# Patient Record
Sex: MALE | Race: WHITE | NOT HISPANIC OR LATINO | Employment: FULL TIME | URBAN - METROPOLITAN AREA
[De-identification: names, ages, dates, MRNs, and addresses within clinical notes are randomized per-mention and may not be internally consistent; named-entity substitution may affect disease eponyms.]

---

## 2017-01-15 ENCOUNTER — E-VISIT (OUTPATIENT)
Dept: FAMILY MEDICINE | Facility: CLINIC | Age: 42
End: 2017-01-15
Payer: COMMERCIAL

## 2017-01-15 DIAGNOSIS — J45.31 MILD PERSISTENT ASTHMA WITH EXACERBATION: Primary | ICD-10-CM

## 2017-01-15 DIAGNOSIS — J01.00 ACUTE NON-RECURRENT MAXILLARY SINUSITIS: ICD-10-CM

## 2017-01-15 PROCEDURE — 99444 ZZC PHYSICIAN ONLINE EVALUATION & MANAGEMENT SERVICE: CPT | Performed by: FAMILY MEDICINE

## 2017-01-15 RX ORDER — PREDNISONE 20 MG/1
40 TABLET ORAL DAILY
Qty: 10 TABLET | Refills: 0 | Status: SHIPPED | OUTPATIENT
Start: 2017-01-15 | End: 2017-02-16

## 2017-02-07 DIAGNOSIS — J45.31 MILD PERSISTENT ASTHMA WITH EXACERBATION: Primary | ICD-10-CM

## 2017-02-07 NOTE — Clinical Note
[facility]  [address] [facility] [id number] [phone]    February 8, 2017      Dano Kennedy  [address] [facility] [address]      Dear Dano,     Your clinic record indicates that you are due for an asthma update. We have a survey tool called an ACT (or Asthma Control Test) we use to measure the level of control of your asthma. Please complete the enclosed questionnaire and mail it back to us in the self-addressed stamped envelope.     If you have questions about this letter please contact your provider.     Sincerely,       Your [facility] Team

## 2017-02-07 NOTE — Clinical Note
[facility]                                             [address] [place]  [address]    February 8, 2017    Dano Kennedy  [address] [facility] [address]    Dear Dano,       We recently received a refill request for albuterol (PROAIR HFA/PROVENTIL.  We have refilled this for a one time 30 day supply only because you are due for a:    Asthma office visit      Please call at your earliest convenience so that there will not be a delay with your future refills.          Thank you,   Your [facility] Care Team/ms  [phone]

## 2017-02-08 RX ORDER — ALBUTEROL SULFATE 90 UG/1
2 AEROSOL, METERED RESPIRATORY (INHALATION) EVERY 6 HOURS PRN
Qty: 3 INHALER | Refills: 0 | Status: SHIPPED | OUTPATIENT
Start: 2017-02-08 | End: 2017-04-26

## 2017-02-15 ENCOUNTER — E-VISIT (OUTPATIENT)
Dept: FAMILY MEDICINE | Facility: CLINIC | Age: 42
End: 2017-02-15
Payer: COMMERCIAL

## 2017-02-15 DIAGNOSIS — J45.31 MILD PERSISTENT ASTHMA WITH EXACERBATION: ICD-10-CM

## 2017-02-15 PROCEDURE — 99444 ZZC PHYSICIAN ONLINE EVALUATION & MANAGEMENT SERVICE: CPT | Performed by: FAMILY MEDICINE

## 2017-02-16 ENCOUNTER — TELEPHONE (OUTPATIENT)
Dept: FAMILY MEDICINE | Facility: CLINIC | Age: 42
End: 2017-02-16

## 2017-02-16 RX ORDER — PREDNISONE 20 MG/1
40 TABLET ORAL DAILY
Qty: 10 TABLET | Refills: 0 | Status: SHIPPED | OUTPATIENT
Start: 2017-02-16 | End: 2017-07-11

## 2017-02-16 NOTE — TELEPHONE ENCOUNTER
PA started for Dulera through cover my meds  Key [id number]  Will await for a response.  Gem Sultana, cma

## 2017-02-17 NOTE — TELEPHONE ENCOUNTER
PA for Dulera has been approved 2/17/17 - 2/17/18.  Will notify pharmacy when they open.  Gem Sultana cma

## 2017-03-14 ENCOUNTER — TELEPHONE (OUTPATIENT)
Dept: FAMILY MEDICINE | Facility: CLINIC | Age: 42
End: 2017-03-14

## 2017-03-14 NOTE — TELEPHONE ENCOUNTER
Panel Management Review      Patient has the following on his problem list:     Asthma review     ACT Total Scores 12/8/2016   ACT TOTAL SCORE (Goal Greater than or Equal to 20) 15   In the past 12 months, how many times did you visit the emergency room for your asthma without being admitted to the hospital? 0   In the past 12 months, how many times were you hospitalized overnight because of your asthma? 0      1. Is Asthma diagnosis on the Problem List? Yes    2. Is Asthma listed on Health Maintenance? Yes    3. Patient is due for:  ACT and AAP      Composite cancer screening  Chart review shows that this patient is due/due soon for the following None  Summary:    Patient is due/failing the following:   AAP, ACT and LDL    Action needed:   Patient needs office visit for asthma and fasting lipids.    Type of outreach:    Sent [employer] message.    Questions for provider review:    None                                                                                                                                    Joy Omalley MA       Chart routed to Care Team .

## 2017-03-28 DIAGNOSIS — J45.30 MILD PERSISTENT ASTHMA: ICD-10-CM

## 2017-03-28 RX ORDER — ALBUTEROL SULFATE 0.83 MG/ML
1 SOLUTION RESPIRATORY (INHALATION) EVERY 6 HOURS PRN
Qty: 1 BOX | Refills: 0 | Status: SHIPPED | OUTPATIENT
Start: 2017-03-28 | End: 2017-11-06

## 2017-04-03 ENCOUNTER — TELEPHONE (OUTPATIENT)
Dept: FAMILY MEDICINE | Facility: CLINIC | Age: 42
End: 2017-04-03

## 2017-04-03 ENCOUNTER — OFFICE VISIT (OUTPATIENT)
Dept: FAMILY MEDICINE | Facility: CLINIC | Age: 42
End: 2017-04-03
Payer: COMMERCIAL

## 2017-04-03 VITALS
SYSTOLIC BLOOD PRESSURE: 124 MMHG | BODY MASS INDEX: 36.96 KG/M2 | OXYGEN SATURATION: 97 % | DIASTOLIC BLOOD PRESSURE: 79 MMHG | TEMPERATURE: 99 F | WEIGHT: 236 LBS | HEART RATE: 78 BPM | RESPIRATION RATE: 15 BRPM

## 2017-04-03 DIAGNOSIS — K12.2 UVULITIS: Primary | ICD-10-CM

## 2017-04-03 PROCEDURE — 99213 OFFICE O/P EST LOW 20 MIN: CPT | Performed by: NURSE PRACTITIONER

## 2017-04-03 RX ORDER — EPINEPHRINE 0.3 MG/.3ML
0.3 INJECTION SUBCUTANEOUS
Qty: 0.6 ML | Refills: 0 | Status: SHIPPED | OUTPATIENT
Start: 2017-04-03 | End: 2020-07-22

## 2017-04-03 RX ORDER — PREDNISONE 20 MG/1
TABLET ORAL
Qty: 20 TABLET | Refills: 0 | Status: SHIPPED | OUTPATIENT
Start: 2017-04-03 | End: 2017-07-11

## 2017-04-03 RX ORDER — PENICILLIN V POTASSIUM 500 MG/1
500 TABLET, FILM COATED ORAL 2 TIMES DAILY
Qty: 20 TABLET | Refills: 0 | Status: SHIPPED | OUTPATIENT
Start: 2017-04-03 | End: 2017-07-11

## 2017-04-03 ASSESSMENT — PAIN SCALES - GENERAL: PAINLEVEL: NO PAIN (0)

## 2017-04-03 NOTE — MR AVS SNAPSHOT
After Visit Summary   4/3/2017    Dano Kennedy    MRN: [id number]           Patient Information     Date Of Birth          1975        Visit Information        Provider Department      4/3/2017 11:00 AM Yumiko Mckeon APRN [facility]        Today's Diagnoses     Uvulitis    -  1      Care Instructions      Uvulitis    The uvula is the tissue that hangs in the back of the throat. Uvulitis is inflammation of the uvula. Inflammation happens when the body responds to an injury, allergic reaction, infection or illness. Symptoms of inflammation may include redness, irritation, itching, swelling, or burning. Uvulitis is more common in children than adults.  Symptoms of uvulitis include:    Sore throat    Trouble swallowing    Painful swallowing    Trouble breathing  Possible causes of uvulitis include:    Throat infection    Inhaling or swallowing chemicals     Inhaling hot air or steam    Allergic reaction to something eaten, touched or breathed in  In rare cases, uvulitis can be caused by a condition called angioedema. Angioendema can be:    Hereditary (runs in the family).     A side effect of a class of drugs used for high blood pressure called  ACE inhibitors.     Life-threatening. It can lead to swelling of the air passage in the mouth or throat. Severe swelling can block your breathing and cause death. Watch for the earliest signs of this illness. Call 911 if the swelling involves the face, mouth, or throat areas.  To help find the cause of the uvulitis, imaging tests may be done. If infection is suspected, swabs from the throat or samples of blood may be tested. Questions may be asked about an individual s vaccination history to be sure it is up to date. A cause for uvulitis is not always found.  Treatment depends on the cause and the severity of the symptoms.  Home care  Medicines: The doctor may prescribe antibiotics for an infection. For an allergic reaction or  angioedema, medicines called steroids or antihistamines may be given. Follow instructions when using any medicine.  To care for the condition at home:    Rest until the symptoms go away.    If medicines were prescribed, be sure they are taken as directed. They should be taken until they are gone or the healthcare provider says to stop them.    If you were told that your angioedema was from a medicine that you are taking, you must stop taking this medicine. Contact your doctor for a prescription for a different medicine. Advise future medical providers that you are allergic to this medicine.    Contact your healthcare provider before taking any over-the-counter medicines.    Drink fluids. Pain when swallowing may make it harder to drink and lead to dehydration. To prevent this, sip fluids throughout the day. Children can be given frozen juice bars, milk, or other cold liquids. Watch for the signs of dehydration listed below.    Ask your healthcare provider when you can return to work or school. Ask your child s healthcare provider when your child can return to school or .  Follow-up care  Follow up with the healthcare provider as directed. You may be referred to a specialist (an allergy doctor and/or an ear, nose, or throat doctor) for further evaluation and treatment. Make these visits as soon as possible.  When to seek medical advice  Call the healthcare provider right away for any of the following:    Symptoms not going away or getting worse    Symptoms of dehydration, including dark urine, dry mouth, cracked lips, dizziness, or sunken eyes    A child acting very sick or not improving  Fever in adults:     Fever over 100.4 F (38 C), or as directed by the healthcare provider.  Fever in children:    Child of any age has repeated fevers above 104 F (40 C).    Child younger than 2 years of age has a fever of 100.4 F (38 C) that continues for more than 1 day.    Child 2 years old or older has a fever of 100.4 F  (38 C) that continues for more than 3 days.  Call 911  Call 911 if any of these occur:    Drooling or trouble swallowing    Trouble breathing    Trouble talking    Swollen tongue, lips, face, or throat (may be indicated by voice changes)    Jerking and loss of consciousness; seizure    Lack of response, extreme drowsiness, or trouble waking up    Fainting    Confusion    Child being unresponsive    Skin or lips look blue, purple, or gray    4194-6261 [employer]. [address] [place] [address]. All rights reserved. This information is not intended as a substitute for professional medical care. Always follow your healthcare professional's instructions.              Follow-ups after your visit        Who to contact     If you have questions or need follow up information about today's clinic visit or your schedule please contact [facility] directly at [phone].  Normal or non-critical lab and imaging results will be communicated to you by MyChart, letter or phone within 4 business days after the clinic has received the results. If you do not hear from us within 7 days, please contact the clinic through [employer] or phone. If you have a critical or abnormal lab result, we will notify you by phone as soon as possible.  Submit refill requests through [employer] or call your pharmacy and they will forward the refill request to us. Please allow 3 business days for your refill to be completed.          Additional Information About Your Visit        MyChart Information     [employer] gives you secure access to your electronic health record. If you see a primary care provider, you can also send messages to your care team and make appointments. If you have questions, please call your primary care clinic.  If you do not have a primary care provider, please call [phone] and they will assist you.        Care EveryWhere ID     This is your Care EveryWhere ID. This could be used by other  organizations to access your [facility] medical records  [id number]        Your Vitals Were     Pulse Temperature Respirations Pulse Oximetry BMI (Body Mass Index)       78 99  F (37.2  C) (Oral) 15 97% 36.96 kg/m2        Blood Pressure from Last 3 Encounters:   04/03/17 124/79   12/14/16 120/80   12/08/16 117/73    Weight from Last 3 Encounters:   04/03/17 236 lb (107 kg)   12/14/16 232 lb (105.2 kg)   12/08/16 230 lb (104.3 kg)              Today, you had the following     No orders found for display         Today's Medication Changes          These changes are accurate as of: 4/3/17 11:26 AM.  If you have any questions, ask your nurse or doctor.               Start taking these medicines.        Dose/Directions    EPINEPHrine 0.3 MG/0.3ML injection   Commonly known as:  EPIPEN 2-DONNY   Used for:  Uvulitis   Started by:  Yumiko Mckeon APRN CNP        Dose:  0.3 mg   Inject 0.3 mLs (0.3 mg) into the muscle once as needed for anaphylaxis   Quantity:  0.6 mL   Refills:  0       penicillin V potassium 500 MG tablet   Commonly known as:  VEETID   Used for:  Uvulitis   Started by:  Yumiko Mckeon APRN CNP        Dose:  500 mg   Take 1 tablet (500 mg) by mouth 2 times daily   Quantity:  20 tablet   Refills:  0         These medicines have changed or have updated prescriptions.        Dose/Directions    * predniSONE 20 MG tablet   Commonly known as:  DELTASONE   This may have changed:  Another medication with the same name was added. Make sure you understand how and when to take each.   Used for:  Mild persistent asthma with exacerbation   Changed by:  Santa Hunter MD        Dose:  40 mg   Take 2 tablets (40 mg) by mouth daily   Quantity:  10 tablet   Refills:  0       * predniSONE 20 MG tablet   Commonly known as:  DELTASONE   This may have changed:  You were already taking a medication with the same name, and this prescription was added. Make sure you understand how and when to take each.   Used for:   Uvulitis   Changed by:  Yumiko Mckeon APRN CNP        Take 3 tabs (60 mg) by mouth daily x 3 days, 2 tabs (40 mg) daily x 3 days, 1 tab (20 mg) daily x 3 days, then 1/2 tab (10 mg) x 3 days.   Quantity:  20 tablet   Refills:  0       * Notice:  This list has 2 medication(s) that are the same as other medications prescribed for you. Read the directions carefully, and ask your doctor or other care provider to review them with you.         Where to get your medicines      These medications were sent to [facility] [address] [facility] [address] [facility] [address]  [address] [facility] [address]     Phone:  [phone]     EPINEPHrine 0.3 MG/0.3ML injection    penicillin V potassium 500 MG tablet    predniSONE 20 MG tablet                Primary Care Provider Office Phone # Fax #    Santa Hunter [phone]       [facility] [address] [facility] [address]        Thank you!     Thank you for choosing [facility]  for your care. Our goal is always to provide you with excellent care. Hearing back from our patients is one way we can continue to improve our services. Please take a few minutes to complete the written survey that you may receive in the mail after your visit with us. Thank you!             Your Updated Medication List - Protect others around you: Learn how to safely use, store and throw away your medicines at www.disposemymeds.org.          This list is accurate as of: 4/3/17 11:26 AM.  Always use your most recent med list.                   Brand Name Dispense Instructions for use    * albuterol 108 (90 BASE) MCG/ACT Inhaler    PROAIR HFA/PROVENTIL HFA/VENTOLIN HFA    3 Inhaler    Inhale 2 puffs into the lungs every 6 hours as needed for shortness of breath / dyspnea       * albuterol (2.5 MG/3ML) 0.083% neb solution     1 Box    Take 1 vial (2.5 mg) by nebulization every 6 hours as needed for  shortness of breath / dyspnea       amoxicillin-clavulanate 875-125 MG per tablet    AUGMENTIN    20 tablet    Take 1 tablet by mouth 2 times daily       EPINEPHrine 0.3 MG/0.3ML injection    EPIPEN 2-DONNY    0.6 mL    Inject 0.3 mLs (0.3 mg) into the muscle once as needed for anaphylaxis       fluticasone 50 MCG/ACT spray    FLONASE    1 Package    Spray 1-2 sprays into both nostrils daily       loratadine-pseudoePHEDrine  MG per 24 hr tablet    CLARITIN-D 24 HOUR    90 tablet    Take 1 tablet by mouth daily       mometasone-formoterol 200-5 MCG/ACT oral inhaler    DULERA    3 Inhaler    Inhale 2 puffs into the lungs 2 times daily       * order for DME     1 Device    Nebulizer tubing and mouthpiece       * order for DME      [employer] REMSTAR 60 Series Auto CPAP 5-18cm H2O, MIrage Fx standard nasal mask       order for DME     1 Device    Equipment being ordered: nebulizer       penicillin V potassium 500 MG tablet    VEETID    20 tablet    Take 1 tablet (500 mg) by mouth 2 times daily       * predniSONE 20 MG tablet    DELTASONE    10 tablet    Take 2 tablets (40 mg) by mouth daily       * predniSONE 20 MG tablet    DELTASONE    20 tablet    Take 3 tabs (60 mg) by mouth daily x 3 days, 2 tabs (40 mg) daily x 3 days, 1 tab (20 mg) daily x 3 days, then 1/2 tab (10 mg) x 3 days.       scopolamine 72 hr patch    TRANSDERM    4 patch    Place 1 patch onto the skin every 72 hours.  Apply to hairless area behind one ear at least 4 hours before travel.  Remove old patch and change every 3 days.       sildenafil 20 MG tablet    REVATIO/VIAGRA    10 tablet    Take 1 tablet (20 mg) by mouth daily as needed Take 1-5 tablets on empty stomach 30 minutes before intercourse.  Never use with nitroglycerin, terazosin or doxazosin.       tadalafil 5 MG tablet    CIALIS    30 tablet    Take 1 tablet (5 mg) by mouth as needed for erectile dysfunction       * Notice:  This list has 6 medication(s) that are the same as other  medications prescribed for you. Read the directions carefully, and ask your doctor or other care provider to review them with you.

## 2017-04-03 NOTE — PATIENT INSTRUCTIONS
Uvulitis    The uvula is the tissue that hangs in the back of the throat. Uvulitis is inflammation of the uvula. Inflammation happens when the body responds to an injury, allergic reaction, infection or illness. Symptoms of inflammation may include redness, irritation, itching, swelling, or burning. Uvulitis is more common in children than adults.  Symptoms of uvulitis include:    Sore throat    Trouble swallowing    Painful swallowing    Trouble breathing  Possible causes of uvulitis include:    Throat infection    Inhaling or swallowing chemicals     Inhaling hot air or steam    Allergic reaction to something eaten, touched or breathed in  In rare cases, uvulitis can be caused by a condition called angioedema. Angioendema can be:    Hereditary (runs in the family).     A side effect of a class of drugs used for high blood pressure called  ACE inhibitors.     Life-threatening. It can lead to swelling of the air passage in the mouth or throat. Severe swelling can block your breathing and cause death. Watch for the earliest signs of this illness. Call 911 if the swelling involves the face, mouth, or throat areas.  To help find the cause of the uvulitis, imaging tests may be done. If infection is suspected, swabs from the throat or samples of blood may be tested. Questions may be asked about an individual s vaccination history to be sure it is up to date. A cause for uvulitis is not always found.  Treatment depends on the cause and the severity of the symptoms.  Home care  Medicines: The doctor may prescribe antibiotics for an infection. For an allergic reaction or angioedema, medicines called steroids or antihistamines may be given. Follow instructions when using any medicine.  To care for the condition at home:    Rest until the symptoms go away.    If medicines were prescribed, be sure they are taken as directed. They should be taken until they are gone or the healthcare provider says to stop them.    If you were  told that your angioedema was from a medicine that you are taking, you must stop taking this medicine. Contact your doctor for a prescription for a different medicine. Advise future medical providers that you are allergic to this medicine.    Contact your healthcare provider before taking any over-the-counter medicines.    Drink fluids. Pain when swallowing may make it harder to drink and lead to dehydration. To prevent this, sip fluids throughout the day. Children can be given frozen juice bars, milk, or other cold liquids. Watch for the signs of dehydration listed below.    Ask your healthcare provider when you can return to work or school. Ask your child s healthcare provider when your child can return to school or .  Follow-up care  Follow up with the healthcare provider as directed. You may be referred to a specialist (an allergy doctor and/or an ear, nose, or throat doctor) for further evaluation and treatment. Make these visits as soon as possible.  When to seek medical advice  Call the healthcare provider right away for any of the following:    Symptoms not going away or getting worse    Symptoms of dehydration, including dark urine, dry mouth, cracked lips, dizziness, or sunken eyes    A child acting very sick or not improving  Fever in adults:     Fever over 100.4 F (38 C), or as directed by the healthcare provider.  Fever in children:    Child of any age has repeated fevers above 104 F (40 C).    Child younger than 2 years of age has a fever of 100.4 F (38 C) that continues for more than 1 day.    Child 2 years old or older has a fever of 100.4 F (38 C) that continues for more than 3 days.  Call 911  Call 911 if any of these occur:    Drooling or trouble swallowing    Trouble breathing    Trouble talking    Swollen tongue, lips, face, or throat (may be indicated by voice changes)    Jerking and loss of consciousness; seizure    Lack of response, extreme drowsiness, or trouble waking  up    Fainting    Confusion    Child being unresponsive    Skin or lips look blue, purple, or gray    5405-2126 [employer]. [address] [place] [address], [place] [address]. All rights reserved. This information is not intended as a substitute for professional medical care. Always follow your healthcare professional's instructions.

## 2017-04-03 NOTE — PROGRESS NOTES
"  SUBJECTIVE:                                                    Dano Kennedy is a 42 year old male who presents to clinic today for the following health issues:    He at shrimp last night at 10 pm, was up 2 hours past that with no symptoms  When he awoke this morning he felt like his throat was dry and was having trouble swallowing. His uvula is red and swollen. He took 2 bendaryl which relieved the symptoms. He never has had facial or lip swelling, no hives  No known anaphylactic reactions, has had shrimp \"many times\" before with no reaction.    Problem list and histories reviewed & adjusted, as indicated.  Additional history: as documented    Patient Active Problem List   Diagnosis     Anxiety     Mild persistent asthma with exacerbation     Seasonal allergic rhinitis     Environmental allergies     GSE (gluten-sensitive enteropathy)     BMI 33.0-33.9,adult     Hyperlipidemia LDL goal <130     ADHD (attention deficit hyperactivity disorder)     Heartburn     WENDY (obstructive sleep apnea)     Past Surgical History:   Procedure Laterality Date     ENDOSCOPIC POLYPECTOMY NASAL       LASER HOLMIUM LITHOTRIPSY URETER(S), INSERT STENT, COMBINED Left 1/14/2015    Procedure: COMBINED CYSTOSCOPY, URETEROSCOPY, LASER HOLMIUM LITHOTRIPSY URETER(S), INSERT STENT;  Surgeon: Derrek Cobb MD;  Location:  OR       Social History   Substance Use Topics     Smoking status: Never Smoker     Smokeless tobacco: Never Used     Alcohol use Yes     No family history on file.        Reviewed and updated as needed this visit by clinical staff  Tobacco       Reviewed and updated as needed this visit by Provider         ROS:  Constitutional, HEENT, cardiovascular, pulmonary, GI, , musculoskeletal, neuro, skin, endocrine and psych systems are negative, except as otherwise noted.    OBJECTIVE:                                                    /79  Pulse 78  Temp 99  F (37.2  C) (Oral)  Resp 15  Wt 236 lb (107 kg)  " SpO2 97%  BMI 36.96 kg/m2  Body mass index is 36.96 kg/(m^2).  GENERAL: alert and no distress  EYES: Eyes grossly normal to inspection, PERRL and conjunctivae and sclerae normal  HENT: normal cephalic/atraumatic, ear canals and TM's normal, nose and mouth without ulcers or lesions and uvula erythematous and inflammed  NECK: no adenopathy, no asymmetry, masses, or scars and thyroid normal to palpation  RESP: lungs clear to auscultation - no rales, rhonchi or wheezes  CV: regular rate and rhythm, normal S1 S2, no S3 or S4, no murmur, click or rub, no peripheral edema and peripheral pulses strong  SKIN: no suspicious lesions or rashes    Diagnostic Test Results:  none      ASSESSMENT/PLAN:                                                        1. Uvulitis    Being unknown cause treated for possible infectious cause and allergic cause    1)- penicillin V potassium (VEETID) 500 MG tablet; Take 1 tablet (500 mg) by mouth 2 times daily  Dispense: 20 tablet; Refill: 0    2)- predniSONE (DELTASONE) 20 MG tablet; Take 3 tabs (60 mg) by mouth daily x 3 days, 2 tabs (40 mg) daily x 3 days, 1 tab (20 mg) daily x 3 days, then 1/2 tab (10 mg) x 3 days.  Dispense: 20 tablet; Refill: 0    Advised on when and how to use epipen if needed  - EPINEPHrine (EPIPEN 2-DONNY) 0.3 MG/0.3ML injection; Inject 0.3 mLs (0.3 mg) into the muscle once as needed for anaphylaxis  Dispense: 0.6 mL; Refill: 0    Home treat and monitor symptoms, if worsening or not improving call or rtc  See Patient Instructions  Patient Instructions     Uvulitis    The uvula is the tissue that hangs in the back of the throat. Uvulitis is inflammation of the uvula. Inflammation happens when the body responds to an injury, allergic reaction, infection or illness. Symptoms of inflammation may include redness, irritation, itching, swelling, or burning. Uvulitis is more common in children than adults.  Symptoms of uvulitis include:    Sore throat    Trouble  swallowing    Painful swallowing    Trouble breathing  Possible causes of uvulitis include:    Throat infection    Inhaling or swallowing chemicals     Inhaling hot air or steam    Allergic reaction to something eaten, touched or breathed in  In rare cases, uvulitis can be caused by a condition called angioedema. Angioendema can be:    Hereditary (runs in the family).     A side effect of a class of drugs used for high blood pressure called  ACE inhibitors.     Life-threatening. It can lead to swelling of the air passage in the mouth or throat. Severe swelling can block your breathing and cause death. Watch for the earliest signs of this illness. Call 911 if the swelling involves the face, mouth, or throat areas.  To help find the cause of the uvulitis, imaging tests may be done. If infection is suspected, swabs from the throat or samples of blood may be tested. Questions may be asked about an individual s vaccination history to be sure it is up to date. A cause for uvulitis is not always found.  Treatment depends on the cause and the severity of the symptoms.  Home care  Medicines: The doctor may prescribe antibiotics for an infection. For an allergic reaction or angioedema, medicines called steroids or antihistamines may be given. Follow instructions when using any medicine.  To care for the condition at home:    Rest until the symptoms go away.    If medicines were prescribed, be sure they are taken as directed. They should be taken until they are gone or the healthcare provider says to stop them.    If you were told that your angioedema was from a medicine that you are taking, you must stop taking this medicine. Contact your doctor for a prescription for a different medicine. Advise future medical providers that you are allergic to this medicine.    Contact your healthcare provider before taking any over-the-counter medicines.    Drink fluids. Pain when swallowing may make it harder to drink and lead to  dehydration. To prevent this, sip fluids throughout the day. Children can be given frozen juice bars, milk, or other cold liquids. Watch for the signs of dehydration listed below.    Ask your healthcare provider when you can return to work or school. Ask your child s healthcare provider when your child can return to school or .  Follow-up care  Follow up with the healthcare provider as directed. You may be referred to a specialist (an allergy doctor and/or an ear, nose, or throat doctor) for further evaluation and treatment. Make these visits as soon as possible.  When to seek medical advice  Call the healthcare provider right away for any of the following:    Symptoms not going away or getting worse    Symptoms of dehydration, including dark urine, dry mouth, cracked lips, dizziness, or sunken eyes    A child acting very sick or not improving  Fever in adults:     Fever over 100.4 F (38 C), or as directed by the healthcare provider.  Fever in children:    Child of any age has repeated fevers above 104 F (40 C).    Child younger than 2 years of age has a fever of 100.4 F (38 C) that continues for more than 1 day.    Child 2 years old or older has a fever of 100.4 F (38 C) that continues for more than 3 days.  Call 911  Call 911 if any of these occur:    Drooling or trouble swallowing    Trouble breathing    Trouble talking    Swollen tongue, lips, face, or throat (may be indicated by voice changes)    Jerking and loss of consciousness; seizure    Lack of response, extreme drowsiness, or trouble waking up    Fainting    Confusion    Child being unresponsive    Skin or lips look blue, purple, or gray    6658-0076 [employer]. [address] [place] [address], [place] [address]. All rights reserved. This information is not intended as a substitute for professional medical care. Always follow your healthcare professional's instructions.            RAMON [facility]

## 2017-04-03 NOTE — TELEPHONE ENCOUNTER
Patient states that he woke up this morning and his uvula appears swollen in the mirror.  States that it doesn't hurt.  He did take some Benadryl now.  No breathing issues.  Is able to swallow fluids/food.  He states did get a little better with he Benadryl.  He had had cold shrimp at 9 or 10pm last night.  States was awake for 3 hrs after that and no issues.  Has had shrimp multiple times in past without any issues.   made him an 11am appointment today with Yumiko Mckeon.  He is aware that if he has any worsening of symptoms; ie throat swelling, lips swelling, respiratory concerns of any kind, difficulty swallowing fluids he needs to call 911 immediately.  He states understanding.  Praveena Dougherty RN

## 2017-04-04 ASSESSMENT — ASTHMA QUESTIONNAIRES: ACT_TOTALSCORE: 15

## 2017-04-10 DIAGNOSIS — N52.9 ERECTILE DYSFUNCTION, UNSPECIFIED ERECTILE DYSFUNCTION TYPE: ICD-10-CM

## 2017-04-10 NOTE — TELEPHONE ENCOUNTER
tadalafil      Last Written Prescription Date: 12/14/16  Last Fill Quantity: 30,  # refills: 0   Last Office Visit with [facility], [facility] or [facility] prescribing provider: 12/14/16 Cobb

## 2017-04-11 DIAGNOSIS — N52.9 ERECTILE DYSFUNCTION, UNSPECIFIED ERECTILE DYSFUNCTION TYPE: ICD-10-CM

## 2017-04-11 RX ORDER — TADALAFIL 5 MG/1
5 TABLET ORAL PRN
Qty: 30 TABLET | Refills: 0 | Status: SHIPPED | OUTPATIENT
Start: 2017-04-11 | End: 2017-04-15

## 2017-04-12 RX ORDER — SILDENAFIL CITRATE 20 MG/1
20 TABLET ORAL DAILY PRN
Qty: 10 TABLET | Refills: 11 | OUTPATIENT
Start: 2017-04-12

## 2017-04-14 DIAGNOSIS — N52.9 ERECTILE DYSFUNCTION, UNSPECIFIED ERECTILE DYSFUNCTION TYPE: ICD-10-CM

## 2017-04-14 NOTE — TELEPHONE ENCOUNTER
Both cialis and viagra are on patient's med list.   Routing to PCP to clarify if patient is to be on both.    Thu Bishop RN

## 2017-04-14 NOTE — TELEPHONE ENCOUNTER
tadalafil (CIALIS) 5 MG tablet      Last Written Prescription Date: 4/11/17  Last Fill Quantity: 30,  # refills: 0   Last Office Visit with [facility], [facility] or [facility] prescribing provider: 12/14/16

## 2017-04-17 RX ORDER — SILDENAFIL CITRATE 20 MG/1
20 TABLET ORAL DAILY PRN
Qty: 10 TABLET | Refills: 11 | Status: SHIPPED | OUTPATIENT
Start: 2017-04-17 | End: 2017-04-21

## 2017-04-17 RX ORDER — TADALAFIL 5 MG/1
5 TABLET ORAL PRN
Qty: 30 TABLET | Refills: 0 | Status: SHIPPED | OUTPATIENT
Start: 2017-04-17 | End: 2018-01-08

## 2017-04-17 NOTE — TELEPHONE ENCOUNTER
cialis  Prescription approved per [facility].  Delaney Guy RN

## 2017-04-21 ENCOUNTER — TELEPHONE (OUTPATIENT)
Dept: FAMILY MEDICINE | Facility: CLINIC | Age: 42
End: 2017-04-21

## 2017-04-21 DIAGNOSIS — N52.9 ERECTILE DYSFUNCTION, UNSPECIFIED ERECTILE DYSFUNCTION TYPE: ICD-10-CM

## 2017-04-21 RX ORDER — SILDENAFIL CITRATE 20 MG/1
20 TABLET ORAL DAILY PRN
Qty: 10 TABLET | Refills: 11 | Status: SHIPPED | OUTPATIENT
Start: 2017-04-21 | End: 2017-04-21

## 2017-04-21 RX ORDER — SILDENAFIL CITRATE 20 MG/1
20 TABLET ORAL DAILY PRN
Qty: 10 TABLET | Refills: 11 | Status: SHIPPED | OUTPATIENT
Start: 2017-04-21 | End: 2018-01-08

## 2017-04-21 NOTE — TELEPHONE ENCOUNTER
Is patient getting both Cialis and Viagra?    Patient last filled Cialis 12/22/16  Last filled Viagra 12/10/16.    If patient is to be on both, then pharmacy will need new Viagra prescription as they never received the 4/17/17 prescription.     Ronda Rosenbaum, RAJESHN RN

## 2017-04-21 NOTE — TELEPHONE ENCOUNTER
Called in clarification to Thao at pharmacy.       Please fax over prescription.    Thank you, RAJESH BinghamN RN

## 2017-04-25 ENCOUNTER — TELEPHONE (OUTPATIENT)
Dept: FAMILY MEDICINE | Facility: CLINIC | Age: 42
End: 2017-04-25

## 2017-04-25 NOTE — TELEPHONE ENCOUNTER
4/21/17 prescription was printed as local print and pharmacy never received the prescription. Gave VO to pharmacist Sarah.   Ronda Rosenbaum, RAJESHN RN

## 2017-04-25 NOTE — TELEPHONE ENCOUNTER
Sarah from [facility] is calling for status on RX: sildenafil (REVATIO/VIAGRA) 20 MG tablet. Please call to advise. Thank  You.

## 2017-04-26 DIAGNOSIS — J45.31 MILD PERSISTENT ASTHMA WITH EXACERBATION: ICD-10-CM

## 2017-04-26 RX ORDER — ALBUTEROL SULFATE 90 UG/1
AEROSOL, METERED RESPIRATORY (INHALATION)
Qty: 25.5 G | Refills: 0 | Status: SHIPPED | OUTPATIENT
Start: 2017-04-26 | End: 2017-08-13

## 2017-06-08 DIAGNOSIS — J30.2 SEASONAL ALLERGIC RHINITIS, UNSPECIFIED ALLERGIC RHINITIS TRIGGER: Primary | ICD-10-CM

## 2017-06-08 RX ORDER — LORATADINE PSEUDOEPHEDRINE SULFATE 10; 240 MG/1; MG/1
TABLET, EXTENDED RELEASE ORAL
Qty: 90 TABLET | Refills: 0 | OUTPATIENT
Start: 2017-06-08

## 2017-06-12 RX ORDER — LORATADINE PSEUDOEPHEDRINE SULFATE 10; 240 MG/1; MG/1
TABLET, EXTENDED RELEASE ORAL
Qty: 90 TABLET | Refills: 0 | Status: SHIPPED | OUTPATIENT
Start: 2017-06-12 | End: 2017-09-22

## 2017-07-11 ENCOUNTER — TELEPHONE (OUTPATIENT)
Dept: FAMILY MEDICINE | Facility: CLINIC | Age: 42
End: 2017-07-11

## 2017-07-11 DIAGNOSIS — J45.31 MILD PERSISTENT ASTHMA WITH EXACERBATION: Primary | ICD-10-CM

## 2017-07-11 NOTE — TELEPHONE ENCOUNTER
Due to copyright laws, staff cannot call and do ACT over phone. Must be sent via postal mail.   , can you please mail the ACT and let her know we will need him to be fasting the next time he is seen.   Thanks, Ronda Rosenbaum, RAJESHN RN

## 2017-07-11 NOTE — LETTER
[facility]  [address] [facility] [id number] [phone]    July 11, 2017          Dano Kennedy  [address] [facility] [address]      Dear Dano,     Your clinic record indicates that you are due for an asthma update. We have a survey tool called an ACT (or Asthma Control Test) we use to measure the level of control of your asthma. Please complete the enclosed questionnaire and mail it back to us in the self-addressed stamped envelope.    You are due for fasting labs (cholesterol) the next time you are in the clinic.       If you have questions about this letter please contact your provider.     Sincerely,       Your [facility] Team

## 2017-07-11 NOTE — TELEPHONE ENCOUNTER
Panel Management Review      Patient has the following on his problem list:     Asthma review     ACT Total Scores 4/3/2017   ACT TOTAL SCORE (Goal Greater than or Equal to 20) 15   In the past 12 months, how many times did you visit the emergency room for your asthma without being admitted to the hospital? 0   In the past 12 months, how many times were you hospitalized overnight because of your asthma? 0      1. Is Asthma diagnosis on the Problem List? Yes    2. Is Asthma listed on Health Maintenance? Yes    3. Patient is due for:  ACT      Composite cancer screening  Chart review shows that this patient is due/due soon for the following None  Summary:    Patient is due/failing the following:   AAP, ACT and LDL    Action needed:   Routed to provider for review.    Type of outreach:    None, routed to provider for review.    Questions for provider review:    Failing act and needs aap, also need lipid panel                                                                                                                                    Jyo Omalley MA      Chart routed to Provider .

## 2017-07-11 NOTE — TELEPHONE ENCOUNTER
Letter & ACT mailed to patient.  Will await for the patient to return it back to us.  Becca Dietz,

## 2017-08-13 DIAGNOSIS — J45.31 MILD PERSISTENT ASTHMA WITH EXACERBATION: ICD-10-CM

## 2017-08-13 NOTE — LETTER
August 15, 2017    Dano Kennedy  [address] [facility] [address]        Dear Dano,       We recently received a refill request for ALBUTEROL 108 (90 BASE) MCG/ACT inhaler.  We have refilled this for a one time refill only because you are due for a:    Asthma office visit      Please call at your earliest convenience so that there will not be a delay with your future refills.          Thank you,   Your [facility] Team/[facility]  [phone]

## 2017-08-14 RX ORDER — ALBUTEROL SULFATE 90 UG/1
AEROSOL, METERED RESPIRATORY (INHALATION)
Qty: 25.5 G | Refills: 0 | Status: SHIPPED | OUTPATIENT
Start: 2017-08-14 | End: 2018-09-26

## 2017-09-22 ENCOUNTER — OFFICE VISIT (OUTPATIENT)
Dept: FAMILY MEDICINE | Facility: CLINIC | Age: 42
End: 2017-09-22
Payer: COMMERCIAL

## 2017-09-22 VITALS
TEMPERATURE: 97.6 F | BODY MASS INDEX: 36.96 KG/M2 | HEART RATE: 83 BPM | OXYGEN SATURATION: 97 % | DIASTOLIC BLOOD PRESSURE: 80 MMHG | WEIGHT: 236 LBS | SYSTOLIC BLOOD PRESSURE: 126 MMHG

## 2017-09-22 DIAGNOSIS — J45.30 MILD PERSISTENT ASTHMA, UNCOMPLICATED: Primary | ICD-10-CM

## 2017-09-22 DIAGNOSIS — Z91.09 ENVIRONMENTAL ALLERGIES: ICD-10-CM

## 2017-09-22 PROCEDURE — 99213 OFFICE O/P EST LOW 20 MIN: CPT | Performed by: PHYSICIAN ASSISTANT

## 2017-09-22 RX ORDER — ALBUTEROL SULFATE 90 UG/1
2 AEROSOL, METERED RESPIRATORY (INHALATION) EVERY 6 HOURS PRN
Qty: 1 INHALER | Refills: 0 | Status: SHIPPED | OUTPATIENT
Start: 2017-09-22 | End: 2017-10-27

## 2017-09-22 ASSESSMENT — ENCOUNTER SYMPTOMS
HEMOPTYSIS: 0
FEVER: 0
WEIGHT LOSS: 0
CONSTITUTIONAL NEGATIVE: 1
EYE PAIN: 0
GASTROINTESTINAL NEGATIVE: 1
CARDIOVASCULAR NEGATIVE: 1
COUGH: 0
DIAPHORESIS: 0
RESPIRATORY NEGATIVE: 1
PALPITATIONS: 0

## 2017-09-22 NOTE — MR AVS SNAPSHOT
After Visit Summary   9/22/2017    Dano Kennedy    MRN: [id number]           Patient Information     Date Of Birth          1975        Visit Information        Provider Department      9/22/2017 2:40 PM Lee Ann Cruz PA-C [facility]        Today's Diagnoses     Mild persistent asthma, uncomplicated    -  1    Environmental allergies           Follow-ups after your visit        Who to contact     If you have questions or need follow up information about today's clinic visit or your schedule please contact [facility] directly at [phone].  Normal or non-critical lab and imaging results will be communicated to you by [employer], letter or phone within 4 business days after the clinic has received the results. If you do not hear from us within 7 days, please contact the clinic through [employer] or phone. If you have a critical or abnormal lab result, we will notify you by phone as soon as possible.  Submit refill requests through [employer] or call your pharmacy and they will forward the refill request to us. Please allow 3 business days for your refill to be completed.          Additional Information About Your Visit        MyChart Information     [employer] gives you secure access to your electronic health record. If you see a primary care provider, you can also send messages to your care team and make appointments. If you have questions, please call your primary care clinic.  If you do not have a primary care provider, please call [phone] and they will assist you.        Care EveryWhere ID     This is your Care EveryWhere ID. This could be used by other organizations to access your [place] medical records  [id number]        Your Vitals Were     Pulse Temperature Pulse Oximetry BMI (Body Mass Index)          83 97.6  F (36.4  C) (Oral) 97% 36.96 kg/m2         Blood Pressure from Last 3 Encounters:   09/22/17 126/80   04/03/17 124/79   12/14/16 120/80    Weight from  Last 3 Encounters:   09/22/17 236 lb (107 kg)   04/03/17 236 lb (107 kg)   12/14/16 232 lb (105.2 kg)              Today, you had the following     No orders found for display         Today's Medication Changes          These changes are accurate as of: 9/22/17  2:59 PM.  If you have any questions, ask your nurse or doctor.               These medicines have changed or have updated prescriptions.        Dose/Directions    * albuterol (2.5 MG/3ML) 0.083% neb solution   This may have changed:  Another medication with the same name was added. Make sure you understand how and when to take each.   Used for:  Mild persistent asthma   Changed by:  Santa Hunter MD        Dose:  1 vial   Take 1 vial (2.5 mg) by nebulization every 6 hours as needed for shortness of breath / dyspnea   Quantity:  1 Box   Refills:  0       * PROAIR  (90 BASE) MCG/ACT Inhaler   This may have changed:  Another medication with the same name was added. Make sure you understand how and when to take each.   Used for:  Mild persistent asthma with exacerbation   Generic drug:  albuterol   Changed by:  Santa Hunter MD        INHALE 2 PUFFS INTO THE LUNGS EVERY 6 HOURS AS NEEDED FOR SHORTNESS OF BREATH.   Quantity:  25.5 g   Refills:  0       * albuterol 108 (90 BASE) MCG/ACT Inhaler   Commonly known as:  PROAIR HFA/PROVENTIL HFA/VENTOLIN HFA   This may have changed:  You were already taking a medication with the same name, and this prescription was added. Make sure you understand how and when to take each.   Used for:  Mild persistent asthma, uncomplicated   Changed by:  Lee Ann Cruz PA-C        Dose:  2 puff   Inhale 2 puffs into the lungs every 6 hours as needed for shortness of breath / dyspnea or wheezing   Quantity:  1 Inhaler   Refills:  0       loratadine-pseudoePHEDrine  MG per 24 hr tablet   Commonly known as:  CLARITIN-D 24 HOUR   This may have changed:  See the new instructions.   Used for:  Environmental allergies    Changed by:  Lee Ann Cruz PA-C        Dose:  1 tablet   Take 1 tablet by mouth daily   Quantity:  30 tablet   Refills:  0       * Notice:  This list has 3 medication(s) that are the same as other medications prescribed for you. Read the directions carefully, and ask your doctor or other care provider to review them with you.         Where to get your medicines      These medications were sent to [facility] [id number] [place] [address] [address] [place] [address] AT SEC [address]  [address] [facility] [address]     Phone:  [phone]     albuterol 108 (90 BASE) MCG/ACT Inhaler    mometasone-formoterol 200-5 MCG/ACT oral inhaler         Some of these will need a paper prescription and others can be bought over the counter.  Ask your nurse if you have questions.     Bring a paper prescription for each of these medications     loratadine-pseudoePHEDrine  MG per 24 hr tablet                Primary Care Provider Office Phone # Fax #    Santa Hunter [phone] [address] [facility] [address]        Equal Access to Services     [facility]: Hadii lori mitchell hadasho Soroulaali, waaxda luqadaha, qaybta kaalmada adeegyada, daiana lim . So [facility] [phone].    ATENCIÓN: Si habla español, tiene a skinner disposición servicios gratuitos de asistencia lingüística. [facility] [phone].    We comply with applicable federal civil rights laws and [place] laws. We do not discriminate on the basis of race, color, national origin, age, disability sex, sexual orientation or gender identity.            Thank you!     Thank you for choosing [facility]  for your care. Our goal is always to provide you with excellent care. Hearing back from our patients is one way we can continue to improve our services. Please take a few minutes to complete the written survey that you may receive in the mail after your visit with us. Thank  you!             Your Updated Medication List - Protect others around you: Learn how to safely use, store and throw away your medicines at www.disposemymeds.org.          This list is accurate as of: 9/22/17  2:59 PM.  Always use your most recent med list.                   Brand Name Dispense Instructions for use Diagnosis    * albuterol (2.5 MG/3ML) 0.083% neb solution     1 Box    Take 1 vial (2.5 mg) by nebulization every 6 hours as needed for shortness of breath / dyspnea    Mild persistent asthma       * PROAIR  (90 BASE) MCG/ACT Inhaler   Generic drug:  albuterol     25.5 g    INHALE 2 PUFFS INTO THE LUNGS EVERY 6 HOURS AS NEEDED FOR SHORTNESS OF BREATH.    Mild persistent asthma with exacerbation       * albuterol 108 (90 BASE) MCG/ACT Inhaler    PROAIR HFA/PROVENTIL HFA/VENTOLIN HFA    1 Inhaler    Inhale 2 puffs into the lungs every 6 hours as needed for shortness of breath / dyspnea or wheezing    Mild persistent asthma, uncomplicated       EPINEPHrine 0.3 MG/0.3ML injection 2-pack    EPIPEN 2-DONNY    0.6 mL    Inject 0.3 mLs (0.3 mg) into the muscle once as needed for anaphylaxis    Uvulitis       fluticasone 50 MCG/ACT spray    FLONASE    1 Package    Spray 1-2 sprays into both nostrils daily    Seasonal allergic rhinitis       loratadine-pseudoePHEDrine  MG per 24 hr tablet    CLARITIN-D 24 HOUR    30 tablet    Take 1 tablet by mouth daily    Environmental allergies       mometasone-formoterol 200-5 MCG/ACT oral inhaler    DULERA    3 Inhaler    Inhale 2 puffs into the lungs 2 times daily    Mild persistent asthma, uncomplicated       * order for DME     1 Device    Nebulizer tubing and mouthpiece    Mild persistent asthma with exacerbation       * order for DME      [employer] REMSTAR 60 Series Auto CPAP 5-18cm H2O, MIrage Fx standard nasal mask        order for DME     1 Device    Equipment being ordered: nebulizer    Mild persistent asthma with exacerbation       sildenafil 20 MG tablet     REVATIO    10 tablet    Take 1 tablet (20 mg) by mouth daily as needed Take 1-5 tablets on empty stomach 30 minutes before intercourse.  Never use with nitroglycerin, terazosin or doxazosin.    Erectile dysfunction, unspecified erectile dysfunction type       tadalafil 5 MG tablet    CIALIS    30 tablet    Take 1 tablet (5 mg) by mouth as needed for erectile dysfunction    Erectile dysfunction, unspecified erectile dysfunction type       * Notice:  This list has 5 medication(s) that are the same as other medications prescribed for you. Read the directions carefully, and ask your doctor or other care provider to review them with you.

## 2017-09-22 NOTE — PROGRESS NOTES
SUBJECTIVE:     SO  Dano Kennedy is a 42 year old male who presents to clinic today for the following health issues:  Has known history of mild persistent asthma and is currently on Dulera and albuterol inhalers. But he does not take the dulera inhaler on a consistent basis.  Was told that he would need to be seen in order to have his albuterol inhaler refilled.  States that he does have symptoms at night disrupting his sleep. No recent asthma exacerbation requiring ER visit or hospitalization.  States that his asthma is primarily triggered by allergies and takes a Claritin-D consistently primarily during the summer, fall and winter    Asthma Follow-Up  Was ACT completed today?  Yes    ACT Total Scores 9/22/2017   ACT TOTAL SCORE -   ASTHMA ER VISITS -   ASTHMA HOSPITALIZATIONS -   ACT TOTAL SCORE (Goal Greater than or Equal to 20) 14   In the past 12 months, how many times did you visit the emergency room for your asthma without being admitted to the hospital? -   In the past 12 months, how many times were you hospitalized overnight because of your asthma? -       Recent asthma triggers that patient is dealing with: Seasonal allergies, exercise     Amount of exercise or physical activity: 3 days per week    Problems taking medications regularly: No    Medication side effects: none  Diet: regular (no restrictions)      Reviewed PMH.  Patient Active Problem List   Diagnosis     Anxiety     Mild persistent asthma with exacerbation     Seasonal allergic rhinitis     Environmental allergies     GSE (gluten-sensitive enteropathy)     BMI 33.0-33.9,adult     Hyperlipidemia LDL goal <130     ADHD (attention deficit hyperactivity disorder)     Heartburn     WENDY (obstructive sleep apnea)     Current Outpatient Prescriptions   Medication Sig Dispense Refill     loratadine-pseudoePHEDrine (CLARITIN-D 24 HOUR)  MG per 24 hr tablet Take 1 tablet by mouth daily 30 tablet 0     mometasone-formoterol (DULERA) 200-5  MCG/ACT oral inhaler Inhale 2 puffs into the lungs 2 times daily 3 Inhaler 3     albuterol (PROAIR HFA/PROVENTIL HFA/VENTOLIN HFA) 108 (90 BASE) MCG/ACT Inhaler Inhale 2 puffs into the lungs every 6 hours as needed for shortness of breath / dyspnea or wheezing 1 Inhaler 0     ALBUTEROL 108 (90 BASE) MCG/ACT inhaler INHALE 2 PUFFS INTO THE LUNGS EVERY 6 HOURS AS NEEDED FOR SHORTNESS OF BREATH. 25.5 g 0     sildenafil (REVATIO/VIAGRA) 20 MG tablet Take 1 tablet (20 mg) by mouth daily as needed Take 1-5 tablets on empty stomach 30 minutes before intercourse.  Never use with nitroglycerin, terazosin or doxazosin. (Patient not taking: Reported on 9/22/2017) 10 tablet 11     tadalafil (CIALIS) 5 MG tablet Take 1 tablet (5 mg) by mouth as needed for erectile dysfunction (Patient not taking: Reported on 9/22/2017) 30 tablet 0     EPINEPHrine (EPIPEN 2-DONNY) 0.3 MG/0.3ML injection Inject 0.3 mLs (0.3 mg) into the muscle once as needed for anaphylaxis (Patient not taking: Reported on 9/22/2017) 0.6 mL 0     albuterol (2.5 MG/3ML) 0.083% neb solution Take 1 vial (2.5 mg) by nebulization every 6 hours as needed for shortness of breath / dyspnea (Patient not taking: Reported on 9/22/2017) 1 Box 0     [DISCONTINUED] mometasone-formoterol (DULERA) 200-5 MCG/ACT oral inhaler Inhale 2 puffs into the lungs 2 times daily (Patient not taking: Reported on 9/22/2017) 3 Inhaler 3     fluticasone (FLONASE) 50 MCG/ACT nasal spray Spray 1-2 sprays into both nostrils daily (Patient not taking: Reported on 9/22/2017) 1 Package 12     ORDER FOR DME Equipment being ordered: nebulizer (Patient not taking: Reported on 9/22/2017) 1 Device 0     ORDER FOR DME Respironics REMSTAR 60 Series Auto CPAP 5-18cm H2O, MIrage Fx standard nasal mask       ORDER FOR DME Nebulizer tubing and mouthpiece (Patient not taking: Reported on 9/22/2017) 1 Device 0     Allergies   Allergen Reactions     Nkda [No Known Drug Allergies]        Review of Systems    Constitutional: Negative.  Negative for diaphoresis, fever and weight loss.   HENT: Negative.    Eyes: Negative for pain.   Respiratory: Negative.  Negative for cough and hemoptysis.    Cardiovascular: Negative.  Negative for chest pain and palpitations.   Gastrointestinal: Negative.    Skin: Negative.    Endo/Heme/Allergies: Negative for environmental allergies.   All other systems reviewed and are negative.      Physical Exam   Constitutional: He is oriented to person, place, and time and well-developed, well-nourished, and in no distress. No distress.   HENT:   Head: Normocephalic and atraumatic.   Nose: Nose normal.   Mouth/Throat: Uvula is midline, oropharynx is clear and moist and mucous membranes are normal. No oropharyngeal exudate or posterior oropharyngeal erythema.   TMs are intact without any erythema or bulging bilaterally.  Airway is patent.   Eyes: Conjunctivae and EOM are normal. Pupils are equal, round, and reactive to light. No scleral icterus.   Neck: Normal range of motion. Neck supple. No thyromegaly present.   Cardiovascular: Normal rate, regular rhythm, normal heart sounds and intact distal pulses.  Exam reveals no gallop and no friction rub.    No murmur heard.  Pulmonary/Chest: Effort normal and breath sounds normal. No respiratory distress. He has no wheezes. He has no rales.   Abdominal: Soft. Normal appearance, normal aorta and bowel sounds are normal. He exhibits no mass. There is no hepatosplenomegaly. There is no tenderness. There is no rebound and no guarding.   Lymphadenopathy:     He has no cervical adenopathy.   Neurological: He is alert and oriented to person, place, and time.   Skin: Skin is warm and dry. No rash noted.   Psychiatric: Mood, memory, affect and judgment normal.   Nursing note and vitals reviewed.      Assessment/Plan:  Mild persistent asthma, uncomplicated:  Fair control as he does not consistently take the Dulera inhaler.  No recent ER visit or hospitalizations  for his asthma within the past year.  Asthma action plan was completed and discussed today in clinic. Recommended that he restart the Dulera and to take this consistently. Also recommended asthma education which patient has declined.  Avoid triggers and will refill his albuterol inhalers. Recheck in clinic if symptoms worsen or if symptoms do not improve.  -     mometasone-formoterol (DULERA) 200-5 MCG/ACT oral inhaler; Inhale 2 puffs into the lungs 2 times daily  -     albuterol (PROAIR HFA/PROVENTIL HFA/VENTOLIN HFA) 108 (90 BASE) MCG/ACT Inhaler; Inhale 2 puffs into the lungs every 6 hours as needed for shortness of breath / dyspnea or wheezing    Environmental allergies:  This seems to be a primary trigger of his asthma. We'll refill the Claritin-D for one month and follow with PCP for further refills.  -     loratadine-pseudoePHEDrine (CLARITIN-D 24 HOUR)  MG per 24 hr tablet; Take 1 tablet by mouth daily          Lee Ann Cruz PA-C

## 2017-09-22 NOTE — LETTER
My Asthma Action Plan  Name: Dano Kennedy   YOB: 1975  Date: 9/22/2017   My doctor: Lee Ann Cruz PA-C   My clinic: [facility]        My Control Medicine: Mometasone + formoterol (Dulera) -  200/5 mcg 2 puffs bid  My Rescue Medicine: Albuterol (Proair/Ventolin/Proventil) inhaler every 4-6hours prn    My Asthma Severity: mild persistent  Avoid your asthma triggers:   seasonal/ exercise            GREEN ZONE   Good Control    I feel good    No cough or wheeze    Can work, sleep and play without asthma symptoms       Take your asthma control medicine every day.     1. If exercise triggers your asthma, take your rescue medication    15 minutes before exercise or sports, and    During exercise if you have asthma symptoms  2. Spacer to use with inhaler: If you have a spacer, make sure to use it with your inhaler             YELLOW ZONE Getting Worse  I have ANY of these:    I do not feel good    Cough or wheeze    Chest feels tight    Wake up at night   1. Keep taking your Green Zone medications  2. Start taking your rescue medicine:    every 20 minutes for up to 1 hour. Then every 4 hours for 24-48 hours.  3. If you stay in the Yellow Zone for more than 12-24 hours, contact your doctor.  4. If you do not return to the Green Zone in 12-24 hours or you get worse, start taking your oral steroid medicine if prescribed by your provider.           RED ZONE Medical Alert - Get Help  I have ANY of these:    I feel awful    Medicine is not helping    Breathing getting harder    Trouble walking or talking    Nose opens wide to breathe       1. Take your rescue medicine NOW  2. If your provider has prescribed an oral steroid medicine, start taking it NOW  3. Call your doctor NOW  4. If you are still in the Red Zone after 20 minutes and you have not reached your doctor:    Take your rescue medicine again and    Call 911 or go to the emergency room right away    See your regular doctor within 2 weeks  of an Emergency Room or Urgent Care visit for follow-up treatment.        Electronically signed by: Lee Ann Cruz, September 22, 2017    Annual Reminders:  Meet with Asthma Educator,  Flu Shot in the Fall, consider Pneumonia Vaccination for patients with asthma (aged 19 and older).    Pharmacy:    [id number] - [place], [place] - [address] [facility] [address] AT [facility] [address]  WRITTEN PRESCRIPTION REQUESTED  [id number] - [place], [place] - [address] AT [facility] [address]                    Asthma Triggers  How To Control Things That Make Your Asthma Worse    Triggers are things that make your asthma worse.  Look at the list below to help you find your triggers and what you can do about them.  You can help prevent asthma flare-ups by staying away from your triggers.      Trigger                                                          What you can do   Cigarette Smoke  Tobacco smoke can make asthma worse. Do not allow smoking in your home, car or around you.  Be sure no one smokes at a child s day care or school.  If you smoke, ask your health care provider for ways to help you quit.  Ask family members to quit too.  Ask your health care provider for a referral to Quit Plan to help you quit smoking, or call 1-586-568-PLAN.     Colds, Flu, Bronchitis  These are common triggers of asthma. Wash your hands often.  Don t touch your eyes, nose or mouth.  Get a flu shot every year.     Dust Mites  These are tiny bugs that live in cloth or carpet. They are too small to see. Wash sheets and blankets in hot water every week.   Encase pillows and mattress in dust mite proof covers.  Avoid having carpet if you can. If you have carpet, vacuum weekly.   Use a dust mask and HEPA vacuum.   Pollen and Outdoor Mold  Some people are allergic to trees, grass, or weed pollen, or molds. Try to keep your windows closed.  Limit time out doors when pollen count is high.   Ask you  health care provider about taking medicine during allergy season.     Animal Dander  Some people are allergic to skin flakes, urine or saliva from pets with fur or feathers. Keep pets with fur or feathers out of your home.    If you can t keep the pet outdoors, then keep the pet out of your bedroom.  Keep the bedroom door closed.  Keep pets off cloth furniture and away from stuffed toys.     Mice, Rats, and Cockroaches  Some people are allergic to the waste from these pests.   Cover food and garbage.  Clean up spills and food crumbs.  Store grease in the refrigerator.   Keep food out of the bedroom.   Indoor Mold  This can be a trigger if your home has high moisture. Fix leaking faucets, pipes, or other sources of water.   Clean moldy surfaces.  Dehumidify basement if it is damp and smelly.   Smoke, Strong Odors, and Sprays  These can reduce air quality. Stay away from strong odors and sprays, such as perfume, powder, hair spray, paints, smoke incense, paint, cleaning products, candles and new carpet.   Exercise or Sports  Some people with asthma have this trigger. Be active!  Ask your doctor about taking medicine before sports or exercise to prevent symptoms.    Warm up for 5-10 minutes before and after sports or exercise.     Other Triggers of Asthma  Cold air:  Cover your nose and mouth with a scarf.  Sometimes laughing or crying can be a trigger.  Some medicines and food can trigger asthma.

## 2017-09-22 NOTE — NURSING NOTE
"Chief Complaint   Patient presents with     Asthma     recheck       Initial /80  Pulse 83  Temp 97.6  F (36.4  C) (Oral)  Wt 236 lb (107 kg)  SpO2 97%  BMI 36.96 kg/m2 Estimated body mass index is 36.96 kg/(m^2) as calculated from the following:    Height as of 5/4/16: 5' 7\" (1.702 m).    Weight as of this encounter: 236 lb (107 kg).  Medication Reconciliation: complete   Mary Hunter CMA      "

## 2017-10-24 ENCOUNTER — TELEPHONE (OUTPATIENT)
Dept: FAMILY MEDICINE | Facility: CLINIC | Age: 42
End: 2017-10-24

## 2017-10-24 NOTE — TELEPHONE ENCOUNTER
Panel Management Review      Patient has the following on his problem list:     Asthma review     ACT Total Scores 9/22/2017   ACT TOTAL SCORE -   ASTHMA ER VISITS -   ASTHMA HOSPITALIZATIONS -   ACT TOTAL SCORE (Goal Greater than or Equal to 20) 14   In the past 12 months, how many times did you visit the emergency room for your asthma without being admitted to the hospital? -   In the past 12 months, how many times were you hospitalized overnight because of your asthma? -      1. Is Asthma diagnosis on the Problem List? Yes    2. Is Asthma listed on Health Maintenance? Yes    3. Patient is due for:  ACT        Composite cancer screening  Chart review shows that this patient is due/due soon for the following None  Summary:    Patient is due/failing the following:   ACT and LDL    Action needed:   Patient needs to do ACT.    Type of outreach:    Sent Object[employer] message. and Sent letter. postpone 4 weeks to follow up    Questions for provider review:    None                                                                                                                                    Joy Omalley MA       Chart routed to Care Team .

## 2017-10-24 NOTE — LETTER
[facility]  [address] [facility] [address]  Phone: [phone]    10/24/17    Dano Kennedy  [address] [facility] [address]      To whom it may concern:     At your last appointment you asthma control test score was lower than we would like to see. Please fill out and mail back to the clinic.     Sincerely,      Santa eWn MD/ct

## 2017-10-24 NOTE — LETTER
November 24, 2017      Dano Kennedy  [address] [facility] [address]          Dear Dano,     Your clinic record indicates that you are due for an asthma update. We have a survey tool called an ACT (or Asthma Control Test) we use to measure the level of control of your asthma. Please complete the enclosed questionnaire and mail it back to us in the self-addressed stamped envelope.     If you have questions about this letter please contact your provider.     Sincerely,       Your [facility] Team

## 2017-10-27 ENCOUNTER — MYC MEDICAL ADVICE (OUTPATIENT)
Dept: FAMILY MEDICINE | Facility: CLINIC | Age: 42
End: 2017-10-27

## 2017-10-27 DIAGNOSIS — J45.30 MILD PERSISTENT ASTHMA, UNCOMPLICATED: ICD-10-CM

## 2017-10-27 RX ORDER — ALBUTEROL SULFATE 90 UG/1
2 AEROSOL, METERED RESPIRATORY (INHALATION) EVERY 6 HOURS PRN
Qty: 3 INHALER | Refills: 0 | Status: SHIPPED | OUTPATIENT
Start: 2017-10-27 | End: 2018-01-08

## 2017-10-27 NOTE — TELEPHONE ENCOUNTER
Based on patients reply, will route to PCP for advice.     Of note, patient was seen by 9/22/17 Lee Ann Cruz PA-C, and was asked to follow up with PCP in 1 month.     If patient is having financial difficulty, can write a referral for care coordination to involve SW.     To provider to advise.   CAMILA Bingham RN

## 2017-10-27 NOTE — TELEPHONE ENCOUNTER
Since his asthma is not well controlled, although some of it is, I believe, due to non compliance and since allergies seem to be a major trigger for his asthma, I think a referral to our asthma/allergist specialist, Dr Maycol Kim would be a good idea.   If he can get him on a good regimen for treatment of his allergies and asthma, we could then just see him once a year for his asthma.     Santa Wen

## 2017-10-30 NOTE — TELEPHONE ENCOUNTER
Will close conversation since it seems to be nonproductive.   Patient's albuterol was refilled.     Santa Wen

## 2017-11-06 DIAGNOSIS — J45.31 MILD PERSISTENT ASTHMA WITH EXACERBATION: Primary | ICD-10-CM

## 2017-11-07 RX ORDER — ALBUTEROL SULFATE 0.83 MG/ML
SOLUTION RESPIRATORY (INHALATION)
Qty: 75 ML | Refills: 3 | Status: SHIPPED | OUTPATIENT
Start: 2017-11-07 | End: 2018-01-08

## 2017-11-07 NOTE — TELEPHONE ENCOUNTER
Patient has not followed up with the allergist as discussed in October MyChart message.     Routing to provider to advise.  Thu Bishop RN

## 2017-12-08 ENCOUNTER — MYC MEDICAL ADVICE (OUTPATIENT)
Dept: NURSING | Facility: CLINIC | Age: 42
End: 2017-12-08

## 2017-12-20 NOTE — TELEPHONE ENCOUNTER
I called and left the patient a VM.  Third and final attempt made but unable to reach patient.  Patient advised to fill out ACT and has not returned it yet.  Please advise.  Thank you.  Becca Dietz,

## 2017-12-20 NOTE — TELEPHONE ENCOUNTER
Patient calling to ask if the Asthma questionnaire can be filled out online somewhere? Or can it be uploaded to his [employer] account to fill out and send back? Please call patient to advise. Thank you. He has made his appointment with  for 1/8/18. Thanks

## 2017-12-27 NOTE — TELEPHONE ENCOUNTER
Can the patient fill out his ACT at his 1/8/18 appointment with Dr. Kim?  Please advise and route back to [facility]. Thank you.  Becca Dietz,

## 2017-12-28 NOTE — TELEPHONE ENCOUNTER
[employer] message sent to the patient.  Postpone and look for the completed ACT on 1/8/18.  Becca Dietz,

## 2018-01-08 ENCOUNTER — OFFICE VISIT (OUTPATIENT)
Dept: ALLERGY | Facility: CLINIC | Age: 43
End: 2018-01-08
Payer: COMMERCIAL

## 2018-01-08 VITALS
TEMPERATURE: 98.4 F | DIASTOLIC BLOOD PRESSURE: 96 MMHG | HEART RATE: 99 BPM | WEIGHT: 237.8 LBS | BODY MASS INDEX: 37.32 KG/M2 | SYSTOLIC BLOOD PRESSURE: 142 MMHG | OXYGEN SATURATION: 95 % | HEIGHT: 67 IN

## 2018-01-08 DIAGNOSIS — J45.50 SEVERE PERSISTENT ASTHMA WITHOUT COMPLICATION (H): Primary | ICD-10-CM

## 2018-01-08 DIAGNOSIS — J30.89 ALLERGIC RHINITIS DUE TO MOLD: ICD-10-CM

## 2018-01-08 DIAGNOSIS — J30.81 CHRONIC ALLERGIC RHINITIS DUE TO ANIMAL HAIR AND DANDER: ICD-10-CM

## 2018-01-08 DIAGNOSIS — J30.1 CHRONIC SEASONAL ALLERGIC RHINITIS DUE TO POLLEN: ICD-10-CM

## 2018-01-08 LAB
FEF 25/75: NORMAL
FEV-1: NORMAL
FEV1/FVC: NORMAL
FVC: NORMAL

## 2018-01-08 PROCEDURE — 95004 PERQ TESTS W/ALRGNC XTRCS: CPT | Performed by: ALLERGY & IMMUNOLOGY

## 2018-01-08 PROCEDURE — 94010 BREATHING CAPACITY TEST: CPT | Performed by: ALLERGY & IMMUNOLOGY

## 2018-01-08 PROCEDURE — 99244 OFF/OP CNSLTJ NEW/EST MOD 40: CPT | Mod: 25 | Performed by: ALLERGY & IMMUNOLOGY

## 2018-01-08 RX ORDER — HYDROCODONE BITARTRATE AND ACETAMINOPHEN 5; 325 MG/1; MG/1
1-2 TABLET ORAL
COMMUNITY
Start: 2014-03-09 | End: 2019-01-30

## 2018-01-08 RX ORDER — ALBUTEROL SULFATE 90 UG/1
2 AEROSOL, METERED RESPIRATORY (INHALATION) EVERY 4 HOURS PRN
Qty: 1 INHALER | Refills: 3 | Status: SHIPPED | OUTPATIENT
Start: 2018-01-08 | End: 2018-09-10

## 2018-01-08 RX ORDER — FEXOFENADINE HCL AND PSEUDOEPHEDRINE HCL 180; 240 MG/1; MG/1
1 TABLET, EXTENDED RELEASE ORAL DAILY
Qty: 30 TABLET | Refills: 11 | Status: SHIPPED | OUTPATIENT
Start: 2018-01-08 | End: 2019-02-04

## 2018-01-08 RX ORDER — ALBUTEROL SULFATE 0.83 MG/ML
1 SOLUTION RESPIRATORY (INHALATION) EVERY 4 HOURS PRN
Qty: 75 ML | Refills: 3 | Status: SHIPPED | OUTPATIENT
Start: 2018-01-08 | End: 2021-11-06

## 2018-01-08 NOTE — NURSING NOTE
Per provider verbal order, placed Adult Environmental Panel scratch test.  Consent was obtained prior to procedure.  Once panels were placed, patient was monitored for 15 minutes in clinic.  RN read test after 15 minutes and provider was notified of results.  Pt tolerated procedure well.  All questions and concerns were addressed at office visit.     RN reviewed Asthma Action Plan with patient. Verbalized understanding.No further questions.    Writer demonstrated how to use an HFA inhaler with a spacer for patient.  Patient instructed to shake the inhaler, empty lungs, put the inhaler spacer in mouth, push down on the inhaler and breathe in at the same time and then hold breath for 10 seconds.  RN informed patient that if an audible whistle/hum is heard from spacer, they are to slow their breathing.  Patient advised to wait 30 seconds-1 minute between puffs.  Patient instructed on how to clean the MDI inhaler, take the medication canister out, wash it in warm soapy water, rinse it and then let it dry over night.  RN advised pt to refer to handout with spacer for cleaning instructions.  Patient informed the inhaler needs to be washed once a week or when he notices a powder buildup.  Patient verbalized understanding.     Emi Lambert RN

## 2018-01-08 NOTE — MR AVS SNAPSHOT
After Visit Summary   1/8/2018    Dano Kennedy    MRN: [id number]           Patient Information     Date Of Birth          1975        Visit Information        Provider Department      1/8/2018 11:20 AM Maycol Kim DO [facility]        Today's Diagnoses     Severe persistent asthma without complication    -  1    Mild persistent asthma, uncomplicated        Chronic seasonal allergic rhinitis due to pollen        Chronic allergic rhinitis due to animal hair and dander        Allergic rhinitis due to mold          Care Instructions    Allergy Staff Appt Hours Shot Hours Locations    Physician     Maycol Kim DO       Support Staff     FERNY Pavon MA  Monday:                      [place] 8-7     Tuesday:         [place] 8-5     Wednesday:        [place]: 7-5     Friday:        [place] 7-5   [place]        Monday: 9-5:50        Wednesday: 2-5:50        Friday: 7-12:50     [place]        Tuesday: 7-10:50        Thursday: 1:30-6:30     [place] Monday: 7:10-4:50        Tuesday: 12:30-6:30        Thursday: 7-11:50 [facility]  [id number] [place] [address]  Appt Line: [phone]  Allergy RN (Monday):  [phone]    [facility]  [address] [place] [address]  Appt Line: [phone]  Allergy RN (Tues & Wed):  [phone]    [facility]  [address] [place] [address]  Appt Line: [phone]  Allergy RN (Friday):  [phone]       Important Scheduling Information  Aspirin Desensitization: Appt will last 2 clinic days. Please call the Allergy RN line for your clinic to schedule. Discontinue antihistamines 7 days prior to the appointment.     Food Challenges: Appt will last 3-4 hours. Please call the Allergy RN line for your clinic to schedule. Discontinue antihistamines 7 days prior to the appointment.     Penicillin Testing: Appt will last 2-3 hours. Please call the Allergy RN line for your  clinic to schedule. Discontinue antihistamines 7 days prior to the appointment.     Skin Testing: Appt will about 40 minutes. Call the appointment line for your clinic to schedule. Discontinue antihistamines 7 days prior to the appointment.     Venom Testing: Appt will last 2-3 hours. Please call the Allergy RN line for your clinic to schedule. Discontinue antihistamines 7 days prior to the appointment.     Thank you for trusting us with your Allergy, Asthma, and Immunology care. Please feel free to contact us with any questions or concerns you may have.      - See asthma action plan.   - Start Breo 200/25mcg 1 puff daily.   - Albuterol 2-4 puffs inhaled (use a spacer unless using a Proair Respiclick device) every 4 hours as needed for chest tightness, wheezing, shortness of breath and/or coughing.   - Albuterol 2-4 puffs inhaled (use spacer if not using Proair Respiclick device) 15-20 minutes prior to physical activity. Please ensure warm up period prior to exercise.   - Allegra-D daily.   - Return to clinic in 6 weeks.   - Start using CPAP.   - Start Prilosec 40mg by mouth daily.     AEROALLERGEN AVOIDANCE INSTRUCTIONS  MOLD  Indoors, mold season is year round. Outdoors, most mold prefer seasons with high humidity. Mold prefers damp, dark, warm places. Here are some tips on how to avoid mold exposure.  1.  Keep humidity inside between 35-50% with air conditioning or dehumidifier. The humidity level can be checked with a meter from a hardware store.   2.  Clean surfaces where mold grows and dry wet areas.  3.  Avoid steam cleaning carpets and discard moldy belongings.  4.  Wear a mask when doing yard work and refrain from walking through uncut fields or playing in leaves.  5.  Minimize use of potted plants and do not keep them indoors.  6.  Consider an allergy cover for the pillow and mattress.  POLLEN  Pollens are the tiny airborne particles given off by trees, weeds, and grasses. They can be the cause of seasonal  allergic rhinitis or hay fever symptoms, which include stuffy, itchy, runny nose, redness, swelling and itching of the eyes, and itching of the ears and throat. Here are some tips on how to avoid pollen exposure.  1. .Keep windows closed and use the air conditioner when possible.  2.  Avoid outside exposure in the early morning as pollen counts are highest at that time.  3.  Take a shower and wash hair each night.  4.  Consider wearing a mask when working in the yard and/or garden.  5.  Clean furnace filter monthly with HEPA filters. Consider a HEPA filter vacuum  which will prevent pollen from being reintroduced into the air.   PETS  Pets present many problems for people with allergies. Dander from pets is very difficult to remove and also is a food source for dust mites.  1.  If possible, find the pet a new home.  2.  If not possible, keep the pet outdoors. Never allow the pet into the bedroom.  3.  Wash pet weekly in warm water.  4.  Encase mattresses, pillows, and box springs in allergen-proof covers.  5.  Use HEPA air filters and a HEPA filter vacuum . Change filters monthly.              Follow-ups after your visit        Who to contact     If you have questions or need follow up information about today's clinic visit or your schedule please contact [facility] directly at [phone].  Normal or non-critical lab and imaging results will be communicated to you by [employer], letter or phone within 4 business days after the clinic has received the results. If you do not hear from us within 7 days, please contact the clinic through Fanerg[employer] or phone. If you have a critical or abnormal lab result, we will notify you by phone as soon as possible.  Submit refill requests through [employer] or call your pharmacy and they will forward the refill request to us. Please allow 3 business days for your refill to be completed.          Additional Information About Your Visit        [employer] Information   "   [employer] gives you secure access to your electronic health record. If you see a primary care provider, you can also send messages to your care team and make appointments. If you have questions, please call your primary care clinic.  If you do not have a primary care provider, please call [phone] and they will assist you.        Care EveryWhere ID     This is your Care EveryWhere ID. This could be used by other organizations to access your [facility] medical records  [id number]        Your Vitals Were     Pulse Temperature Height Pulse Oximetry BMI (Body Mass Index)       99 98.4  F (36.9  C) (Oral) 1.702 m (5' 7.01\") 95% 37.24 kg/m2        Blood Pressure from Last 3 Encounters:   01/08/18 (!) 142/96   09/22/17 126/80   04/03/17 124/79    Weight from Last 3 Encounters:   01/08/18 107.9 kg (237 lb 12.8 oz)   09/22/17 107 kg (236 lb)   04/03/17 107 kg (236 lb)              We Performed the Following     Spirometry, Breathing Capacity          Today's Medication Changes          These changes are accurate as of: 1/8/18 12:53 PM.  If you have any questions, ask your nurse or doctor.               Start taking these medicines.        Dose/Directions    fexofenadine-pseudoePHEDrine 180-240 MG per 24 hr tablet   Commonly known as:  ALLEGRA-D 24   Used for:  Severe persistent asthma without complication, Chronic seasonal allergic rhinitis due to pollen, Chronic allergic rhinitis due to animal hair and dander, Allergic rhinitis due to mold   Started by:  Maycol Kim,         Dose:  1 tablet   Take 1 tablet by mouth daily   Quantity:  30 tablet   Refills:  11       fluticasone-vilanterol 200-25 MCG/INH oral inhaler   Commonly known as:  BREO ELLIPTA   Used for:  Severe persistent asthma without complication   Started by:  Maycol Kim DO        Dose:  1 puff   Inhale 1 puff into the lungs daily   Quantity:  1 Inhaler   Refills:  3         These medicines have changed or have updated prescriptions.        " Dose/Directions    * PROAIR  (90 BASE) MCG/ACT Inhaler   This may have changed:  Another medication with the same name was changed. Make sure you understand how and when to take each.   Used for:  Mild persistent asthma with exacerbation   Generic drug:  albuterol   Changed by:  Santa Hunter MD        INHALE 2 PUFFS INTO THE LUNGS EVERY 6 HOURS AS NEEDED FOR SHORTNESS OF BREATH.   Quantity:  25.5 g   Refills:  0       * albuterol 108 (90 BASE) MCG/ACT Inhaler   Commonly known as:  PROAIR HFA/PROVENTIL HFA/VENTOLIN HFA   This may have changed:  when to take this   Used for:  Severe persistent asthma without complication   Changed by:  Maycol Kim DO        Dose:  2 puff   Inhale 2 puffs into the lungs every 4 hours as needed for shortness of breath / dyspnea or wheezing   Quantity:  1 Inhaler   Refills:  3       * albuterol (2.5 MG/3ML) 0.083% neb solution   This may have changed:  See the new instructions.   Used for:  Severe persistent asthma without complication   Changed by:  Maycol Kim DO        Dose:  1 vial   Take 1 vial (2.5 mg) by nebulization every 4 hours as needed for shortness of breath / dyspnea or wheezing   Quantity:  75 mL   Refills:  3       * Notice:  This list has 3 medication(s) that are the same as other medications prescribed for you. Read the directions carefully, and ask your doctor or other care provider to review them with you.      Stop taking these medicines if you haven't already. Please contact your care team if you have questions.     fluticasone 50 MCG/ACT spray   Commonly known as:  FLONASE   Stopped by:  Maycol Kim DO           loratadine-pseudoePHEDrine  MG per 24 hr tablet   Commonly known as:  CLARITIN-D 24 HOUR   Stopped by:  Maycol Kim DO           mometasone-formoterol 200-5 MCG/ACT oral inhaler   Commonly known as:  DULERA   Stopped by:  Maycol Kim DO                Where to get your medicines      These medications  [id number] [address] [facility] [facility]  [address] [address]     Phone:  [phone]     albuterol (2.5 MG/3ML) 0.083% neb solution    albuterol 108 (90 BASE) MCG/ACT Inhaler    fluticasone-vilanterol 200-25 MCG/INH oral inhaler         Some of these will need a paper prescription and others can be bought over the counter.  Ask your nurse if you have questions.     Bring a paper prescription for each of these medications     fexofenadine-pseudoePHEDrine 180-240 MG per 24 hr tablet                Primary Care Provider Office Phone # Fax #    Santa Hunter [phone] [address] [facility] [address]        Equal Access to Services     [facility]: Hadii aad ku hadasho Soyennifer, waaxda luqadaha, qaybta kaalmada adeegyada, daiana lim . So [facility] [phone].    ATENCIÓN: Si habla español, tiene a skinner disposición servicios gratuitos de asistencia lingüística. [facility] [phone].    We comply with applicable federal civil rights laws and [place] laws. We do not discriminate on the basis of race, color, national origin, age, disability, sex, sexual orientation, or gender identity.            Thank you!     Thank you for choosing [facility]  for your care. Our goal is always to provide you with excellent care. Hearing back from our patients is one way we can continue to improve our services. Please take a few minutes to complete the written survey that you may receive in the mail after your visit with us. Thank you!             Your Updated Medication List - Protect others around you: Learn how to safely use, store and throw away your medicines at www.disposemymeds.org.          This list is accurate as of: 1/8/18 12:53 PM.  Always use your most recent med list.                   Brand Name Dispense Instructions for use Diagnosis    EPINEPHrine  0.3 MG/0.3ML injection 2-pack    EPIPEN 2-DONNY    0.6 mL    Inject 0.3 mLs (0.3 mg) into the muscle once as needed for anaphylaxis    Uvulitis       fexofenadine-pseudoePHEDrine 180-240 MG per 24 hr tablet    ALLEGRA-D 24    30 tablet    Take 1 tablet by mouth daily    Severe persistent asthma without complication, Chronic seasonal allergic rhinitis due to pollen, Chronic allergic rhinitis due to animal hair and dander, Allergic rhinitis due to mold       fluticasone-vilanterol 200-25 MCG/INH oral inhaler    BREO ELLIPTA    1 Inhaler    Inhale 1 puff into the lungs daily    Severe persistent asthma without complication       HYDROcodone-acetaminophen 5-325 MG per tablet    NORCO     Take 1-2 tablets by mouth        * order for DME     1 Device    Nebulizer tubing and mouthpiece    Mild persistent asthma with exacerbation       * order for DME      [employer] REMSTAR 60 Series Auto CPAP 5-18cm H2O, MIrage Fx standard nasal mask        order for DME     1 Device    Equipment being ordered: nebulizer    Mild persistent asthma with exacerbation       * PROAIR  (90 BASE) MCG/ACT Inhaler   Generic drug:  albuterol     25.5 g    INHALE 2 PUFFS INTO THE LUNGS EVERY 6 HOURS AS NEEDED FOR SHORTNESS OF BREATH.    Mild persistent asthma with exacerbation       * albuterol 108 (90 BASE) MCG/ACT Inhaler    PROAIR HFA/PROVENTIL HFA/VENTOLIN HFA    1 Inhaler    Inhale 2 puffs into the lungs every 4 hours as needed for shortness of breath / dyspnea or wheezing    Severe persistent asthma without complication       * albuterol (2.5 MG/3ML) 0.083% neb solution     75 mL    Take 1 vial (2.5 mg) by nebulization every 4 hours as needed for shortness of breath / dyspnea or wheezing    Severe persistent asthma without complication       * Notice:  This list has 5 medication(s) that are the same as other medications prescribed for you. Read the directions carefully, and ask your doctor or other care provider to review them with you.

## 2018-01-08 NOTE — PATIENT INSTRUCTIONS
Allergy Staff Appt Hours Shot Hours Locations    Physician     Maycol Kim DO       Support Staff     Emi POP RN      Kesha WYNN MA  Monday:                      [place] 8-7     Tuesday:         [place] 8-5     Wednesday:        [place]: 7-5     Friday:        [place] 7-5   [place]        Monday: 9-5:50        Wednesday: 2-5:50        Friday: 7-12:50     [place]        Tuesday: 7-10:50        Thursday: 1:30-6:30     [place] Monday: 7:10-4:50        Tuesday: 12:30-6:30        Thursday: 7-11:50 [facility]  [address] [place] [address]  Appt Line: [phone]  Allergy RN (Monday):  [phone]    [facility]  [address] [place] [address]  Appt Line: [phone]  Allergy RN (Tues & Wed):  [phone]    [facility]  [address] [place] [address]  Appt Line: [phone]  Allergy RN (Friday):  [phone]       Important Scheduling Information  Aspirin Desensitization: Appt will last 2 clinic days. Please call the Allergy RN line for your clinic to schedule. Discontinue antihistamines 7 days prior to the appointment.     Food Challenges: Appt will last 3-4 hours. Please call the Allergy RN line for your clinic to schedule. Discontinue antihistamines 7 days prior to the appointment.     Penicillin Testing: Appt will last 2-3 hours. Please call the Allergy RN line for your clinic to schedule. Discontinue antihistamines 7 days prior to the appointment.     Skin Testing: Appt will about 40 minutes. Call the appointment line for your clinic to schedule. Discontinue antihistamines 7 days prior to the appointment.     Venom Testing: Appt will last 2-3 hours. Please call the Allergy RN line for your clinic to schedule. Discontinue antihistamines 7 days prior to the appointment.     Thank you for trusting us with your Allergy, Asthma, and Immunology care. Please feel free to contact us with any questions or concerns you may have.      - See asthma action  plan.   - Start Breo 200/25mcg 1 puff daily.   - Albuterol 2-4 puffs inhaled (use a spacer unless using a Proair Respiclick device) every 4 hours as needed for chest tightness, wheezing, shortness of breath and/or coughing.   - Albuterol 2-4 puffs inhaled (use spacer if not using Proair Respiclick device) 15-20 minutes prior to physical activity. Please ensure warm up period prior to exercise.   - Allegra-D daily.   - Return to clinic in 6 weeks.   - Start using CPAP.   - Start Prilosec 40mg by mouth daily.     AEROALLERGEN AVOIDANCE INSTRUCTIONS  MOLD  Indoors, mold season is year round. Outdoors, most mold prefer seasons with high humidity. Mold prefers damp, dark, warm places. Here are some tips on how to avoid mold exposure.  1.  Keep humidity inside between 35-50% with air conditioning or dehumidifier. The humidity level can be checked with a meter from a hardware store.   2.  Clean surfaces where mold grows and dry wet areas.  3.  Avoid steam cleaning carpets and discard moldy belongings.  4.  Wear a mask when doing yard work and refrain from walking through uncut fields or playing in leaves.  5.  Minimize use of potted plants and do not keep them indoors.  6.  Consider an allergy cover for the pillow and mattress.  POLLEN  Pollens are the tiny airborne particles given off by trees, weeds, and grasses. They can be the cause of seasonal allergic rhinitis or hay fever symptoms, which include stuffy, itchy, runny nose, redness, swelling and itching of the eyes, and itching of the ears and throat. Here are some tips on how to avoid pollen exposure.  1. .Keep windows closed and use the air conditioner when possible.  2.  Avoid outside exposure in the early morning as pollen counts are highest at that time.  3.  Take a shower and wash hair each night.  4.  Consider wearing a mask when working in the yard and/or garden.  5.  Clean furnace filter monthly with HEPA filters. Consider a HEPA filter vacuum  which  will prevent pollen from being reintroduced into the air.   PETS  Pets present many problems for people with allergies. Dander from pets is very difficult to remove and also is a food source for dust mites.  1.  If possible, find the pet a new home.  2.  If not possible, keep the pet outdoors. Never allow the pet into the bedroom.  3.  Wash pet weekly in warm water.  4.  Encase mattresses, pillows, and box springs in allergen-proof covers.  5.  Use HEPA air filters and a HEPA filter vacuum . Change filters monthly.

## 2018-01-08 NOTE — LETTER
My Asthma Action Plan  Name: Dano Kennedy   YOB: 1975  Date: 1/8/2018   My doctor: Maycol Kim, DO   My clinic: [facility]        My Control Medicine: Fluticasone furoate + vilanterol (Breo Ellipta)-  200/25mcg 1 puff daily  My Rescue Medicine:   Albuterol 2-4 puffs inhaled (use a spacer unless using a Proair Respiclick device) every 4 hours as needed for chest tightness, wheezing, shortness of breath and/or coughing.     Albuterol 2-4 puffs inhaled (use spacer if not using Proair Respiclick device) 15-20 minutes prior to physical activity.     Please ensure warm up period prior to exercise.      My Asthma Severity: severe persistent  Avoid your asthma triggers: upper respiratory infections, pollens, animal dander and exercise or sports  seasonal/ exercise            GREEN ZONE   Good Control    I feel good    No cough or wheeze    Can work, sleep and play without asthma symptoms       Take your asthma control medicine every day.     1. If exercise triggers your asthma, take your rescue medication    15 minutes before exercise or sports, and    During exercise if you have asthma symptoms  2. Spacer to use with inhaler: If you have a spacer, make sure to use it with your inhaler             YELLOW ZONE Getting Worse  I have ANY of these:    I do not feel good    Cough or wheeze    Chest feels tight    Wake up at night   1. Keep taking your Green Zone medications  2. Start taking your rescue medicine:    every 20 minutes for up to 1 hour. Then every 4 hours for 24-48 hours.  3. If you stay in the Yellow Zone for more than 12-24 hours, contact your doctor.  4. If you do not return to the Green Zone in 12-24 hours or you get worse, start taking your oral steroid medicine if prescribed by your provider.           RED ZONE Medical Alert - Get Help  I have ANY of these:    I feel awful    Medicine is not helping    Breathing getting harder    Trouble walking or talking    Nose opens  wide to breathe       1. Take your rescue medicine NOW  2. If your provider has prescribed an oral steroid medicine, start taking it NOW  3. Call your doctor NOW  4. If you are still in the Red Zone after 20 minutes and you have not reached your doctor:    Take your rescue medicine again and    Call 911 or go to the emergency room right away    See your regular doctor within 2 weeks of an Emergency Room or Urgent Care visit for follow-up treatment.        Electronically signed by: Maycol Kim, January 8, 2018    Annual Reminders:  Meet with Asthma Educator,  Flu Shot in the Fall, consider Pneumonia Vaccination for patients with asthma (aged 19 and older).    Pharmacy:    [id number] - [place], [place] - [address] [facility] [address]  WRITTEN PRESCRIPTION REQUESTED  [facility] - [place], [place] - [address] AT [facility] [address]                    Asthma Triggers  How To Control Things That Make Your Asthma Worse    Triggers are things that make your asthma worse.  Look at the list below to help you find your triggers and what you can do about them.  You can help prevent asthma flare-ups by staying away from your triggers.      Trigger                                                          What you can do   Cigarette Smoke  Tobacco smoke can make asthma worse. Do not allow smoking in your home, car or around you.  Be sure no one smokes at a child s day care or school.  If you smoke, ask your health care provider for ways to help you quit.  Ask family members to quit too.  Ask your health care provider for a referral to Quit Plan to help you quit smoking, or call 3-516-453-PLAN.     Colds, Flu, Bronchitis  These are common triggers of asthma. Wash your hands often.  Don t touch your eyes, nose or mouth.  Get a flu shot every year.     Dust Mites  These are tiny bugs that live in cloth or carpet. They are too small to see. Wash sheets  and blankets in hot water every week.   Encase pillows and mattress in dust mite proof covers.  Avoid having carpet if you can. If you have carpet, vacuum weekly.   Use a dust mask and HEPA vacuum.   Pollen and Outdoor Mold  Some people are allergic to trees, grass, or weed pollen, or molds. Try to keep your windows closed.  Limit time out doors when pollen count is high.   Ask you health care provider about taking medicine during allergy season.     Animal Dander  Some people are allergic to skin flakes, urine or saliva from pets with fur or feathers. Keep pets with fur or feathers out of your home.    If you can t keep the pet outdoors, then keep the pet out of your bedroom.  Keep the bedroom door closed.  Keep pets off cloth furniture and away from stuffed toys.     Mice, Rats, and Cockroaches  Some people are allergic to the waste from these pests.   Cover food and garbage.  Clean up spills and food crumbs.  Store grease in the refrigerator.   Keep food out of the bedroom.   Indoor Mold  This can be a trigger if your home has high moisture. Fix leaking faucets, pipes, or other sources of water.   Clean moldy surfaces.  Dehumidify basement if it is damp and smelly.   Smoke, Strong Odors, and Sprays  These can reduce air quality. Stay away from strong odors and sprays, such as perfume, powder, hair spray, paints, smoke incense, paint, cleaning products, candles and new carpet.   Exercise or Sports  Some people with asthma have this trigger. Be active!  Ask your doctor about taking medicine before sports or exercise to prevent symptoms.    Warm up for 5-10 minutes before and after sports or exercise.     Other Triggers of Asthma  Cold air:  Cover your nose and mouth with a scarf.  Sometimes laughing or crying can be a trigger.  Some medicines and food can trigger asthma.

## 2018-01-08 NOTE — ASSESSMENT & PLAN NOTE
Perennial allergy symptoms.  Claritin-D is very beneficial.  Nasal steroids caused nasal bleeding.  Increased symptoms around cats.  Dog in the home.    Skin testing positive for dog, cat, mold and weeds.    -Allergen avoidance measures were reviewed and literature provided.  - Allegra-D daily.  -Consider allergen immunotherapy.

## 2018-01-08 NOTE — PROGRESS NOTES
Dano Kennedy is a 42 year old White male with previous medical history significant for asthma, obstructive sleep apnea and allergic rhinoconjunctivitis. Dano Kennedy is being seen today for evaluation of asthma and seasonal allergies. The patient is accompanied by spouse. The spouse helped provide the history. The patient is being seen in consultation at the request of Dr. Elsa MD.     The patient has perennial rhinorrhea, nasal itching, sneezing, congestion, sinus headaches, ocular itching and ocular watering.  Increased symptoms around cats, dust, mowing grass and raking leaves.  No clear seasonal worsening that he can ascertain.  Claritin-D is nearly 100% effective.  He use nasal corticosteroids and caused increased epistaxis.  He has had sinus surgery once before in 2007 for nasal polyposis and septal deviation.  No recent CT scan of the sinuses.  No recent nasal polyposis.  History of positive allergy testing when he was a child.  He was on allergy shots for 1 year.  These were not clearly beneficial.  No recent allergy evaluation.    Patient was diagnosed with asthma at 8 years of age.  Asthma presents with wheezing, shortness of breath, tightness in chest and coughing.  Recently he was on Dulera, but was inconsistent with use.  This was not clearly beneficial.  He was on 200/5 mcg.  He has additionally also tried Serevent, Flovent, Advair and montelukast.  He is using albuterol anywhere from 2-4 times per day and finds this to be helpful.  Prednisone is helpful.  He is on prednisone typically when he gets ill with an upper respiratory tract infection.  He has been hospitalized for asthma twice in childhood.  None recently.  Asthma flares with exposure to cats, dust, infections, pollen, exercise.  He denies history of flaring of asthma or nasal symptoms with ibuprofen or NSAIDs.      Mother with allergic rhinitis.     The patient has no history of eczema, food allergies, medications allergies or  hives.     ENVIRONMENTAL HISTORY: [place] The home is heated with a forced air. They does have central air conditioning. The patient's bedroom is furnished with carpeting in bedroom and allergen mattress cover.  Pets inside the house include 1 dog(s). There is not history of cockroach or mice infestation. There is/are 0 smokers in the house.  The house does not have a damp basement.     ACT Total Scores 1/8/2018   ACT TOTAL SCORE -   ASTHMA ER VISITS -   ASTHMA HOSPITALIZATIONS -   ACT TOTAL SCORE (Goal Greater than or Equal to 20) 12   In the past 12 months, how many times did you visit the emergency room for your asthma without being admitted to the hospital? 0   In the past 12 months, how many times were you hospitalized overnight because of your asthma? 0     Past Medical History:   Diagnosis Date     Allergic rhinitis, cause unspecified      Asthma      Unspecified asthma(493.90)      History reviewed. No pertinent family history.  Past Surgical History:   Procedure Laterality Date     ENDOSCOPIC POLYPECTOMY NASAL       LASER HOLMIUM LITHOTRIPSY URETER(S), INSERT STENT, COMBINED Left 1/14/2015    Procedure: COMBINED CYSTOSCOPY, URETEROSCOPY, LASER HOLMIUM LITHOTRIPSY URETER(S), INSERT STENT;  Surgeon: Derrek Cobb MD;  Location:  OR       REVIEW OF SYSTEMS:  General: positive  for weight gain. negative for weight loss. negative for changes in sleep.   Ears: negative for fullness. negative for hearing loss. negative for dizziness.   Nose: positive  for snoring.negative for changes in smell. positive  for drainage.   Eyes: positive  for eye watering. positive  for eye itching. negative for vision changes. positive  for eye redness.  Throat: positive  for hoarseness. negative for sore throat. positive  for trouble swallowing.   Lungs: positive  for shortness of breath.positive  for wheezing. negative for sputum production.   Cardiovascular: negative for  chest pain. negative for swelling of ankles. negative for fast or irregular heartbeat.   Gastrointestinal: negative for nausea. negative for heartburn. negative for acid reflux.   Musculoskeletal: positive  for joint pain. negative for joint stiffness. negative for joint swelling.   Neurologic: negative for seizures. negative for fainting. negative for weakness.   Psychiatric: negative for changes in mood. negative for anxiety.   Endocrine: negative for cold intolerance. negative for heat intolerance. negative for tremors.   Lymphatic: negative for lower extremity swelling. negative for lymph node swelling.   Hematologic: negative for easy bruising. negative for easy bleeding.  Integumentary: negative for rash. negative for scaling. negative for nail changes.       Current Outpatient Prescriptions:      fluticasone-vilanterol (BREO ELLIPTA) 200-25 MCG/INH oral inhaler, Inhale 1 puff into the lungs daily, Disp: 1 Inhaler, Rfl: 3     albuterol (PROAIR HFA/PROVENTIL HFA/VENTOLIN HFA) 108 (90 BASE) MCG/ACT Inhaler, Inhale 2 puffs into the lungs every 4 hours as needed for shortness of breath / dyspnea or wheezing, Disp: 1 Inhaler, Rfl: 3     fexofenadine-pseudoePHEDrine (ALLEGRA-D 24) 180-240 MG per 24 hr tablet, Take 1 tablet by mouth daily, Disp: 30 tablet, Rfl: 11     albuterol (2.5 MG/3ML) 0.083% neb solution, Take 1 vial (2.5 mg) by nebulization every 4 hours as needed for shortness of breath / dyspnea or wheezing, Disp: 75 mL, Rfl: 3     ALBUTEROL 108 (90 BASE) MCG/ACT inhaler, INHALE 2 PUFFS INTO THE LUNGS EVERY 6 HOURS AS NEEDED FOR SHORTNESS OF BREATH., Disp: 25.5 g, Rfl: 0     EPINEPHrine (EPIPEN 2-DONNY) 0.3 MG/0.3ML injection, Inject 0.3 mLs (0.3 mg) into the muscle once as needed for anaphylaxis, Disp: 0.6 mL, Rfl: 0     ORDER FOR DME, Equipment being ordered: nebulizer, Disp: 1 Device, Rfl: 0     ORDER FOR DME, Respironics REMSTAR 60 Series Auto CPAP 5-18cm H2O, MIrage Fx standard nasal mask, Disp: , Rfl:       ORDER FOR DME, Nebulizer tubing and mouthpiece, Disp: 1 Device, Rfl: 0     HYDROcodone-acetaminophen (NORCO) 5-325 MG per tablet, Take 1-2 tablets by mouth, Disp: , Rfl:      [DISCONTINUED] albuterol (2.5 MG/3ML) 0.083% neb solution, NEBULIZE ONE VIAL EVERY 6 HOURS AS NEEDED FOR SHORTNESS OF BREATH OR DIFFICULTY BREATHING, Disp: 75 mL, Rfl: 3     [DISCONTINUED] albuterol (PROAIR HFA/PROVENTIL HFA/VENTOLIN HFA) 108 (90 BASE) MCG/ACT Inhaler, Inhale 2 puffs into the lungs every 6 hours as needed for shortness of breath / dyspnea or wheezing, Disp: 3 Inhaler, Rfl: 0  Immunization History   Administered Date(s) Administered     Pneumococcal 23 valent 10/30/2012     TDAP Vaccine (Adacel) 10/06/2011     Allergies   Allergen Reactions     Nkda [No Known Drug Allergies]          EXAM:   Constitutional:  Appears well-developed and well-nourished. No distress.   HEENT:   Head: Normocephalic.   Right Ear: External ear normal. TM normal  Left Ear: External ear normal. TM normal  Mouth/Throat: No oropharyngeal exudate present.   No cobblestoning of posterior oropharynx.   Nasal tissue pink and normal appearing.  No rhinorrhea noted.    Eyes: Conjunctivae are non-erythematous   No maxillary or frontal sinus tenderness to palpation.   Cardiovascular: Normal rate, regular rhythm and normal heart sounds. Exam reveals no gallop and no friction rub.   No murmur heard.  Respiratory: Effort normal and breath sounds normal. No respiratory distress. No wheezes. No rales.   Musculoskeletal: Normal range of motion.   Lymphadenopathy:   No cervical adenopathy.   No lower extremity edema.   Neuro: Oriented to person, place, and time.  Skin: Skin is warm and dry. No rash noted.   Psychiatric: Normal mood and affect.     Nursing note and vitals reviewed.    WORKUP:   Skin testing  Positive for cat, dog, weeds and molds.    Spirometry  FVC % pred:98  FEV1 % pred:103  FEV1/FVC % act:83  FEF 25-75% pred:120    Normal  spirometry    ASSESSMENT/PLAN:  Problem List Items Addressed This Visit        Respiratory    Severe persistent asthma without complication - Primary     Chest symptoms throughout the day.  Albuterol is beneficial.  Previously has been on Dulera, Serevent, Flovent and Advair.  Currently on no controller asthma medications.  Normal spirometry today.  Skin testing positive for cat, dog, weeds and molds.ACT 12    - An asthma action plan was provided and discussed with patient and family.   - Albuterol 2-4 puffs inhaled (use a spacer unless using a Proair Respiclick device) every 4 hours as needed for chest tightness, wheezing, shortness of breath and/or coughing.   - Albuterol 2-4 puffs inhaled (use spacer if not using Proair Respiclick device) 15-20 minutes prior to physical activity.   - Please ensure warm up period prior to exercise.   - Avoid asthma triggers.  - Breo 200/25mcg 1 puff inhaled daily.           Relevant Medications    fluticasone-vilanterol (BREO ELLIPTA) 200-25 MCG/INH oral inhaler    albuterol (PROAIR HFA/PROVENTIL HFA/VENTOLIN HFA) 108 (90 BASE) MCG/ACT Inhaler    fexofenadine-pseudoePHEDrine (ALLEGRA-D 24) 180-240 MG per 24 hr tablet    albuterol (2.5 MG/3ML) 0.083% neb solution    Other Relevant Orders    Spirometry, Breathing Capacity (Completed)    ALLERGY SKIN TESTS,ALLERGENS (Completed)    Seasonal allergic rhinitis    Relevant Medications    fluticasone-vilanterol (BREO ELLIPTA) 200-25 MCG/INH oral inhaler    albuterol (PROAIR HFA/PROVENTIL HFA/VENTOLIN HFA) 108 (90 BASE) MCG/ACT Inhaler    fexofenadine-pseudoePHEDrine (ALLEGRA-D 24) 180-240 MG per 24 hr tablet    albuterol (2.5 MG/3ML) 0.083% neb solution    Other Relevant Orders    ALLERGY SKIN TESTS,ALLERGENS (Completed)    Chronic allergic rhinitis due to animal hair and dander     Perennial allergy symptoms.  Claritin-D is very beneficial.  Nasal steroids caused nasal bleeding.  Increased symptoms around cats.  Dog in the home.    Skin  testing positive for dog, cat, mold and weeds.    -Allergen avoidance measures were reviewed and literature provided.  - Allegra-D daily.  -Consider allergen immunotherapy.         Relevant Medications    fluticasone-vilanterol (BREO ELLIPTA) 200-25 MCG/INH oral inhaler    albuterol (PROAIR HFA/PROVENTIL HFA/VENTOLIN HFA) 108 (90 BASE) MCG/ACT Inhaler    fexofenadine-pseudoePHEDrine (ALLEGRA-D 24) 180-240 MG per 24 hr tablet    albuterol (2.5 MG/3ML) 0.083% neb solution    Other Relevant Orders    ALLERGY SKIN TESTS,ALLERGENS (Completed)    Allergic rhinitis due to mold    Relevant Medications    fluticasone-vilanterol (BREO ELLIPTA) 200-25 MCG/INH oral inhaler    albuterol (PROAIR HFA/PROVENTIL HFA/VENTOLIN HFA) 108 (90 BASE) MCG/ACT Inhaler    fexofenadine-pseudoePHEDrine (ALLEGRA-D 24) 180-240 MG per 24 hr tablet    albuterol (2.5 MG/3ML) 0.083% neb solution    Other Relevant Orders    ALLERGY SKIN TESTS,ALLERGENS (Completed)        Return in 6 weeks.    Chart documentation with Dragon Voice recognition Software. Although reviewed after completion, some words and grammatical errors may remain.    Maycol Kim DO   Allergy/Immunology  [facility], [place]

## 2018-01-08 NOTE — ASSESSMENT & PLAN NOTE
Chest symptoms throughout the day.  Albuterol is beneficial.  Previously has been on Dulera, Serevent, Flovent and Advair.  Currently on no controller asthma medications.  Normal spirometry today.  Skin testing positive for cat, dog, weeds and molds.ACT 12    - An asthma action plan was provided and discussed with patient and family.   - Albuterol 2-4 puffs inhaled (use a spacer unless using a Proair Respiclick device) every 4 hours as needed for chest tightness, wheezing, shortness of breath and/or coughing.   - Albuterol 2-4 puffs inhaled (use spacer if not using Proair Respiclick device) 15-20 minutes prior to physical activity.   - Please ensure warm up period prior to exercise.   - Avoid asthma triggers.  - Breo 200/25mcg 1 puff inhaled daily.

## 2018-01-08 NOTE — LETTER
1/8/2018         RE: Dano Kennedy  [address] [facility] [id number]        Dear Colleague,    Thank you for referring your patient, Dano Kennedy, to the [facility]. Please see a copy of my visit note below.    Dano Kennedy is a 42 year old White male with previous medical history significant for asthma, obstructive sleep apnea and allergic rhinoconjunctivitis. Dano Kennedy is being seen today for evaluation of asthma and seasonal allergies. The patient is accompanied by spouse. The spouse helped provide the history. The patient is being seen in consultation at the request of Dr. Elsa MD.     The patient has perennial rhinorrhea, nasal itching, sneezing, congestion, sinus headaches, ocular itching and ocular watering.  Increased symptoms around cats, dust, mowing grass and raking leaves.  No clear seasonal worsening that he can ascertain.  Claritin-D is nearly 100% effective.  He use nasal corticosteroids and caused increased epistaxis.  He has had sinus surgery once before in 2007 for nasal polyposis and septal deviation.  No recent CT scan of the sinuses.  No recent nasal polyposis.  History of positive allergy testing when he was a child.  He was on allergy shots for 1 year.  These were not clearly beneficial.  No recent allergy evaluation.    Patient was diagnosed with asthma at 8 years of age.  Asthma presents with wheezing, shortness of breath, tightness in chest and coughing.  Recently he was on Dulera, but was inconsistent with use.  This was not clearly beneficial.  He was on 200/5 mcg.  He has additionally also tried Serevent, Flovent, Advair and montelukast.  He is using albuterol anywhere from 2-4 times per day and finds this to be helpful.  Prednisone is helpful.  He is on prednisone typically when he gets ill with an upper respiratory tract infection.  He has been hospitalized for asthma twice in childhood.  None recently.  Asthma flares with exposure to cats,  dust, infections, pollen, exercise.  He denies history of flaring of asthma or nasal symptoms with ibuprofen or NSAIDs.      Mother with allergic rhinitis.     The patient has no history of eczema, food allergies, medications allergies or hives.     ENVIRONMENTAL HISTORY: [place] The home is heated with a forced air. They does have central air conditioning. The patient's bedroom is furnished with carpeting in bedroom and allergen mattress cover.  Pets inside the house include 1 dog(s). There is not history of cockroach or mice infestation. There is/are 0 smokers in the house.  The house does not have a damp basement.     ACT Total Scores 1/8/2018   ACT TOTAL SCORE -   ASTHMA ER VISITS -   ASTHMA HOSPITALIZATIONS -   ACT TOTAL SCORE (Goal Greater than or Equal to 20) 12   In the past 12 months, how many times did you visit the emergency room for your asthma without being admitted to the hospital? 0   In the past 12 months, how many times were you hospitalized overnight because of your asthma? 0     Past Medical History:   Diagnosis Date     Allergic rhinitis, cause unspecified      Asthma      Unspecified asthma(493.90)      History reviewed. No pertinent family history.  Past Surgical History:   Procedure Laterality Date     ENDOSCOPIC POLYPECTOMY NASAL       LASER HOLMIUM LITHOTRIPSY URETER(S), INSERT STENT, COMBINED Left 1/14/2015    Procedure: COMBINED CYSTOSCOPY, URETEROSCOPY, LASER HOLMIUM LITHOTRIPSY URETER(S), INSERT STENT;  Surgeon: Derrek Cobb MD;  Location:  OR       REVIEW OF SYSTEMS:  General: positive  for weight gain. negative for weight loss. negative for changes in sleep.   Ears: negative for fullness. negative for hearing loss. negative for dizziness.   Nose: positive  for snoring.negative for changes in smell. positive  for drainage.   Eyes: positive  for eye watering. positive  for eye itching. negative for vision changes. positive  for eye  redness.  Throat: positive  for hoarseness. negative for sore throat. positive  for trouble swallowing.   Lungs: positive  for shortness of breath.positive  for wheezing. negative for sputum production.   Cardiovascular: negative for chest pain. negative for swelling of ankles. negative for fast or irregular heartbeat.   Gastrointestinal: negative for nausea. negative for heartburn. negative for acid reflux.   Musculoskeletal: positive  for joint pain. negative for joint stiffness. negative for joint swelling.   Neurologic: negative for seizures. negative for fainting. negative for weakness.   Psychiatric: negative for changes in mood. negative for anxiety.   Endocrine: negative for cold intolerance. negative for heat intolerance. negative for tremors.   Lymphatic: negative for lower extremity swelling. negative for lymph node swelling.   Hematologic: negative for easy bruising. negative for easy bleeding.  Integumentary: negative for rash. negative for scaling. negative for nail changes.       Current Outpatient Prescriptions:      fluticasone-vilanterol (BREO ELLIPTA) 200-25 MCG/INH oral inhaler, Inhale 1 puff into the lungs daily, Disp: 1 Inhaler, Rfl: 3     albuterol (PROAIR HFA/PROVENTIL HFA/VENTOLIN HFA) 108 (90 BASE) MCG/ACT Inhaler, Inhale 2 puffs into the lungs every 4 hours as needed for shortness of breath / dyspnea or wheezing, Disp: 1 Inhaler, Rfl: 3     fexofenadine-pseudoePHEDrine (ALLEGRA-D 24) 180-240 MG per 24 hr tablet, Take 1 tablet by mouth daily, Disp: 30 tablet, Rfl: 11     albuterol (2.5 MG/3ML) 0.083% neb solution, Take 1 vial (2.5 mg) by nebulization every 4 hours as needed for shortness of breath / dyspnea or wheezing, Disp: 75 mL, Rfl: 3     ALBUTEROL 108 (90 BASE) MCG/ACT inhaler, INHALE 2 PUFFS INTO THE LUNGS EVERY 6 HOURS AS NEEDED FOR SHORTNESS OF BREATH., Disp: 25.5 g, Rfl: 0     EPINEPHrine (EPIPEN 2-DONNY) 0.3 MG/0.3ML injection, Inject 0.3 mLs (0.3 mg) into the muscle once as needed  for anaphylaxis, Disp: 0.6 mL, Rfl: 0     ORDER FOR DME, Equipment being ordered: nebulizer, Disp: 1 Device, Rfl: 0     ORDER FOR DME, Respironics REMSTAR 60 Series Auto CPAP 5-18cm H2O, MIrage Fx standard nasal mask, Disp: , Rfl:      ORDER FOR DME, Nebulizer tubing and mouthpiece, Disp: 1 Device, Rfl: 0     HYDROcodone-acetaminophen (NORCO) 5-325 MG per tablet, Take 1-2 tablets by mouth, Disp: , Rfl:      [DISCONTINUED] albuterol (2.5 MG/3ML) 0.083% neb solution, NEBULIZE ONE VIAL EVERY 6 HOURS AS NEEDED FOR SHORTNESS OF BREATH OR DIFFICULTY BREATHING, Disp: 75 mL, Rfl: 3     [DISCONTINUED] albuterol (PROAIR HFA/PROVENTIL HFA/VENTOLIN HFA) 108 (90 BASE) MCG/ACT Inhaler, Inhale 2 puffs into the lungs every 6 hours as needed for shortness of breath / dyspnea or wheezing, Disp: 3 Inhaler, Rfl: 0  Immunization History   Administered Date(s) Administered     Pneumococcal 23 valent 10/30/2012     TDAP Vaccine (Adacel) 10/06/2011     Allergies   Allergen Reactions     Nkda [No Known Drug Allergies]          EXAM:   Constitutional:  Appears well-developed and well-nourished. No distress.   HEENT:   Head: Normocephalic.   Right Ear: External ear normal. TM normal  Left Ear: External ear normal. TM normal  Mouth/Throat: No oropharyngeal exudate present.   No cobblestoning of posterior oropharynx.   Nasal tissue pink and normal appearing.  No rhinorrhea noted.    Eyes: Conjunctivae are non-erythematous   No maxillary or frontal sinus tenderness to palpation.   Cardiovascular: Normal rate, regular rhythm and normal heart sounds. Exam reveals no gallop and no friction rub.   No murmur heard.  Respiratory: Effort normal and breath sounds normal. No respiratory distress. No wheezes. No rales.   Musculoskeletal: Normal range of motion.   Lymphadenopathy:   No cervical adenopathy.   No lower extremity edema.   Neuro: Oriented to person, place, and time.  Skin: Skin is warm and dry. No rash noted.   Psychiatric: Normal mood and  affect.     Nursing note and vitals reviewed.    WORKUP:   Skin testing  Positive for cat, dog, weeds and molds.    Spirometry  FVC % pred:98  FEV1 % pred:103  FEV1/FVC % act:83  FEF 25-75% pred:120    Normal spirometry    ASSESSMENT/PLAN:  Problem List Items Addressed This Visit        Respiratory    Severe persistent asthma without complication - Primary     Chest symptoms throughout the day.  Albuterol is beneficial.  Previously has been on Dulera, Serevent, Flovent and Advair.  Currently on no controller asthma medications.  Normal spirometry today.  Skin testing positive for cat, dog, weeds and molds.ACT 12    - An asthma action plan was provided and discussed with patient and family.   - Albuterol 2-4 puffs inhaled (use a spacer unless using a Proair Respiclick device) every 4 hours as needed for chest tightness, wheezing, shortness of breath and/or coughing.   - Albuterol 2-4 puffs inhaled (use spacer if not using Proair Respiclick device) 15-20 minutes prior to physical activity.   - Please ensure warm up period prior to exercise.   - Avoid asthma triggers.  - Breo 200/25mcg 1 puff inhaled daily.           Relevant Medications    fluticasone-vilanterol (BREO ELLIPTA) 200-25 MCG/INH oral inhaler    albuterol (PROAIR HFA/PROVENTIL HFA/VENTOLIN HFA) 108 (90 BASE) MCG/ACT Inhaler    fexofenadine-pseudoePHEDrine (ALLEGRA-D 24) 180-240 MG per 24 hr tablet    albuterol (2.5 MG/3ML) 0.083% neb solution    Other Relevant Orders    Spirometry, Breathing Capacity (Completed)    ALLERGY SKIN TESTS,ALLERGENS (Completed)    Seasonal allergic rhinitis    Relevant Medications    fluticasone-vilanterol (BREO ELLIPTA) 200-25 MCG/INH oral inhaler    albuterol (PROAIR HFA/PROVENTIL HFA/VENTOLIN HFA) 108 (90 BASE) MCG/ACT Inhaler    fexofenadine-pseudoePHEDrine (ALLEGRA-D 24) 180-240 MG per 24 hr tablet    albuterol (2.5 MG/3ML) 0.083% neb solution    Other Relevant Orders    ALLERGY SKIN TESTS,ALLERGENS (Completed)    Chronic  allergic rhinitis due to animal hair and dander     Perennial allergy symptoms.  Claritin-D is very beneficial.  Nasal steroids caused nasal bleeding.  Increased symptoms around cats.  Dog in the home.    Skin testing positive for dog, cat, mold and weeds.    -Allergen avoidance measures were reviewed and literature provided.  - Allegra-D daily.  -Consider allergen immunotherapy.         Relevant Medications    fluticasone-vilanterol (BREO ELLIPTA) 200-25 MCG/INH oral inhaler    albuterol (PROAIR HFA/PROVENTIL HFA/VENTOLIN HFA) 108 (90 BASE) MCG/ACT Inhaler    fexofenadine-pseudoePHEDrine (ALLEGRA-D 24) 180-240 MG per 24 hr tablet    albuterol (2.5 MG/3ML) 0.083% neb solution    Other Relevant Orders    ALLERGY SKIN TESTS,ALLERGENS (Completed)    Allergic rhinitis due to mold    Relevant Medications    fluticasone-vilanterol (BREO ELLIPTA) 200-25 MCG/INH oral inhaler    albuterol (PROAIR HFA/PROVENTIL HFA/VENTOLIN HFA) 108 (90 BASE) MCG/ACT Inhaler    fexofenadine-pseudoePHEDrine (ALLEGRA-D 24) 180-240 MG per 24 hr tablet    albuterol (2.5 MG/3ML) 0.083% neb solution    Other Relevant Orders    ALLERGY SKIN TESTS,ALLERGENS (Completed)        Return in 6 weeks.    Chart documentation with Dragon Voice recognition Software. Although reviewed after completion, some words and grammatical errors may remain.    Maycol Kim DO   Allergy/Immunology  [facility]        Again, thank you for allowing me to participate in the care of your patient.        Sincerely,        Maycol Kim DO

## 2018-01-08 NOTE — Clinical Note
I saw your patient today in consultation. Please see the attached note for full details and recommendations from the consultation. I appreciate you trusting [facility] to provide care to your patients.  Maycol Kim, DO [facility]

## 2018-01-09 ASSESSMENT — ASTHMA QUESTIONNAIRES: ACT_TOTALSCORE: 12

## 2018-01-11 NOTE — TELEPHONE ENCOUNTER
Patient scored a 12 on his ACT while at his visit with Dr. Kim on 1/8/18.  Per protocol will forward to provider for review.  Becca Dietz,

## 2018-01-12 NOTE — TELEPHONE ENCOUNTER
Pt noncompliant with filling this out in the past  Now following with Dr. Kim  Will defer to this to him     Santa Wen

## 2018-02-06 ENCOUNTER — TELEPHONE (OUTPATIENT)
Dept: FAMILY MEDICINE | Facility: CLINIC | Age: 43
End: 2018-02-06

## 2018-02-06 NOTE — TELEPHONE ENCOUNTER
Panel Management Review      Patient has the following on his problem list:     Asthma review     ACT Total Scores 1/8/2018   ACT TOTAL SCORE -   ASTHMA ER VISITS -   ASTHMA HOSPITALIZATIONS -   ACT TOTAL SCORE (Goal Greater than or Equal to 20) 12   In the past 12 months, how many times did you visit the emergency room for your asthma without being admitted to the hospital? 0   In the past 12 months, how many times were you hospitalized overnight because of your asthma? 0      1. Is Asthma diagnosis on the Problem List? Yes    2. Is Asthma listed on Health Maintenance? Yes    3. Patient is due for:  none      Composite cancer screening  Chart review shows that this patient is due/due soon for the following None  Summary:    Patient is due/failing the following:   ACT    Action needed:   Please mail ACT and follow up with phone call in 6 weeks per humaira call     Type of outreach:    please mail act     Questions for provider review:    None                                                                                                                                    Joy Omalley MA       Chart routed to Care Team .

## 2018-02-06 NOTE — LETTER
February 6, 2018      Dano Kennedy  [address] [facility] [address]          Dear Dano,     Your clinic record indicates that you are due for an asthma update. We have a survey tool called an ACT (or Asthma Control Test) we use to measure the level of control of your asthma. Please complete the enclosed questionnaire and mail it back to us in the self-addressed stamped envelope.     If you have questions about this letter please contact your provider.     Sincerely,       Your [facility] Team

## 2018-02-14 ENCOUNTER — E-VISIT (OUTPATIENT)
Dept: FAMILY MEDICINE | Facility: CLINIC | Age: 43
End: 2018-02-14
Payer: COMMERCIAL

## 2018-02-14 DIAGNOSIS — T75.3XXA MOTION SICKNESS, INITIAL ENCOUNTER: Primary | ICD-10-CM

## 2018-02-14 PROCEDURE — 99444 ZZC PHYSICIAN ONLINE EVALUATION & MANAGEMENT SERVICE: CPT | Performed by: FAMILY MEDICINE

## 2018-02-14 RX ORDER — SCOLOPAMINE TRANSDERMAL SYSTEM 1 MG/1
PATCH, EXTENDED RELEASE TRANSDERMAL
Qty: 5 PATCH | Refills: 0 | Status: SHIPPED | OUTPATIENT
Start: 2018-02-14 | End: 2020-01-13

## 2018-02-19 ENCOUNTER — MYC MEDICAL ADVICE (OUTPATIENT)
Dept: UROLOGY | Facility: CLINIC | Age: 43
End: 2018-02-19

## 2018-02-19 DIAGNOSIS — N20.0 KIDNEY STONE: Primary | ICD-10-CM

## 2018-02-19 NOTE — TELEPHONE ENCOUNTER
RN called and left message for patient stating he should hear something from clinic tomorrow.    FERNY Chavez

## 2018-02-22 NOTE — TELEPHONE ENCOUNTER
I left the patient a VM.  Clinic number given if he needs another ACT mailed to him.  Becca Dietz,

## 2018-02-23 NOTE — TELEPHONE ENCOUNTER
I called the patient and there was no answer.  No VM message left.  Second attempt made and no response from patient.  Per protocol I will close the encounter.  Becca Dietz,

## 2018-05-15 DIAGNOSIS — J45.50 SEVERE PERSISTENT ASTHMA WITHOUT COMPLICATION (H): ICD-10-CM

## 2018-05-16 NOTE — TELEPHONE ENCOUNTER
"Requested Prescriptions   Pending Prescriptions Disp Refills     fluticasone-vilanterol (BREO ELLIPTA) 200-25 MCG/INH oral inhaler 1 Inhaler 3     Sig: Inhale 1 puff into the lungs daily    Inhaled Steroids Protocol Failed    5/15/2018  9:36 AM       Failed - Asthma control assessment score within normal limits in last 6 months    Please review ACT score.          Failed - Recent (6 mo) or future (30 days) visit within the authorizing provider's specialty    Patient had office visit in the last 6 months or has a visit in the next 30 days with authorizing provider or within the authorizing provider's specialty.  See \"Patient Info\" tab in inbasket, or \"Choose Columns\" in Meds & Orders section of the refill encounter.           Passed - Patient is age 12 or older        "

## 2018-05-16 NOTE — TELEPHONE ENCOUNTER
Pending Prescriptions:                       Disp   Refills    fluticasone-vilanterol (BREO ELLIPTA) 200*1 Inha*3            Sig: Inhale 1 puff into the lungs daily    Routing refill request to provider for review/approval because:  Last ACT < 19 (scored 12 on 1/8/18).  Patient due for follow up appointment (last office visit 1/8/18 and was asked to follow up in 6 weeks).  Unable to reach patient to schedule appointment.    Emi Lambert RN

## 2018-05-18 ENCOUNTER — TELEPHONE (OUTPATIENT)
Dept: FAMILY MEDICINE | Facility: CLINIC | Age: 43
End: 2018-05-18

## 2018-05-18 NOTE — TELEPHONE ENCOUNTER
Panel Management Review      Patient has the following on his problem list:     Asthma review     ACT Total Scores 1/8/2018   ACT TOTAL SCORE -   ASTHMA ER VISITS -   ASTHMA HOSPITALIZATIONS -   ACT TOTAL SCORE (Goal Greater than or Equal to 20) 12   In the past 12 months, how many times did you visit the emergency room for your asthma without being admitted to the hospital? 0   In the past 12 months, how many times were you hospitalized overnight because of your asthma? 0      1. Is Asthma diagnosis on the Problem List? Yes    2. Is Asthma listed on Health Maintenance? Yes    3. Patient is due for:  none      Composite cancer screening  Chart review shows that this patient is due/due soon for the following None  Summary:    Patient is due/failing the following:   ACT and LDL    Action needed:   Tc please send patient ACT and follow most current protocol.    Sent my chart message to make a fasting lab to check lipids    Type of outreach:    tc to mail act    Questions for provider review:    None                                                                                                                                    Joy Omalley MA       Chart routed to Care Team .

## 2018-05-18 NOTE — LETTER
May 18, 2018      Dano Kennedy  [address] [facility] [address]          Dear Dano,     Your clinic record indicates that you are due for an asthma update. We have a survey tool called an ACT (or Asthma Control Test) we use to measure the level of control of your asthma. Please complete the enclosed questionnaire and mail it back to us in the self-addressed stamped envelope.     If you have questions about this letter please contact your provider.     Sincerely,       Your [facility] Team

## 2018-05-18 NOTE — LETTER
Dear Dano,     Please make a fasting appointment to check your cholesterol and watch for an Asthma control test, once received please fill it out and mail it back to the clinic. Thank you .    Joy Omalley MA

## 2018-06-01 NOTE — TELEPHONE ENCOUNTER
I called the patient and LM.  Clinic number given for patient to call if he needs another ACT mailed to him.  Postpone 1 week.  Becca Dietz,

## 2018-09-06 ENCOUNTER — OFFICE VISIT (OUTPATIENT)
Dept: OPTOMETRY | Facility: CLINIC | Age: 43
End: 2018-09-06
Payer: COMMERCIAL

## 2018-09-06 DIAGNOSIS — H52.4 PRESBYOPIA: ICD-10-CM

## 2018-09-06 DIAGNOSIS — H52.223 REGULAR ASTIGMATISM OF BOTH EYES: Primary | ICD-10-CM

## 2018-09-06 PROCEDURE — 92015 DETERMINE REFRACTIVE STATE: CPT | Performed by: OPTOMETRIST

## 2018-09-06 PROCEDURE — 92004 COMPRE OPH EXAM NEW PT 1/>: CPT | Performed by: OPTOMETRIST

## 2018-09-06 ASSESSMENT — REFRACTION_MANIFEST
OS_CYLINDER: +0.75
OS_CYLINDER: +1.00
OS_SPHERE: -0.50
OS_ADD: +1.25
OD_AXIS: 075
OD_AXIS: 073
OD_CYLINDER: +0.75
OD_SPHERE: -0.50
OD_ADD: +1.25
OS_SPHERE: -1.00
OD_CYLINDER: +1.00
OD_SPHERE: -1.25
METHOD_AUTOREFRACTION: 1
OS_AXIS: 165
OS_AXIS: 163

## 2018-09-06 ASSESSMENT — KERATOMETRY
OD_AXISANGLE2_DEGREES: 172
OD_K2POWER_DIOPTERS: 47.25
OS_AXISANGLE2_DEGREES: 43
OD_K1POWER_DIOPTERS: 45.75
OS_K1POWER_DIOPTERS: 46.00
OS_K2POWER_DIOPTERS: 46.50

## 2018-09-06 ASSESSMENT — TONOMETRY
IOP_METHOD: APPLANATION
OD_IOP_MMHG: 16
OS_IOP_MMHG: 16

## 2018-09-06 ASSESSMENT — CUP TO DISC RATIO
OD_RATIO: 0.15
OS_RATIO: 0.15

## 2018-09-06 ASSESSMENT — CONF VISUAL FIELD
METHOD: COUNTING FINGERS
OD_NORMAL: 1
OS_NORMAL: 1

## 2018-09-06 ASSESSMENT — EXTERNAL EXAM - RIGHT EYE: OD_EXAM: NORMAL

## 2018-09-06 ASSESSMENT — VISUAL ACUITY
OS_SC: 20/30
OD_SC: 20/40
OD_SC: 20/70
METHOD: SNELLEN - LINEAR
OS_SC+: -1
OS_PH_SC: 20/20-1
OD_PH_SC: 20/25-1
OS_SC: 20/40
OD_SC+: -2

## 2018-09-06 ASSESSMENT — EXTERNAL EXAM - LEFT EYE: OS_EXAM: NORMAL

## 2018-09-06 ASSESSMENT — SLIT LAMP EXAM - LIDS
COMMENTS: NORMAL
COMMENTS: NORMAL

## 2018-09-06 NOTE — PROGRESS NOTES
Chief Complaint   Patient presents with     COMPREHENSIVE EYE EXAM     New to Eye Dept          Last Eye Exam: 3 years ago   Dilated Previously: Yes    What are you currently using to see?  Patient said that he was prescribed a slight correction before but he never got used to the glasses (fish bowl) and so he just didn't wear them.        Distance Vision Acuity: Noticed gradual change in both eyes    Near Vision Acuity: Not satisfied , trouble seeing things at near as well. Used someone's OTC readers the other day and it seemed to help     Eye Comfort: good and mentioned that he does have allergies so they get itchy at times   Do you use eye drops? 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       OBJECTIVE: See Ophthalmology exam    ASSESSMENT:    ICD-10-CM    1. Regular astigmatism of both eyes H52.223    2. Presbyopia H52.4       PLAN:     Patient Instructions   Patient was advised of today's exam findings.  Fill glasses prescription  Allow 2 weeks to adapt to change in glasses  Return to clinic to verify glasses and for glasses check as needed.   Family history of glaucoma   Return in 1-2 years for eye exam    Fara Lorenzo O.D.  [facility]   [address] [place], [place] [id number] [phone]

## 2018-09-06 NOTE — MR AVS SNAPSHOT
After Visit Summary   9/6/2018    Dano Kennedy    MRN: [id number]           Patient Information     Date Of Birth          1975        Visit Information        Provider Department      9/6/2018 10:40 AM Fara Lorenzo, CECIL [facility]        Today's Diagnoses     Regular astigmatism of both eyes    -  1    Presbyopia          Care Instructions    Patient was advised of today's exam findings.  Fill glasses prescription  Allow 2 weeks to adapt to change in glasses  Return to clinic to verify glasses and for glasses check as needed.   Family history of glaucoma   Return in 1-2 years for eye exam    Fara Lorenzo O.D.  [facility]   [address] [place] [address]  [phone]            Follow-ups after your visit        Who to contact     If you have questions or need follow up information about today's clinic visit or your schedule please contact [facility] directly at [phone].  Normal or non-critical lab and imaging results will be communicated to you by MyChart, letter or phone within 4 business days after the clinic has received the results. If you do not hear from us within 7 days, please contact the clinic through [employer] or phone. If you have a critical or abnormal lab result, we will notify you by phone as soon as possible.  Submit refill requests through [employer] or call your pharmacy and they will forward the refill request to us. Please allow 3 business days for your refill to be completed.          Additional Information About Your Visit        [email or web address] Information     [employer] gives you secure access to your electronic health record. If you see a primary care provider, you can also send messages to your care team and make appointments. If you have questions, please call your primary care clinic.  If you do not have a primary care provider, please call [phone] and they will assist you.        Care EveryWhere ID     This is your  Care EveryWhere ID. This could be used by other organizations to access your [place] medical records  [id number]         Blood Pressure from Last 3 Encounters:   01/08/18 (!) 142/96   09/22/17 126/80   04/03/17 124/79    Weight from Last 3 Encounters:   01/08/18 107.9 kg (237 lb 12.8 oz)   09/22/17 107 kg (236 lb)   04/03/17 107 kg (236 lb)              Today, you had the following     No orders found for display       Primary Care Provider Office Phone # Fax #    Santa Hunter [phone] [address] [facility] [address]        Equal Access to Services     ANTOINE LONGORIA : Leena Patel, warussell blake, qaybta kaalmada ademeagan, daiana lim . So [facility] [phone].    ATENCIÓN: Si habla español, tiene a skinner disposición servicios gratuitos de asistencia lingüística. Llame al [phone].    We comply with applicable federal civil rights laws and [place] laws. We do not discriminate on the basis of race, color, national origin, age, disability, sex, sexual orientation, or gender identity.            Thank you!     Thank you for choosing [facility]  for your care. Our goal is always to provide you with excellent care. Hearing back from our patients is one way we can continue to improve our services. Please take a few minutes to complete the written survey that you may receive in the mail after your visit with us. Thank you!             Your Updated Medication List - Protect others around you: Learn how to safely use, store and throw away your medicines at www.disposemymeds.org.          This list is accurate as of 9/6/18 11:53 AM.  Always use your most recent med list.                   Brand Name Dispense Instructions for use Diagnosis    EPINEPHrine 0.3 MG/0.3ML injection 2-pack    EPIPEN 2-DONNY    0.6 mL    Inject 0.3 mLs (0.3 mg) into the muscle once as needed for anaphylaxis    Uvulitis       fexofenadine-pseudoePHEDrine  180-240 MG per 24 hr tablet    ALLEGRA-D 24    30 tablet    Take 1 tablet by mouth daily    Severe persistent asthma without complication, Chronic seasonal allergic rhinitis due to pollen, Chronic allergic rhinitis due to animal hair and dander, Allergic rhinitis due to mold       fluticasone-vilanterol 200-25 MCG/INH inhaler    BREO ELLIPTA    1 Inhaler    Inhale 1 puff into the lungs daily    Severe persistent asthma without complication       HYDROcodone-acetaminophen 5-325 MG per tablet    NORCO     Take 1-2 tablets by mouth        * order for DME     1 Device    Nebulizer tubing and mouthpiece    Mild persistent asthma with exacerbation       * order for DME      Respironics REMSTAR 60 Series Auto CPAP 5-18cm H2O, MIrage Fx standard nasal mask        order for DME     1 Device    Equipment being ordered: nebulizer    Mild persistent asthma with exacerbation       * PROAIR  (90 Base) MCG/ACT inhaler   Generic drug:  albuterol     25.5 g    INHALE 2 PUFFS INTO THE LUNGS EVERY 6 HOURS AS NEEDED FOR SHORTNESS OF BREATH.    Mild persistent asthma with exacerbation       * albuterol 108 (90 Base) MCG/ACT inhaler    PROAIR HFA/PROVENTIL HFA/VENTOLIN HFA    1 Inhaler    Inhale 2 puffs into the lungs every 4 hours as needed for shortness of breath / dyspnea or wheezing    Severe persistent asthma without complication       * albuterol (2.5 MG/3ML) 0.083% neb solution     75 mL    Take 1 vial (2.5 mg) by nebulization every 4 hours as needed for shortness of breath / dyspnea or wheezing    Severe persistent asthma without complication       scopolamine 72 hr patch    TRANSDERM    5 patch    Apply 1 patch to hairless area behind one ear at least 4 hours before travel.  Remove old patch and change every 3 days (72 hours).    Motion sickness, initial encounter       * Notice:  This list has 5 medication(s) that are the same as other medications prescribed for you. Read the directions carefully, and ask your doctor or  other care provider to review them with you.

## 2018-09-06 NOTE — PATIENT INSTRUCTIONS
Patient was advised of today's exam findings.  Fill glasses prescription  Allow 2 weeks to adapt to change in glasses  Return to clinic to verify glasses and for glasses check as needed.   Family history of glaucoma   Return in 1-2 years for eye exam    Fara Lorenzo O.D.  [facility]   [address] [place] [address] [phone]

## 2018-09-06 NOTE — LETTER
9/6/2018         RE: Dano Kennedy  [address] [facility] [address]        Dear Colleague,    Thank you for referring your patient, Dano Kennedy, to the [facility]. Please see a copy of my visit note below.    Chief Complaint   Patient presents with     COMPREHENSIVE EYE EXAM     New to Eye Dept          Last Eye Exam: 3 years ago   Dilated Previously: Yes    What are you currently using to see?  Patient said that he was prescribed a slight correction before but he never got used to the glasses (fish bowl) and so he just didn't wear them.        Distance Vision Acuity: Noticed gradual change in both eyes    Near Vision Acuity: Not satisfied , trouble seeing things at near as well. Used someone's OTC readers the other day and it seemed to help     Eye Comfort: good and mentioned that he does have allergies so they get itchy at times   Do you use eye drops? 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       OBJECTIVE: See Ophthalmology exam    ASSESSMENT:    ICD-10-CM    1. Regular astigmatism of both eyes H52.223    2. Presbyopia H52.4       PLAN:     Patient Instructions   Patient was advised of today's exam findings.  Fill glasses prescription  Allow 2 weeks to adapt to change in glasses  Return to clinic to verify glasses and for glasses check as needed.   Family history of glaucoma   Return in 1-2 years for eye exam    Fara Lorenzo O.D.  [facility]   [address] [place] [address]  [phone]           Again, thank you for allowing me to participate in the care of your patient.        Sincerely,        Fara Lorenzo, OD

## 2018-09-10 DIAGNOSIS — J45.50 SEVERE PERSISTENT ASTHMA WITHOUT COMPLICATION (H): ICD-10-CM

## 2018-09-10 RX ORDER — ALBUTEROL SULFATE 90 UG/1
2 AEROSOL, METERED RESPIRATORY (INHALATION) EVERY 4 HOURS PRN
Qty: 1 INHALER | Refills: 0 | Status: SHIPPED | OUTPATIENT
Start: 2018-09-10 | End: 2018-09-26

## 2018-09-10 NOTE — TELEPHONE ENCOUNTER
Pending Prescriptions:                       Disp   Refills    albuterol (PROAIR HFA/PROVENTIL HFA/VIBHA*1 Inha*0            Sig: Inhale 2 puffs into the lungs every 4 hours as           needed for shortness of breath / dyspnea or           wheezing    Routing refill request to provider for review/approval because:  Patient needs to be seen because:  Last office visit and ACT > 6 months ago.  At 1/8/18 office visit, ACT was 12.  Patient was asked to follow up in 6 weeks.  RN attempted to reach patient to schedule a follow up appointment, but wasn't able to.    Emi Lambert RN

## 2018-09-11 NOTE — TELEPHONE ENCOUNTER
Notified patient that he is overdue for an appointment.  He states that he does not plan on following up with provider because he pays out of pocket for these appointments and it is too expensive for him to continue to see a specialist.  Notified him that provider prefers to see his asthma patients every 6 months, and that if he isn't planning on following up with Dr. Kim that he may want to have his PCP fill his asthma medications in the future.  No further action needed, closing encounter.    Emi Lambert RN

## 2018-09-25 ENCOUNTER — TELEPHONE (OUTPATIENT)
Dept: FAMILY MEDICINE | Facility: CLINIC | Age: 43
End: 2018-09-25

## 2018-09-25 NOTE — LETTER
September 26, 2018      Dano Kennedy  [address] [facility] [address]      Dear Dano,     Your clinic record indicates that you are due for an asthma update. We have a survey tool called an ACT (or Asthma Control Test) we use to measure the level of control of your asthma. Please complete the enclosed questionnaire and mail it back to us in the self-addressed stamped envelope.     If you have questions about this letter please contact your provider.     Sincerely,       Your [facility] Team

## 2018-09-25 NOTE — TELEPHONE ENCOUNTER
Panel Management Review      Patient has the following on his problem list:     Asthma review     ACT Total Scores 1/8/2018   ACT TOTAL SCORE -   ASTHMA ER VISITS -   ASTHMA HOSPITALIZATIONS -   ACT TOTAL SCORE (Goal Greater than or Equal to 20) 12   In the past 12 months, how many times did you visit the emergency room for your asthma without being admitted to the hospital? 0   In the past 12 months, how many times were you hospitalized overnight because of your asthma? 0      1. Is Asthma diagnosis on the Problem List? Yes    2. Is Asthma listed on Health Maintenance? Yes    3. Patient is due for:  ACT      Composite cancer screening  Chart review shows that this patient is due/due soon for the following None  Summary:    Patient is due/failing the following:   ACT and LDL    Action needed:   Patient needs to do ACT and fasting lipids     Type of outreach:    please mail ACT and follow protocol, also see if he will schedule for fasting labs.    Questions for provider review:    None                                                                                                                                    Joy Omalley MA       Chart routed to Care Team .

## 2018-09-25 NOTE — LETTER
Dano Kennedy  [address] [facility] [address]      September 26, 2018        Dear Dano,      Our records indicate that you have not scheduled for a(n)Asthma check  and Cholesterol check which was recommended by your health care team. Monitoring and managing your preventative and chronic health conditions are very important to us.       If you have received your health care elsewhere, please provide us with that information so it can be documented in your chart.    Please call [phone] or message us through your [employer] account to schedule an appointment or provide information for your chart.     We look forward to seeing you and working with you on your health care needs.     Sincerely,   Santa Hunter MD/horace          *If you have already scheduled an appointment, please disregard this reminder

## 2018-09-26 ENCOUNTER — E-VISIT (OUTPATIENT)
Dept: FAMILY MEDICINE | Facility: CLINIC | Age: 43
End: 2018-09-26
Payer: COMMERCIAL

## 2018-09-26 ENCOUNTER — MYC MEDICAL ADVICE (OUTPATIENT)
Dept: FAMILY MEDICINE | Facility: CLINIC | Age: 43
End: 2018-09-26

## 2018-09-26 DIAGNOSIS — J45.50 SEVERE PERSISTENT ASTHMA WITHOUT COMPLICATION (H): ICD-10-CM

## 2018-09-26 DIAGNOSIS — J01.00 ACUTE NON-RECURRENT MAXILLARY SINUSITIS: ICD-10-CM

## 2018-09-26 PROCEDURE — 99444 ZZC PHYSICIAN ONLINE EVALUATION & MANAGEMENT SERVICE: CPT | Performed by: FAMILY MEDICINE

## 2018-09-26 RX ORDER — ALBUTEROL SULFATE 90 UG/1
2 AEROSOL, METERED RESPIRATORY (INHALATION) EVERY 4 HOURS PRN
Qty: 1 INHALER | Refills: 3 | Status: SHIPPED | OUTPATIENT
Start: 2018-09-26 | End: 2019-02-04

## 2018-09-26 NOTE — TELEPHONE ENCOUNTER
Mailed ACT and letter that an appointment is needed to patient's home. Will postpone message for 2 weeks.Ericka Hargrove MA/TC

## 2018-10-10 NOTE — TELEPHONE ENCOUNTER
I called the patient and LM reminding him to fill out and return his questionnaire.  Clinic number given if he needs another one sent to him.  Postpone one week.  Becca Dietz,

## 2018-10-16 ASSESSMENT — ASTHMA QUESTIONNAIRES: ACT_TOTALSCORE: 18

## 2018-10-24 ENCOUNTER — E-VISIT (OUTPATIENT)
Dept: FAMILY MEDICINE | Facility: CLINIC | Age: 43
End: 2018-10-24
Payer: COMMERCIAL

## 2018-10-24 ENCOUNTER — MYC MEDICAL ADVICE (OUTPATIENT)
Dept: FAMILY MEDICINE | Facility: CLINIC | Age: 43
End: 2018-10-24

## 2018-10-24 DIAGNOSIS — J45.31 MILD PERSISTENT ASTHMA WITH EXACERBATION: ICD-10-CM

## 2018-10-24 DIAGNOSIS — J45.50 SEVERE PERSISTENT ASTHMA WITHOUT COMPLICATION (H): Primary | ICD-10-CM

## 2018-10-24 PROCEDURE — 99444 ZZC PHYSICIAN ONLINE EVALUATION & MANAGEMENT SERVICE: CPT | Performed by: FAMILY MEDICINE

## 2018-10-24 NOTE — TELEPHONE ENCOUNTER
Kristen Kehr,PA-C addressed patient's E-visit today. Will forward to provider to address patient's request below.    Jelena MENAN, RN, CPN

## 2018-10-24 NOTE — TELEPHONE ENCOUNTER
RN pended DME order for nebulizer.   Diagnosis is asthma. He uses albuterol in his nebulizer.     Routing to provider to print/ sign.    RAJESH IvoryN RN

## 2018-10-24 NOTE — TELEPHONE ENCOUNTER
Patient calling to follow up on this request. States his nebulizer broke and he needs to use it today.

## 2018-10-28 ENCOUNTER — MYC MEDICAL ADVICE (OUTPATIENT)
Dept: FAMILY MEDICINE | Facility: CLINIC | Age: 43
End: 2018-10-28

## 2018-10-28 NOTE — TELEPHONE ENCOUNTER
Has this been addressed. It looks like his nebulizer tubing and mouth piece were reordered.   Does he need a nebulizer as well?    Santa Wen

## 2018-10-29 RX ORDER — IPRATROPIUM BROMIDE AND ALBUTEROL SULFATE 2.5; .5 MG/3ML; MG/3ML
1 SOLUTION RESPIRATORY (INHALATION) EVERY 6 HOURS PRN
Qty: 90 VIAL | Refills: 0 | Status: SHIPPED | OUTPATIENT
Start: 2018-10-29 | End: 2018-12-10

## 2018-10-29 NOTE — TELEPHONE ENCOUNTER
Per chart it looks like patient already generated an E-visit and medication that was requested was sent to the pharmacy.  No further action needed.  Kymberly Duckworth R.N.

## 2018-10-30 ENCOUNTER — TRANSFERRED RECORDS (OUTPATIENT)
Dept: HEALTH INFORMATION MANAGEMENT | Facility: CLINIC | Age: 43
End: 2018-10-30

## 2018-10-30 ENCOUNTER — E-VISIT (OUTPATIENT)
Dept: FAMILY MEDICINE | Facility: CLINIC | Age: 43
End: 2018-10-30
Payer: COMMERCIAL

## 2018-10-30 DIAGNOSIS — K57.32 DIVERTICULITIS OF COLON: Primary | ICD-10-CM

## 2018-10-30 LAB
ALT SERPL-CCNC: 82 IU/L (ref 0–45)
AST SERPL-CCNC: 40 IU/L (ref 0–40)
CREAT SERPL-MCNC: 1.06 MG/DL (ref 0.7–1.3)
GFR SERPL CREATININE-BSD FRML MDRD: >60 ML/MIN/1.73M2
GLUCOSE SERPL-MCNC: 109 MG/DL (ref 70–125)
POTASSIUM SERPL-SCNC: 4.1 MMOL/L (ref 3.5–5)

## 2018-11-13 ENCOUNTER — MYC MEDICAL ADVICE (OUTPATIENT)
Dept: FAMILY MEDICINE | Facility: CLINIC | Age: 43
End: 2018-11-13

## 2018-11-13 DIAGNOSIS — K57.32 DIVERTICULITIS OF COLON: Primary | ICD-10-CM

## 2018-11-13 NOTE — TELEPHONE ENCOUNTER
Patient went into ER as PCP recommended 10/30/18.  Patient now requesting referral to surgeon for his diverticulitis.   RN pended general surgeon referral.    This was also recommended by ER provider:    [facility] MEDICINE DISCHARGE SUMMARY     Primary Care Physician: Santa Hunter MD Admission Date: 10/30/2018   Discharge Provider: Enrrique Villa Discharge Date: 10/31/2018   Diet: Full liquid diet followed by mechanical soft for 2-3 days and then regular diet  Code Status: Full Code   Activity: activity as tolerated      Condition at Discharge: Stable     REASON FOR PRESENTATION(See Admission Note for Details)   Abdominal pain    PRINCIPAL & ACTIVE DISCHARGE DIAGNOSES   Acute sigmoid diverticulitis without abscess  Mild intermittent asthma    SIGNIFICANT FINDINGS (Imaging, labs):   CT scan abdomen: Mild worsening of the sigmoid diverticulitis compared to CT scan obtained 10/25/2018, no abscess  CBC without evidence of leukocytosis    PENDING LABS     Order Current Status   Culture, Urine In process       PROCEDURES ( this hospitalization only)     * No surgery found *    RECOMMENDATION FOR F/U VISIT     -Monitor symptoms, if no resolution then may need to be admitted again for IV antibiotics and this was discussed with the patient  -Referral to general surgery, patient will need this from primary care physician    DISPOSITION   Home    SUMMARY OF HOSPITAL COURSE:     43-year-old gentleman with a history of 4 previous episodes of acute sigmoid diverticulitis in the last 6 years presented with a left lower quadrant abdominal pain. Initially he was evaluated in the ED on 10/25/2018 and treated with the Cipro and Flagyl. He took antibiotics for 5 days and continued to have pain so returned. He was then started on Zosyn, he has significant improvement in his symptoms overnight, now with a mild pain. He is tolerating clear liquid diet and advance to full liquids. Given significant improvement which was  somewhat unexpected overnight, he will be discharged home with Augmentin 875 p.o. twice daily for 10 days. He is advised to return to the hospital if he has worsening of his pain. He is also advised to follow-up with a general surgeon given this is his fifth episode of diverticulitis.        Routing to provider to advise.  CAMILA Ivory RN

## 2018-11-15 ENCOUNTER — TRANSFERRED RECORDS (OUTPATIENT)
Dept: HEALTH INFORMATION MANAGEMENT | Facility: CLINIC | Age: 43
End: 2018-11-15

## 2018-11-16 NOTE — TELEPHONE ENCOUNTER
Patient now requesting referral to different [place] surgeon, Dr Mary mcfarlane at [facility].   This RN filled out new referral and send patient MyChart.     Thu Bishop, BSN RN

## 2018-12-04 ENCOUNTER — TRANSFERRED RECORDS (OUTPATIENT)
Dept: HEALTH INFORMATION MANAGEMENT | Facility: CLINIC | Age: 43
End: 2018-12-04

## 2018-12-10 DIAGNOSIS — J45.50 SEVERE PERSISTENT ASTHMA WITHOUT COMPLICATION (H): ICD-10-CM

## 2018-12-12 RX ORDER — IPRATROPIUM BROMIDE AND ALBUTEROL SULFATE 2.5; .5 MG/3ML; MG/3ML
1 SOLUTION RESPIRATORY (INHALATION) EVERY 4 HOURS PRN
Qty: 270 ML | Refills: 0 | Status: SHIPPED | OUTPATIENT
Start: 2018-12-12 | End: 2019-04-25

## 2018-12-12 NOTE — TELEPHONE ENCOUNTER
Last office visit with Dr Hunter in clinic 12/8/16.  Has seen same day providers for Asthma.  Please advise on refill.  Cristina Murrell RN

## 2019-01-21 ENCOUNTER — E-VISIT (OUTPATIENT)
Dept: FAMILY MEDICINE | Facility: CLINIC | Age: 44
End: 2019-01-21
Payer: COMMERCIAL

## 2019-01-21 DIAGNOSIS — K57.32 DIVERTICULITIS OF COLON: ICD-10-CM

## 2019-01-21 DIAGNOSIS — T75.3XXA MOTION SICKNESS, INITIAL ENCOUNTER: Primary | ICD-10-CM

## 2019-01-21 PROCEDURE — 99444 ZZC PHYSICIAN ONLINE EVALUATION & MANAGEMENT SERVICE: CPT | Performed by: FAMILY MEDICINE

## 2019-01-21 RX ORDER — SCOLOPAMINE TRANSDERMAL SYSTEM 1 MG/1
1 PATCH, EXTENDED RELEASE TRANSDERMAL
Qty: 4 PATCH | Refills: 0 | Status: SHIPPED | OUTPATIENT
Start: 2019-01-21 | End: 2019-04-04

## 2019-01-23 DIAGNOSIS — J30.1 CHRONIC SEASONAL ALLERGIC RHINITIS DUE TO POLLEN: ICD-10-CM

## 2019-01-23 DIAGNOSIS — J30.81 CHRONIC ALLERGIC RHINITIS DUE TO ANIMAL HAIR AND DANDER: ICD-10-CM

## 2019-01-23 DIAGNOSIS — J45.50 SEVERE PERSISTENT ASTHMA WITHOUT COMPLICATION (H): ICD-10-CM

## 2019-01-23 DIAGNOSIS — J30.89 ALLERGIC RHINITIS DUE TO MOLD: ICD-10-CM

## 2019-01-23 RX ORDER — FEXOFENADINE HCL AND PSEUDOEPHEDRINE HCL 180; 240 MG/1; MG/1
1 TABLET, EXTENDED RELEASE ORAL DAILY
Qty: 30 TABLET | Refills: 0 | OUTPATIENT
Start: 2019-01-23

## 2019-01-23 NOTE — TELEPHONE ENCOUNTER
Refused Prescriptions:                       Disp   Refills    fexofenadine-pseudoePHEDrine (ALLEGRA-D 24*30 tab*0        Sig: Take 1 tablet by mouth daily  Refused By: AARTI STEWART  Reason for Refusal: Patient needs appointment  Reason for Refusal Comment: Patient has been told that he needs to follow up.     Dr. Kim stated in his note 9/10/18 that patient must follow up with him for further refills. When patient was contacted he stated that he will not be following up with Dr. Kim, because it is too expensive.     Aarti Stewart, RN

## 2019-01-29 ENCOUNTER — MYC MEDICAL ADVICE (OUTPATIENT)
Dept: FAMILY MEDICINE | Facility: CLINIC | Age: 44
End: 2019-01-29

## 2019-01-29 DIAGNOSIS — K57.32 DIVERTICULITIS OF COLON: Primary | ICD-10-CM

## 2019-01-30 RX ORDER — HYDROCODONE BITARTRATE AND ACETAMINOPHEN 5; 325 MG/1; MG/1
1-2 TABLET ORAL EVERY 6 HOURS PRN
Qty: 10 TABLET | Refills: 0 | Status: SHIPPED | OUTPATIENT
Start: 2019-01-30 | End: 2019-04-25

## 2019-01-30 NOTE — TELEPHONE ENCOUNTER
I brought the Rx up to the  and it is ready for .  [employer] message sent.  Becca Dietz,

## 2019-02-04 ENCOUNTER — MYC MEDICAL ADVICE (OUTPATIENT)
Dept: FAMILY MEDICINE | Facility: CLINIC | Age: 44
End: 2019-02-04

## 2019-02-04 DIAGNOSIS — J45.50 SEVERE PERSISTENT ASTHMA WITHOUT COMPLICATION (H): ICD-10-CM

## 2019-02-04 DIAGNOSIS — J30.1 CHRONIC SEASONAL ALLERGIC RHINITIS DUE TO POLLEN: ICD-10-CM

## 2019-02-04 DIAGNOSIS — J30.81 CHRONIC ALLERGIC RHINITIS DUE TO ANIMAL HAIR AND DANDER: ICD-10-CM

## 2019-02-04 DIAGNOSIS — J30.89 ALLERGIC RHINITIS DUE TO MOLD: ICD-10-CM

## 2019-02-04 RX ORDER — FEXOFENADINE HCL AND PSEUDOEPHEDRINE HCL 180; 240 MG/1; MG/1
1 TABLET, EXTENDED RELEASE ORAL DAILY
Qty: 90 TABLET | Refills: 1 | Status: SHIPPED | OUTPATIENT
Start: 2019-02-04 | End: 2019-04-25

## 2019-02-06 RX ORDER — ALBUTEROL SULFATE 90 UG/1
AEROSOL, METERED RESPIRATORY (INHALATION)
Qty: 8.5 G | Refills: 0 | Status: SHIPPED | OUTPATIENT
Start: 2019-02-06 | End: 2019-04-25

## 2019-04-04 DIAGNOSIS — J45.50 SEVERE PERSISTENT ASTHMA WITHOUT COMPLICATION (H): ICD-10-CM

## 2019-04-25 ENCOUNTER — ANCILLARY PROCEDURE (OUTPATIENT)
Dept: GENERAL RADIOLOGY | Facility: CLINIC | Age: 44
End: 2019-04-25
Attending: PHYSICIAN ASSISTANT
Payer: COMMERCIAL

## 2019-04-25 ENCOUNTER — OFFICE VISIT (OUTPATIENT)
Dept: FAMILY MEDICINE | Facility: CLINIC | Age: 44
End: 2019-04-25
Payer: COMMERCIAL

## 2019-04-25 VITALS
DIASTOLIC BLOOD PRESSURE: 76 MMHG | OXYGEN SATURATION: 95 % | WEIGHT: 228 LBS | RESPIRATION RATE: 18 BRPM | SYSTOLIC BLOOD PRESSURE: 134 MMHG | BODY MASS INDEX: 35.79 KG/M2 | TEMPERATURE: 98.6 F | HEIGHT: 67 IN | HEART RATE: 105 BPM

## 2019-04-25 DIAGNOSIS — J45.51 SEVERE PERSISTENT ASTHMA WITH EXACERBATION (H): ICD-10-CM

## 2019-04-25 DIAGNOSIS — J30.81 CHRONIC ALLERGIC RHINITIS DUE TO ANIMAL HAIR AND DANDER: ICD-10-CM

## 2019-04-25 DIAGNOSIS — J30.89 ALLERGIC RHINITIS DUE TO MOLD: ICD-10-CM

## 2019-04-25 DIAGNOSIS — R05.9 COUGH: Primary | ICD-10-CM

## 2019-04-25 DIAGNOSIS — J45.50 SEVERE PERSISTENT ASTHMA WITHOUT COMPLICATION (H): ICD-10-CM

## 2019-04-25 DIAGNOSIS — J30.1 CHRONIC SEASONAL ALLERGIC RHINITIS DUE TO POLLEN: ICD-10-CM

## 2019-04-25 PROCEDURE — 99214 OFFICE O/P EST MOD 30 MIN: CPT | Performed by: PHYSICIAN ASSISTANT

## 2019-04-25 PROCEDURE — 71046 X-RAY EXAM CHEST 2 VIEWS: CPT | Performed by: FAMILY MEDICINE

## 2019-04-25 RX ORDER — AZITHROMYCIN 250 MG/1
TABLET, FILM COATED ORAL
Qty: 6 TABLET | Refills: 0 | Status: ON HOLD | OUTPATIENT
Start: 2019-04-25 | End: 2019-09-29

## 2019-04-25 RX ORDER — ALBUTEROL SULFATE 90 UG/1
AEROSOL, METERED RESPIRATORY (INHALATION)
Qty: 8.5 G | Refills: 3 | Status: SHIPPED | OUTPATIENT
Start: 2019-04-25 | End: 2019-12-31

## 2019-04-25 RX ORDER — PREDNISONE 20 MG/1
40 TABLET ORAL DAILY
Qty: 14 TABLET | Refills: 0 | Status: ON HOLD | OUTPATIENT
Start: 2019-04-25 | End: 2019-09-29

## 2019-04-25 RX ORDER — IPRATROPIUM BROMIDE AND ALBUTEROL SULFATE 2.5; .5 MG/3ML; MG/3ML
1 SOLUTION RESPIRATORY (INHALATION) EVERY 4 HOURS PRN
Qty: 270 ML | Refills: 1 | Status: SHIPPED | OUTPATIENT
Start: 2019-04-25 | End: 2020-09-28

## 2019-04-25 RX ORDER — FEXOFENADINE HCL AND PSEUDOEPHEDRINE HCL 180; 240 MG/1; MG/1
1 TABLET, EXTENDED RELEASE ORAL DAILY
Qty: 90 TABLET | Refills: 1 | Status: SHIPPED | OUTPATIENT
Start: 2019-04-25 | End: 2019-08-13

## 2019-04-25 RX ORDER — ALBUTEROL SULFATE 0.83 MG/ML
2.5 SOLUTION RESPIRATORY (INHALATION) EVERY 4 HOURS PRN
Qty: 75 ML | Refills: 3 | Status: CANCELLED | OUTPATIENT
Start: 2019-04-25

## 2019-04-25 ASSESSMENT — ASTHMA QUESTIONNAIRES
QUESTION_3 LAST FOUR WEEKS HOW OFTEN DID YOUR ASTHMA SYMPTOMS (WHEEZING, COUGHING, SHORTNESS OF BREATH, CHEST TIGHTNESS OR PAIN) WAKE YOU UP AT NIGHT OR EARLIER THAN USUAL IN THE MORNING: NOT AT ALL
QUESTION_1 LAST FOUR WEEKS HOW MUCH OF THE TIME DID YOUR ASTHMA KEEP YOU FROM GETTING AS MUCH DONE AT WORK, SCHOOL OR AT HOME: NONE OF THE TIME
QUESTION_5 LAST FOUR WEEKS HOW WOULD YOU RATE YOUR ASTHMA CONTROL: COMPLETELY CONTROLLED
ACUTE_EXACERBATION_TODAY: YES
ACT_TOTALSCORE: 25
QUESTION_2 LAST FOUR WEEKS HOW OFTEN HAVE YOU HAD SHORTNESS OF BREATH: NOT AT ALL
QUESTION_4 LAST FOUR WEEKS HOW OFTEN HAVE YOU USED YOUR RESCUE INHALER OR NEBULIZER MEDICATION (SUCH AS ALBUTEROL): NOT AT ALL

## 2019-04-25 ASSESSMENT — MIFFLIN-ST. JEOR: SCORE: 1882.83

## 2019-04-25 NOTE — PATIENT INSTRUCTIONS
To emergency room with worsening shortness of breath    Xray is negative. I will follow up with radiologist read and we will call you if anything else is seen on x-ray.

## 2019-04-25 NOTE — LETTER
My Asthma Action Plan  Name: Dano Kennedy   YOB: 1975  Date: 4/25/2019   My doctor: Nancy Preciado PA-C   My clinic: [facility]        My Control Medicine: breo  My Rescue Medicine: albuterol  My Oral Steroid Medicine: prednisone My Asthma Severity: severe persistent  Avoid your asthma triggers: upper respiratory infections  seasonal/ exercise            GREEN ZONE   Good Control    I feel good    No cough or wheeze    Can work, sleep and play without asthma symptoms       Take your asthma control medicine every day.     1. If exercise triggers your asthma, take your rescue medication    15 minutes before exercise or sports, and    During exercise if you have asthma symptoms  2. Spacer to use with inhaler: If you have a spacer, make sure to use it with your inhaler             YELLOW ZONE Getting Worse  I have ANY of these:    I do not feel good    Cough or wheeze    Chest feels tight    Wake up at night   1. Keep taking your Green Zone medications  2. Start taking your rescue medicine:    every 20 minutes for up to 1 hour. Then every 4 hours for 24-48 hours.  3. If you stay in the Yellow Zone for more than 12-24 hours, contact your doctor.  4. If you do not return to the Green Zone in 12-24 hours or you get worse, start taking your oral steroid medicine if prescribed by your provider.           RED ZONE Medical Alert - Get Help  I have ANY of these:    I feel awful    Medicine is not helping    Breathing getting harder    Trouble walking or talking    Nose opens wide to breathe       1. Take your rescue medicine NOW  2. If your provider has prescribed an oral steroid medicine, start taking it NOW  3. Call your doctor NOW  4. If you are still in the Red Zone after 20 minutes and you have not reached your doctor:    Take your rescue medicine again and    Call 911 or go to the emergency room right away    See your regular doctor within 2 weeks of an Emergency Room or Urgent  Care visit for follow-up treatment.          Annual Reminders:  Meet with Asthma Educator,  Flu Shot in the Fall, consider Pneumonia Vaccination for patients with asthma (aged 19 and older).    Pharmacy:    [id number] - [facility] [address] AT [facility] [address]  WRITTEN PRESCRIPTION REQUESTED  [id number] - [place], [place] - [address] AT [facility] [address]                      Asthma Triggers  How To Control Things That Make Your Asthma Worse    Triggers are things that make your asthma worse.  Look at the list below to help you find your triggers and what you can do about them.  You can help prevent asthma flare-ups by staying away from your triggers.      Trigger                                                          What you can do   Cigarette Smoke  Tobacco smoke can make asthma worse. Do not allow smoking in your home, car or around you.  Be sure no one smokes at a child s day care or school.  If you smoke, ask your health care provider for ways to help you quit.  Ask family members to quit too.  Ask your health care provider for a referral to Quit Plan to help you quit smoking, or call 9-989-857-PLAN.     Colds, Flu, Bronchitis  These are common triggers of asthma. Wash your hands often.  Don t touch your eyes, nose or mouth.  Get a flu shot every year.     Dust Mites  These are tiny bugs that live in cloth or carpet. They are too small to see. Wash sheets and blankets in hot water every week.   Encase pillows and mattress in dust mite proof covers.  Avoid having carpet if you can. If you have carpet, vacuum weekly.   Use a dust mask and HEPA vacuum.   Pollen and Outdoor Mold  Some people are allergic to trees, grass, or weed pollen, or molds. Try to keep your windows closed.  Limit time out doors when pollen count is high.   Ask you health care provider about taking medicine during allergy season.     Animal Dander  Some  people are allergic to skin flakes, urine or saliva from pets with fur or feathers. Keep pets with fur or feathers out of your home.    If you can t keep the pet outdoors, then keep the pet out of your bedroom.  Keep the bedroom door closed.  Keep pets off cloth furniture and away from stuffed toys.     Mice, Rats, and Cockroaches  Some people are allergic to the waste from these pests.   Cover food and garbage.  Clean up spills and food crumbs.  Store grease in the refrigerator.   Keep food out of the bedroom.   Indoor Mold  This can be a trigger if your home has high moisture. Fix leaking faucets, pipes, or other sources of water.   Clean moldy surfaces.  Dehumidify basement if it is damp and smelly.   Smoke, Strong Odors, and Sprays  These can reduce air quality. Stay away from strong odors and sprays, such as perfume, powder, hair spray, paints, smoke incense, paint, cleaning products, candles and new carpet.   Exercise or Sports  Some people with asthma have this trigger. Be active!  Ask your doctor about taking medicine before sports or exercise to prevent symptoms.    Warm up for 5-10 minutes before and after sports or exercise.     Other Triggers of Asthma  Cold air:  Cover your nose and mouth with a scarf.  Sometimes laughing or crying can be a trigger.  Some medicines and food can trigger asthma.

## 2019-04-25 NOTE — PROGRESS NOTES
SUBJECTIVE:   Dano Kennedy is a 44 year old male who presents to clinic today for the following   health issues:    ENT Symptoms             Symptoms: cc Present Absent Comment   Fever/Chills  x  Night sweats no documented temp   Fatigue  x     Muscle Aches  x     Eye Irritation   x    Sneezing  x     Nasal Tristen/Drg  x     Sinus Pressure/Pain  x     Loss of smell   x    Dental pain   x    Sore Throat   x    Swollen Glands   x    Ear Pain/Fullness   x    Cough  x  Hurts to cough but does have one, not really productive   Wheeze  x     Chest Pain   x Sore when coughing   Shortness of breath  x  With ashtma   Rash   x    Other  x  Headache      Symptom duration:  x 3-4 days   Symptom severity:  moderate   Treatments tried:  OTC   Contacts:  none       Patient with asthma presents with concerns of pneumonia per patient.    has had pneumonia in the past. This started off as a cold but then he got chills, body aches, cough and fatigue and shortness of breath.   Tried some old prednisone 40 mg yesterday and did not help a lot.   Has sinus pressure also and h/o sinus infections.   Oxygen is 95 percent.   Does have body aches.       Inhalers -has duboneb at home that is helpful he used it last night, has albuterol rescue inhaler and also allegra-d for allergies he needs refilled and then breo for control medicine.   Needs refills on all.           Additional history: as documented    Reviewed  and updated as needed this visit by clinical staff         Reviewed and updated as needed this visit by Provider         Patient Active Problem List   Diagnosis     Anxiety     Severe persistent asthma without complication     Seasonal allergic rhinitis     Environmental allergies     GSE (gluten-sensitive enteropathy)     BMI 33.0-33.9,adult     Hyperlipidemia LDL goal <130     ADHD (attention deficit hyperactivity disorder)     Heartburn     WENDY (obstructive sleep apnea)     Chronic allergic rhinitis due to animal hair and  dander     Allergic rhinitis due to mold     Past Surgical History:   Procedure Laterality Date     ENDOSCOPIC POLYPECTOMY NASAL       LASER HOLMIUM LITHOTRIPSY URETER(S), INSERT STENT, COMBINED Left 1/14/2015    Procedure: COMBINED CYSTOSCOPY, URETEROSCOPY, LASER HOLMIUM LITHOTRIPSY URETER(S), INSERT STENT;  Surgeon: Derrek Cobb MD;  Location:  OR       Social History     Tobacco Use     Smoking status: Never Smoker     Smokeless tobacco: Never Used   Substance Use Topics     Alcohol use: Yes     Family History   Problem Relation Age of Onset     Glaucoma Father      Glaucoma Maternal Grandfather      Macular Degeneration No family hx of          Current Outpatient Medications   Medication Sig Dispense Refill     albuterol (2.5 MG/3ML) 0.083% neb solution Take 1 vial (2.5 mg) by nebulization every 4 hours as needed for shortness of breath / dyspnea or wheezing 75 mL 3     albuterol (PROAIR HFA) 108 (90 Base) MCG/ACT inhaler INHALE 2 PUFFS INTO THE LUNGS EVERY 4 HOURS AS NEEDED FOR SHORTNESS OF BREATH OR DIFFICULT BREATHING OR WHEEZING 8.5 g 3     EPINEPHrine (EPIPEN 2-DONNY) 0.3 MG/0.3ML injection Inject 0.3 mLs (0.3 mg) into the muscle once as needed for anaphylaxis 0.6 mL 0     fexofenadine-pseudoePHEDrine (ALLEGRA-D 24) 180-240 MG 24 hr tablet Take 1 tablet by mouth daily 90 tablet 1     fluticasone-vilanterol (BREO ELLIPTA) 200-25 MCG/INH inhaler INHALE 1 PUFF INTO THE LUNGS DAILY 1 Inhaler 5     ipratropium - albuterol 0.5 mg/2.5 mg/3 mL (DUONEB) 0.5-2.5 (3) MG/3ML neb solution Take 1 vial (3 mLs) by nebulization every 4 hours as needed for shortness of breath / dyspnea or wheezing 270 mL 1     order for DME Equipment being ordered: nebulizer 1 Device 0     order for DME Nebulizer tubing and mouthpiece 1 Device 0     ORDER FOR DME Respironics REMSTAR 60 Series Auto CPAP 5-18cm H2O, MIrage Fx standard nasal mask       scopolamine (TRANSDERM) 72 hr patch Apply 1 patch to hairless area behind one ear at  "least 4 hours before travel.  Remove old patch and change every 3 days (72 hours). 5 patch 0     Allergies   Allergen Reactions     Nkda [No Known Drug Allergies]        ROS:  Constitutional, HEENT, cardiovascular, pulmonary, GI, , musculoskeletal, neuro, skin, endocrine and psych systems are negative, except as otherwise noted.    OBJECTIVE:     /76   Pulse 105   Temp 98.6  F (37  C) (Oral)   Resp 18   Ht 1.702 m (5' 7\")   Wt 103.4 kg (228 lb)   SpO2 95%   BMI 35.71 kg/m    Body mass index is 35.71 kg/m .  GENERAL:  No acute distress.  Interacts appropriately.  Breathing without difficulty.  Alert.  HEENT:  Tympanic membranes intact without effusion or erythema.  Oral mucosa moist. Posterior pharynx has no erythema.  Posterior pharynx has no exudate. no edema.  NECK:  Soft and supple.  without tenderness.  no lymphadenopathy.  Normal range of motion.    CARDIAC:   Regular rate and rhythm.  No murmurs, rubs, or gallops.   PULMONARY: diffuse wheezing and course rhonchi throughout. Normal air exchange/breath sounds.  No use of accessory muscles.    PSYCH: Normal affect.  SKIN: No rashes.    Chest xray- initial read neg    ASSESSMENT/PLAN:     ASSESSMENT / PLAN:  (R05) Cough  (primary encounter diagnosis)  Comment:  With co-morbids will cover empricially  Plan: predniSONE (DELTASONE) 20 MG tablet,         azithromycin (ZITHROMAX) 250 MG tablet            (J45.51) Severe persistent asthma with exacerbation  Comment:   Plan: predniSONE (DELTASONE) 20 MG tablet,         azithromycin (ZITHROMAX) 250 MG tablet            (J45.50) Severe persistent asthma without complication  Comment:   Plan: fluticasone-vilanterol (BREO ELLIPTA) 200-25         MCG/INH inhaler, ipratropium - albuterol 0.5         mg/2.5 mg/3 mL (DUONEB) 0.5-2.5 (3) MG/3ML neb         solution, albuterol (PROAIR HFA) 108 (90 Base)         MCG/ACT inhaler, XR Chest 2 Views,         fexofenadine-pseudoePHEDrine (ALLEGRA-D 24)         180-240 MG " 24 hr tablet            (J30.1) Chronic seasonal allergic rhinitis due to pollen  Comment:   Plan: fexofenadine-pseudoePHEDrine (ALLEGRA-D 24)         180-240 MG 24 hr tablet            (J30.81) Chronic allergic rhinitis due to animal hair and dander  Comment:   Plan: fexofenadine-pseudoePHEDrine (ALLEGRA-D 24)         180-240 MG 24 hr tablet            (J30.89) Allergic rhinitis due to mold  Comment:   Plan: fexofenadine-pseudoePHEDrine (ALLEGRA-D 24)         180-240 MG 24 hr tablet            See below  Recheck asthma in 3-6 months depending on how you are doing    Patient Instructions   To emergency room with worsening shortness of breath    Xray is negative. I will follow up with radiologist read and we will call you if anything else is seen on x-ray.           Nancy Preciado PA-C  [facility]

## 2019-04-26 ASSESSMENT — ASTHMA QUESTIONNAIRES: ACT_TOTALSCORE: 25

## 2019-04-30 ENCOUNTER — MYC MEDICAL ADVICE (OUTPATIENT)
Dept: FAMILY MEDICINE | Facility: CLINIC | Age: 44
End: 2019-04-30

## 2019-04-30 DIAGNOSIS — J01.90 ACUTE SINUSITIS WITH SYMPTOMS > 10 DAYS: Primary | ICD-10-CM

## 2019-04-30 NOTE — TELEPHONE ENCOUNTER
augmentin is better for sinus.  Have him take that. I pended it needs a pharmacy.  Please sign it for me if you can.     To ENT if not better after that, [place]

## 2019-06-30 ENCOUNTER — TRANSFERRED RECORDS (OUTPATIENT)
Dept: HEALTH INFORMATION MANAGEMENT | Facility: CLINIC | Age: 44
End: 2019-06-30

## 2019-08-13 DIAGNOSIS — J45.50 SEVERE PERSISTENT ASTHMA WITHOUT COMPLICATION (H): ICD-10-CM

## 2019-08-13 DIAGNOSIS — J30.89 ALLERGIC RHINITIS DUE TO MOLD: ICD-10-CM

## 2019-08-13 DIAGNOSIS — J30.1 CHRONIC SEASONAL ALLERGIC RHINITIS DUE TO POLLEN: ICD-10-CM

## 2019-08-13 DIAGNOSIS — J30.81 CHRONIC ALLERGIC RHINITIS DUE TO ANIMAL HAIR AND DANDER: ICD-10-CM

## 2019-08-13 RX ORDER — FEXOFENADINE HCL AND PSEUDOEPHEDRINE HCL 180; 240 MG/1; MG/1
1 TABLET, EXTENDED RELEASE ORAL DAILY
Qty: 90 TABLET | Refills: 1 | Status: SHIPPED | OUTPATIENT
Start: 2019-08-13 | End: 2020-06-17

## 2019-08-13 NOTE — TELEPHONE ENCOUNTER
Message: New rx request. This presc has not been filled at our pharmacy. Already transferred once- not allowed per state law to transfer again. New rx required to fill at our store. Thanks!

## 2019-08-15 ENCOUNTER — E-VISIT (OUTPATIENT)
Dept: FAMILY MEDICINE | Facility: CLINIC | Age: 44
End: 2019-08-15
Payer: COMMERCIAL

## 2019-08-15 DIAGNOSIS — R11.0 NAUSEA: Primary | ICD-10-CM

## 2019-08-15 PROCEDURE — 99444 ZZC PHYSICIAN ONLINE EVALUATION & MANAGEMENT SERVICE: CPT | Performed by: FAMILY MEDICINE

## 2019-08-16 RX ORDER — ONDANSETRON 4 MG/1
8 TABLET, FILM COATED ORAL EVERY 8 HOURS PRN
Qty: 20 TABLET | Refills: 1 | Status: SHIPPED | OUTPATIENT
Start: 2019-08-16 | End: 2020-01-11

## 2019-09-29 ENCOUNTER — TRANSFERRED RECORDS (OUTPATIENT)
Dept: HEALTH INFORMATION MANAGEMENT | Facility: CLINIC | Age: 44
End: 2019-09-29

## 2019-09-29 ENCOUNTER — HOSPITAL ENCOUNTER (INPATIENT)
Facility: CLINIC | Age: 44
LOS: 2 days | Discharge: HOME | DRG: 392 | End: 2019-10-01
Attending: INTERNAL MEDICINE | Admitting: INTERNAL MEDICINE
Payer: COMMERCIAL

## 2019-09-29 DIAGNOSIS — K57.92 ACUTE DIVERTICULITIS: Primary | ICD-10-CM

## 2019-09-29 LAB
ALT SERPL-CCNC: 115 U/L
AST SERPL-CCNC: 79 IU/L (ref 15–46)
CREAT SERPL-MCNC: 0.8 MG/DL (ref 0.66–1.25)
GFR SERPL CREATININE-BSD FRML MDRD: >60 ML/MIN/1.73M2
GLUCOSE SERPL-MCNC: 107 MG/DL (ref 74–106)
POTASSIUM SERPL-SCNC: 4.2 MMOL/L (ref 3.5–5.1)

## 2019-09-29 PROCEDURE — 99222 1ST HOSP IP/OBS MODERATE 55: CPT | Mod: AI | Performed by: INTERNAL MEDICINE

## 2019-09-29 PROCEDURE — 12000000 ZZH R&B MED SURG/OB

## 2019-09-29 PROCEDURE — 25000128 H RX IP 250 OP 636: Performed by: INTERNAL MEDICINE

## 2019-09-29 RX ORDER — ONDANSETRON 2 MG/ML
4 INJECTION INTRAMUSCULAR; INTRAVENOUS EVERY 6 HOURS PRN
Status: DISCONTINUED | OUTPATIENT
Start: 2019-09-29 | End: 2019-10-01 | Stop reason: HOSPADM

## 2019-09-29 RX ORDER — SODIUM CHLORIDE AND POTASSIUM CHLORIDE 150; 900 MG/100ML; MG/100ML
INJECTION, SOLUTION INTRAVENOUS CONTINUOUS
Status: DISCONTINUED | OUTPATIENT
Start: 2019-09-29 | End: 2019-10-01 | Stop reason: HOSPADM

## 2019-09-29 RX ORDER — NALOXONE HYDROCHLORIDE 0.4 MG/ML
.1-.4 INJECTION, SOLUTION INTRAMUSCULAR; INTRAVENOUS; SUBCUTANEOUS
Status: DISCONTINUED | OUTPATIENT
Start: 2019-09-29 | End: 2019-10-01 | Stop reason: HOSPADM

## 2019-09-29 RX ORDER — ACETAMINOPHEN 650 MG/1
650 SUPPOSITORY RECTAL EVERY 4 HOURS PRN
Status: DISCONTINUED | OUTPATIENT
Start: 2019-09-29 | End: 2019-10-01 | Stop reason: HOSPADM

## 2019-09-29 RX ORDER — ONDANSETRON 4 MG/1
4 TABLET, ORALLY DISINTEGRATING ORAL EVERY 6 HOURS PRN
Status: DISCONTINUED | OUTPATIENT
Start: 2019-09-29 | End: 2019-10-01 | Stop reason: HOSPADM

## 2019-09-29 RX ORDER — ACETAMINOPHEN 325 MG/1
650 TABLET ORAL EVERY 4 HOURS PRN
Status: DISCONTINUED | OUTPATIENT
Start: 2019-09-29 | End: 2019-10-01 | Stop reason: HOSPADM

## 2019-09-29 RX ORDER — OXYCODONE HYDROCHLORIDE 5 MG/1
5-10 TABLET ORAL
Status: DISCONTINUED | OUTPATIENT
Start: 2019-09-29 | End: 2019-10-01 | Stop reason: HOSPADM

## 2019-09-29 RX ORDER — POTASSIUM CHLORIDE 7.45 MG/ML
10 INJECTION INTRAVENOUS
Status: DISCONTINUED | OUTPATIENT
Start: 2019-09-29 | End: 2019-10-01 | Stop reason: HOSPADM

## 2019-09-29 RX ORDER — ALBUTEROL SULFATE 90 UG/1
2 AEROSOL, METERED RESPIRATORY (INHALATION) EVERY 4 HOURS PRN
Status: DISCONTINUED | OUTPATIENT
Start: 2019-09-29 | End: 2019-10-01 | Stop reason: HOSPADM

## 2019-09-29 RX ORDER — IPRATROPIUM BROMIDE AND ALBUTEROL SULFATE 2.5; .5 MG/3ML; MG/3ML
1 SOLUTION RESPIRATORY (INHALATION) EVERY 4 HOURS PRN
Status: DISCONTINUED | OUTPATIENT
Start: 2019-09-29 | End: 2019-10-01 | Stop reason: HOSPADM

## 2019-09-29 RX ORDER — POTASSIUM CHLORIDE 1500 MG/1
20-40 TABLET, EXTENDED RELEASE ORAL
Status: DISCONTINUED | OUTPATIENT
Start: 2019-09-29 | End: 2019-10-01 | Stop reason: HOSPADM

## 2019-09-29 RX ORDER — PIPERACILLIN SODIUM, TAZOBACTAM SODIUM 3; .375 G/15ML; G/15ML
3.38 INJECTION, POWDER, LYOPHILIZED, FOR SOLUTION INTRAVENOUS EVERY 6 HOURS
Status: DISCONTINUED | OUTPATIENT
Start: 2019-09-30 | End: 2019-10-01 | Stop reason: HOSPADM

## 2019-09-29 RX ORDER — POTASSIUM CL/LIDO/0.9 % NACL 10MEQ/0.1L
10 INTRAVENOUS SOLUTION, PIGGYBACK (ML) INTRAVENOUS
Status: DISCONTINUED | OUTPATIENT
Start: 2019-09-29 | End: 2019-10-01 | Stop reason: HOSPADM

## 2019-09-29 RX ORDER — POTASSIUM CHLORIDE 1.5 G/1.58G
20-40 POWDER, FOR SOLUTION ORAL
Status: DISCONTINUED | OUTPATIENT
Start: 2019-09-29 | End: 2019-10-01 | Stop reason: HOSPADM

## 2019-09-29 RX ORDER — LIDOCAINE 40 MG/G
CREAM TOPICAL
Status: DISCONTINUED | OUTPATIENT
Start: 2019-09-29 | End: 2019-10-01 | Stop reason: HOSPADM

## 2019-09-29 RX ORDER — HYDROMORPHONE HYDROCHLORIDE 1 MG/ML
.3-.5 INJECTION, SOLUTION INTRAMUSCULAR; INTRAVENOUS; SUBCUTANEOUS
Status: DISCONTINUED | OUTPATIENT
Start: 2019-09-29 | End: 2019-10-01 | Stop reason: HOSPADM

## 2019-09-29 RX ORDER — POTASSIUM CHLORIDE 29.8 MG/ML
20 INJECTION INTRAVENOUS
Status: DISCONTINUED | OUTPATIENT
Start: 2019-09-29 | End: 2019-10-01 | Stop reason: HOSPADM

## 2019-09-29 RX ADMIN — POTASSIUM CHLORIDE AND SODIUM CHLORIDE: 900; 150 INJECTION, SOLUTION INTRAVENOUS at 23:54

## 2019-09-30 LAB
ALBUMIN SERPL-MCNC: 3.2 G/DL (ref 3.4–5)
ALP SERPL-CCNC: 133 U/L (ref 40–150)
ALT SERPL W P-5'-P-CCNC: 79 U/L (ref 0–70)
ANION GAP SERPL CALCULATED.3IONS-SCNC: 2 MMOL/L (ref 3–14)
AST SERPL W P-5'-P-CCNC: 29 U/L (ref 0–45)
BILIRUB DIRECT SERPL-MCNC: 0.3 MG/DL (ref 0–0.2)
BILIRUB SERPL-MCNC: 0.8 MG/DL (ref 0.2–1.3)
BUN SERPL-MCNC: 14 MG/DL (ref 7–30)
CALCIUM SERPL-MCNC: 8.6 MG/DL (ref 8.5–10.1)
CHLORIDE SERPL-SCNC: 105 MMOL/L (ref 94–109)
CO2 SERPL-SCNC: 29 MMOL/L (ref 20–32)
CREAT SERPL-MCNC: 0.9 MG/DL (ref 0.66–1.25)
ERYTHROCYTE [DISTWIDTH] IN BLOOD BY AUTOMATED COUNT: 13.2 % (ref 10–15)
GFR SERPL CREATININE-BSD FRML MDRD: >90 ML/MIN/{1.73_M2}
GLUCOSE SERPL-MCNC: 95 MG/DL (ref 70–99)
HCT VFR BLD AUTO: 38.6 % (ref 40–53)
HGB BLD-MCNC: 12.6 G/DL (ref 13.3–17.7)
MCH RBC QN AUTO: 28.2 PG (ref 26.5–33)
MCHC RBC AUTO-ENTMCNC: 32.6 G/DL (ref 31.5–36.5)
MCV RBC AUTO: 86 FL (ref 78–100)
PLATELET # BLD AUTO: 174 10E9/L (ref 150–450)
POTASSIUM SERPL-SCNC: 4.3 MMOL/L (ref 3.4–5.3)
PROT SERPL-MCNC: 7.1 G/DL (ref 6.8–8.8)
RBC # BLD AUTO: 4.47 10E12/L (ref 4.4–5.9)
SODIUM SERPL-SCNC: 136 MMOL/L (ref 133–144)
WBC # BLD AUTO: 10.1 10E9/L (ref 4–11)

## 2019-09-30 PROCEDURE — 99207 ZZC NON-BILLABLE SERV PER CHARTING: CPT | Performed by: INTERNAL MEDICINE

## 2019-09-30 PROCEDURE — 25000128 H RX IP 250 OP 636: Performed by: INTERNAL MEDICINE

## 2019-09-30 PROCEDURE — 85027 COMPLETE CBC AUTOMATED: CPT | Performed by: INTERNAL MEDICINE

## 2019-09-30 PROCEDURE — 25000132 ZZH RX MED GY IP 250 OP 250 PS 637: Performed by: INTERNAL MEDICINE

## 2019-09-30 PROCEDURE — 80076 HEPATIC FUNCTION PANEL: CPT | Performed by: INTERNAL MEDICINE

## 2019-09-30 PROCEDURE — 80048 BASIC METABOLIC PNL TOTAL CA: CPT | Performed by: INTERNAL MEDICINE

## 2019-09-30 PROCEDURE — 36415 COLL VENOUS BLD VENIPUNCTURE: CPT | Performed by: INTERNAL MEDICINE

## 2019-09-30 PROCEDURE — 12000000 ZZH R&B MED SURG/OB

## 2019-09-30 RX ADMIN — POTASSIUM CHLORIDE AND SODIUM CHLORIDE: 900; 150 INJECTION, SOLUTION INTRAVENOUS at 11:22

## 2019-09-30 RX ADMIN — PIPERACILLIN SODIUM,TAZOBACTAM SODIUM 3.38 G: 3; .375 INJECTION, POWDER, FOR SOLUTION INTRAVENOUS at 20:44

## 2019-09-30 RX ADMIN — OXYCODONE HYDROCHLORIDE 10 MG: 5 TABLET ORAL at 09:16

## 2019-09-30 RX ADMIN — POTASSIUM CHLORIDE AND SODIUM CHLORIDE: 900; 150 INJECTION, SOLUTION INTRAVENOUS at 21:25

## 2019-09-30 RX ADMIN — OXYCODONE HYDROCHLORIDE 10 MG: 5 TABLET ORAL at 03:49

## 2019-09-30 RX ADMIN — PIPERACILLIN SODIUM,TAZOBACTAM SODIUM 3.38 G: 3; .375 INJECTION, POWDER, FOR SOLUTION INTRAVENOUS at 03:46

## 2019-09-30 RX ADMIN — FLUTICASONE FUROATE AND VILANTEROL TRIFENATATE 1 PUFF: 200; 25 POWDER RESPIRATORY (INHALATION) at 09:19

## 2019-09-30 RX ADMIN — PIPERACILLIN SODIUM,TAZOBACTAM SODIUM 3.38 G: 3; .375 INJECTION, POWDER, FOR SOLUTION INTRAVENOUS at 14:45

## 2019-09-30 RX ADMIN — PIPERACILLIN SODIUM,TAZOBACTAM SODIUM 3.38 G: 3; .375 INJECTION, POWDER, FOR SOLUTION INTRAVENOUS at 08:24

## 2019-09-30 RX ADMIN — ACETAMINOPHEN 650 MG: 325 TABLET, FILM COATED ORAL at 20:48

## 2019-09-30 RX ADMIN — ACETAMINOPHEN 650 MG: 325 TABLET, FILM COATED ORAL at 15:02

## 2019-09-30 RX ADMIN — ONDANSETRON 4 MG: 2 INJECTION INTRAMUSCULAR; INTRAVENOUS at 08:16

## 2019-09-30 RX ADMIN — OXYCODONE HYDROCHLORIDE 5 MG: 5 TABLET ORAL at 00:03

## 2019-09-30 ASSESSMENT — ACTIVITIES OF DAILY LIVING (ADL)
ADLS_ACUITY_SCORE: 10
BATHING: 0-->INDEPENDENT
AMBULATION: 0-->INDEPENDENT
RETIRED_COMMUNICATION: 0-->UNDERSTANDS/COMMUNICATES WITHOUT DIFFICULTY
DRESS: 0-->INDEPENDENT
TRANSFERRING: 0-->INDEPENDENT
RETIRED_EATING: 0-->INDEPENDENT
ADLS_ACUITY_SCORE: 10
COGNITION: 0 - NO COGNITION ISSUES REPORTED
FALL_HISTORY_WITHIN_LAST_SIX_MONTHS: NO
ADLS_ACUITY_SCORE: 19
SWALLOWING: 0-->SWALLOWS FOODS/LIQUIDS WITHOUT DIFFICULTY
ADLS_ACUITY_SCORE: 10
TOILETING: 0-->INDEPENDENT
ADLS_ACUITY_SCORE: 10
ADLS_ACUITY_SCORE: 10

## 2019-09-30 NOTE — PLAN OF CARE
Cognitive Concerns/ Orientation : A&Ox4   BEHAVIOR & AGGRESSION TOOL COLOR: Green  CIWA SCORE: N/A      ABNL VS/O2: VSS on RA  MOBILITY: Independent  PAIN MANAGMENT: PRN Oxycodone for lower abdominal pain; patient reports effectiveness at the 10mg dose.  DIET: NPO  BOWEL/BLADDER: Continent  ABNL LAB/BG: K 4.3, hgb 12.6  DRAIN/DEVICES: PIV infusing  TELEMETRY RHYTHM: N/A  SKIN: Intact  TESTS/PROCEDURES: Colorectal surgery consult done.  CXR WNL.  D/C DAY/GOALS/PLACE: Pending treatment plan post consult.  OTHER IMPORTANT INFO: Abdomen round, distended, and obese.  Tender to palpation of the lower quadrants.  BS active.  Reports having a BM yesterday AM.  + flatus.    Attempted to contact colorectal office.  Unable to reach paging line, no answer.  Will continue to attempt.  Seeking clarification of dietary advancements that were verbalized, per patient.  No new orders.

## 2019-09-30 NOTE — H&P
Admitted:     09/29/2019     DATE OF SERVICE: 09/29/2019     CHIEF COMPLAINT:  Lower abdominal pain.      HISTORY OF PRESENT ILLNESS:  Had been obtained from the patient who is a good historian.  I did discuss with Dr. Perez from [facility].      Mr. Dano Kennedy is a very pleasant 44-year-old gentleman with past medical history of recurrent diverticulitis, asthma and kidney stones, who presented initially to [facility] for evaluation of lower abdominal pain.  He states that he had 4 or 5 episodes of diverticulitis in the past.  Last admission for diverticulitis was in 11/2018 in [facility].  He states that he had a colonoscopy 2-3 years ago which showed some diverticula, no other pathology.  I do not have a report of that colonoscopy.      He states that he feels that he always has some very mild left lower quadrant discomfort.  He states that on Friday, 2 days ago, he started having more severe pain in left lower quadrant and in the lower abdomen.  He felt some bloating in lower abdomen, as something was pressing on his bladder, as he was having increased urinary frequency, although no dysuria.  He reported the pain was almost constant, sometimes worse.  He tried some Tylenol and Advil at home, which relieved the pain only temporarily.  He did not check his temperature at home, but he did report having some night sweats last night.  He denies any nausea, no vomiting.  He denies being constipated.  He actually has 3 bowel movements daily.  Last BM was in the morning, and it was normal.  He denies any headache.  No chest pain, no shortness of breath, no nausea, no vomiting, no dysuria, no leg swelling.      He went to [facility].  His blood work there showed a CBC with white blood cells of 12.5, hemoglobin of 14.4, hematocrit 45.1, platelet count 224,000.  His BMP showed sodium of 139, potassium 4.2, chloride 103, bicarbonate 27, BUN 20, creatinine 0.8.  Lactic acid was 0.7.  UA was  negative for leukocyte esterase, negative for nitrites.  He had a CT of the abdomen and pelvis in Urgent Care, which showed evidence of acute sigmoid diverticulitis with localized microperforation, no evidence of abscess.  There is evidence of hepatic steatosis.      In Urgent Care, he was given 1 dose of Zosyn and 1 dose of Dilaudid, and Hospitalist Service was called regarding the admission and transferring the patient to [facility].      PAST MEDICAL HISTORY:   1.  Recurrent episodes of diverticulitis.   2.  History of kidney stones.   3.  History of asthma.   4.  Allergic rhinitis.      PAST SURGICAL HISTORY:   Past Surgical History:   Procedure Laterality Date     ENDOSCOPIC POLYPECTOMY NASAL       LASER HOLMIUM LITHOTRIPSY URETER(S), INSERT STENT, COMBINED Left 1/14/2015    Procedure: COMBINED CYSTOSCOPY, URETEROSCOPY, LASER HOLMIUM LITHOTRIPSY URETER(S), INSERT STENT;  Surgeon: Derrek Cobb MD;  Location:  OR        SOCIAL HISTORY:  He never smoked.  He reports drinking alcohol very rarely.  He denies illicit drug abuse.      FAMILY HISTORY:    Family History     Problem (# of Occurrences) Relation (Name,Age of Onset)    Glaucoma (2) Father, Maternal Grandfather       Negative family history of: Macular Degeneration           [id number] MEDICATIONS:    No current facility-administered medications on file prior to encounter.   albuterol (PROAIR HFA) 108 (90 Base) MCG/ACT inhaler, INHALE 2 PUFFS INTO THE LUNGS EVERY 4 HOURS AS NEEDED FOR SHORTNESS OF BREATH OR DIFFICULT BREATHING OR WHEEZING  fluticasone-vilanterol (BREO ELLIPTA) 200-25 MCG/INH inhaler, INHALE 1 PUFF INTO THE LUNGS DAILY  albuterol (2.5 MG/3ML) 0.083% neb solution, Take 1 vial (2.5 mg) by nebulization every 4 hours as needed for shortness of breath / dyspnea or wheezing  EPINEPHrine (EPIPEN 2-DONNY) 0.3 MG/0.3ML injection, Inject 0.3 mLs (0.3 mg) into the muscle once as needed for  anaphylaxis  fexofenadine-pseudoePHEDrine (ALLEGRA-D 24) 180-240 MG 24 hr tablet, Take 1 tablet by mouth daily  ipratropium - albuterol 0.5 mg/2.5 mg/3 mL (DUONEB) 0.5-2.5 (3) MG/3ML neb solution, Take 1 vial (3 mLs) by nebulization every 4 hours as needed for shortness of breath / dyspnea or wheezing  ondansetron (ZOFRAN) 4 MG tablet, Take 2 tablets (8 mg) by mouth every 8 hours as needed for nausea  order for DME, Equipment being ordered: nebulizer  order for DME, Nebulizer tubing and mouthpiece  ORDER FOR DME, Respironics REMSTAR 60 Series Auto CPAP 5-18cm H2O, MIrage Fx standard nasal mask  scopolamine (TRANSDERM) 72 hr patch, Apply 1 patch to hairless area behind one ear at least 4 hours before travel.  Remove old patch and change every 3 days (72 hours).         ALLERGIES:  NO KNOWN DRUG ALLERGIES.      REVIEW OF SYSTEMS:  A 10-point review of systems was conducted, and it was negative except for pertinent positives mentioned in history of present illness.      PHYSICAL EXAMINATION:   VITAL SIGNS:  Blood pressure is 142/77, heart rate 75, respiratory rate 18, oxygen saturation 94% on room air, temperature 98.1.   GENERAL:  The patient is awake, alert, in no acute distress at the time of my examination.   HEENT:  Normocephalic, atraumatic.  Pupils are equal, round and reactive to light.  Oral mucosa is moist.   NECK:  Supple.  No cervical lymphadenopathy, no thyromegaly.   CHEST:  There is bilateral air entry, no wheezing, no rales, no crackles.   CARDIOVASCULAR:  There is normal S1, S2, regular rate and rhythm, no murmurs, no rubs.   ABDOMEN:  Soft, moderately tender to palpation in the left lower quadrant and lower abdomen.  No rebound, no guarding.  Bowel sounds are present.   EXTREMITIES:  There is no leg swelling, no calf tenderness, 2+ peripheral pulses are palpable.   SKIN:  Intact, no rash, no cyanosis.   NEUROLOGIC:  The patient is awake, alert, oriented to self, place and time.  There are no focal  neurological deficits.   PSYCHIATRIC:  Normal mood, normal affect.   MUSCULOSKELETAL:  He moves all extremities freely.  There are no obvious joint deformities.      LABORATORY DATA:  Reviewed and dictated above.      ASSESSMENT:  Mr. Kennedy is a very pleasant 44-year-old gentleman with past medical history of asthma, recurrent diverticulitis and kidney stones, who presented initially to [facility] for evaluation of left lower quadrant and lower abdominal pain.      PLAN:   1.  Recurrent acute diverticulitis:  He reports 4 or 5 episodes of diverticulitis in the past.  He states that he had a colonoscopy 2-3 years ago, which did not show any concerning pathology except some diverticula.  He states that he met with a colorectal surgeon 6 months ago, but no decision regarding any surgical intervention was done at that time, as patient was not decided.  He presents now again with a new episode of acute diverticulitis.  He started having left lower quadrant and lower abdominal pain and bloating since Friday.  He has mildly elevated white blood cells of 12.5.  His UA is negative.  CT of the abdomen and pelvis shows again acute sigmoid diverticulitis with localized microperforation, no evidence of abscess.  The patient was transferred to [facility].  Plan for now is to keep him n.p.o. except his medications and some ice chips.  We are going to provide IV fluids, pain medications and antiemetics.  He was given 1 dose of Zosyn in Urgent Care.  We will continue with IV Zosyn, and a Colorectal Surgery consult requested.  He eventually may need a partial colectomy given his recurrent episodes.  We will monitor fever curve and white blood cell trend.   2.  History of asthma:  This does not seem to be an acute issue at this time.  We will continue his [id number] inhalers.      DEEP VENOUS THROMBOSIS PROPHYLAXIS:  Pneumatic compression device.      CODE STATUS:  Discussed with the patient, and  patient is full code.      DISPOSITION:  Admit inpatient.  Anticipate at least 2 days of hospitalization pending clinical improvement.         CANDICE PEÑA MD             D: 2019   T: 2019   MT: NICHOLE      Name:     VERNON RUIZ   MRN:      [id number]        Account:      [id number]   :      1975        Admitted:     2019                   Document: [id number]       cc: Santa Hunter MD

## 2019-09-30 NOTE — PROGRESS NOTES
Received report from [facility] Room.  Girlfriend transporting, expected to be to [facility] in 20-30 minutes.

## 2019-09-30 NOTE — PLAN OF CARE
Cognitive Concerns/ Orientation : A&Ox4   BEHAVIOR & AGGRESSION TOOL COLOR: Green  CIWA SCORE: N/A      ABNL VS/O2: VSS on RA  MOBILITY: Independent  PAIN MANAGMENT: PRN tylenol for lower abdominal pain; patient reports effectiveness at the 10mg dose.  DIET: clear liquid diet started. tolerating  BOWEL/BLADDER: Continent  ABNL LAB/BG: K 4.3, hgb 12.6  DRAIN/DEVICES: PIV infusing  TELEMETRY RHYTHM: N/A  SKIN: Intact  TESTS/PROCEDURES: Colorectal surgery consult done.  CXR WNL.  D/C DAY/GOALS/PLACE: Pending treatment plan post consult.  OTHER IMPORTANT INFO: Abdomen round, distended, and obese.  Tender to palpation of the lower quadrants.  BS active.  Reports having a BM yesterday AM.  + flatus.

## 2019-09-30 NOTE — CONSULTS
[facility]  Colon and Rectal Surgery Consult Note  Name: Dano Kennedy    MRN: [id number]  YOB: 1975    Age: 44 year old  Date of admission: 9/29/2019  Primary care provider: Santa Hunter     Requesting Physician: Dr. Hodge  Reason for consult:  Diverticulitis with microperforation           History of Present Illness:   Dano Kennedy is a 44 year old male with PMHx of recurrent diverticulitis, asthma and kidney stones, seen at the request of Dr. Hodge, who presents with recurrent diverticulitis now with microperforation. He presented yesterday evening to outside  with LLQ abdominal pain and pressure. He states he has had 4 or 5 episodes of diverticulitis in the past.  Last admission for diverticulitis was in 11/2018 in [facility]. He followed up after that hospitalization with our practice in December for a surgical consult. At that time, surgery was deferred. He feels that since that hospitalization in November, he has never fully recovered. He feels he always has some very mild left lower quadrant discomfort.  He states that on Friday, 2 days ago, he started having more severe pain in left lower quadrant and in the lower abdomen.  He felt some bloating in lower abdomen, as something was pressing on his bladder, as he was having increased urinary frequency, although no dysuria.  He reported the pain was almost constant, sometimes worse.  He tried some Tylenol and Advil at home, which relieved the pain only temporarily.  He did not check his temperature at home, but he did report having some night sweats last night.  He denies any nausea, no vomiting.  He denies being constipated.  He actually has 3 bowel movements daily. Last BM was yesterday morning, and it was normal and without blood.       He went to [facility]. His blood work there showed a CBC with white blood cells of 12.5, hemoglobin of 14.4, hematocrit 45.1, platelet count 224,000.  His BMP showed  "sodium of 139, potassium 4.2, chloride 103, bicarbonate 27, BUN 20, creatinine 0.8.  Lactic acid was 0.7.  UA was negative for leukocyte esterase, negative for nitrites.  He had a CT of the abdomen and pelvis in Urgent Care, which showed evidence of acute sigmoid diverticulitis with localized microperforation, no evidence of abscess.  There is evidence of hepatic steatosis. He was then transferred and admitted to [facility].     This morning he reports his pain is improved with pain medication. He continues to pass flatus, but has not had a bowel movement since yesterday morning. He reports a feeling over \"pressure\" over his bladder in which he feels like he has to urinate but will only void a small amount. He denies nausea. He reports a very low appetite over the past few days due to the pain.    Colonoscopy History:  Last colonoscopy 12/13/16 with [facility] at [facility]. Report shows diverticulosis in sigmoid colon without inflammation. Exam was otherwise normal and recommended follow up in 10 years for a repeat screening colonoscopy.    Past abdominal surgery: None.            Past Medical History:     Past Medical History:   Diagnosis Date     Allergic rhinitis, cause unspecified      Asthma      Unspecified asthma(493.90)              Past Surgical History:     Past Surgical History:   Procedure Laterality Date     ENDOSCOPIC POLYPECTOMY NASAL       LASER HOLMIUM LITHOTRIPSY URETER(S), INSERT STENT, COMBINED Left 1/14/2015    Procedure: COMBINED CYSTOSCOPY, URETEROSCOPY, LASER HOLMIUM LITHOTRIPSY URETER(S), INSERT STENT;  Surgeon: Derrek Cobb MD;  Location:  OR               Social History:     Social History     Tobacco Use     Smoking status: Never Smoker     Smokeless tobacco: Never Used   Substance Use Topics     Alcohol use: Yes             Family History:     Family History   Problem Relation Age of Onset     Glaucoma Father      Glaucoma Maternal Grandfather      Macular Degeneration No " family hx of              Allergies:     Allergies   Allergen Reactions     Nkda [No Known Drug Allergies]              Medications:       fluticasone-vilanterol  1 puff Inhalation Daily     [START ON 10/1/2019] influenza quadrivalent (PF) vacc  0.5 mL Intramuscular Prior to discharge     piperacillin-tazobactam  3.375 g Intravenous Q6H     sodium chloride (PF)  3 mL Intracatheter Q8H             Review of Systems:   A comprehensive greater than 10 system review of systems was carried out.  Pertinent positives and negatives are noted above.  Otherwise negative for contributory info.            Physical Exam:     Blood pressure 119/65, pulse 73, temperature 97.8  F (36.6  C), temperature source Oral, resp. rate 16, SpO2 93 %.  No intake or output data in the 24 hours ending 09/30/19 1024  Exam:  General - Awake alert and oriented, appears stated age  Pulm - Non-labored breathing with normal respiratory effort  CVS - reg rate and rhythm, no peripheral edema  Abd - Soft, non-distended, tenderness in LLQ and lower mid abdomen.  No guarding, rigidity or peritoneal signs.   Rectal exam was not performed.   Neuro - CN II-XII grossly intact  Musculoskeletal - extremities with no clubbing, cyanosis or edema; able to ambulate  Psych - responsive, alert, cooperative; oriented x3; appropriate mood and affect  External/skin - inspection reveals no rashes, lesions or ulcers, normal coloring         Data Reviewed:   No results found for this or any previous visit (from the past 24 hour(s)).    Recent Labs   Lab 09/30/19  0819   WBC 10.1   HGB 12.6*   HCT 38.6*   MCV 86        Recent Labs   Lab 09/30/19  0819      POTASSIUM 4.3   CHLORIDE 105   CO2 29   ANIONGAP 2*   GLC 95   BUN 14   CR 0.90   GFRESTIMATED >90   GFRESTBLACK >90   TAY 8.6   PROTTOTAL 7.1   ALBUMIN 3.2*   BILITOTAL 0.8   ALKPHOS 133   AST 29   ALT 79*         Assessment and Plan:   Dano Kennedy is a 44 year old male who presented with LLQ pain to an  outside  and was directly admitted to [facility]. CT at outside  found to have diverticulitis with microperforation. Image disc in patient chart, awaiting radiology to upload images to view. Will manage conservatively with bowel rest at this time.    Plan:  1. Admit to hospital medicine.  2. Surgery: No indication for urgent surgery at this time.  3. Diet: NPO  4. IV Fluids: yes  5. Antibiotics:  IV Zosyn  6. Medications:  (ie. Home meds to give/hold, blood thinners) per primary  7. I&O s:  strict I&O s   8. Labs:   - Reviewed: Yes  - Ordered: none   9. Imaging:   - Dr. Brand has personally viewed: CT abd/pelvis  - Ordered:  none  10. Activity:  OOB, ambulate as able  11. DVT prophylaxis: SCD s   12. This plan has been discussed with Dr. Brand    Patient specific identified risk factors considered as part of today s evaluation include: none (Blood thinners, BMI>30, Smoker, Diabetic etc. etc.)      Additional history obtained from hospital records and outside records.  Time spent on consultation: 30 minutes, greater than 50% spent on counseling and/or coordination of care.    Tameka Moreno PA-C  [facility]  [phone]

## 2019-09-30 NOTE — PLAN OF CARE
Admission    Patient arrives to room 635-1 via private car transport from [facility].    Inpatient nursing criteria listed below were met:    PCD's Documented: No  Skin issues/needs documented: NA  Isolation education started/completed: NA  Patient allergies verified with patient: Yes  Verified completion of [place] Risk Assessment Tool:  Yes  Verified completion of Guardianship screening tool: Yes  Fall Prevention: Care plan updated, Education given and documented: NA  Care Plan initiated: Yes  Home medications documented in belongings flowsheet: Yes  Patient belongings documented in belongings flowsheet: Yes  Reminder note (belongings/ medications) placed in discharge instructions:Yes  Admission profile/ required documentation complete: Yes  Bedside Report Letter given and explained to patient: Yes    DATE & TIME: 9/29/19 Admit to 0700    Cognitive Concerns/ Orientation : A&Ox4   BEHAVIOR & AGGRESSION TOOL COLOR: Green  CIWA SCORE: N/A   ABNL VS/O2: VSS on RA  MOBILITY: Independent  PAIN MANAGMENT: PRN Oxycodone x2 for lower abdominal pain; patient reports effectiveness at the 10mg dose, though was noted to be sleeping at both the 5mg and 10mg doses.  DIET: NPO  BOWEL/BLADDER: Continent  ABNL LAB/BG: K 4.2, WBC 12.5.  DRAIN/DEVICES: PIV infusing  TELEMETRY RHYTHM: N/A  SKIN: Intact  TESTS/PROCEDURES: Colorectal surgery consult pending  D/C DAY/GOALS/PLACE: Pending treatment plan post consult.  OTHER IMPORTANT INFO: Abdomen round, distended, and obese.  Tender to palpation of the lower quadrants.  BS active.  Reports having a BM Titi morning.

## 2019-09-30 NOTE — PROGRESS NOTES
[facility]    Medicine Progress Note - Hospitalist Service       Date of Admission:  9/29/2019  Assessment & Plan   Dano Kennedy is a 44 year-old M with past medical history of asthma, recurrent diverticulitis and kidney stones, who presented initially to [facility] for evaluation of left lower quadrant and lower abdominal pain, found to have recurrent diverticulitis. Admitted 9/29/2019.     Recurrent acute diverticulitis  He reports 4 or 5 episodes of diverticulitis in the past.  He states that he had a colonoscopy 2-3 years ago, which did not show any concerning pathology except some diverticula.  He states that he met with a colorectal surgeon 6 months ago, but no decision regarding any surgical intervention was done at that time, as patient was not decided.  He presents again with left lower quadrant pain and bloating. WBC elevated to 12.5. CT of the abdomen and pelvis shows again acute sigmoid diverticulitis with localized microperforation, no evidence of abscess.   - NPO  - Colorectal surgery consulted  - Continue piperacillin-tazobactam IV   - IVF   - WBC improving, afebrile    History of asthma  No acute issues.   - Continue prior to admission inhalers.     Diet: NPO for Medical/Clinical Reasons Except for: Meds, Ice Chips    DVT Prophylaxis: Pneumatic Compression Devices  Burrows Catheter: not present  Code Status: Full Code      Disposition Plan   Expected discharge: In 2-3 days pending advancement of diet, transition to oral antibiotics  Entered: Landon Stacy MD 09/30/2019, 8:42 AM       The patient's care was discussed with the Patient.    Landon Stacy MD  Hospitalist Service  [facility]    ______________________________________________________________________    Interval History   No acute events overnight. Abdominal pain is improving, though still present. Passing gas. Having some nausea without vomiting.     Data reviewed today: I reviewed all  medications, new labs and imaging results over the last 24 hours. I personally reviewed no images or EKG's today.    Physical Exam   Vital Signs: Temp: 97.8  F (36.6  C) Temp src: Oral BP: 119/65 Pulse: 73   Resp: 16 SpO2: 93 % O2 Device: None (Room air)    Weight: 0 lbs 0 oz    Constitutional: Well-appearing, NAD  Respiratory: Clear to auscultation bilaterally, good air movement bilaterally  Cardiovascular: RRR, no m/r/g. No peripheral edema.  GI: Soft, mild tenderness in left lower quadrant, non-distended.    Skin/Integumen: Warm, dry  Other:      Data   Recent Labs   Lab 09/30/19  0819   WBC 10.1   HGB 12.6*   MCV 86          No results found for this or any previous visit (from the past 24 hour(s)).  Medications     0.9% sodium chloride + KCl 20 mEq/L 100 mL/hr at 09/30/19 0738       fluticasone-vilanterol  1 puff Inhalation Daily     [START ON 10/1/2019] influenza quadrivalent (PF) vacc  0.5 mL Intramuscular Prior to discharge     piperacillin-tazobactam  3.375 g Intravenous Q6H     sodium chloride (PF)  3 mL Intracatheter Q8H

## 2019-09-30 NOTE — PHARMACY-ADMISSION MEDICATION HISTORY
Admission medication history interview status for the 9/29/2019  admission is complete. See EPIC admission navigator for prior to admission medications     Medication history source reliability:Good    Actions taken by pharmacist (provider contacted, etc):None     Additional medication history information not noted on PTA med list :None    Medication reconciliation/reorder completed by provider prior to medication history? No    Time spent in this activity: 10min    Prior to Admission medications    Medication Sig Last Dose Taking? Auth Provider   albuterol (PROAIR HFA) 108 (90 Base) MCG/ACT inhaler INHALE 2 PUFFS INTO THE LUNGS EVERY 4 HOURS AS NEEDED FOR SHORTNESS OF BREATH OR DIFFICULT BREATHING OR WHEEZING 9/29/2019 at has own Yes Nancy Preciado PA-C   fluticasone-vilanterol (BREO ELLIPTA) 200-25 MCG/INH inhaler INHALE 1 PUFF INTO THE LUNGS DAILY 9/29/2019 at am Yes Nancy Preciado PA-C   albuterol (2.5 MG/3ML) 0.083% neb solution Take 1 vial (2.5 mg) by nebulization every 4 hours as needed for shortness of breath / dyspnea or wheezing prn  Maycol Kim DO   EPINEPHrine (EPIPEN 2-DONNY) 0.3 MG/0.3ML injection Inject 0.3 mLs (0.3 mg) into the muscle once as needed for anaphylaxis prn  Yumiko Mckeon APRN CNP   fexofenadine-pseudoePHEDrine (ALLEGRA-D 24) 180-240 MG 24 hr tablet Take 1 tablet by mouth daily prn  Nancy Preciado PA-C   ipratropium - albuterol 0.5 mg/2.5 mg/3 mL (DUONEB) 0.5-2.5 (3) MG/3ML neb solution Take 1 vial (3 mLs) by nebulization every 4 hours as needed for shortness of breath / dyspnea or wheezing prn  Nancy Preciado PA-C   ondansetron (ZOFRAN) 4 MG tablet Take 2 tablets (8 mg) by mouth every 8 hours as needed for nausea travel  Santa Hunter MD   order for DME Equipment being ordered: nebulizer   Kehr, Kristen M, PA-C   order for DME Nebulizer tubing and mouthpiece   Kehr, Kristen M, PA-C   [facility]  Auto CPAP 5-18cm H2O, MIrage Fx standard nasal mask   Nathaniel Campuzano NP   scopolamine (TRANSDERM) 72 hr patch Apply 1 patch to hairless area behind one ear at least 4 hours before travel.  Remove old patch and change every 3 days (72 hours). travel  Santa Hunter MD

## 2019-10-01 VITALS
DIASTOLIC BLOOD PRESSURE: 71 MMHG | OXYGEN SATURATION: 98 % | HEART RATE: 67 BPM | SYSTOLIC BLOOD PRESSURE: 122 MMHG | RESPIRATION RATE: 18 BRPM | TEMPERATURE: 98 F

## 2019-10-01 PROCEDURE — 25000132 ZZH RX MED GY IP 250 OP 250 PS 637: Performed by: INTERNAL MEDICINE

## 2019-10-01 PROCEDURE — 25000128 H RX IP 250 OP 636: Performed by: INTERNAL MEDICINE

## 2019-10-01 PROCEDURE — 99238 HOSP IP/OBS DSCHRG MGMT 30/<: CPT | Performed by: INTERNAL MEDICINE

## 2019-10-01 PROCEDURE — 90686 IIV4 VACC NO PRSV 0.5 ML IM: CPT | Performed by: INTERNAL MEDICINE

## 2019-10-01 RX ORDER — METRONIDAZOLE 500 MG/1
500 TABLET ORAL 3 TIMES DAILY
Qty: 18 TABLET | Refills: 0 | Status: ON HOLD | OUTPATIENT
Start: 2019-10-01 | End: 2019-11-29

## 2019-10-01 RX ORDER — SULFAMETHOXAZOLE/TRIMETHOPRIM 800-160 MG
1 TABLET ORAL 2 TIMES DAILY
Qty: 12 TABLET | Refills: 0 | Status: ON HOLD | OUTPATIENT
Start: 2019-10-01 | End: 2019-11-29

## 2019-10-01 RX ADMIN — PIPERACILLIN SODIUM,TAZOBACTAM SODIUM 3.38 G: 3; .375 INJECTION, POWDER, FOR SOLUTION INTRAVENOUS at 02:36

## 2019-10-01 RX ADMIN — FLUTICASONE FUROATE AND VILANTEROL TRIFENATATE 1 PUFF: 200; 25 POWDER RESPIRATORY (INHALATION) at 09:05

## 2019-10-01 RX ADMIN — ACETAMINOPHEN 650 MG: 325 TABLET, FILM COATED ORAL at 12:19

## 2019-10-01 RX ADMIN — ACETAMINOPHEN 650 MG: 325 TABLET, FILM COATED ORAL at 02:49

## 2019-10-01 RX ADMIN — POTASSIUM CHLORIDE AND SODIUM CHLORIDE: 900; 150 INJECTION, SOLUTION INTRAVENOUS at 07:32

## 2019-10-01 RX ADMIN — INFLUENZA A VIRUS A/BRISBANE/02/2018 IVR-190 (H1N1) ANTIGEN (FORMALDEHYDE INACTIVATED), INFLUENZA A VIRUS A/KANSAS/14/2017 X-327 (H3N2) ANTIGEN (FORMALDEHYDE INACTIVATED), INFLUENZA B VIRUS B/PHUKET/3073/2013 ANTIGEN (FORMALDEHYDE INACTIVATED), AND INFLUENZA B VIRUS B/MARYLAND/15/2016 BX-69A ANTIGEN (FORMALDEHYDE INACTIVATED) 0.5 ML: 15; 15; 15; 15 INJECTION, SUSPENSION INTRAMUSCULAR at 10:11

## 2019-10-01 RX ADMIN — ACETAMINOPHEN 650 MG: 325 TABLET, FILM COATED ORAL at 07:39

## 2019-10-01 RX ADMIN — PIPERACILLIN SODIUM,TAZOBACTAM SODIUM 3.38 G: 3; .375 INJECTION, POWDER, FOR SOLUTION INTRAVENOUS at 07:32

## 2019-10-01 ASSESSMENT — ACTIVITIES OF DAILY LIVING (ADL)
ADLS_ACUITY_SCORE: 10

## 2019-10-01 NOTE — PLAN OF CARE
Date & Time: 10/01/19 1783-3760  Cognitive Concerns/ Orientation : A&Ox4   BEHAVIOR & AGGRESSION TOOL COLOR: Green  CIWA SCORE: N/A      ABNL VS/O2: VSS on RA  MOBILITY: Independent  PAIN MANAGMENT: PRN tylenol for lower abdominal pain  DIET: Clear liquids; advance as tolerated  BOWEL/BLADDER: Continent  ABNL LAB/BG:   DRAIN/DEVICES: PIV infusing NS with 20 mEq K+  TELEMETRY RHYTHM: N/A  SKIN: Intact  TESTS/PROCEDURES: Colorectal surgery consult done.  CXR WNL.  D/C DAY/GOALS/PLACE: Possibly today  OTHER IMPORTANT INFO: Abdomen round, distended, and obese.  Tender to palpation of the lower quadrants.  BS active.

## 2019-10-01 NOTE — PLAN OF CARE
"Discharge    Patient discharged to home via wheelchair to door 6 with volunteer from \"[employer].\"  DATE & TIME: 10/1/2019 days  Cognitive Concerns/ Orientation : AOx4   BEHAVIOR & AGGRESSION TOOL COLOR: Green  CIWA SCORE: n/a  ABNL VS/O2: n/a  MOBILITY: independent  PAIN MANAGMENT: Tylenol this morning with relief  DIET: FL  BOWEL/BLADDER: continent, BM this morning.  ABNL LAB/BG: n/a  DRAIN/DEVICES: n/a, PIV removed.  TELEMETRY RHYTHM: n/a, no tele orders.  SKIN: WNL  TESTS/PROCEDURES: n/a  D/C DAY/GOALS/PLACE: discharge  OTHER IMPORTANT INFO: FL diet for 1 week, PO antibiotics to start for 6 day course.  Reviewed AVS, medications, no belongings locked up with security, per patient.       Listed belongings gathered and returned to patient. Yes  Care Plan and Patient education resolved: Yes  Prescriptions if needed, hard copies sent with patient  Yes  Home and hospital acquired medications returned to patient: Yes  Medication Bin checked and emptied on discharge Yes  Follow up appointment made for patient: no, patient prefers to make own appointment.   "

## 2019-10-01 NOTE — PLAN OF CARE
A&O, VSS on RA, LS clear, denies nausea, of Clear liquid diet tolerating it well, denies nausea, continent, independent, abdominal pain managed with Tylenol. Patient is looking forward to be discharged tomorrow.

## 2019-10-02 ENCOUNTER — HEALTH MAINTENANCE LETTER (OUTPATIENT)
Age: 44
End: 2019-10-02

## 2019-10-02 ENCOUNTER — TELEPHONE (OUTPATIENT)
Dept: FAMILY MEDICINE | Facility: CLINIC | Age: 44
End: 2019-10-02

## 2019-10-02 NOTE — TELEPHONE ENCOUNTER
This pt was Discharged from [facility] on 10/1/19 for Acute Diverticulitis      Please note this information was received from either [place] or [place] [facility] IP daily report with  identified as the PCP.    A follow-up visit has not been scheduled.    Please follow-up with patient accordingly.

## 2019-10-16 ENCOUNTER — TRANSFERRED RECORDS (OUTPATIENT)
Dept: HEALTH INFORMATION MANAGEMENT | Facility: CLINIC | Age: 44
End: 2019-10-16

## 2019-10-17 ENCOUNTER — HOSPITAL ENCOUNTER (INPATIENT)
Facility: CLINIC | Age: 44
Setting detail: SURGERY ADMIT
End: 2019-10-17
Attending: COLON & RECTAL SURGERY | Admitting: COLON & RECTAL SURGERY
Payer: COMMERCIAL

## 2019-11-13 ENCOUNTER — OFFICE VISIT (OUTPATIENT)
Dept: FAMILY MEDICINE | Facility: CLINIC | Age: 44
End: 2019-11-13
Payer: COMMERCIAL

## 2019-11-13 VITALS
SYSTOLIC BLOOD PRESSURE: 123 MMHG | DIASTOLIC BLOOD PRESSURE: 79 MMHG | BODY MASS INDEX: 36.71 KG/M2 | HEART RATE: 80 BPM | WEIGHT: 234.4 LBS | TEMPERATURE: 98.9 F

## 2019-11-13 DIAGNOSIS — Z01.818 PREOP GENERAL PHYSICAL EXAM: Primary | ICD-10-CM

## 2019-11-13 DIAGNOSIS — J45.50 SEVERE PERSISTENT ASTHMA WITHOUT COMPLICATION (H): ICD-10-CM

## 2019-11-13 DIAGNOSIS — G47.33 OSA (OBSTRUCTIVE SLEEP APNEA): ICD-10-CM

## 2019-11-13 DIAGNOSIS — E66.01 MORBID OBESITY (H): ICD-10-CM

## 2019-11-13 DIAGNOSIS — K57.32 DIVERTICULITIS OF COLON: ICD-10-CM

## 2019-11-13 LAB
ANION GAP SERPL CALCULATED.3IONS-SCNC: 6 MMOL/L (ref 3–14)
BUN SERPL-MCNC: 18 MG/DL (ref 7–30)
CALCIUM SERPL-MCNC: 9.5 MG/DL (ref 8.5–10.1)
CHLORIDE SERPL-SCNC: 107 MMOL/L (ref 94–109)
CO2 SERPL-SCNC: 28 MMOL/L (ref 20–32)
CREAT SERPL-MCNC: 0.82 MG/DL (ref 0.66–1.25)
ERYTHROCYTE [DISTWIDTH] IN BLOOD BY AUTOMATED COUNT: 13.7 % (ref 10–15)
GFR SERPL CREATININE-BSD FRML MDRD: >90 ML/MIN/{1.73_M2}
GLUCOSE SERPL-MCNC: 86 MG/DL (ref 70–99)
HCT VFR BLD AUTO: 43.6 % (ref 40–53)
HGB BLD-MCNC: 14.3 G/DL (ref 13.3–17.7)
MCH RBC QN AUTO: 27.9 PG (ref 26.5–33)
MCHC RBC AUTO-ENTMCNC: 32.8 G/DL (ref 31.5–36.5)
MCV RBC AUTO: 85 FL (ref 78–100)
PLATELET # BLD AUTO: 254 10E9/L (ref 150–450)
POTASSIUM SERPL-SCNC: 4 MMOL/L (ref 3.4–5.3)
RBC # BLD AUTO: 5.13 10E12/L (ref 4.4–5.9)
SODIUM SERPL-SCNC: 141 MMOL/L (ref 133–144)
WBC # BLD AUTO: 10.7 10E9/L (ref 4–11)

## 2019-11-13 PROCEDURE — 80048 BASIC METABOLIC PNL TOTAL CA: CPT | Performed by: NURSE PRACTITIONER

## 2019-11-13 PROCEDURE — 85027 COMPLETE CBC AUTOMATED: CPT | Performed by: NURSE PRACTITIONER

## 2019-11-13 PROCEDURE — 99214 OFFICE O/P EST MOD 30 MIN: CPT | Performed by: NURSE PRACTITIONER

## 2019-11-13 PROCEDURE — 36415 COLL VENOUS BLD VENIPUNCTURE: CPT | Performed by: NURSE PRACTITIONER

## 2019-11-13 NOTE — PROGRESS NOTES
[facility]  [address] [facility] [address]  [phone]  Dept: [phone]     PRE-OP EVALUATION:  Today's date: 2019    Dano Kennedy (: 1975) presents for pre-operative evaluation assessment as requested by Dr. Moss.  He requires evaluation and anesthesia risk assessment prior to undergoing surgery/procedure for treatment of diverticulitis.    Fax number for surgical facility:  Primary Physician: Santa Hunter  Type of Anesthesia Anticipated: General    Patient has a Health Care Directive or Living Will:  YES     Preop Questions 2019   Who is doing your surgery? Dr Moss Colon Rectal [facility]   What are you having done? colon resection due to diverticulitis   Date of Surgery/Procedure: 2019   Facility or Hospital where procedure/surgery will be performed: [facility]   1.  Do you have a history of Heart attack, stroke, stent, coronary bypass surgery, or other heart surgery? No   2.  Do you ever have any pain or discomfort in your chest? No   3.  Do you have a history of  Heart Failure? No   4.   Are you troubled by shortness of breath when:  walking on a level surface, or up a slight hill, or at night? No   5.  Do you currently have a cold, bronchitis or other respiratory infection? No   6.  Do you have a cough, shortness of breath, or wheezing? YES - Asthma, seasonal allergies   7.  Do you sometimes get pains in the calves of your legs when you walk? No   8. Do you or anyone in your family have previous history of blood clots? UNKNOWN    9.  Do you or does anyone in your family have a serious bleeding problem such as prolonged bleeding following surgeries or cuts? No   10. Have you ever had problems with anemia or been told to take iron pills? No   11. Have you had any abnormal blood loss such as black, tarry or bloody stools? No   12. Have you ever had a blood transfusion? No   13. Have you or any of your relatives ever had problems with  anesthesia? YES - reports had convulsions after anesthesia after lithotripsy.  Did well after colonoscopy anesthesia.     14. Do you have sleep apnea, excessive snoring or daytime drowsiness? YES - WENDY with CPAP   15. Do you have any prosthetic heart valves? No   16. Do you have prosthetic joints? No         HPI:     HPI related to upcoming procedure: Dano is a 44 y.o male who will be undergoing Robotic assisted sigmoid colectomy on 11/26/19 for recurrent diverticulitis.  His last episode 9/26/19 where he had a microperforation.        ASTHMA - Patient has a longstanding history of moderate-severe Asthma . Patient has been doing well overall noting NO SYMPTOMS and continues on medication regimen consisting of Breo and Albuterol without adverse reactions or side effects.  Using rescue inhaler 3-4 x per week on average.       MEDICAL HISTORY:     Patient Active Problem List    Diagnosis Date Noted     Obesity (BMI 35.0-39.9) with comorbidity (H) 11/13/2019     Priority: Medium     Acute diverticulitis 09/29/2019     Priority: Medium     Chronic allergic rhinitis due to animal hair and dander 01/08/2018     Priority: Medium     Allergic rhinitis due to mold 01/08/2018     Priority: Medium     WENDY (obstructive sleep apnea) 10/10/2013     Priority: Medium     HST 10/13 (230#)- AHI estimated to be 24 (supine AHI 40.9). Oxygen Favian was 86%       Heartburn 06/03/2013     Priority: Medium     ADHD (attention deficit hyperactivity disorder) 10/22/2012     Priority: Medium     Environmental allergies 10/06/2011     Priority: Medium     mold       GSE (gluten-sensitive enteropathy) 10/06/2011     Priority: Medium     Free of symptoms with gluten free diet        BMI 33.0-33.9,adult 10/06/2011     Priority: Medium     Hyperlipidemia LDL goal <130 10/06/2011     Priority: Medium     Anxiety 03/30/2011     Priority: Medium     Severe persistent asthma without complication 03/30/2011     Priority: Medium     Seasonal allergic  rhinitis 03/30/2011     Priority: Medium      Past Medical History:   Diagnosis Date     Allergic rhinitis, cause unspecified      Asthma      Unspecified asthma(493.90)      Past Surgical History:   Procedure Laterality Date     ENDOSCOPIC POLYPECTOMY NASAL       LASER HOLMIUM LITHOTRIPSY URETER(S), INSERT STENT, COMBINED Left 1/14/2015    Procedure: COMBINED CYSTOSCOPY, URETEROSCOPY, LASER HOLMIUM LITHOTRIPSY URETER(S), INSERT STENT;  Surgeon: Derrek Cobb MD;  Location: [facility] OR     Current Outpatient Medications   Medication Sig Dispense Refill     albuterol (2.5 MG/3ML) 0.083% neb solution Take 1 vial (2.5 mg) by nebulization every 4 hours as needed for shortness of breath / dyspnea or wheezing 75 mL 3     albuterol (PROAIR HFA) 108 (90 Base) MCG/ACT inhaler INHALE 2 PUFFS INTO THE LUNGS EVERY 4 HOURS AS NEEDED FOR SHORTNESS OF BREATH OR DIFFICULT BREATHING OR WHEEZING 8.5 g 3     EPINEPHrine (EPIPEN 2-DONNY) 0.3 MG/0.3ML injection Inject 0.3 mLs (0.3 mg) into the muscle once as needed for anaphylaxis 0.6 mL 0     fexofenadine-pseudoePHEDrine (ALLEGRA-D 24) 180-240 MG 24 hr tablet Take 1 tablet by mouth daily 90 tablet 1     fluticasone-vilanterol (BREO ELLIPTA) 200-25 MCG/INH inhaler INHALE 1 PUFF INTO THE LUNGS DAILY 1 Inhaler 5     ipratropium - albuterol 0.5 mg/2.5 mg/3 mL (DUONEB) 0.5-2.5 (3) MG/3ML neb solution Take 1 vial (3 mLs) by nebulization every 4 hours as needed for shortness of breath / dyspnea or wheezing 270 mL 1     order for DME Equipment being ordered: nebulizer 1 Device 0     order for DME Nebulizer tubing and mouthpiece 1 Device 0     ondansetron (ZOFRAN) 4 MG tablet Take 2 tablets (8 mg) by mouth every 8 hours as needed for nausea (Patient not taking: Reported on 11/13/2019) 20 tablet 1     ORDER FOR DME Respironics REMSTAR 60 Series Auto CPAP 5-18cm H2O, MIrage Fx standard nasal mask       scopolamine (TRANSDERM) 72 hr patch Apply 1 patch to hairless area behind one ear at least 4  hours before travel.  Remove old patch and change every 3 days (72 hours). (Patient not taking: Reported on 11/13/2019) 5 patch 0     OTC products: None, except as noted above    Allergies   Allergen Reactions     Nkda [No Known Drug Allergies]       Latex Allergy: NO    Social History     Tobacco Use     Smoking status: Never Smoker     Smokeless tobacco: Never Used   Substance Use Topics     Alcohol use: Yes     History   Drug Use No       REVIEW OF SYSTEMS:   CONSTITUTIONAL: NEGATIVE for fever, chills, change in weight  INTEGUMENTARY/SKIN: NEGATIVE for worrisome rashes, moles or lesions  EYES: NEGATIVE for vision changes or irritation  ENT/MOUTH: NEGATIVE for ear, mouth and throat problems  RESP: NEGATIVE for significant cough or SOB  BREAST: NEGATIVE for masses, tenderness or discharge  CV: NEGATIVE for chest pain, palpitations or peripheral edema  GI: NEGATIVE for nausea, abdominal pain, heartburn, or change in bowel habits  : NEGATIVE for frequency, dysuria, or hematuria  MUSCULOSKELETAL: NEGATIVE for significant arthralgias or myalgia  NEURO: NEGATIVE for weakness, dizziness or paresthesias  ENDOCRINE: NEGATIVE for temperature intolerance, skin/hair changes  HEME: NEGATIVE for bleeding problems  PSYCHIATRIC: NEGATIVE for changes in mood or affect    EXAM:   /79   Pulse 80   Temp 98.9  F (37.2  C) (Oral)   Wt 106.3 kg (234 lb 6.4 oz)   BMI 36.71 kg/m      GENERAL APPEARANCE: healthy, alert and no distress     EYES: EOMI,  PERRL     HENT: ear canals and TM's normal and nose and mouth without ulcers or lesions     NECK: no adenopathy, no asymmetry, masses, or scars and thyroid normal to palpation     RESP: lungs clear to auscultation - no rales, rhonchi or wheezes     CV: regular rates and rhythm, normal S1 S2, no S3 or S4 and no murmur, click or rub     ABDOMEN:  soft, nontender, no HSM or masses and bowel sounds normal     MS: extremities normal- no gross deformities noted, no evidence of  inflammation in joints, FROM in all extremities.     SKIN: no suspicious lesions or rashes     NEURO: Normal strength and tone, sensory exam grossly normal, mentation intact and speech normal     PSYCH: mentation appears normal. and affect normal/bright     LYMPHATICS: No cervical adenopathy    DIAGNOSTICS:   Hemoglobin (indicated for history of anemia or procedure with significant blood loss such as tonsillectomy, major intraperitoneal surgery, vascular surgery, major spine surgery, total joint replacement)  Serum Potassium  Serum Creatinine    Recent Labs   Lab Test 09/30/19  0819 09/29/19  01/15/14  1345 09/29/11  1338   HGB 12.6*  --   --   --  14.4     --   --   --  200     --   --   --   --    POTASSIUM 4.3 4.2   < >  --   --    CR 0.90 0.80   < >  --   --    A1C  --   --   --  5.2  --     < > = values in this interval not displayed.        IMPRESSION:   Reason for surgery/procedure: Recurrent Diverticulitis  Diagnosis/reason for consult: Preoperative physical.     The proposed surgical procedure is considered LOW risk.    REVISED CARDIAC RISK INDEX  The patient has the following serious cardiovascular risks for perioperative complications such as (MI, PE, VFib and 3  AV Block):  No serious cardiac risks  INTERPRETATION: 0 risks: Class I (very low risk - 0.4% complication rate)    The patient has the following additional risks for perioperative complications:  No identified additional risks      ICD-10-CM    1. Preop general physical exam Z01.818 CBC with platelets     Basic metabolic panel  (Ca, Cl, CO2, Creat, Gluc, K, Na, BUN)   2. Diverticulitis of colon K57.32    3. WENDY (obstructive sleep apnea) G47.33    4. Morbid obesity (H) E66.01    5. Severe persistent asthma without complication J45.50        RECOMMENDATIONS:       Obstructive Sleep Apnea (or suspected sleep apnea)  Patient is to bring their home CPAP with them on the day of surgery      --Patient is to take all scheduled medications on  the day of surgery EXCEPT for modifications listed below.    APPROVAL GIVEN to proceed with proposed procedure, without further diagnostic evaluation       Signed Electronically by: Alma Rosa Liz NP    Copy of this evaluation report is provided to requesting physician.    [facility] Preop Guidelines    Revised Cardiac Risk Index

## 2019-11-13 NOTE — LETTER
November 15, 2019    Dano Kennedy  [address]  [place] [address]          Dear Dano,    Preoperative labs.  All labs are stable.    If you have any questions or concerns, please call myself or my nurse at [phone].    Sincerely,    Alma Rosa Liz NP/nico    Results for orders placed or performed in visit on 11/13/19   CBC with platelets     Status: None   Result Value Ref Range    WBC 10.7 4.0 - 11.0 10e9/L    RBC Count 5.13 4.4 - 5.9 10e12/L    Hemoglobin 14.3 13.3 - 17.7 g/dL    Hematocrit 43.6 40.0 - 53.0 %    MCV 85 78 - 100 fl    MCH 27.9 26.5 - 33.0 pg    MCHC 32.8 31.5 - 36.5 g/dL    RDW 13.7 10.0 - 15.0 %    Platelet Count 254 150 - 450 10e9/L   Basic metabolic panel  (Ca, Cl, CO2, Creat, Gluc, K, Na, BUN)     Status: None   Result Value Ref Range    Sodium 141 133 - 144 mmol/L    Potassium 4.0 3.4 - 5.3 mmol/L    Chloride 107 94 - 109 mmol/L    Carbon Dioxide 28 20 - 32 mmol/L    Anion Gap 6 3 - 14 mmol/L    Glucose 86 70 - 99 mg/dL    Urea Nitrogen 18 7 - 30 mg/dL    Creatinine 0.82 0.66 - 1.25 mg/dL    GFR Estimate >90 >60 mL/min/[1.73_m2]    GFR Estimate If Black >90 >60 mL/min/[1.73_m2]    Calcium 9.5 8.5 - 10.1 mg/dL

## 2019-11-25 ENCOUNTER — ANESTHESIA EVENT (OUTPATIENT)
Dept: SURGERY | Facility: CLINIC | Age: 44
DRG: 331 | End: 2019-11-25
Payer: COMMERCIAL

## 2019-11-25 RX ORDER — HEPARIN SODIUM 5000 [USP'U]/.5ML
5000 INJECTION, SOLUTION INTRAVENOUS; SUBCUTANEOUS
Status: CANCELLED | OUTPATIENT
Start: 2019-11-25

## 2019-11-25 RX ORDER — SODIUM CHLORIDE, SODIUM LACTATE, POTASSIUM CHLORIDE, CALCIUM CHLORIDE 600; 310; 30; 20 MG/100ML; MG/100ML; MG/100ML; MG/100ML
INJECTION, SOLUTION INTRAVENOUS CONTINUOUS
Status: CANCELLED | OUTPATIENT
Start: 2019-11-25

## 2019-11-25 RX ORDER — ALVIMOPAN 12 MG/1
12 CAPSULE ORAL ONCE
Status: CANCELLED | OUTPATIENT
Start: 2019-11-25 | End: 2019-11-25

## 2019-11-25 RX ORDER — CELECOXIB 200 MG/1
200 CAPSULE ORAL ONCE
Status: CANCELLED | OUTPATIENT
Start: 2019-11-25 | End: 2019-11-25

## 2019-11-25 RX ORDER — ACETAMINOPHEN 325 MG/1
975 TABLET ORAL ONCE
Status: CANCELLED | OUTPATIENT
Start: 2019-11-25 | End: 2019-11-25

## 2019-11-25 RX ORDER — CIPROFLOXACIN 2 MG/ML
400 INJECTION, SOLUTION INTRAVENOUS SEE ADMIN INSTRUCTIONS
Status: CANCELLED | OUTPATIENT
Start: 2019-11-25

## 2019-11-25 RX ORDER — CIPROFLOXACIN 2 MG/ML
400 INJECTION, SOLUTION INTRAVENOUS
Status: CANCELLED | OUTPATIENT
Start: 2019-11-25

## 2019-11-25 NOTE — OR NURSING
"During pre-op phone call, patient stated that he had a bad reaction to \"succs\" during his last anesthesia. Requests to not receive that medication again.  "

## 2019-11-26 ENCOUNTER — HOSPITAL ENCOUNTER (INPATIENT)
Facility: CLINIC | Age: 44
LOS: 4 days | Discharge: HOME | DRG: 331 | End: 2019-11-30
Attending: EMERGENCY MEDICINE | Admitting: COLON & RECTAL SURGERY
Payer: COMMERCIAL

## 2019-11-26 ENCOUNTER — ANESTHESIA (OUTPATIENT)
Dept: SURGERY | Facility: CLINIC | Age: 44
DRG: 331 | End: 2019-11-26
Payer: COMMERCIAL

## 2019-11-26 DIAGNOSIS — R55 VASOVAGAL NEAR SYNCOPE: ICD-10-CM

## 2019-11-26 DIAGNOSIS — K57.92 DIVERTICULITIS: Primary | ICD-10-CM

## 2019-11-26 LAB
ABO + RH BLD: NORMAL
ABO + RH BLD: NORMAL
ANION GAP SERPL CALCULATED.3IONS-SCNC: 6 MMOL/L (ref 3–14)
BASOPHILS # BLD AUTO: 0 10E9/L (ref 0–0.2)
BASOPHILS NFR BLD AUTO: 0.2 %
BLD GP AB SCN SERPL QL: NORMAL
BLOOD BANK CMNT PATIENT-IMP: NORMAL
BUN SERPL-MCNC: 14 MG/DL (ref 7–30)
CALCIUM SERPL-MCNC: 9 MG/DL (ref 8.5–10.1)
CHLORIDE SERPL-SCNC: 105 MMOL/L (ref 94–109)
CO2 SERPL-SCNC: 25 MMOL/L (ref 20–32)
CREAT SERPL-MCNC: 0.91 MG/DL (ref 0.66–1.25)
DIFFERENTIAL METHOD BLD: ABNORMAL
EOSINOPHIL # BLD AUTO: 0.1 10E9/L (ref 0–0.7)
EOSINOPHIL NFR BLD AUTO: 1 %
ERYTHROCYTE [DISTWIDTH] IN BLOOD BY AUTOMATED COUNT: 13.5 % (ref 10–15)
GFR SERPL CREATININE-BSD FRML MDRD: >90 ML/MIN/{1.73_M2}
GLUCOSE SERPL-MCNC: 137 MG/DL (ref 70–99)
HCT VFR BLD AUTO: 44.4 % (ref 40–53)
HGB BLD-MCNC: 15 G/DL (ref 13.3–17.7)
IMM GRANULOCYTES # BLD: 0 10E9/L (ref 0–0.4)
IMM GRANULOCYTES NFR BLD: 0.2 %
INTERPRETATION ECG - MUSE: NORMAL
LYMPHOCYTES # BLD AUTO: 1.6 10E9/L (ref 0.8–5.3)
LYMPHOCYTES NFR BLD AUTO: 11.8 %
MCH RBC QN AUTO: 28.4 PG (ref 26.5–33)
MCHC RBC AUTO-ENTMCNC: 33.8 G/DL (ref 31.5–36.5)
MCV RBC AUTO: 84 FL (ref 78–100)
MONOCYTES # BLD AUTO: 0.8 10E9/L (ref 0–1.3)
MONOCYTES NFR BLD AUTO: 6.2 %
NEUTROPHILS # BLD AUTO: 10.7 10E9/L (ref 1.6–8.3)
NEUTROPHILS NFR BLD AUTO: 80.6 %
NRBC # BLD AUTO: 0 10*3/UL
NRBC BLD AUTO-RTO: 0 /100
PLATELET # BLD AUTO: 223 10E9/L (ref 150–450)
POTASSIUM SERPL-SCNC: 3.8 MMOL/L (ref 3.4–5.3)
RBC # BLD AUTO: 5.29 10E12/L (ref 4.4–5.9)
SODIUM SERPL-SCNC: 136 MMOL/L (ref 133–144)
SPECIMEN EXP DATE BLD: NORMAL
WBC # BLD AUTO: 13.3 10E9/L (ref 4–11)

## 2019-11-26 PROCEDURE — 25000128 H RX IP 250 OP 636: Performed by: COLON & RECTAL SURGERY

## 2019-11-26 PROCEDURE — 25000132 ZZH RX MED GY IP 250 OP 250 PS 637: Performed by: NURSE ANESTHETIST, CERTIFIED REGISTERED

## 2019-11-26 PROCEDURE — 0DJD8ZZ INSPECTION OF LOWER INTESTINAL TRACT, VIA NATURAL OR ARTIFICIAL OPENING ENDOSCOPIC: ICD-10-PCS | Performed by: COLON & RECTAL SURGERY

## 2019-11-26 PROCEDURE — 25800030 ZZH RX IP 258 OP 636: Performed by: COLON & RECTAL SURGERY

## 2019-11-26 PROCEDURE — 25800030 ZZH RX IP 258 OP 636: Performed by: EMERGENCY MEDICINE

## 2019-11-26 PROCEDURE — 99285 EMERGENCY DEPT VISIT HI MDM: CPT

## 2019-11-26 PROCEDURE — 86901 BLOOD TYPING SEROLOGIC RH(D): CPT | Performed by: COLON & RECTAL SURGERY

## 2019-11-26 PROCEDURE — 40000169 ZZH STATISTIC PRE-PROCEDURE ASSESSMENT I: Performed by: COLON & RECTAL SURGERY

## 2019-11-26 PROCEDURE — 25000128 H RX IP 250 OP 636: Performed by: ANESTHESIOLOGY

## 2019-11-26 PROCEDURE — 25000125 ZZHC RX 250: Performed by: NURSE ANESTHETIST, CERTIFIED REGISTERED

## 2019-11-26 PROCEDURE — 71000013 ZZH RECOVERY PHASE 1 LEVEL 1 EA ADDTL HR: Performed by: COLON & RECTAL SURGERY

## 2019-11-26 PROCEDURE — 86850 RBC ANTIBODY SCREEN: CPT | Performed by: COLON & RECTAL SURGERY

## 2019-11-26 PROCEDURE — 36415 COLL VENOUS BLD VENIPUNCTURE: CPT | Performed by: COLON & RECTAL SURGERY

## 2019-11-26 PROCEDURE — 27210794 ZZH OR GENERAL SUPPLY STERILE: Performed by: COLON & RECTAL SURGERY

## 2019-11-26 PROCEDURE — 25800030 ZZH RX IP 258 OP 636: Performed by: ANESTHESIOLOGY

## 2019-11-26 PROCEDURE — 36000087 ZZH SURGERY LEVEL 8 EA 15 ADDTL MIN: Performed by: COLON & RECTAL SURGERY

## 2019-11-26 PROCEDURE — 12000000 ZZH R&B MED SURG/OB

## 2019-11-26 PROCEDURE — 37000008 ZZH ANESTHESIA TECHNICAL FEE, 1ST 30 MIN: Performed by: COLON & RECTAL SURGERY

## 2019-11-26 PROCEDURE — 85025 COMPLETE CBC W/AUTO DIFF WBC: CPT | Performed by: EMERGENCY MEDICINE

## 2019-11-26 PROCEDURE — 37000009 ZZH ANESTHESIA TECHNICAL FEE, EACH ADDTL 15 MIN: Performed by: COLON & RECTAL SURGERY

## 2019-11-26 PROCEDURE — 25000566 ZZH SEVOFLURANE, EA 15 MIN: Performed by: COLON & RECTAL SURGERY

## 2019-11-26 PROCEDURE — 93005 ELECTROCARDIOGRAM TRACING: CPT

## 2019-11-26 PROCEDURE — 71000012 ZZH RECOVERY PHASE 1 LEVEL 1 FIRST HR: Performed by: COLON & RECTAL SURGERY

## 2019-11-26 PROCEDURE — 0DBN0ZZ EXCISION OF SIGMOID COLON, OPEN APPROACH: ICD-10-PCS | Performed by: COLON & RECTAL SURGERY

## 2019-11-26 PROCEDURE — 25000132 ZZH RX MED GY IP 250 OP 250 PS 637: Performed by: COLON & RECTAL SURGERY

## 2019-11-26 PROCEDURE — 96360 HYDRATION IV INFUSION INIT: CPT

## 2019-11-26 PROCEDURE — 25800025 ZZH RX 258: Performed by: COLON & RECTAL SURGERY

## 2019-11-26 PROCEDURE — 36000085 ZZH SURGERY LEVEL 8 1ST 30 MIN: Performed by: COLON & RECTAL SURGERY

## 2019-11-26 PROCEDURE — 88307 TISSUE EXAM BY PATHOLOGIST: CPT | Performed by: COLON & RECTAL SURGERY

## 2019-11-26 PROCEDURE — 86900 BLOOD TYPING SEROLOGIC ABO: CPT | Performed by: COLON & RECTAL SURGERY

## 2019-11-26 PROCEDURE — 25000564 ZZH DESFLURANE, EA 15 MIN: Performed by: COLON & RECTAL SURGERY

## 2019-11-26 PROCEDURE — 25000128 H RX IP 250 OP 636: Performed by: NURSE ANESTHETIST, CERTIFIED REGISTERED

## 2019-11-26 PROCEDURE — 25800030 ZZH RX IP 258 OP 636: Performed by: NURSE ANESTHETIST, CERTIFIED REGISTERED

## 2019-11-26 PROCEDURE — 8E0W4CZ ROBOTIC ASSISTED PROCEDURE OF TRUNK REGION, PERCUTANEOUS ENDOSCOPIC APPROACH: ICD-10-PCS | Performed by: COLON & RECTAL SURGERY

## 2019-11-26 PROCEDURE — 88307 TISSUE EXAM BY PATHOLOGIST: CPT | Mod: 26 | Performed by: COLON & RECTAL SURGERY

## 2019-11-26 PROCEDURE — 80048 BASIC METABOLIC PNL TOTAL CA: CPT | Performed by: EMERGENCY MEDICINE

## 2019-11-26 RX ORDER — ONDANSETRON 2 MG/ML
4 INJECTION INTRAMUSCULAR; INTRAVENOUS EVERY 6 HOURS PRN
Status: DISCONTINUED | OUTPATIENT
Start: 2019-11-26 | End: 2019-11-30 | Stop reason: HOSPADM

## 2019-11-26 RX ORDER — INDOCYANINE GREEN AND WATER 25 MG
KIT INJECTION PRN
Status: DISCONTINUED | OUTPATIENT
Start: 2019-11-26 | End: 2019-11-26

## 2019-11-26 RX ORDER — DIPHENHYDRAMINE HYDROCHLORIDE 50 MG/ML
25 INJECTION INTRAMUSCULAR; INTRAVENOUS EVERY 6 HOURS PRN
Status: DISCONTINUED | OUTPATIENT
Start: 2019-11-26 | End: 2019-11-30 | Stop reason: HOSPADM

## 2019-11-26 RX ORDER — HYDROMORPHONE HYDROCHLORIDE 1 MG/ML
.3-.5 INJECTION, SOLUTION INTRAMUSCULAR; INTRAVENOUS; SUBCUTANEOUS
Status: DISCONTINUED | OUTPATIENT
Start: 2019-11-26 | End: 2019-11-30 | Stop reason: HOSPADM

## 2019-11-26 RX ORDER — HYDROMORPHONE HYDROCHLORIDE 1 MG/ML
.3-.5 INJECTION, SOLUTION INTRAMUSCULAR; INTRAVENOUS; SUBCUTANEOUS EVERY 5 MIN PRN
Status: DISCONTINUED | OUTPATIENT
Start: 2019-11-26 | End: 2019-11-26 | Stop reason: HOSPADM

## 2019-11-26 RX ORDER — CIPROFLOXACIN 2 MG/ML
400 INJECTION, SOLUTION INTRAVENOUS EVERY 12 HOURS
Status: COMPLETED | OUTPATIENT
Start: 2019-11-26 | End: 2019-11-27

## 2019-11-26 RX ORDER — SODIUM CHLORIDE, SODIUM LACTATE, POTASSIUM CHLORIDE, CALCIUM CHLORIDE 600; 310; 30; 20 MG/100ML; MG/100ML; MG/100ML; MG/100ML
INJECTION, SOLUTION INTRAVENOUS CONTINUOUS
Status: DISCONTINUED | OUTPATIENT
Start: 2019-11-26 | End: 2019-11-26 | Stop reason: HOSPADM

## 2019-11-26 RX ORDER — KETOROLAC TROMETHAMINE 30 MG/ML
30 INJECTION, SOLUTION INTRAMUSCULAR; INTRAVENOUS EVERY 6 HOURS PRN
Status: DISCONTINUED | OUTPATIENT
Start: 2019-11-26 | End: 2019-11-29

## 2019-11-26 RX ORDER — OXYCODONE HYDROCHLORIDE 5 MG/1
5-10 TABLET ORAL EVERY 4 HOURS PRN
Status: DISCONTINUED | OUTPATIENT
Start: 2019-11-26 | End: 2019-11-30 | Stop reason: HOSPADM

## 2019-11-26 RX ORDER — HEPARIN SODIUM 5000 [USP'U]/.5ML
5000 INJECTION, SOLUTION INTRAVENOUS; SUBCUTANEOUS
Status: COMPLETED | OUTPATIENT
Start: 2019-11-26 | End: 2019-11-26

## 2019-11-26 RX ORDER — ALBUTEROL SULFATE 90 UG/1
AEROSOL, METERED RESPIRATORY (INHALATION) PRN
Status: DISCONTINUED | OUTPATIENT
Start: 2019-11-26 | End: 2019-11-26

## 2019-11-26 RX ORDER — ONDANSETRON 4 MG/1
4 TABLET, ORALLY DISINTEGRATING ORAL EVERY 30 MIN PRN
Status: DISCONTINUED | OUTPATIENT
Start: 2019-11-26 | End: 2019-11-26 | Stop reason: HOSPADM

## 2019-11-26 RX ORDER — ONDANSETRON 2 MG/ML
INJECTION INTRAMUSCULAR; INTRAVENOUS PRN
Status: DISCONTINUED | OUTPATIENT
Start: 2019-11-26 | End: 2019-11-26

## 2019-11-26 RX ORDER — ALVIMOPAN 12 MG/1
12 CAPSULE ORAL 2 TIMES DAILY
Status: DISCONTINUED | OUTPATIENT
Start: 2019-11-27 | End: 2019-11-29

## 2019-11-26 RX ORDER — CELECOXIB 200 MG/1
200 CAPSULE ORAL ONCE
Status: COMPLETED | OUTPATIENT
Start: 2019-11-26 | End: 2019-11-26

## 2019-11-26 RX ORDER — GLYCOPYRROLATE 0.2 MG/ML
INJECTION, SOLUTION INTRAMUSCULAR; INTRAVENOUS PRN
Status: DISCONTINUED | OUTPATIENT
Start: 2019-11-26 | End: 2019-11-26

## 2019-11-26 RX ORDER — FENTANYL CITRATE 50 UG/ML
25-50 INJECTION, SOLUTION INTRAMUSCULAR; INTRAVENOUS
Status: DISCONTINUED | OUTPATIENT
Start: 2019-11-26 | End: 2019-11-26 | Stop reason: HOSPADM

## 2019-11-26 RX ORDER — DEXAMETHASONE SODIUM PHOSPHATE 4 MG/ML
INJECTION, SOLUTION INTRA-ARTICULAR; INTRALESIONAL; INTRAMUSCULAR; INTRAVENOUS; SOFT TISSUE PRN
Status: DISCONTINUED | OUTPATIENT
Start: 2019-11-26 | End: 2019-11-26

## 2019-11-26 RX ORDER — CIPROFLOXACIN 2 MG/ML
400 INJECTION, SOLUTION INTRAVENOUS
Status: DISCONTINUED | OUTPATIENT
Start: 2019-11-26 | End: 2019-11-26 | Stop reason: HOSPADM

## 2019-11-26 RX ORDER — NEOSTIGMINE METHYLSULFATE 1 MG/ML
VIAL (ML) INJECTION PRN
Status: DISCONTINUED | OUTPATIENT
Start: 2019-11-26 | End: 2019-11-26

## 2019-11-26 RX ORDER — SODIUM CHLORIDE, SODIUM LACTATE, POTASSIUM CHLORIDE, CALCIUM CHLORIDE 600; 310; 30; 20 MG/100ML; MG/100ML; MG/100ML; MG/100ML
INJECTION, SOLUTION INTRAVENOUS CONTINUOUS
Status: DISCONTINUED | OUTPATIENT
Start: 2019-11-26 | End: 2019-11-29

## 2019-11-26 RX ORDER — NALOXONE HYDROCHLORIDE 0.4 MG/ML
.1-.4 INJECTION, SOLUTION INTRAMUSCULAR; INTRAVENOUS; SUBCUTANEOUS
Status: DISCONTINUED | OUTPATIENT
Start: 2019-11-26 | End: 2019-11-30 | Stop reason: HOSPADM

## 2019-11-26 RX ORDER — NEOMYCIN/POLYMYXIN B/PRAMOXINE 3.5-10K-1
2 CREAM (GRAM) TOPICAL DAILY PRN
COMMUNITY
End: 2022-04-06

## 2019-11-26 RX ORDER — ALBUTEROL SULFATE 0.83 MG/ML
2.5 SOLUTION RESPIRATORY (INHALATION) EVERY 4 HOURS PRN
Status: DISCONTINUED | OUTPATIENT
Start: 2019-11-26 | End: 2019-11-30 | Stop reason: HOSPADM

## 2019-11-26 RX ORDER — ALVIMOPAN 12 MG/1
12 CAPSULE ORAL ONCE
Status: COMPLETED | OUTPATIENT
Start: 2019-11-26 | End: 2019-11-26

## 2019-11-26 RX ORDER — LIDOCAINE 40 MG/G
CREAM TOPICAL
Status: DISCONTINUED | OUTPATIENT
Start: 2019-11-26 | End: 2019-11-30 | Stop reason: HOSPADM

## 2019-11-26 RX ORDER — PROPOFOL 10 MG/ML
INJECTION, EMULSION INTRAVENOUS PRN
Status: DISCONTINUED | OUTPATIENT
Start: 2019-11-26 | End: 2019-11-26

## 2019-11-26 RX ORDER — BUPIVACAINE HYDROCHLORIDE 5 MG/ML
INJECTION, SOLUTION PERINEURAL PRN
Status: DISCONTINUED | OUTPATIENT
Start: 2019-11-26 | End: 2019-11-26 | Stop reason: HOSPADM

## 2019-11-26 RX ORDER — IPRATROPIUM BROMIDE AND ALBUTEROL SULFATE 2.5; .5 MG/3ML; MG/3ML
1 SOLUTION RESPIRATORY (INHALATION) EVERY 4 HOURS PRN
Status: DISCONTINUED | OUTPATIENT
Start: 2019-11-26 | End: 2019-11-30 | Stop reason: HOSPADM

## 2019-11-26 RX ORDER — NALOXONE HYDROCHLORIDE 0.4 MG/ML
.1-.4 INJECTION, SOLUTION INTRAMUSCULAR; INTRAVENOUS; SUBCUTANEOUS
Status: DISCONTINUED | OUTPATIENT
Start: 2019-11-26 | End: 2019-11-26

## 2019-11-26 RX ORDER — CIPROFLOXACIN 2 MG/ML
400 INJECTION, SOLUTION INTRAVENOUS SEE ADMIN INSTRUCTIONS
Status: DISCONTINUED | OUTPATIENT
Start: 2019-11-26 | End: 2019-11-26 | Stop reason: HOSPADM

## 2019-11-26 RX ORDER — ONDANSETRON 2 MG/ML
4 INJECTION INTRAMUSCULAR; INTRAVENOUS EVERY 30 MIN PRN
Status: DISCONTINUED | OUTPATIENT
Start: 2019-11-26 | End: 2019-11-26 | Stop reason: HOSPADM

## 2019-11-26 RX ORDER — FENTANYL CITRATE 50 UG/ML
INJECTION, SOLUTION INTRAMUSCULAR; INTRAVENOUS PRN
Status: DISCONTINUED | OUTPATIENT
Start: 2019-11-26 | End: 2019-11-26

## 2019-11-26 RX ORDER — KETOROLAC TROMETHAMINE 30 MG/ML
30 INJECTION, SOLUTION INTRAMUSCULAR; INTRAVENOUS EVERY 6 HOURS PRN
Status: DISCONTINUED | OUTPATIENT
Start: 2019-11-26 | End: 2019-11-26

## 2019-11-26 RX ORDER — ACETAMINOPHEN 325 MG/1
975 TABLET ORAL EVERY 8 HOURS SCHEDULED
Status: COMPLETED | OUTPATIENT
Start: 2019-11-26 | End: 2019-11-29

## 2019-11-26 RX ORDER — ACETAMINOPHEN 325 MG/1
975 TABLET ORAL ONCE
Status: COMPLETED | OUTPATIENT
Start: 2019-11-26 | End: 2019-11-26

## 2019-11-26 RX ORDER — LIDOCAINE HYDROCHLORIDE 20 MG/ML
INJECTION, SOLUTION INFILTRATION; PERINEURAL PRN
Status: DISCONTINUED | OUTPATIENT
Start: 2019-11-26 | End: 2019-11-26

## 2019-11-26 RX ORDER — SODIUM CHLORIDE 9 MG/ML
1000 INJECTION, SOLUTION INTRAVENOUS CONTINUOUS
Status: DISCONTINUED | OUTPATIENT
Start: 2019-11-26 | End: 2019-11-26

## 2019-11-26 RX ADMIN — MIDAZOLAM 2 MG: 1 INJECTION INTRAMUSCULAR; INTRAVENOUS at 07:34

## 2019-11-26 RX ADMIN — ROCURONIUM BROMIDE 10 MG: 10 INJECTION INTRAVENOUS at 10:23

## 2019-11-26 RX ADMIN — ALVIMOPAN 12 MG: 12 CAPSULE ORAL at 06:56

## 2019-11-26 RX ADMIN — ROCURONIUM BROMIDE 10 MG: 10 INJECTION INTRAVENOUS at 10:27

## 2019-11-26 RX ADMIN — DEXAMETHASONE SODIUM PHOSPHATE 4 MG: 4 INJECTION, SOLUTION INTRA-ARTICULAR; INTRALESIONAL; INTRAMUSCULAR; INTRAVENOUS; SOFT TISSUE at 08:00

## 2019-11-26 RX ADMIN — INDOCYANINE GREEN 7.5 MG: KIT INTRAVENOUS at 10:57

## 2019-11-26 RX ADMIN — LIDOCAINE HYDROCHLORIDE 50 MG: 20 INJECTION, SOLUTION INFILTRATION; PERINEURAL at 07:42

## 2019-11-26 RX ADMIN — SODIUM CHLORIDE 1000 ML: 9 INJECTION, SOLUTION INTRAVENOUS at 03:45

## 2019-11-26 RX ADMIN — OXYCODONE HYDROCHLORIDE 10 MG: 5 TABLET ORAL at 19:38

## 2019-11-26 RX ADMIN — FENTANYL CITRATE 50 MCG: 50 INJECTION, SOLUTION INTRAMUSCULAR; INTRAVENOUS at 08:27

## 2019-11-26 RX ADMIN — OXYCODONE HYDROCHLORIDE 5 MG: 5 TABLET ORAL at 15:15

## 2019-11-26 RX ADMIN — ACETAMINOPHEN 975 MG: 325 TABLET, FILM COATED ORAL at 21:49

## 2019-11-26 RX ADMIN — FENTANYL CITRATE 50 MCG: 50 INJECTION, SOLUTION INTRAMUSCULAR; INTRAVENOUS at 07:41

## 2019-11-26 RX ADMIN — KETOROLAC TROMETHAMINE 30 MG: 30 INJECTION, SOLUTION INTRAMUSCULAR at 17:17

## 2019-11-26 RX ADMIN — GLYCOPYRROLATE 1 MG: 0.2 INJECTION, SOLUTION INTRAMUSCULAR; INTRAVENOUS at 11:32

## 2019-11-26 RX ADMIN — HYDROMORPHONE HYDROCHLORIDE 0.5 MG: 1 INJECTION, SOLUTION INTRAMUSCULAR; INTRAVENOUS; SUBCUTANEOUS at 23:28

## 2019-11-26 RX ADMIN — CIPROFLOXACIN 400 MG: 2 INJECTION INTRAVENOUS at 07:46

## 2019-11-26 RX ADMIN — HYDROMORPHONE HYDROCHLORIDE 0.5 MG: 1 INJECTION, SOLUTION INTRAMUSCULAR; INTRAVENOUS; SUBCUTANEOUS at 08:12

## 2019-11-26 RX ADMIN — CIPROFLOXACIN 400 MG: 2 INJECTION, SOLUTION INTRAVENOUS at 19:40

## 2019-11-26 RX ADMIN — ROCURONIUM BROMIDE 20 MG: 10 INJECTION INTRAVENOUS at 08:28

## 2019-11-26 RX ADMIN — NEOSTIGMINE METHYLSULFATE 5 MG: 1 INJECTION, SOLUTION INTRAVENOUS at 11:32

## 2019-11-26 RX ADMIN — SODIUM CHLORIDE, POTASSIUM CHLORIDE, SODIUM LACTATE AND CALCIUM CHLORIDE: 600; 310; 30; 20 INJECTION, SOLUTION INTRAVENOUS at 06:58

## 2019-11-26 RX ADMIN — ONDANSETRON 4 MG: 2 INJECTION INTRAMUSCULAR; INTRAVENOUS at 10:12

## 2019-11-26 RX ADMIN — ALBUTEROL SULFATE 4 PUFF: 90 AEROSOL, METERED RESPIRATORY (INHALATION) at 07:34

## 2019-11-26 RX ADMIN — SODIUM CHLORIDE, POTASSIUM CHLORIDE, SODIUM LACTATE AND CALCIUM CHLORIDE: 600; 310; 30; 20 INJECTION, SOLUTION INTRAVENOUS at 13:59

## 2019-11-26 RX ADMIN — ROCURONIUM BROMIDE 10 MG: 10 INJECTION INTRAVENOUS at 08:51

## 2019-11-26 RX ADMIN — CELECOXIB 200 MG: 200 CAPSULE ORAL at 06:55

## 2019-11-26 RX ADMIN — ROCURONIUM BROMIDE 20 MG: 10 INJECTION INTRAVENOUS at 09:45

## 2019-11-26 RX ADMIN — ROCURONIUM BROMIDE 20 MG: 10 INJECTION INTRAVENOUS at 08:09

## 2019-11-26 RX ADMIN — FLUTICASONE FUROATE AND VILANTEROL TRIFENATATE 1 PUFF: 200; 25 POWDER RESPIRATORY (INHALATION) at 15:15

## 2019-11-26 RX ADMIN — METRONIDAZOLE 500 MG: 500 INJECTION, SOLUTION INTRAVENOUS at 15:15

## 2019-11-26 RX ADMIN — METRONIDAZOLE 500 MG: 500 INJECTION, SOLUTION INTRAVENOUS at 08:00

## 2019-11-26 RX ADMIN — HYDROMORPHONE HYDROCHLORIDE 0.5 MG: 1 INJECTION, SOLUTION INTRAMUSCULAR; INTRAVENOUS; SUBCUTANEOUS at 12:22

## 2019-11-26 RX ADMIN — HEPARIN SODIUM 5000 UNITS: 10000 INJECTION, SOLUTION INTRAVENOUS; SUBCUTANEOUS at 07:56

## 2019-11-26 RX ADMIN — PHENYLEPHRINE HYDROCHLORIDE 100 MCG: 10 INJECTION INTRAVENOUS at 10:26

## 2019-11-26 RX ADMIN — FENTANYL CITRATE 50 MCG: 50 INJECTION, SOLUTION INTRAMUSCULAR; INTRAVENOUS at 08:09

## 2019-11-26 RX ADMIN — HYDROMORPHONE HYDROCHLORIDE 0.5 MG: 1 INJECTION, SOLUTION INTRAMUSCULAR; INTRAVENOUS; SUBCUTANEOUS at 20:43

## 2019-11-26 RX ADMIN — ALBUTEROL SULFATE 4 PUFF: 90 AEROSOL, METERED RESPIRATORY (INHALATION) at 11:25

## 2019-11-26 RX ADMIN — HYDROMORPHONE HYDROCHLORIDE 0.5 MG: 1 INJECTION, SOLUTION INTRAMUSCULAR; INTRAVENOUS; SUBCUTANEOUS at 16:21

## 2019-11-26 RX ADMIN — HYDROMORPHONE HYDROCHLORIDE 0.5 MG: 1 INJECTION, SOLUTION INTRAMUSCULAR; INTRAVENOUS; SUBCUTANEOUS at 10:34

## 2019-11-26 RX ADMIN — ROCURONIUM BROMIDE 20 MG: 10 INJECTION INTRAVENOUS at 10:48

## 2019-11-26 RX ADMIN — ACETAMINOPHEN 975 MG: 325 TABLET, FILM COATED ORAL at 06:55

## 2019-11-26 RX ADMIN — ROCURONIUM BROMIDE 50 MG: 10 INJECTION INTRAVENOUS at 07:42

## 2019-11-26 RX ADMIN — PROPOFOL 300 MG: 10 INJECTION, EMULSION INTRAVENOUS at 07:42

## 2019-11-26 RX ADMIN — FENTANYL CITRATE 50 MCG: 50 INJECTION, SOLUTION INTRAMUSCULAR; INTRAVENOUS at 12:53

## 2019-11-26 RX ADMIN — ACETAMINOPHEN 975 MG: 325 TABLET, FILM COATED ORAL at 15:15

## 2019-11-26 RX ADMIN — ROCURONIUM BROMIDE 20 MG: 10 INJECTION INTRAVENOUS at 09:24

## 2019-11-26 RX ADMIN — METRONIDAZOLE 500 MG: 500 INJECTION, SOLUTION INTRAVENOUS at 23:27

## 2019-11-26 RX ADMIN — FENTANYL CITRATE 50 MCG: 50 INJECTION, SOLUTION INTRAMUSCULAR; INTRAVENOUS at 07:42

## 2019-11-26 RX ADMIN — HYDROMORPHONE HYDROCHLORIDE 0.3 MG: 1 INJECTION, SOLUTION INTRAMUSCULAR; INTRAVENOUS; SUBCUTANEOUS at 13:57

## 2019-11-26 ASSESSMENT — ENCOUNTER SYMPTOMS
BLOOD IN STOOL: 0
DIZZINESS: 1
CHEST TIGHTNESS: 0
NAUSEA: 1
SEIZURES: 0
DIAPHORESIS: 1
ABDOMINAL PAIN: 1
LIGHT-HEADEDNESS: 1

## 2019-11-26 ASSESSMENT — ACTIVITIES OF DAILY LIVING (ADL)
ADLS_ACUITY_SCORE: 12
ADLS_ACUITY_SCORE: 12

## 2019-11-26 ASSESSMENT — MIFFLIN-ST. JEOR
SCORE: 1871.49
SCORE: 1869.22

## 2019-11-26 NOTE — ED PROVIDER NOTES
History     Chief Complaint:  Dizziness/ lightheadedness    HPI   Dano Kennedy is a 44 year old male who presents with dizziness and lightheadedness. The patient has a bowel resection surgery in the morning at 0730 for recurrent diverticulitis with Dr. Mitchell Brand. He has been taking Gatorade, Miralax, and Dulcolax for the surgery preparation. The patient woke up about 1.5 hours ago to use the bathroom, when he became nauseous, diaphoretic, and lightheaded, feeling like he was going to pass out. He also notes abdominal cramping, though this has otherwise been present with the diverticulitis. The patient denies blood in stool as well as chest pain, pressure, or heaviness. In the Emergency Department, the patient notes feeling better with some persisting nausea. He states he has taken 3 Zofran in the last 24 hours.     Allergies:  NKDA    Medications:    albuterol inhaler and neb  EpiPen  Breo Ellipta  DuoNeb  Zofran    Past Medical History:    Anxiety  Asthma  Obesity  Diverticulitis  Obstructive sleep apnea  ADHD  Gluten sensitive enteropathy  Complication of anesthesia  History of kidney stones    Past Surgical History:    Endoscopic polypectomy, nasal  Combined laser holmium lithotripsy and stent insertion, left    Family History:    Father - glaucoma    Social History:  Patient is a  in [place]  Negative for tobacco use.  Alcohol use; positive   Marital Status:  Single [1]    Review of Systems   Constitutional: Positive for diaphoresis.   Respiratory: Negative for chest tightness.    Cardiovascular: Negative for chest pain.   Gastrointestinal: Positive for abdominal pain and nausea. Negative for blood in stool.   Neurological: Positive for dizziness and light-headedness.   All other systems reviewed and are negative.        Physical Exam     Patient Vitals for the past 24 hrs:   BP Temp Temp src Pulse Heart Rate Resp SpO2 Height Weight   11/26/19 0415 -- -- -- -- -- -- 97 % -- --  "  11/26/19 0400 113/75 -- -- 70 -- -- 93 % -- --   11/26/19 0345 111/79 -- -- -- -- -- 93 % -- --   11/26/19 0330 -- -- -- -- -- -- 92 % -- --   11/26/19 0315 -- -- -- -- -- -- 97 % -- --   11/26/19 0300 -- -- -- -- -- -- 94 % -- --   11/26/19 0245 -- -- -- -- -- -- 94 % -- --   11/26/19 0239 137/89 98.2  F (36.8  C) Oral -- 78 18 95 % -- --   11/26/19 0231 -- -- -- -- -- -- -- 1.702 m (5' 7\") 102.1 kg (225 lb)        Physical Exam    GENERAL: well developed, pleasant  HEAD: atraumatic  EYES: pupils reactive, extraocular muscles intact, conjunctivae normal  ENT:  mucus membranes moist  NECK:  trachea midline, normal range of motion  RESPIRATORY: no tachypnea, breath sounds clear to auscultation   CVS: normal S1/S2, no murmurs, intact distal pulses  ABDOMEN: soft, nontender, nondistention  MUSCULOSKELETAL: no deformities  SKIN: warm and dry, no acute rashes or ulceration  NEURO: GCS 15, cranial nerves intact, alert and oriented x3  PSYCH:  Mood/affect normal      Emergency Department Course     ECG:  Indication: dizziness, lightheadedness  Time: 0237  Vent. Rate 78 bpm. OH interval 140. QRS duration 92. QT/QTc 386/440. P-R-T axis 47 90 1.  Normal sinus rhythm with sinus arrhythmia. Rightward axis. T wave abnormality, consider inferior ischemia. Abnormal ECG. Read time: 0247     Laboratory:  Laboratory findings were communicated with the patient and family who voiced understanding of the findings.    CBC: WBC 13.3 (H), HGB 15.0,    BMP: glucose 137 (H) o/w WNL (Creatinine 0.91)    Interventions:  0345 NS 1 L IV     Emergency Department Course:  Past medical records, nursing notes, and vitals reviewed.    0309: I performed an exam of the patient as documented above.     IV was inserted and blood was drawn for laboratory testing, results above.    0413: Patient rechecked and updated.      I personally reviewed the laboratory results with the Patient and spouse and answered all related questions prior to " discharge.      Impression & Plan     Medical Decision Making:  Dano Kennedy is a 44 year old male who presents to the emergency department today with near syncope.  He notes that he scheduled for surgery today and has been doing a bowel prep.  Been having intermittent nausea throughout the day and some abdominal cramps.  Sitting on the toilet felt lightheaded that he might pass out.  Patient had an EKG and labs obtained showing slight leukocytosis although minimally elevated.  Has not had any recent infections although he does have intermittent abdominal pain and cramping which is the reason he is having surgery today.  Likely a stress response.  Patient was discharged on 515 as he is planning on having surgery early this morning and check-in time is 530.  IV was left in.      Discharge Diagnosis:    ICD-10-CM    1. Vasovagal near syncope R55        Disposition:  Discharged to home/ for surgery    Scribe Disclosure:  Azalia SAWYER, am serving as a scribe at 3:12 AM on 11/26/2019 to document services personally performed by Vj Berger MD based on my observations and the provider's statements to me.        Vj Berger MD  11/26/19 0679

## 2019-11-26 NOTE — ANESTHESIA POSTPROCEDURE EVALUATION
Patient: Dano Kennedy    Procedure(s):  ROBOTIC ASSISTED SIGMOID COLECTOMY. MOBILIZATION OF SPLENIC FLESURE    Diagnosis:Diverticulitis [K57.92]  Diagnosis Additional Information: No value filed.    Anesthesia Type:  General, ETT    Note:  Anesthesia Post Evaluation    Patient location during evaluation: PACU  Patient participation: Able to fully participate in evaluation  Level of consciousness: awake and alert  Pain management: adequate  Airway patency: patent  Cardiovascular status: acceptable and hemodynamically stable  Respiratory status: acceptable and nasal cannula  Hydration status: euvolemic  PONV: none     Anesthetic complications: None          Last vitals:  Vitals:    11/26/19 1500 11/26/19 1515 11/26/19 1530   BP: 132/79  134/78   Pulse:      Resp: 18 16 18   Temp:      SpO2: 96%  95%         Electronically Signed By: Cecil Haas MD  November 26, 2019  3:58 PM

## 2019-11-26 NOTE — ANESTHESIA PREPROCEDURE EVALUATION
Anesthesia Pre-Procedure Evaluation    Patient: Dano Kennedy   MRN: [id number] : 1975          Preoperative Diagnosis: Diverticulitis [K57.92]    Procedure(s):  ROBOTIC ASSISTED SIGMOID COLECTOMY    Past Medical History:   Diagnosis Date     Allergic rhinitis, cause unspecified      Asthma      Complication of anesthesia     did not respond well to succs     Unspecified asthma(493.90)      Past Surgical History:   Procedure Laterality Date     ENDOSCOPIC POLYPECTOMY NASAL       LASER HOLMIUM LITHOTRIPSY URETER(S), INSERT STENT, COMBINED Left 2015    Procedure: COMBINED CYSTOSCOPY, URETEROSCOPY, LASER HOLMIUM LITHOTRIPSY URETER(S), INSERT STENT;  Surgeon: Derrek Cobb MD;  Location: [facility] OR       Anesthesia Evaluation     .             ROS/MED HX    ENT/Pulmonary:     (+)sleep apnea, allergic rhinitis, Moderate Persistent asthma , . .    Neurologic:      (-) seizures and CVA   Cardiovascular:        (-) hypertension   METS/Exercise Tolerance:  >4 METS   Hematologic:        (-) anemia   Musculoskeletal:         GI/Hepatic:        (-) GERD and liver disease   Renal/Genitourinary:      (-) renal disease   Endo:     (+) Obesity, .      Psychiatric:     (+) psychiatric history anxiety (ADHD)      Infectious Disease:         Malignancy:         Other:                          Physical Exam  Normal systems: dental    Airway   Mallampati: II  TM distance: >3 FB  Neck ROM: full    Dental     Cardiovascular   Rhythm and rate: regular      Pulmonary             Lab Results   Component Value Date    WBC 13.3 (H) 2019    HGB 15.0 2019    HCT 44.4 2019     2019     2019    POTASSIUM 3.8 2019    CHLORIDE 105 2019    CO2 25 2019    BUN 14 2019    CR 0.91 2019     (H) 2019    TAY 9.0 2019    ALBUMIN 3.2 (L) 2019    PROTTOTAL 7.1 2019    ALT 79 (H) 2019    AST 29 2019    ALKPHOS 133 2019     "BILITOTAL 0.8 09/30/2019    TSH 1.75 10/01/2013    T4 0.94 09/29/2011       Preop Vitals  BP Readings from Last 3 Encounters:   11/26/19 (!) 145/81   11/13/19 123/79   10/01/19 122/71    Pulse Readings from Last 3 Encounters:   11/26/19 70   11/13/19 80   10/01/19 67      Resp Readings from Last 3 Encounters:   11/26/19 18   10/01/19 18   04/25/19 18    SpO2 Readings from Last 3 Encounters:   11/26/19 97%   10/01/19 98%   04/25/19 95%      Temp Readings from Last 1 Encounters:   11/26/19 36.4  C (97.5  F) (Temporal)    Ht Readings from Last 1 Encounters:   11/26/19 1.702 m (5' 7\")      Wt Readings from Last 1 Encounters:   11/26/19 102.3 kg (225 lb 8 oz)    Estimated body mass index is 35.32 kg/m  as calculated from the following:    Height as of this encounter: 1.702 m (5' 7\").    Weight as of this encounter: 102.3 kg (225 lb 8 oz).       Anesthesia Plan      History & Physical Review  History and physical reviewed and following examination; no interval change.    ASA Status:  2 .    NPO Status:  > 8 hours    Plan for General and ETT with Propofol induction. Maintenance will be Inhalation.    PONV prophylaxis:  Ondansetron (or other 5HT-3) and Dexamethasone or Solumedrol  Albuterol 2-3 puffs preop      Postoperative Care  Postoperative pain management:  IV analgesics and Oral pain medications.      Consents  Anesthetic plan, risks, benefits and alternatives discussed with:  Patient..                 Cecil Haas MD  "

## 2019-11-26 NOTE — PROVIDER NOTIFICATION
Called colorectal on call regarding no changes in pain with oxy & dilaudid. Ice pack, binder given. Will be giving oxy max dose 10 mg.     Dilaudid 0.5 mg given with some decrease in pain.

## 2019-11-26 NOTE — PROGRESS NOTES
Admission medication history interview status for the 11/26/2019  admission is complete. See EPIC admission navigator for prior to admission medications     Medication history source reliability:Moderate    Medication history interview source(s):Patient    Medication history resources (including written lists, pill bottles, clinic record):None    Primary pharmacy.[facility]    Additional medication history information not noted on PTA med list :None    Time spent in this activity: 45 MINUTES    Prior to Admission medications    Medication Sig Last Dose Taking? Auth Provider   Acetaminophen (TYLENOL PO) Take 2 tablets by mouth daily as needed for mild pain or fever 11/21/2019 at PRN Yes Reported, Patient   albuterol (2.5 MG/3ML) 0.083% neb solution Take 1 vial (2.5 mg) by nebulization every 4 hours as needed for shortness of breath / dyspnea or wheezing ON HAND Yes Maycol Kim,    albuterol (PROAIR HFA) 108 (90 Base) MCG/ACT inhaler INHALE 2 PUFFS INTO THE LUNGS EVERY 4 HOURS AS NEEDED FOR SHORTNESS OF BREATH OR DIFFICULT BREATHING OR WHEEZING 11/25/2019 at PRN Yes Nancy Preciado PA-C   EPINEPHrine (EPIPEN 2-DONNY) 0.3 MG/0.3ML injection Inject 0.3 mLs (0.3 mg) into the muscle once as needed for anaphylaxis ON HAND Yes Yumiko Mckeon APRN CNP   fexofenadine-pseudoePHEDrine (ALLEGRA-D 24) 180-240 MG 24 hr tablet Take 1 tablet by mouth daily 11/24/2019 at AM Yes Nanyc Preciado PA-C   fluticasone-vilanterol (BREO ELLIPTA) 200-25 MCG/INH inhaler INHALE 1 PUFF INTO THE LUNGS DAILY 11/25/2019 at AM Yes Nancy Preciado PA-C   ipratropium - albuterol 0.5 mg/2.5 mg/3 mL (DUONEB) 0.5-2.5 (3) MG/3ML neb solution Take 1 vial (3 mLs) by nebulization every 4 hours as needed for shortness of breath / dyspnea or wheezing  Patient taking differently: Take 1 vial by nebulization once a week AS NEEDED. 11/21/2019 at PRN Yes Nancy Preciado PA-C   Multiple Vitamins-Minerals  (MULTI-VITAMIN GUMMIES) CHEW Take 2 chew tab by mouth daily as needed 11/23/2019 at PRN Yes Reported, Patient   ondansetron (ZOFRAN) 4 MG tablet Take 2 tablets (8 mg) by mouth every 8 hours as needed for nausea 11/26/2019 at 0300 Yes Santa Hunter MD   scopolamine (TRANSDERM) 72 hr patch Apply 1 patch to hairless area behind one ear at least 4 hours before travel.  Remove old patch and change every 3 days (72 hours). ON HAND Yes Santa Hunter MD   order for DME Equipment being ordered: nebulizer   Kehr, Kristen M, PA-C   order for DME Nebulizer tubing and mouthpiece   Kehr, Kristen M, PA-C   ORDER FOR DME Respironics REMSTAR 60 Series Auto CPAP 5-18cm H2O, MIrage Fx standard nasal mask   Nathaniel Campuzano, NP

## 2019-11-26 NOTE — ED TRIAGE NOTES
Pt. experiencing lightheadedness over the last hour. Denies CP or SOB. Pt. is undergoing a bowel resection in the am for diverticulitis. Currently undergoing bowel prep.

## 2019-11-26 NOTE — ANESTHESIA CARE TRANSFER NOTE
Patient: Dano Kennedy    Procedure(s):  ROBOTIC ASSISTED SIGMOID COLECTOMY. MOBILIZATION OF SPLENIC FLESURE    Diagnosis: Diverticulitis [K57.92]  Diagnosis Additional Information: No value filed.    Anesthesia Type:   General, ETT     Note:  Airway :Face Mask  Patient transferred to:PACU        Vitals: (Last set prior to Anesthesia Care Transfer)    CRNA VITALS  11/26/2019 1121 - 11/26/2019 1158      11/26/2019             Pulse:  99    SpO2:  100 %    Resp Rate (observed):  10    Resp Rate (set):  10                Electronically Signed By: RAMON Todd CRNA  November 26, 2019  11:58 AM

## 2019-11-26 NOTE — ED AVS SNAPSHOT
Emergency Department  [address] [place] [id number]  Phone:  [phone]  Fax:  [phone]                                    Dano Kennedy   MRN: [id number]    Department:   Emergency Department   Date of Visit:  11/26/2019           After Visit Summary Signature Page    I have received my discharge instructions, and my questions have been answered. I have discussed any challenges I see with this plan with the nurse or doctor.    ..........................................................................................................................................  Patient/Patient Representative Signature      ..........................................................................................................................................  Patient Representative Print Name and Relationship to Patient    ..................................................               ................................................  Date                                   Time    ..........................................................................................................................................  Reviewed by Signature/Title    ...................................................              ..............................................  Date                                               Time          22EPIC Rev 08/18

## 2019-11-26 NOTE — OP NOTE
Operative Report    Pre Operative Diagnosis:  1)  Sigmoid Diverticulitis     Post Operative Diagnosis  1) Sigmoid diverticulitis       Surgery:  1)  Robotic sigmoid colectomy  2)  Robotic mobilization of splenic flexure  3)  Intraoperative fluorescence angiography  4)  Colorectal anastomosis  5)  Rigid proctoscopy    Surgeon: Travis Brand MD  Assistant: Lawanda Stroud PA-C  Second Assistant: Dorothea García MD, [facility] Colorectal surgery fellow    Anesthesia: General    EBL:  50    Findings: The sigmoid colon was thickened and firm consistent with diverticular disease.  There was no evidence of abscess or perforation.  There were adhesions of the sigmoid colon to the left pelvic side wall and anterior abdominal wall.  I performed a medial to lateral mobilization of the sigmoid colon with full mobilization of the splenic flexure.  The distal transection was within the upper rectum and we created an end to end stapled anastomosis.  The anastomosis was created with excellent blood supply and without tension.  A pneumatic leak test of the anastomosis was negative for any extravasation of air.      Indication: Dano Kennedy is a 44 year old male who has a history of recurrent sigmoid diverticulitis.  He has a history of multiple attacks documented by CT scan.  He was advised of the risks and benefits or surgery.  The risks discussed include but are not limited to the risk of bleeding, anastomotic leak, wound infection, injury to adjacent structures, development of a hernia, need for further surgery including creating a stoma, and even more serious complications.  We discussed expectations regarding recovery.  He wishes to proceed.    Description of procedure:  After obtaining informed consent the patient was brought to the operating room after a complete bowel prep.  He was placed on the operating table and general anesthesia was induced.  He was intubated by the anesthesia service without difficulty.      Burrows catheter  was placed under sterile conditions.  An orogastric tube was placed.  The patient's arms were tucked at his side.  He was secured to the table with tape across the chest.  He was placed in the low modified lithotomy position.  All pressure points were inspected and padded appropriately.  The abdomen was shaved prepped and draped in the usual sterile fashion.  A timeout was undertaken which identified the patient and the correct procedure.    We began by making a stab incision in the left upper quadrant of the abdomen.  A Veres needle was used to establish pneumoperitoneum.  A small incision was made in the supraumbilical position.  An 8 mm robotic trocar was placed here without difficulty.  We then surveyed the abdomen with 30 degree angled robotic camera.  There is no evidence of injury to the underlying viscera and the Veres needle was removed.  The abdomen was surveyed and there is no evidence of other pathology.  We placed 2 additional 8 mm trochars on the left side of the abdomen running in the line across the abdomen transversely.  We placed an additional 12 mm robotic trocar in the right lower quadrant and a 8 mm air seal trocar in the right upper quadrant.  The air seal device was used to maintain pneumoperitoneum throughout the case.    We then placed the patient in steep Trendelenburg position with the left side slightly up.  At this point we docked the robot over the patient's left side and centered the robot on the inferior mesenteric artery.  We utilized a small grasping retractor and arm #1 tip up grasper in arm #2 the camera and arm #3 and a monopolar scissors and arm #4.  Arm #4 was switched out to the vessel sealing device and a robotic stapler as needed.  With this instrumentation we performed a mobilization of the colon.    We then performed a medial to lateral mobilization of the sigmoid colon.  Sigmoid colon was retracted cephalad placing the inferior mesenteric artery and stretch.  Peritoneum  medial to the vessel was incised with monopolar scissors elevating the colonic mesentery off the retroperitoneum.  We bluntly dissected in this plane elevating the mesial colon off the retroperitoneum the left ureter was identified and swept posteriorly out of harm's way.  This allowed us to isolate the inferior mesenteric artery which allowed us to triply ligated with the vessel sealing device with excellent hemostasis The adjacent vein was also ligated and transected with excellent hemostasis.  We continued our medial to lateral mobilization to the abdominal sidewall laterally from the sacral promontory up to the level of the splenic flexure.  We then turned our attention laterally and incised the lateral attachments with the monopolar scissors connecting our lateral dissection to our medial dissection.    We then turned our attention back to the pelvis.  At this time a point for distal transection was chosen.  The upper rectum was mobilized by incising the areolar attachments of the mesial rectum to the presacral fascia.  The peritoneum along the upper rectum was incised to allow mobilization of the upper rectum.  We then incised the peritoneum of the upper mesorectum up to the level of the bowel wall at the point chosen for distal transection.  The mesorectum here was then transected with the vessel sealing device to circumferentially isolate the rectum at this level.  We then transected the bowel at this level with a single firing of a 60 mm green load robotic BRANDO stapler.    At this point the robot instruments were removed and the robot was undocked.  I scrubbed into the abdominal field.  The patient was placed in a more neutral position.  We used the robotic camera to identify our proximal staple line.  A grasper was placed on the staple line.  Pneumoperitoneum was evacuated.  We made a small Pfannenstiel incision approximately 3 fingerbreadths above the pubic symphysis dissection was taken down with  "electrocautery the fascia was incised in a transverse fashion and then elevated off the rectus muscles superiorly and inferiorly.  The muscles fibers of the rectus were spread in the midline and the peritoneum was incised with great care to avoid injury to the bladder.  An Bonilla wound retractor was used to facilitate exposure.  This allowed us to extract the proximal staple line and the specimen through a Pfannenstiel incision.  With the bowel nicely eviscerated a point for proximal transection was chosen.  The bowel was isolated here with electrocautery.  The intervening mesentery was then transected between Carmalt clamps and controlled with Vicryl ties.  The bowel was then divided between Adam clamps.  Specimen was sent off the \"sigmoid colon staple line distal\".      We then prepared the proximal bowel for anastomosis.  A 2-0 Prolene suture was then placed in a pursestring fashion around the cut edge of the bowel wall.  The anvil of a 29 mm EEA stapler was then secured within the lumen of the bowel and the suture was tied down to bring the bowel wall type to the shaft of the anvil.  Fat was cleared of the adjacent bowel wall close to the anvil and a second pursestring suture of the 2-0 Prolene was placed to secure the anvil.  This was then reintroduced into the abdomen.  The Bonilla wound retractor was removed and the peritoneum was reapproximated with a running suture of 2-0 Vicryl.  The fascia was then reapproximated at the site with 2 running sutures of looped 0 PDS.  Pneumoperitoneum was reestablished.    We then docked the robot to perform a full mobilization of the splenic flexure.  The patient was then placed in slight reverse Trendelenburg position with the left side up.  The boom of the robot was then spun around 180 degrees center on the splenic flexure.  We utilized the patient's 3 left-sided trochars.  In the left most trocar we used a vessel sealing device the camera was in the adjacent trocar and " a tip up grasper was in the supraumbilical trocar.  With this instrumentation the omentum was retracted cephalad above the transverse colon and the transverse colon was identified.  The attachments of the omentum to the transverse colon were then taken with the vessel sealing device to enter into the lesser sac the posterior wall of the stomach was identified.  The remaining attachments of the omentum to the transverse colon as the colon extended distally towards the spleen were then transected.  This allowed us to take the superior lateral and posterior attachments of the splenic flexure to elevate the colon and its mesocolon off of Gerota's fascia.  The remaining attachments overlying the tail of the pancreas were incised with great care to avoid injury to the pancreas.  We connected our lateral dissection to where we had previously dissected the lateral attachments of the descending colon.  This allowed us to elevate the transverse and descending colon to identify the cut edge of the mesentery from our previous dissection.  This allowed us to continue to incise the mesentery and allowed us to ligate and transect the inferior mesenteric vein just distal to its takeoff from the ligament of Treitz.    The instruments were removed and the robot was undocked.  The patient was then again placed in Trendelenburg position.  I scrubbed into the abdominal field.  We utilized the robotic camera as a laparoscope.  The proximal bowel of the descending colon was identified and the anvil was identified.  The colonic conduit came down nicely into the pelvis. The small bowel was swept up out of the pelvis.  We performed intraoperative fluorescence angiography.  3 ml of ICG was injected by the anesthesia team IV.  The Firefly technology was engaged on the robotic laparoscope and excellent perfusion was noted to the end of the colonic conduit.      My assistant Dr. García then went to the perineal field.  Under my direction temitope  were placed via the anus to the top of the rectal stump.  The EEA stapler was then placed via the anus to the top of the rectal stump.  Stapler was flush with the bowel wall here and the spike was deployed through the bowel wall just adjacent to the staple line in its midportion.  Utilizing laparoscopic instruments the anvil was  to the stapler stapler was closed and fired after ensuring that the proximal bowel was in proper orientation by inspecting the cut edge of the mesentery to its origin.  The stapler was easily retrieved.  The anastomotic rings were inspected and found to be intact.  The pelvis was filled with saline for a pneumatic leak test.  My assistant performed a rigid proctoscopy and a pneumatic leak test was negative.    We then performed a laparoscopic-assisted TAP block utilizing 34 mL of half percent Marcaine plain.  We closed the right lower quadrant 12 mm trocar site with a Familia-Gavi device utilizing 0 Vicryl tie.  Similarly the right upper quadrant air seal trocar site was closed with a Familia-Gavi device utilizing 0 Vicryl tie at this point pneumoperitoneum was evacuated.  Skin and subcutaneous fat at all skin wounds closed with 4-0 Monocryl in a subcuticular fashion Dermabond was placed as a dressing.  Patient was placed in supine position drapes were taken down he was awakened extubated and transferred to the PACU in stable condition lap sponge needle counts correct at the end of the case x2.    Travis Brand MD

## 2019-11-27 LAB
ANION GAP SERPL CALCULATED.3IONS-SCNC: 5 MMOL/L (ref 3–14)
BUN SERPL-MCNC: 12 MG/DL (ref 7–30)
CALCIUM SERPL-MCNC: 8.5 MG/DL (ref 8.5–10.1)
CHLORIDE SERPL-SCNC: 107 MMOL/L (ref 94–109)
CO2 SERPL-SCNC: 26 MMOL/L (ref 20–32)
COPATH REPORT: NORMAL
CREAT SERPL-MCNC: 0.95 MG/DL (ref 0.66–1.25)
ERYTHROCYTE [DISTWIDTH] IN BLOOD BY AUTOMATED COUNT: 13.7 % (ref 10–15)
GFR SERPL CREATININE-BSD FRML MDRD: >90 ML/MIN/{1.73_M2}
GLUCOSE BLDC GLUCOMTR-MCNC: 102 MG/DL (ref 70–99)
GLUCOSE SERPL-MCNC: 128 MG/DL (ref 70–99)
HCT VFR BLD AUTO: 37.9 % (ref 40–53)
HGB BLD-MCNC: 12.4 G/DL (ref 13.3–17.7)
MAGNESIUM SERPL-MCNC: 1.7 MG/DL (ref 1.6–2.3)
MCH RBC QN AUTO: 28 PG (ref 26.5–33)
MCHC RBC AUTO-ENTMCNC: 32.7 G/DL (ref 31.5–36.5)
MCV RBC AUTO: 86 FL (ref 78–100)
PHOSPHATE SERPL-MCNC: 2.4 MG/DL (ref 2.5–4.5)
PLATELET # BLD AUTO: 180 10E9/L (ref 150–450)
POTASSIUM SERPL-SCNC: 3.7 MMOL/L (ref 3.4–5.3)
RBC # BLD AUTO: 4.43 10E12/L (ref 4.4–5.9)
SODIUM SERPL-SCNC: 138 MMOL/L (ref 133–144)
WBC # BLD AUTO: 10.4 10E9/L (ref 4–11)

## 2019-11-27 PROCEDURE — 84100 ASSAY OF PHOSPHORUS: CPT | Performed by: COLON & RECTAL SURGERY

## 2019-11-27 PROCEDURE — 83735 ASSAY OF MAGNESIUM: CPT | Performed by: COLON & RECTAL SURGERY

## 2019-11-27 PROCEDURE — 25000132 ZZH RX MED GY IP 250 OP 250 PS 637: Performed by: COLON & RECTAL SURGERY

## 2019-11-27 PROCEDURE — 12000000 ZZH R&B MED SURG/OB

## 2019-11-27 PROCEDURE — 25000128 H RX IP 250 OP 636: Performed by: COLON & RECTAL SURGERY

## 2019-11-27 PROCEDURE — 36415 COLL VENOUS BLD VENIPUNCTURE: CPT | Performed by: COLON & RECTAL SURGERY

## 2019-11-27 PROCEDURE — 00000146 ZZHCL STATISTIC GLUCOSE BY METER IP

## 2019-11-27 PROCEDURE — 25800030 ZZH RX IP 258 OP 636: Performed by: COLON & RECTAL SURGERY

## 2019-11-27 PROCEDURE — 80048 BASIC METABOLIC PNL TOTAL CA: CPT | Performed by: COLON & RECTAL SURGERY

## 2019-11-27 PROCEDURE — 85027 COMPLETE CBC AUTOMATED: CPT | Performed by: COLON & RECTAL SURGERY

## 2019-11-27 RX ADMIN — OXYCODONE HYDROCHLORIDE 10 MG: 5 TABLET ORAL at 00:50

## 2019-11-27 RX ADMIN — METRONIDAZOLE 500 MG: 500 INJECTION, SOLUTION INTRAVENOUS at 08:06

## 2019-11-27 RX ADMIN — OXYCODONE HYDROCHLORIDE 10 MG: 5 TABLET ORAL at 18:50

## 2019-11-27 RX ADMIN — KETOROLAC TROMETHAMINE 30 MG: 30 INJECTION, SOLUTION INTRAMUSCULAR at 09:21

## 2019-11-27 RX ADMIN — ACETAMINOPHEN 975 MG: 325 TABLET, FILM COATED ORAL at 05:59

## 2019-11-27 RX ADMIN — ALVIMOPAN 12 MG: 12 CAPSULE ORAL at 08:02

## 2019-11-27 RX ADMIN — KETOROLAC TROMETHAMINE 30 MG: 30 INJECTION, SOLUTION INTRAMUSCULAR at 17:01

## 2019-11-27 RX ADMIN — CIPROFLOXACIN 400 MG: 2 INJECTION, SOLUTION INTRAVENOUS at 06:44

## 2019-11-27 RX ADMIN — OXYCODONE HYDROCHLORIDE 10 MG: 5 TABLET ORAL at 22:39

## 2019-11-27 RX ADMIN — FLUTICASONE FUROATE AND VILANTEROL TRIFENATATE 1 PUFF: 200; 25 POWDER RESPIRATORY (INHALATION) at 08:08

## 2019-11-27 RX ADMIN — SODIUM CHLORIDE, POTASSIUM CHLORIDE, SODIUM LACTATE AND CALCIUM CHLORIDE: 600; 310; 30; 20 INJECTION, SOLUTION INTRAVENOUS at 00:50

## 2019-11-27 RX ADMIN — ENOXAPARIN SODIUM 40 MG: 40 INJECTION SUBCUTANEOUS at 08:03

## 2019-11-27 RX ADMIN — ACETAMINOPHEN 975 MG: 325 TABLET, FILM COATED ORAL at 22:39

## 2019-11-27 RX ADMIN — OXYCODONE HYDROCHLORIDE 10 MG: 5 TABLET ORAL at 14:22

## 2019-11-27 RX ADMIN — OXYCODONE HYDROCHLORIDE 10 MG: 5 TABLET ORAL at 10:10

## 2019-11-27 RX ADMIN — HYDROMORPHONE HYDROCHLORIDE 0.5 MG: 1 INJECTION, SOLUTION INTRAMUSCULAR; INTRAVENOUS; SUBCUTANEOUS at 04:15

## 2019-11-27 RX ADMIN — HYDROMORPHONE HYDROCHLORIDE 0.5 MG: 1 INJECTION, SOLUTION INTRAMUSCULAR; INTRAVENOUS; SUBCUTANEOUS at 02:22

## 2019-11-27 RX ADMIN — ACETAMINOPHEN 975 MG: 325 TABLET, FILM COATED ORAL at 14:22

## 2019-11-27 RX ADMIN — OXYCODONE HYDROCHLORIDE 10 MG: 5 TABLET ORAL at 06:00

## 2019-11-27 RX ADMIN — KETOROLAC TROMETHAMINE 30 MG: 30 INJECTION, SOLUTION INTRAMUSCULAR at 00:51

## 2019-11-27 RX ADMIN — ALVIMOPAN 12 MG: 12 CAPSULE ORAL at 22:38

## 2019-11-27 ASSESSMENT — ACTIVITIES OF DAILY LIVING (ADL)
ADLS_ACUITY_SCORE: 14
ADLS_ACUITY_SCORE: 12
ADLS_ACUITY_SCORE: 12
ADLS_ACUITY_SCORE: 14
ADLS_ACUITY_SCORE: 14
ADLS_ACUITY_SCORE: 12

## 2019-11-27 NOTE — PROGRESS NOTES
Colorectal Brief Progress Note    Seen ~1300  Doing well. Tried to void and nothing yet. Otherwise well, no n/v. Tolerating liquids. Passed some dark old blood IL x1.   Abdomen soft, obese, incisions c/d/i    POD1 s/p robotic sigmoid colectomy, doing well  No change to current plan  Await bowel function  Still needs to void    For paging/questions please contact the CRS office at [phone].    Dorothea García MD  Colon & Rectal Surgery Fellow  [facility]  Pager: [phone]

## 2019-11-27 NOTE — PROGRESS NOTES
COLON & RECTAL SURGERY  PROGRESS NOTE    November 27, 2019  Post-op Day # 1 s/p robotic assisted sigmoid colectomy for diverticulitis    SUBJECTIVE:  Pt resting in bed. Pain better controlled from yesterday. Tolerating liquids without nausea. Has stood at side of bed. Juarez in with adequate output. No flatus or BM yet.     OBJECTIVE:  Temp:  [97  F (36.1  C)-99  F (37.2  C)] 98.1  F (36.7  C)  Pulse:  [] 74  Heart Rate:  [] 72  Resp:  [10-28] 13  BP: (107-151)/(64-95) 129/77  SpO2:  [92 %-100 %] 95 %    Intake/Output Summary (Last 24 hours) at 11/27/2019 0826  Last data filed at 11/27/2019 0600  Gross per 24 hour   Intake 1700 ml   Output 1700 ml   Net 0 ml       GENERAL:  Awake, alert, no acute distress, juarez in=1.5L/24hr yellow output  HEAD: Normocephalic atraumatic  SCLERA: Anicteric  EXTREMITIES: Warm and well perfused  ABDOMEN:  Soft, appropriately tender, mildly distended. No guarding, rigidity, or peritoneal signs. Abdominal binder in place  INCISION:  C/d/i, no sign of infection    LABS:  Lab Results   Component Value Date    WBC 10.4 11/27/2019     Lab Results   Component Value Date    HGB 12.4 11/27/2019     Lab Results   Component Value Date    HCT 37.9 11/27/2019     Lab Results   Component Value Date     11/27/2019     Last Basic Metabolic Panel:  Lab Results   Component Value Date     11/27/2019      Lab Results   Component Value Date    POTASSIUM 3.7 11/27/2019     Lab Results   Component Value Date    CHLORIDE 107 11/27/2019     Lab Results   Component Value Date    TAY 8.5 11/27/2019     Lab Results   Component Value Date    CO2 26 11/27/2019     Lab Results   Component Value Date    BUN 12 11/27/2019     Lab Results   Component Value Date    CR 0.95 11/27/2019     Lab Results   Component Value Date     11/27/2019       ASSESSMENT/PLAN: 44 year old male POD#1 s/p robotic assisted sigmoid colectomy for diverticulitis. AVSS. Labs WNL. Tolerating clears. Goal OOB and  ambulate today, discontinue juarez, arobf.     1. Continue full liquid diet, possibly advance later today to low fiber if tolerating  2. Scheduled tylenol, PRN toradol, oxycodone and dilaudid for pain. Continue abdominal binder, ice packs.   3. Decrease IVF  4. Discontinue Juarez  5. OOB, ambulate QID  6. Lovenox for ppx  7. AROBF  8. Plan discussed with Dr. Brand.       For questions/paging, please contact the CRS office at [phone].    Lawanda Stroud PA-C  [facility]  Phone: [phone]    CRS Staff.  Seen and examined with Tameka Moreno.  Agree with above.    I performed a history and physical examination of the patient and discussed their management with the physician assistant. I reviewed the physician assistants note and agree with the documented findings and plan of care.     Travis Brand MD  [facility]  [phone] (office)  [phone] (pager)  [email or web address]

## 2019-11-27 NOTE — PLAN OF CARE
Surg: Sigmoid colectomy. VSS on RA. A/Ox4. Incisions liquid bandage CDI. CMS intact. Pain controlled with PRN Dilaudid/Oxycodone/Toradol, and scheduled Tylenol. Up with 1. Tolerating full liquid diet. Denied N&V. -gas, -bm. Burrows with adequate urine output. Will continue to monitor.

## 2019-11-27 NOTE — PLAN OF CARE
VSS. A/O. Abdo lab sites CDI. Oxy/dilaudid/toradol & tylenol given with some relief @ the end. Yet to dangle. Tolerating diet. CMS intact. Burrows good UOP.

## 2019-11-27 NOTE — PLAN OF CARE
A/O x4. AVSS on RA. Up SBA. Pain managed w/ PRN Toradol and oxycodone. Lap sites  x4 and transerve incision, liquid bandage, WDL. Abdominal binder in place. CMS intact. +BS, +flatus. Tolerating full liquid diet. Burrows removed@ 1017,Voiding small amounts checking PVRs.  Ambulation encouraged.

## 2019-11-28 LAB — GLUCOSE BLDC GLUCOMTR-MCNC: 93 MG/DL (ref 70–99)

## 2019-11-28 PROCEDURE — 25800030 ZZH RX IP 258 OP 636: Performed by: PHYSICIAN ASSISTANT

## 2019-11-28 PROCEDURE — 00000146 ZZHCL STATISTIC GLUCOSE BY METER IP

## 2019-11-28 PROCEDURE — 25000132 ZZH RX MED GY IP 250 OP 250 PS 637: Performed by: COLON & RECTAL SURGERY

## 2019-11-28 PROCEDURE — 12000000 ZZH R&B MED SURG/OB

## 2019-11-28 PROCEDURE — 25000128 H RX IP 250 OP 636: Performed by: COLON & RECTAL SURGERY

## 2019-11-28 RX ADMIN — ACETAMINOPHEN 975 MG: 325 TABLET, FILM COATED ORAL at 14:34

## 2019-11-28 RX ADMIN — ACETAMINOPHEN 975 MG: 325 TABLET, FILM COATED ORAL at 06:44

## 2019-11-28 RX ADMIN — KETOROLAC TROMETHAMINE 30 MG: 30 INJECTION, SOLUTION INTRAMUSCULAR at 14:34

## 2019-11-28 RX ADMIN — ACETAMINOPHEN 975 MG: 325 TABLET, FILM COATED ORAL at 22:03

## 2019-11-28 RX ADMIN — OXYCODONE HYDROCHLORIDE 10 MG: 5 TABLET ORAL at 22:03

## 2019-11-28 RX ADMIN — SODIUM CHLORIDE, POTASSIUM CHLORIDE, SODIUM LACTATE AND CALCIUM CHLORIDE: 600; 310; 30; 20 INJECTION, SOLUTION INTRAVENOUS at 06:46

## 2019-11-28 RX ADMIN — KETOROLAC TROMETHAMINE 30 MG: 30 INJECTION, SOLUTION INTRAMUSCULAR at 01:19

## 2019-11-28 RX ADMIN — ALVIMOPAN 12 MG: 12 CAPSULE ORAL at 08:27

## 2019-11-28 RX ADMIN — ONDANSETRON 4 MG: 2 INJECTION INTRAMUSCULAR; INTRAVENOUS at 07:25

## 2019-11-28 RX ADMIN — OXYCODONE HYDROCHLORIDE 10 MG: 5 TABLET ORAL at 10:41

## 2019-11-28 RX ADMIN — OXYCODONE HYDROCHLORIDE 10 MG: 5 TABLET ORAL at 02:28

## 2019-11-28 RX ADMIN — ALVIMOPAN 12 MG: 12 CAPSULE ORAL at 20:22

## 2019-11-28 RX ADMIN — FLUTICASONE FUROATE AND VILANTEROL TRIFENATATE 1 PUFF: 200; 25 POWDER RESPIRATORY (INHALATION) at 08:27

## 2019-11-28 RX ADMIN — ENOXAPARIN SODIUM 40 MG: 40 INJECTION SUBCUTANEOUS at 08:27

## 2019-11-28 RX ADMIN — OXYCODONE HYDROCHLORIDE 10 MG: 5 TABLET ORAL at 06:44

## 2019-11-28 RX ADMIN — OXYCODONE HYDROCHLORIDE 10 MG: 5 TABLET ORAL at 17:34

## 2019-11-28 ASSESSMENT — ACTIVITIES OF DAILY LIVING (ADL)
ADLS_ACUITY_SCORE: 14
ADLS_ACUITY_SCORE: 15
ADLS_ACUITY_SCORE: 14
ADLS_ACUITY_SCORE: 15
ADLS_ACUITY_SCORE: 14
ADLS_ACUITY_SCORE: 15

## 2019-11-28 NOTE — PROGRESS NOTES
Colon and Rectal Surgery Note         Assessment and Plan:   POD# 2 sigmoid colectomy        Interval History:   Patient doing well, still notes some abd discomfort but he has been ambulating well. No flatus as yet but active bowel sounds and tolerating PO.           Physical Exam:     Temp:  [97.8  F (36.6  C)-98.4  F (36.9  C)] 98  F (36.7  C)  Pulse:  [64-68] 68  Heart Rate:  [66-74] 69  Resp:  [14-16] 16  BP: (118-139)/(69-85) 118/73  SpO2:  [92 %-96 %] 93 %    General:  Pleasant, alert.  In no acute distress.  Chest clear  CV reg  Abdomen: slightly distended, mild incisional tenderness  Wounds clean/dry/intact.          Data:     I/O last 3 completed shifts:  In: 2277.17 [P.O.:240; I.V.:2037.17]  Out: 1250 [Urine:1250]    Lab Results   Component Value Date    WBC 10.4 11/27/2019    WBC 13.3 11/26/2019      Lab Results   Component Value Date    HGB 12.4 11/27/2019    HGB 15.0 11/26/2019       Lab Results   Component Value Date     11/27/2019      Lab Results   Component Value Date    POTASSIUM 3.7 11/27/2019     Lab Results   Component Value Date    CHLORIDE 107 11/27/2019     Lab Results   Component Value Date    CO2 26 11/27/2019     Lab Results   Component Value Date     11/27/2019       Lab Results   Component Value Date    BUN 12 11/27/2019     Lab Results   Component Value Date    CR 0.95 11/27/2019     Lab Results   Component Value Date    TAY 8.5 11/27/2019     Imp/plan   Diverticular dx, status post sigmoid colectomy, patient doing well and will advance diet and activity as tolerated.     Derrek Knowles MD

## 2019-11-28 NOTE — PLAN OF CARE
A&O. VSS on room air. LS clear. +BS/flatus, no BM yet. Incisions WNL. Pain controlled with oxycodone, tylenol and ice. Encouraging hydration, denies nausea but not taking in much orally, and UOP borderline, fluids still infusing. Tolerating ambulating well. Up with SBA.

## 2019-11-28 NOTE — PLAN OF CARE
A/OX4. AVSS. Independent, ambulating the halls. Tolerating low fiber diet. Lap sites WDL. Closed with liquid bandage. BS active, gas+ one small BM today. Pain managed with ice packs Toradol, tylenol and oxycodone. Voiding adequately. Continue to monitor.

## 2019-11-29 LAB
CREAT SERPL-MCNC: 0.87 MG/DL (ref 0.66–1.25)
GFR SERPL CREATININE-BSD FRML MDRD: >90 ML/MIN/{1.73_M2}
PLATELET # BLD AUTO: 186 10E9/L (ref 150–450)

## 2019-11-29 PROCEDURE — 25000132 ZZH RX MED GY IP 250 OP 250 PS 637: Performed by: COLON & RECTAL SURGERY

## 2019-11-29 PROCEDURE — 82565 ASSAY OF CREATININE: CPT | Performed by: COLON & RECTAL SURGERY

## 2019-11-29 PROCEDURE — 12000000 ZZH R&B MED SURG/OB

## 2019-11-29 PROCEDURE — 25000128 H RX IP 250 OP 636: Performed by: COLON & RECTAL SURGERY

## 2019-11-29 PROCEDURE — 85049 AUTOMATED PLATELET COUNT: CPT | Performed by: COLON & RECTAL SURGERY

## 2019-11-29 PROCEDURE — 36415 COLL VENOUS BLD VENIPUNCTURE: CPT | Performed by: COLON & RECTAL SURGERY

## 2019-11-29 PROCEDURE — 25000132 ZZH RX MED GY IP 250 OP 250 PS 637: Performed by: SURGERY

## 2019-11-29 RX ORDER — IBUPROFEN 600 MG/1
600 TABLET, FILM COATED ORAL EVERY 6 HOURS
Status: DISCONTINUED | OUTPATIENT
Start: 2019-11-29 | End: 2019-11-30 | Stop reason: HOSPADM

## 2019-11-29 RX ORDER — OXYCODONE HYDROCHLORIDE 5 MG/1
5-10 TABLET ORAL EVERY 4 HOURS PRN
Qty: 25 TABLET | Refills: 0 | Status: SHIPPED | OUTPATIENT
Start: 2019-11-29 | End: 2021-10-03

## 2019-11-29 RX ADMIN — ENOXAPARIN SODIUM 40 MG: 40 INJECTION SUBCUTANEOUS at 09:36

## 2019-11-29 RX ADMIN — KETOROLAC TROMETHAMINE 30 MG: 30 INJECTION, SOLUTION INTRAMUSCULAR at 06:22

## 2019-11-29 RX ADMIN — IBUPROFEN 600 MG: 600 TABLET ORAL at 13:12

## 2019-11-29 RX ADMIN — OXYCODONE HYDROCHLORIDE 5 MG: 5 TABLET ORAL at 17:46

## 2019-11-29 RX ADMIN — OXYCODONE HYDROCHLORIDE 10 MG: 5 TABLET ORAL at 21:41

## 2019-11-29 RX ADMIN — OXYCODONE HYDROCHLORIDE 10 MG: 5 TABLET ORAL at 03:15

## 2019-11-29 RX ADMIN — OXYCODONE HYDROCHLORIDE 10 MG: 5 TABLET ORAL at 09:36

## 2019-11-29 RX ADMIN — FLUTICASONE FUROATE AND VILANTEROL TRIFENATATE 1 PUFF: 200; 25 POWDER RESPIRATORY (INHALATION) at 09:40

## 2019-11-29 RX ADMIN — ACETAMINOPHEN 975 MG: 325 TABLET, FILM COATED ORAL at 06:22

## 2019-11-29 RX ADMIN — IBUPROFEN 600 MG: 600 TABLET ORAL at 19:19

## 2019-11-29 ASSESSMENT — ACTIVITIES OF DAILY LIVING (ADL)
ADLS_ACUITY_SCORE: 13

## 2019-11-29 NOTE — CONSULTS
Asked by bedside RN to meet with patient regarding potential DME needs for toilet riser. Patient states his toilet is low and anticipates needs assistive equipment to lower and rise from toilet until more recovered from surgery.     I called and discussed with [facility] showroom staff. Reviewed his insurance, no coverage for toilet risers with his insurance. The OOP cost for toilet riser with locking handles is $79 and a toilet riser without handles is $49.    Reviewed the potential costs with patient and gave patient the phone number to contact [facility] directly for payment and delivery. Instructed patient he could also price compare some online sites. Patient stated he would review and arrange equipment for himself.

## 2019-11-29 NOTE — PROGRESS NOTES
Colorectal Surgery Progress Note  POD#3 s/p robotic sigmoid colectomy      Subjective:  Feels pain is not well controlled and worse on right side.  Still requiring IV pain meds.  Passing gas and had small BMs.  Ate LRD yesterday without increase in pain.    Vitals:  Vitals:    11/29/19 0026 11/29/19 0315 11/29/19 0400 11/29/19 0751   BP: 129/66   128/78   BP Location:    Left arm   Pulse:    67   Resp: 16 16 16 16   Temp: 98.2  F (36.8  C)   97.6  F (36.4  C)   TempSrc: Oral   Oral   SpO2: 94%   96%   Weight:       Height:         I/O:  I/O last 3 completed shifts:  In: 320 [P.O.:320]  Out: 600 [Urine:600]    Physical Exam:  Gen: AAOx3, NAD  Pulm: Non-labored breathing  Abd: Soft, non-distended, appropriately tender, no guarding   Incisions C/D/I with glue in place  Ext:  Warm and well-perfused    BMP  Recent Labs   Lab 11/29/19  0751 11/27/19  0729 11/26/19  0341   NA  --  138 136   POTASSIUM  --  3.7 3.8   CHLORIDE  --  107 105   CO2  --  26 25   BUN  --  12 14   CR 0.87 0.95 0.91   GLC  --  128* 137*   MAG  --  1.7  --    PHOS  --  2.4*  --      CBC  Recent Labs   Lab 11/29/19  0751 11/27/19  0729 11/26/19  0341   WBC  --  10.4 13.3*   HGB  --  12.4* 15.0   HCT  --  37.9* 44.4    180 223         ASSESSMENT: This is a 44 year old man POD#3 s/p robotic-assisted sigmoid colectomy for diverticular disease.  On trajectory post-op but still requiring IV pain control.    Plan to transition to all oral medications today.  Patient may discharge later today or tomrorow morning pending pain control.  Continue low residue diet.    Please call the office with questions at     Roxy Young MD  Colon and Rectal Surgery Fellow  [phone]    CRS Staff.  Seen and examined independently.  Agree with above.  Plan to discharge tomorrow.  Orders written.    Travis Brand MD  [facility]  [phone] (office)  [phone] (pager)  [email or web address]

## 2019-11-29 NOTE — PLAN OF CARE
A&O. VSS on room air. Incisions WNL. +BS/flatus, 2 BMs overnight. Up and walking independently. Encouraging patient to increase independence with krista-cares and pain management, pain well controlled most of the night, but did need toradol for breakthrough this morning, otherwise  resting comfortably this shift.

## 2019-11-30 VITALS
HEIGHT: 67 IN | RESPIRATION RATE: 16 BRPM | BODY MASS INDEX: 35.39 KG/M2 | TEMPERATURE: 98.6 F | DIASTOLIC BLOOD PRESSURE: 85 MMHG | HEART RATE: 66 BPM | WEIGHT: 225.5 LBS | OXYGEN SATURATION: 94 % | SYSTOLIC BLOOD PRESSURE: 140 MMHG

## 2019-11-30 PROCEDURE — 25000132 ZZH RX MED GY IP 250 OP 250 PS 637: Performed by: COLON & RECTAL SURGERY

## 2019-11-30 PROCEDURE — 25000132 ZZH RX MED GY IP 250 OP 250 PS 637: Performed by: SURGERY

## 2019-11-30 RX ORDER — LORAZEPAM 0.5 MG/1
0.5 TABLET ORAL EVERY 4 HOURS PRN
Status: DISCONTINUED | OUTPATIENT
Start: 2019-11-30 | End: 2019-11-30 | Stop reason: HOSPADM

## 2019-11-30 RX ORDER — LORAZEPAM 0.5 MG/1
0.5 TABLET ORAL EVERY 4 HOURS PRN
Qty: 15 TABLET | Refills: 0 | Status: SHIPPED | OUTPATIENT
Start: 2019-11-30 | End: 2021-10-03

## 2019-11-30 RX ADMIN — IBUPROFEN 600 MG: 600 TABLET ORAL at 00:26

## 2019-11-30 RX ADMIN — IBUPROFEN 600 MG: 600 TABLET ORAL at 06:24

## 2019-11-30 RX ADMIN — OXYCODONE HYDROCHLORIDE 5 MG: 5 TABLET ORAL at 05:12

## 2019-11-30 RX ADMIN — OXYCODONE HYDROCHLORIDE 5 MG: 5 TABLET ORAL at 09:32

## 2019-11-30 RX ADMIN — FLUTICASONE FUROATE AND VILANTEROL TRIFENATATE 1 PUFF: 200; 25 POWDER RESPIRATORY (INHALATION) at 09:32

## 2019-11-30 ASSESSMENT — ACTIVITIES OF DAILY LIVING (ADL)
ADLS_ACUITY_SCORE: 13

## 2019-11-30 NOTE — PLAN OF CARE
A/Ox4, VSS on RA.  Pain managed with prn oxy and scheduled ibuprofen, ice pack.  Lap sites with liquid bandage, KRISHNA, CDI.  Abd binder in place. LS clear.  +BS, +flatus, voiding adequately.  Tolerating low fiber diet, denies nausea.  Independent.

## 2019-11-30 NOTE — PLAN OF CARE
Discharge paperwork & medication reviewed & given to patient, questions answered. Pt discharged home with wife.

## 2019-11-30 NOTE — PLAN OF CARE
A&O x 4. VSS on RA. independent. Low fiber diet, tolerating well. PIV saline locked. Abdominal incisions WDL, open to air. Abdominal binder applied. Pt notes intermittently sharp pain at RUQ lap site compared to other lap sites. Pain managed with prn oxycodone and scheduled ibuprofen. Voiding spontaneously. BS active, +flatus. Plan to discharge today. Continue plan of care.

## 2019-11-30 NOTE — DISCHARGE SUMMARY
[facility] Discharge Summary      Dano Kennedy MRN# [id number]   Age: 44 year old YOB: 1975     Date of Admission:  11/26/2019  Date of Discharge::  11/30/2019 10:23 AM  Admitting Physician:  Travis Brand MD  Discharge Physician:  Travis Brand MD     PCP:  Santa Hunter    Disposition: Patient discharged from [facility] to home in stable condition.        Primary Diagnosis:   Diverticular disease            Discharge Medications:     Discharge Medication List as of 11/30/2019  9:25 AM      START taking these medications    Details   LORazepam (ATIVAN) 0.5 MG tablet Take 1 tablet (0.5 mg) by mouth every 4 hours as needed for muscle spasms, Disp-15 tablet, R-0, Local Print      oxyCODONE (ROXICODONE) 5 MG tablet Take 1-2 tablets (5-10 mg) by mouth every 4 hours as needed for moderate to severe pain, Disp-25 tablet, R-0, E-Prescribe         CONTINUE these medications which have NOT CHANGED    Details   Acetaminophen (TYLENOL PO) Take 2 tablets by mouth daily as needed for mild pain or fever, Historical      albuterol (2.5 MG/3ML) 0.083% neb solution Take 1 vial (2.5 mg) by nebulization every 4 hours as needed for shortness of breath / dyspnea or wheezing, Disp-75 mL, R-3, E-Prescribe      albuterol (PROAIR HFA) 108 (90 Base) MCG/ACT inhaler INHALE 2 PUFFS INTO THE LUNGS EVERY 4 HOURS AS NEEDED FOR SHORTNESS OF BREATH OR DIFFICULT BREATHING OR WHEEZING, Disp-8.5 g, R-3, E-Prescribe      EPINEPHrine (EPIPEN 2-DONNY) 0.3 MG/0.3ML injection Inject 0.3 mLs (0.3 mg) into the muscle once as needed for anaphylaxis, Disp-0.6 mL, R-0, E-Prescribe      fexofenadine-pseudoePHEDrine (ALLEGRA-D 24) 180-240 MG 24 hr tablet Take 1 tablet by mouth daily, Disp-90 tablet, R-1, Local Print      fluticasone-vilanterol (BREO ELLIPTA) 200-25 MCG/INH inhaler INHALE 1 PUFF INTO THE LUNGS DAILY, Disp-1 Inhaler, R-5, E-Prescribe      ipratropium - albuterol 0.5 mg/2.5 mg/3 mL (DUONEB) 0.5-2.5 (3)  MG/3ML neb solution Take 1 vial (3 mLs) by nebulization every 4 hours as needed for shortness of breath / dyspnea or wheezing, Disp-270 mL, R-1, E-Prescribe      Multiple Vitamins-Minerals (MULTI-VITAMIN GUMMIES) CHEW Take 2 chew tab by mouth daily as needed, Historical      ondansetron (ZOFRAN) 4 MG tablet Take 2 tablets (8 mg) by mouth every 8 hours as needed for nausea, Disp-20 tablet, R-1, E-Prescribe      scopolamine (TRANSDERM) 72 hr patch Apply 1 patch to hairless area behind one ear at least 4 hours before travel.  Remove old patch and change every 3 days (72 hours)., Disp-5 patch, R-0, E-Prescribe      !! order for DME Equipment being ordered: nebulizerDisp-1 Device, R-0, Local Print      !! order for DME Nebulizer tubing and mouthpieceDisp-1 Device, R-0, Local Print      !! ORDER FOR DME Respironics REMSTAR 60 Series Auto CPAP 5-18cm H2O, MIrage Fx standard nasal maskHistorical       !! - Potential duplicate medications found. Please discuss with provider.      STOP taking these medications       metroNIDAZOLE (FLAGYL) 500 MG tablet Comments:   Reason for Stopping:         sulfamethoxazole-trimethoprim (BACTRIM DS/SEPTRA DS) 800-160 MG tablet Comments:   Reason for Stopping:                      Follow Up, Special Instructions:     Discharge diet: Low residue   Discharge activity: Activity as tolerated   Discharge follow-up: Follow up with Dr. Brand's clinic in 2 weeks   Wound care: May get incision wet in shower but do not soak or scrub              Procedures:     Procedure(s): Robotic assisted sigmoid colectomy on 11/26/2019               Consultations:   None          Brief Hospital Summary:     Patient is a 44 year old man who underwent robotic assisted sigmoid colectomy on 11/26/2019 by Dr. Brand.   There were no immediate complications during this procedure.    Please refer to the full operative summary for details.  The patient's hospital course was unremarkable.  Pain was controlled on oral  pain regimen.  He was tolerating a low fiber diet.  Bowel function had returned prior to discharge.  He recovered as anticipated and experienced no post-operative complications.        Attestation:  I have reviewed today's vital signs, notes, medications, labs and imaging.    Roxy Young MD  Colon and Rectal Surgery Fellow  [phone]            ADDENDUM:  Length of stay: 4 days  Indicate Y or N for the following:  UTI  No  C diff  No  PNA  No  SSI No  DVT No  PE  No  CVA No  MI No  Enterocutaneous fistula  No  Peripheral nerve injury  No  Abscess (not adjacent to anastomosis)  No  Leak No    Treated with:   Antibiotics N/A   Drain  N/A   Reoperation  N/A  Death within 30 days No  Reintubation  No  Reoperation  No

## 2019-12-02 ENCOUNTER — PATIENT OUTREACH (OUTPATIENT)
Dept: CARE COORDINATION | Facility: CLINIC | Age: 44
End: 2019-12-02

## 2019-12-02 ENCOUNTER — TELEPHONE (OUTPATIENT)
Dept: FAMILY MEDICINE | Facility: CLINIC | Age: 44
End: 2019-12-02

## 2019-12-02 ASSESSMENT — ACTIVITIES OF DAILY LIVING (ADL): DEPENDENT_IADLS:: INDEPENDENT

## 2019-12-02 NOTE — TELEPHONE ENCOUNTER
This pt was Discharged from [facility] on 11/30/19 for Vasovagal Near Syncope, Diverticulitis      Please note this information was received from either Matt or Hay[facility] IP daily report with Dr. Wen identified as the PCP.    A follow-up visit has not been scheduled.    Please follow-up with patient accordingly.

## 2019-12-02 NOTE — PROGRESS NOTES
Clinic Care Coordination Contact  [facility]/Voicemail    Referral Source: IP Report  Clinical Data: Care Coordinator Outreach  Outreach attempted x 1.  Left message on patient's voicemail with call back information and requested return call.  Plan:  Care Coordinator will try to reach patient again in 1-2 business days.    RAJESH BinghamN RN Clinic Care Coordinator   [place], [place], [place]  Phone: [phone]

## 2019-12-02 NOTE — LETTER
[place] CARE COORDINATION  [address] [facility] [address]    December 3, 2019    Dano Kennedy  [address]  [place] [address]      Dear Dano,    I am a clinic care coordinator who works with Santa Wen MD at [place].  I wanted to introduce myself and provide you with my contact information so that you can call me with questions or concerns about your health care. Below is a description of clinic care coordination and how I can further assist you.     The clinic care coordinator is a registered nurse and/or  who understand the health care system. The goal of clinic care coordination is to help you manage your health and improve access to the [facility] system in the most efficient manner. The registered nurse can assist you in meeting your health care goals by providing education, coordinating services, and strengthening the communication among your providers. The  can assist you with financial, behavioral, psychosocial, chemical dependency, counseling, and/or psychiatric resources.    Please feel free to contact me at [phone], with any questions or concerns. We at [facility] are focused on providing you with the highest-quality healthcare experience possible and that all starts with you.     Sincerely,   RAJESH BinghamN RN Clinic Care Coordinator   [place], [place], [place]  Phone: [phone]

## 2019-12-02 NOTE — TELEPHONE ENCOUNTER
Care coordination has already reached out to patient. Patient is a surgical patient.   This patient is not appropriate for an [facility] RN hospital follow up phone call.    Will close encounter at this time.    Thu MENAN, RN

## 2019-12-03 NOTE — PROGRESS NOTES
Clinic Care Coordination Contact  [facility]/Voicemail    Referral Source: IP Report  Clinical Data: Care Coordinator Outreach  Outreach attempted x 2.  Left message on patient's voicemail with call back information and requested return call.  Plan: Care Coordinator will send care coordination introduction letter with care coordinator contact information and explanation of care coordination services via mail. Care Coordinator will do no further outreaches at this time.    CAMILA Bingham RN Clinic Care Coordinator   [place], [place], [place]  Phone: [phone]

## 2019-12-06 NOTE — H&P
Pre-Op History and Physical     Dano Kennedy MRN# [id number]   YOB: 1975 Age: 44 year old     Date of Procedure: 11/26/2019  Primary care provider: Santa Hunter  Type of Surgery: sigmoid colectomy  Reason for Procedure: diverticulitis  Type of Anesthesia Anticipated    HPI:    Dano is a 44 year old male who came in last night with a near syncopal episode after doing the prep for his sigmoid colectomy today. He was given IV fluids and evaluated in the ED.  No other problems noted..    Allergies   Allergen Reactions     Nkda [No Known Drug Allergies]         No current facility-administered medications for this encounter.      Current Outpatient Medications   Medication     Acetaminophen (TYLENOL PO)     albuterol (2.5 MG/3ML) 0.083% neb solution     albuterol (PROAIR HFA) 108 (90 Base) MCG/ACT inhaler     EPINEPHrine (EPIPEN 2-DONNY) 0.3 MG/0.3ML injection     fexofenadine-pseudoePHEDrine (ALLEGRA-D 24) 180-240 MG 24 hr tablet     fluticasone-vilanterol (BREO ELLIPTA) 200-25 MCG/INH inhaler     ipratropium - albuterol 0.5 mg/2.5 mg/3 mL (DUONEB) 0.5-2.5 (3) MG/3ML neb solution     LORazepam (ATIVAN) 0.5 MG tablet     Multiple Vitamins-Minerals (MULTI-VITAMIN GUMMIES) CHEW     ondansetron (ZOFRAN) 4 MG tablet     oxyCODONE (ROXICODONE) 5 MG tablet     scopolamine (TRANSDERM) 72 hr patch     order for DME     order for DME     ORDER FOR DME       No medications prior to admission.       Patient Active Problem List   Diagnosis     Anxiety     Severe persistent asthma without complication     Seasonal allergic rhinitis     Environmental allergies     GSE (gluten-sensitive enteropathy)     BMI 33.0-33.9,adult     Hyperlipidemia LDL goal <130     ADHD (attention deficit hyperactivity disorder)     Heartburn     WENDY (obstructive sleep apnea)     Chronic allergic rhinitis due to animal hair and dander     Allergic rhinitis due to mold     Acute diverticulitis     Obesity (BMI 35.0-39.9) with comorbidity  "(H)     Diverticulitis        Past Medical History:   Diagnosis Date     Allergic rhinitis, cause unspecified      Asthma      Complication of anesthesia     did not respond well to succs     Unspecified asthma(493.90)         Past Surgical History:   Procedure Laterality Date     DAVINCI XI ASSISTED RESECTION RECTOSIGMOID N/A 11/26/2019    Procedure: ROBOTIC ASSISTED SIGMOID COLECTOMY. MOBILIZATION OF SPLENIC FLESURE;  Surgeon: Travis Brand MD;  Location: [facility] OR     ENDOSCOPIC POLYPECTOMY NASAL       LASER HOLMIUM LITHOTRIPSY URETER(S), INSERT STENT, COMBINED Left 1/14/2015    Procedure: COMBINED CYSTOSCOPY, URETEROSCOPY, LASER HOLMIUM LITHOTRIPSY URETER(S), INSERT STENT;  Surgeon: Derrek Cobb MD;  Location:  OR       Social History     Tobacco Use     Smoking status: Never Smoker     Smokeless tobacco: Never Used   Substance Use Topics     Alcohol use: Yes       Family History   Problem Relation Age of Onset     Glaucoma Father      Glaucoma Maternal Grandfather      Macular Degeneration No family hx of          PHYSICAL EXAM:   BP (!) 140/85 (BP Location: Left arm)   Pulse 66   Temp 98.6  F (37  C) (Oral)   Resp 16   Ht 1.702 m (5' 7\")   Wt 102.3 kg (225 lb 8 oz)   SpO2 94%   BMI 35.32 kg/m   Estimated body mass index is 35.32 kg/m  as calculated from the following:    Height as of this encounter: 1.702 m (5' 7\").    Weight as of this encounter: 102.3 kg (225 lb 8 oz).   Mental status - alert and oriented  RESP: lungs clear  CV: RRR  AIRWAY EXAM: Mallampatti Class II (visualization of the soft palate, fauces, and uvula)    IMPRESSION   ASA Class 2 - Mild systemic disease  Obesity  Dehydration due to prep    Doing well with IV fluids.  OK to proceed with surgery      Signed Electronically by: Travis Brand MD  December 6, 2019    Colorectal Surgery  [phone] (office)  [phone] (pager)  [email or web address]          "

## 2019-12-09 ENCOUNTER — TRANSFERRED RECORDS (OUTPATIENT)
Dept: HEALTH INFORMATION MANAGEMENT | Facility: CLINIC | Age: 44
End: 2019-12-09

## 2019-12-15 ENCOUNTER — APPOINTMENT (OUTPATIENT)
Dept: ULTRASOUND IMAGING | Facility: CLINIC | Age: 44
End: 2019-12-15
Attending: EMERGENCY MEDICINE
Payer: COMMERCIAL

## 2019-12-15 ENCOUNTER — APPOINTMENT (OUTPATIENT)
Dept: CT IMAGING | Facility: CLINIC | Age: 44
End: 2019-12-15
Attending: EMERGENCY MEDICINE
Payer: COMMERCIAL

## 2019-12-15 ENCOUNTER — HOSPITAL ENCOUNTER (EMERGENCY)
Facility: CLINIC | Age: 44
Discharge: HOME | End: 2019-12-15
Attending: EMERGENCY MEDICINE | Admitting: EMERGENCY MEDICINE
Payer: COMMERCIAL

## 2019-12-15 VITALS
SYSTOLIC BLOOD PRESSURE: 119 MMHG | OXYGEN SATURATION: 95 % | DIASTOLIC BLOOD PRESSURE: 85 MMHG | TEMPERATURE: 98.3 F | RESPIRATION RATE: 16 BRPM | HEART RATE: 65 BPM

## 2019-12-15 DIAGNOSIS — R10.84 ABDOMINAL PAIN, GENERALIZED: ICD-10-CM

## 2019-12-15 LAB
ALBUMIN SERPL-MCNC: 3.9 G/DL (ref 3.4–5)
ALP SERPL-CCNC: 120 U/L (ref 40–150)
ALT SERPL W P-5'-P-CCNC: 71 U/L (ref 0–70)
ANION GAP SERPL CALCULATED.3IONS-SCNC: 3 MMOL/L (ref 3–14)
AST SERPL W P-5'-P-CCNC: 24 U/L (ref 0–45)
BASOPHILS # BLD AUTO: 0 10E9/L (ref 0–0.2)
BASOPHILS NFR BLD AUTO: 0.3 %
BILIRUB SERPL-MCNC: 0.4 MG/DL (ref 0.2–1.3)
BUN SERPL-MCNC: 16 MG/DL (ref 7–30)
CALCIUM SERPL-MCNC: 9.2 MG/DL (ref 8.5–10.1)
CHLORIDE SERPL-SCNC: 110 MMOL/L (ref 94–109)
CO2 SERPL-SCNC: 28 MMOL/L (ref 20–32)
CREAT SERPL-MCNC: 0.85 MG/DL (ref 0.66–1.25)
DIFFERENTIAL METHOD BLD: NORMAL
EOSINOPHIL # BLD AUTO: 0.1 10E9/L (ref 0–0.7)
EOSINOPHIL NFR BLD AUTO: 1.8 %
ERYTHROCYTE [DISTWIDTH] IN BLOOD BY AUTOMATED COUNT: 13.3 % (ref 10–15)
GFR SERPL CREATININE-BSD FRML MDRD: >90 ML/MIN/{1.73_M2}
GLUCOSE SERPL-MCNC: 105 MG/DL (ref 70–99)
HCT VFR BLD AUTO: 40.2 % (ref 40–53)
HGB BLD-MCNC: 13.4 G/DL (ref 13.3–17.7)
IMM GRANULOCYTES # BLD: 0 10E9/L (ref 0–0.4)
IMM GRANULOCYTES NFR BLD: 0.1 %
LIPASE SERPL-CCNC: 108 U/L (ref 73–393)
LYMPHOCYTES # BLD AUTO: 1.6 10E9/L (ref 0.8–5.3)
LYMPHOCYTES NFR BLD AUTO: 23.8 %
MCH RBC QN AUTO: 28.1 PG (ref 26.5–33)
MCHC RBC AUTO-ENTMCNC: 33.3 G/DL (ref 31.5–36.5)
MCV RBC AUTO: 84 FL (ref 78–100)
MONOCYTES # BLD AUTO: 0.6 10E9/L (ref 0–1.3)
MONOCYTES NFR BLD AUTO: 9.2 %
NEUTROPHILS # BLD AUTO: 4.4 10E9/L (ref 1.6–8.3)
NEUTROPHILS NFR BLD AUTO: 64.8 %
NRBC # BLD AUTO: 0 10*3/UL
NRBC BLD AUTO-RTO: 0 /100
PLATELET # BLD AUTO: 258 10E9/L (ref 150–450)
POTASSIUM SERPL-SCNC: 4 MMOL/L (ref 3.4–5.3)
PROT SERPL-MCNC: 7.3 G/DL (ref 6.8–8.8)
RBC # BLD AUTO: 4.77 10E12/L (ref 4.4–5.9)
SODIUM SERPL-SCNC: 141 MMOL/L (ref 133–144)
WBC # BLD AUTO: 6.8 10E9/L (ref 4–11)

## 2019-12-15 PROCEDURE — 80053 COMPREHEN METABOLIC PANEL: CPT | Performed by: EMERGENCY MEDICINE

## 2019-12-15 PROCEDURE — 85025 COMPLETE CBC W/AUTO DIFF WBC: CPT | Performed by: EMERGENCY MEDICINE

## 2019-12-15 PROCEDURE — 25000128 H RX IP 250 OP 636: Performed by: EMERGENCY MEDICINE

## 2019-12-15 PROCEDURE — 76705 ECHO EXAM OF ABDOMEN: CPT

## 2019-12-15 PROCEDURE — 25800030 ZZH RX IP 258 OP 636: Performed by: EMERGENCY MEDICINE

## 2019-12-15 PROCEDURE — 99285 EMERGENCY DEPT VISIT HI MDM: CPT | Mod: 25

## 2019-12-15 PROCEDURE — 96376 TX/PRO/DX INJ SAME DRUG ADON: CPT

## 2019-12-15 PROCEDURE — 25000125 ZZHC RX 250: Performed by: EMERGENCY MEDICINE

## 2019-12-15 PROCEDURE — 83690 ASSAY OF LIPASE: CPT | Performed by: EMERGENCY MEDICINE

## 2019-12-15 PROCEDURE — 74177 CT ABD & PELVIS W/CONTRAST: CPT

## 2019-12-15 PROCEDURE — 96374 THER/PROPH/DIAG INJ IV PUSH: CPT | Mod: 59

## 2019-12-15 RX ORDER — IOPAMIDOL 755 MG/ML
113 INJECTION, SOLUTION INTRAVASCULAR ONCE
Status: COMPLETED | OUTPATIENT
Start: 2019-12-15 | End: 2019-12-15

## 2019-12-15 RX ORDER — HYDROMORPHONE HYDROCHLORIDE 1 MG/ML
0.5 INJECTION, SOLUTION INTRAMUSCULAR; INTRAVENOUS; SUBCUTANEOUS
Status: DISCONTINUED | OUTPATIENT
Start: 2019-12-15 | End: 2019-12-15 | Stop reason: HOSPADM

## 2019-12-15 RX ADMIN — IOPAMIDOL 113 ML: 755 INJECTION, SOLUTION INTRAVENOUS at 08:14

## 2019-12-15 RX ADMIN — HYDROMORPHONE HYDROCHLORIDE 0.5 MG: 1 INJECTION, SOLUTION INTRAMUSCULAR; INTRAVENOUS; SUBCUTANEOUS at 07:55

## 2019-12-15 RX ADMIN — SODIUM CHLORIDE 1000 ML: 9 INJECTION, SOLUTION INTRAVENOUS at 07:55

## 2019-12-15 RX ADMIN — SODIUM CHLORIDE 74 ML: 9 INJECTION, SOLUTION INTRAVENOUS at 08:14

## 2019-12-15 RX ADMIN — HYDROMORPHONE HYDROCHLORIDE 0.5 MG: 1 INJECTION, SOLUTION INTRAMUSCULAR; INTRAVENOUS; SUBCUTANEOUS at 09:54

## 2019-12-15 ASSESSMENT — ENCOUNTER SYMPTOMS
VOMITING: 0
ABDOMINAL PAIN: 1
NAUSEA: 1
FEVER: 0
CONSTIPATION: 0
DIARRHEA: 0

## 2019-12-15 NOTE — ED AVS SNAPSHOT
Emergency Department  [address] [place] [address]  Phone:  [phone]  Fax:  [phone]                                    Dano Kennedy   MRN: [id number]    Department:   Emergency Department   Date of Visit:  12/15/2019           After Visit Summary Signature Page    I have received my discharge instructions, and my questions have been answered. I have discussed any challenges I see with this plan with the nurse or doctor.    ..........................................................................................................................................  Patient/Patient Representative Signature      ..........................................................................................................................................  Patient Representative Print Name and Relationship to Patient    ..................................................               ................................................  Date                                   Time    ..........................................................................................................................................  Reviewed by Signature/Title    ...................................................              ..............................................  Date                                               Time          22EPIC Rev 08/18

## 2019-12-15 NOTE — ED PROVIDER NOTES
History     Chief Complaint:  Abdominal Pain    The history is provided by the patient.      Dano Kennedy is a 44 year old male who presents with abdominal pain. The patient had a robotic assisted sigmoid colectomy for diverticulitis on 11/26/19 by Dr. Brand. The patient reports that he developed waves of upper right and epigastric abdominal pain that has bothered him all night. Bending over makes the pain worse. He has had some nausea but denies any vomiting. He has been having bowel movements and passing some gas but states not as much as normal. He still has his gallbladder and denies any history of gallstones.     Allergies:  No known drug allergies.     Medications:    Albuterol  Epipen  Breo ellipta  Duoneb  Ativan    Past Medical History:    Allergic rhinitis  Asthma  Anxiety  Gluten-sensitive enteropathy  Obesity  Hyperlipidemia  ADHD  Heartburn  Obstructive sleep apnea  Diverticulitis    Past Surgical History:    Davinci XI assisted resection rectosigmoid  Endoscopic polypectomy nasal  Laser holmium lithotripsy ureter, insert stent combined    Family History:    Glaucoma  Macular degeneration    Social History:  Patient is single  Tobacco Use: No  Alcohol Use: Yes  PCP: Santa Wen     Review of Systems   Constitutional: Negative for fever.   Gastrointestinal: Positive for abdominal pain and nausea. Negative for constipation, diarrhea and vomiting.   All other systems reviewed and are negative.    Physical Exam   First Vitals:  Patient Vitals for the past 24 hrs:   BP Temp Pulse Resp SpO2   12/15/19 0831 -- -- -- 16 95 %   12/15/19 0744 134/82 98.3  F (36.8  C) 74 16 97 %     Physical Exam  General/Appearance: appears stated age, well-groomed, appears comfortable  Eyes: EOMI, no scleral injection, no icterus  ENT: MMM  Neck: supple, nl ROM, no stiffness  Cardiovascular: RRR, nl S1S2, no m/r/g, 2+ pulses in all 4 extremities, cap refill <2sec  Respiratory: CTAB, good air movement throughout, no  wheezes/rhonchi/rales, no increased WOB, no retractions  Back: no lesions  GI: abd soft, non-distended, discomfort to palp diffusely but moderate RUQ ttp,  no HSM, no rebound, no guarding, nl BS  MSK: WAGNER, good tone, no bony abnormality  Skin: warm and well-perfused, no rash, no edema, no ecchymosis, nl turgor  Neuro: GCS 15, alert and oriented, no gross focal neuro deficits  Psych: interacts appropriately  Heme: no petechia, no purpura, no active bleeding      Emergency Department Course     Imaging:  Radiographic findings were communicated with the patient who voiced understanding of the findings.  US abdomen limited:  Fatty liver. Normal gallbladder, no gallstones or duct  Dilatation. Per radiology read.   CT abdomen pelvis w contrast:  1. Interval postop changes at the sigmoid colon since 9/29/2019.   Postop changes in the abdominal wall, mild fat stranding left  midabdomen may be postsurgical or mild fat necrosis.  2. Otherwise no acute-appearing abnormality. Per radiology read.    Laboratory:  CBC:  WBC 6.8, HGB 13.4,   CMP: Glucose 105 high, Chloride 110 high, ALT 71 high, o/w WNL. (Creatinine 0.85)  Lipase: 108    Interventions:  0755: NS 1L IV  1755: Dilaudid 0.5mg IV  0814: Isovue 113mL IV    Emergency Department Course:  7:35 AM Nursing notes and vitals reviewed.  I performed an exam of the patient as documented above.     9:45 AM I rechecked on and updated the patient. He is feeling better.    9:53 AM Findings and plan explained to the patient. Patient discharged home with instructions regarding supportive care, medications, and reasons to return. The importance of close follow-up was reviewed.     Impression & Plan      Medical Decision Making:  This patient presents with abdominal pain.  The differential diagnosis is broad and includes:  Appendicitis, cholecystitis, peptic ulcer disease, diverticulitis, bowel obstruction, ischemia, pancreatitis, SOB, post-op infection amongst others.  Based on  clinical exam, laboratory testing, and imaging, no significant etiologies were found.  I have low suspicion for  pathology. The pain has improved with interventions in the ED.  The exact etiology of the pain is not clear at this time.  The patient will be discharged, and was warned that persistent or worsening symptoms should prompt re-examination by a physician (ED if necessary) in 8-12 hours.      Diagnosis:    ICD-10-CM    1. Abdominal pain, generalized R10.84        Disposition:  discharged to home    I, Bradley Aasen, am serving as a scribe on 12/15/2019 at 7:41 AM to personally document services performed by Jennifer oCnway MD based on my observations and the provider's statements to me.          Jennifer Conway MD  12/15/19 4004

## 2019-12-16 ENCOUNTER — PATIENT OUTREACH (OUTPATIENT)
Dept: CARE COORDINATION | Facility: CLINIC | Age: 44
End: 2019-12-16

## 2019-12-16 ASSESSMENT — ACTIVITIES OF DAILY LIVING (ADL): DEPENDENT_IADLS:: INDEPENDENT

## 2019-12-16 NOTE — PROGRESS NOTES
Clinic Care Coordination Contact  [facility]/Voicemail    Referral Source: IP Report  Clinical Data: Care Coordinator Outreach  Outreach attempted x 1.  Left message on patient's voicemail with call back information and requested return call.  Plan: Care Coordinator sent care coordination introduction letter on 12/3/19 via mail. Care Coordinator will try to reach patient again in 1-2 business days.    CAMILA Bingham RN Clinic Care Coordinator   [place], [place], [place]  Phone: [phone]

## 2019-12-17 NOTE — PROGRESS NOTES
"Clinic Care Coordination Contact  Clinic Care Coordination Contact  OUTREACH  Referral Information:  Referral Source: IP Report  Primary Diagnosis: Other (include Comment box)(Abdominal pain, generalized)  Chief Complaint   Patient presents with     Clinic Care Coordination - Initial   Clinic Utilization  Difficulty keeping appointments:: No  Compliance Concerns: No  Utilization    Last refreshed: 12/17/2019 10:55 AM:  Hospital Admissions 2           Last refreshed: 12/17/2019 10:55 AM:  ED Visits 1           Last refreshed: 12/17/2019 10:55 AM:  No Show Count (past year) 0              Current as of: 12/17/2019 10:55 AM          Clinical Concerns:  Current Medical Concerns:  [facility] ER 12/15/19 for generalized abdominal pain. Patient stated he is doing better, yesterday and today, not as bad.\" Will monitor and follow up as needed. He is following a bland diet and getting adequate fluids. He was reassured at the ER that the pain was not due to something related to post op complications. Patient confident to carry out advice given, he denies a need for on going support at this time.   Current Behavioral Concerns: none    Education Provided to patient: RN CC educated about Care Coordination Services, discharge instructions, medications reviewed and follow up    Pain  Pain (GOAL):: Yes  Type: Acute (<3mo)  Location of chronic pain:: abd pain  Progression: Improving  Health Maintenance Reviewed: Not assessed  Clinical Pathway: None    Medication Management:  Patient independent in medication management and verbalizes adherence and understanding of medication regimen.       Functional Status:  Dependent ADLs:: Independent  Dependent IADLs:: Independent  Bed or wheelchair confined:: No  Mobility Status: Independent    Living Situation:na    Diet/Exercise/Sleep:  Food Insecurity: No    Transportation:  Transportation concerns (GOAL):: No  Transportation means:: Regular car     Psychosocial:  Mental health DX:: " Yes  Mental health DX how managed:: Medication  Informal Support system:: Family     Financial/Insurance:   Financial/Insurance concerns (GOAL):: No     Resources and Interventions:  Current Resources: discharge summary  Community Resources: None  Equipment Currently Used at Home: raised toilet  Advance Care Plan/Directive  Advanced Care Plans/Directives on file:: No    Goals: na  Patient/Caregiver understanding: yes  Plan: 1. Patient will follow discharge summary.  2. Patient will follow up sooner should symptoms worsen, change or patient feels there is a need further care.  3. No further Care Coordination needs identified at this time. Patient may be referred to Care Coordination in the future if additional needs arise.  Pt encouraged to contact Care Coordinator through the clinic if situation changes and assistance is needed. No follow-up planned.    CAMILA Bingham RN Clinic Care Coordinator   [place], [place], [place]  Phone: [phone]

## 2019-12-29 ENCOUNTER — TELEPHONE (OUTPATIENT)
Dept: FAMILY MEDICINE | Facility: CLINIC | Age: 44
End: 2019-12-29

## 2019-12-29 DIAGNOSIS — J45.50 SEVERE PERSISTENT ASTHMA WITHOUT COMPLICATION (H): ICD-10-CM

## 2019-12-29 NOTE — LETTER
December 31, 2019      Dano Kennedy  [address]  [place] [facility] [address]      Dear Dano,     Your clinic record indicates that you are due for an asthma update. We have a survey tool called an ACT (or Asthma Control Test) we use to measure the level of control of your asthma. Please complete the enclosed questionnaire and mail it back to us in the self-addressed stamped envelope.     If you have questions about this letter please contact your provider.     Sincerely,       Your [facility] Team

## 2019-12-30 ENCOUNTER — TRANSFERRED RECORDS (OUTPATIENT)
Dept: HEALTH INFORMATION MANAGEMENT | Facility: CLINIC | Age: 44
End: 2019-12-30

## 2019-12-31 RX ORDER — ALBUTEROL SULFATE 90 UG/1
AEROSOL, METERED RESPIRATORY (INHALATION)
Qty: 8.5 G | Refills: 0 | Status: SHIPPED | OUTPATIENT
Start: 2019-12-31 | End: 2020-03-16

## 2019-12-31 NOTE — TELEPHONE ENCOUNTER
TC, please mail patient ACT to fill out and return.   Rx refilled per [facility] refill protocol.    Thu MENAN, RN

## 2020-01-11 ENCOUNTER — MYC REFILL (OUTPATIENT)
Dept: FAMILY MEDICINE | Facility: CLINIC | Age: 45
End: 2020-01-11

## 2020-01-11 DIAGNOSIS — T75.3XXA MOTION SICKNESS, INITIAL ENCOUNTER: ICD-10-CM

## 2020-01-11 DIAGNOSIS — R11.0 NAUSEA: ICD-10-CM

## 2020-01-13 RX ORDER — ONDANSETRON 4 MG/1
8 TABLET, FILM COATED ORAL EVERY 8 HOURS PRN
Qty: 20 TABLET | Refills: 1 | Status: SHIPPED | OUTPATIENT
Start: 2020-01-13 | End: 2021-10-03

## 2020-01-13 RX ORDER — SCOLOPAMINE TRANSDERMAL SYSTEM 1 MG/1
PATCH, EXTENDED RELEASE TRANSDERMAL
Qty: 5 PATCH | Refills: 0 | Status: SHIPPED | OUTPATIENT
Start: 2020-01-13 | End: 2021-10-03

## 2020-01-13 NOTE — TELEPHONE ENCOUNTER
To provider to advise  Patient requesting refills of scopolamine and zofran for cruise  Patient was advised to do an E-visit and patient refuses as noted below      Jelena MENAN, RN, CPN

## 2020-01-17 ENCOUNTER — TRANSFERRED RECORDS (OUTPATIENT)
Dept: HEALTH INFORMATION MANAGEMENT | Facility: CLINIC | Age: 45
End: 2020-01-17

## 2020-03-13 DIAGNOSIS — J45.50 SEVERE PERSISTENT ASTHMA WITHOUT COMPLICATION (H): ICD-10-CM

## 2020-03-16 RX ORDER — ALBUTEROL SULFATE 90 UG/1
AEROSOL, METERED RESPIRATORY (INHALATION)
Qty: 8.5 G | Refills: 0 | Status: SHIPPED | OUTPATIENT
Start: 2020-03-16 | End: 2020-03-20

## 2020-03-17 NOTE — TELEPHONE ENCOUNTER
ACT Total Scores 1/8/2018 10/15/2018 4/25/2019   ACT TOTAL SCORE - - -   ASTHMA ER VISITS - - -   ASTHMA HOSPITALIZATIONS - - -   ACT TOTAL SCORE (Goal Greater than or Equal to 20) 12 18 25   In the past 12 months, how many times did you visit the emergency room for your asthma without being admitted to the hospital? 0 0 0   In the past 12 months, how many times were you hospitalized overnight because of your asthma? 0 0 0     Prescription approved per [facility] Refill Protocol.  APPT NEEDED FOR FURTHER REFILLS      Cristina Murrell RN

## 2020-03-20 ENCOUNTER — E-VISIT (OUTPATIENT)
Dept: FAMILY MEDICINE | Facility: CLINIC | Age: 45
End: 2020-03-20
Payer: COMMERCIAL

## 2020-03-20 DIAGNOSIS — J45.51 SEVERE PERSISTENT ASTHMA WITH EXACERBATION (H): ICD-10-CM

## 2020-03-20 DIAGNOSIS — R05.9 COUGH: ICD-10-CM

## 2020-03-20 DIAGNOSIS — J45.50 SEVERE PERSISTENT ASTHMA WITHOUT COMPLICATION (H): ICD-10-CM

## 2020-03-20 PROCEDURE — 99421 OL DIG E/M SVC 5-10 MIN: CPT | Performed by: FAMILY MEDICINE

## 2020-03-20 RX ORDER — ALBUTEROL SULFATE 90 UG/1
AEROSOL, METERED RESPIRATORY (INHALATION)
Qty: 8.5 G | Refills: 3 | Status: SHIPPED | OUTPATIENT
Start: 2020-03-20 | End: 2020-11-06

## 2020-03-20 RX ORDER — PREDNISONE 20 MG/1
40 TABLET ORAL DAILY
Qty: 14 TABLET | Refills: 0 | Status: SHIPPED | OUTPATIENT
Start: 2020-03-20 | End: 2020-11-06

## 2020-03-20 RX ORDER — ALBUTEROL SULFATE 90 UG/1
AEROSOL, METERED RESPIRATORY (INHALATION)
Qty: 8.5 G | Refills: 0 | Status: SHIPPED | OUTPATIENT
Start: 2020-03-20 | End: 2020-03-20

## 2020-04-06 ENCOUNTER — TRANSFERRED RECORDS (OUTPATIENT)
Dept: HEALTH INFORMATION MANAGEMENT | Facility: CLINIC | Age: 45
End: 2020-04-06

## 2020-06-17 ENCOUNTER — MYC MEDICAL ADVICE (OUTPATIENT)
Dept: FAMILY MEDICINE | Facility: CLINIC | Age: 45
End: 2020-06-17

## 2020-06-17 DIAGNOSIS — J30.89 ALLERGIC RHINITIS DUE TO MOLD: ICD-10-CM

## 2020-06-17 DIAGNOSIS — J30.81 CHRONIC ALLERGIC RHINITIS DUE TO ANIMAL HAIR AND DANDER: ICD-10-CM

## 2020-06-17 DIAGNOSIS — J30.1 CHRONIC SEASONAL ALLERGIC RHINITIS DUE TO POLLEN: ICD-10-CM

## 2020-06-17 DIAGNOSIS — J45.50 SEVERE PERSISTENT ASTHMA WITHOUT COMPLICATION (H): ICD-10-CM

## 2020-06-17 RX ORDER — FEXOFENADINE HCL AND PSEUDOEPHEDRINE HCL 180; 240 MG/1; MG/1
1 TABLET, EXTENDED RELEASE ORAL DAILY
Qty: 90 TABLET | Refills: 1 | Status: SHIPPED | OUTPATIENT
Start: 2020-06-17 | End: 2022-06-20

## 2020-07-13 ENCOUNTER — TRANSFERRED RECORDS (OUTPATIENT)
Dept: HEALTH INFORMATION MANAGEMENT | Facility: CLINIC | Age: 45
End: 2020-07-13

## 2020-07-13 LAB
ALT SERPL-CCNC: 57 U/L (ref 0–45)
AST SERPL-CCNC: 25 U/L (ref 0–40)
CREAT SERPL-MCNC: 1 MG/DL (ref 0.7–1.3)
GFR SERPL CREATININE-BSD FRML MDRD: >60 ML/MIN/1.73M2
GLUCOSE SERPL-MCNC: 97 MG/DL (ref 70–125)
POTASSIUM SERPL-SCNC: 3.9 MMOL/L (ref 3.5–5)

## 2020-07-22 ENCOUNTER — MYC MEDICAL ADVICE (OUTPATIENT)
Dept: FAMILY MEDICINE | Facility: CLINIC | Age: 45
End: 2020-07-22

## 2020-07-22 DIAGNOSIS — K12.2 UVULITIS: ICD-10-CM

## 2020-07-22 RX ORDER — EPINEPHRINE 0.3 MG/.3ML
0.3 INJECTION SUBCUTANEOUS
Qty: 0.6 ML | Refills: 0 | Status: SHIPPED | OUTPATIENT
Start: 2020-07-22 | End: 2022-12-12

## 2020-08-12 ENCOUNTER — TRANSFERRED RECORDS (OUTPATIENT)
Dept: HEALTH INFORMATION MANAGEMENT | Facility: CLINIC | Age: 45
End: 2020-08-12

## 2020-08-16 DIAGNOSIS — J45.50 SEVERE PERSISTENT ASTHMA WITHOUT COMPLICATION (H): ICD-10-CM

## 2020-08-18 NOTE — TELEPHONE ENCOUNTER
Routing refill request to provider for review/approval because:  ACT Total Scores 1/8/2018 10/15/2018 4/25/2019   ACT TOTAL SCORE - - -   ASTHMA ER VISITS - - -   ASTHMA HOSPITALIZATIONS - - -   ACT TOTAL SCORE (Goal Greater than or Equal to 20) 12 18 25   In the past 12 months, how many times did you visit the emergency room for your asthma without being admitted to the hospital? 0 0 0   In the past 12 months, how many times were you hospitalized overnight because of your asthma? 0 0 0     Cristina Murrell RN

## 2020-09-22 ENCOUNTER — TRANSFERRED RECORDS (OUTPATIENT)
Dept: HEALTH INFORMATION MANAGEMENT | Facility: CLINIC | Age: 45
End: 2020-09-22

## 2020-09-22 ENCOUNTER — VIRTUAL VISIT (OUTPATIENT)
Dept: FAMILY MEDICINE | Facility: OTHER | Age: 45
End: 2020-09-22
Payer: COMMERCIAL

## 2020-09-22 DIAGNOSIS — Z20.822 SUSPECTED COVID-19 VIRUS INFECTION: Primary | ICD-10-CM

## 2020-09-22 LAB
SARS-COV-2 RNA SPEC QL NAA+PROBE: NOT DETECTED
SPECIMEN SOURCE: NORMAL

## 2020-09-22 PROCEDURE — 99421 OL DIG E/M SVC 5-10 MIN: CPT | Performed by: EMERGENCY MEDICINE

## 2020-09-22 NOTE — PROGRESS NOTES
"Date: 2020 07:47:35  Clinician: Bill Cornelius  Clinician NPI: [id number]  Patient: Dano Kennedy  Patient : 1975  Patient Address: [address] [place], [place] [address], [place]  Patient Phone: [phone]  Visit Protocol: URI  Patient Summary:  Dano is a 45 year old ( : 1975 ) male who initiated a [facility] Visit for COVID-19 (Coronavirus) evaluation and screening. When asked the question \"Please sign me up to receive news, health information and promotions from [facility].\", Dano responded \"No\".    Dano states his symptoms started 1-2 days ago.   His symptoms consist of chills, malaise, a sore throat, a cough, nasal congestion, a headache, anosmia, rhinitis, wheezing, and myalgia. He is experiencing mild difficulty breathing with activities but can speak normally in full sentences. Dano also feels feverish but was unable to measure his temperature.   Symptom details     Nasal secretions: The color of his mucus is clear.    Cough: Dano coughs every 5-10 minutes and his cough is more bothersome at night. Phlegm does not come into his throat when he coughs. He does not believe his cough is caused by post-nasal drip.     Sore throat: Dano reports having mild throat pain (1-3 on a 10 point pain scale), does not have exudate on his tonsils, and can swallow liquids. He is not sure if the lymph nodes in his neck are enlarged. A rash has not appeared on the skin since the sore throat started.     Wheezing: Dano has been diagnosed with asthma. Additional wheezing details as reported by the patient (free text): I have asthma so I am used to wheezing       Headache: He states the headache is moderate (4-6 on a 10 point pain scale).      Dano denies having facial pain or pressure, teeth pain, ageusia, diarrhea, ear pain, vomiting, and nausea. He also denies having a sinus infection within the past year, taking antibiotic medication in the past month, and having recent facial or sinus surgery in " the past 60 days.   Precipitating events  Dano is not sure if he has been exposed to someone with strep throat. He has not recently been exposed to someone with influenza. Dano has been in close contact with the following high risk individuals: people with asthma, heart disease or diabetes.   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   He either works or volunteers as a healthcare worker or a , or works or volunteers in a healthcare facility. He does not provide direct patient care. Additional job details as reported by the patient (free text): I am a    Dano has not lived in a congregate living setting in the past 14 days. He lives with a healthcare worker.   Dano has not had a close contact with a laboratory-confirmed COVID-19 patient within 14 days of symptom onset.   Since December 2019, Dano and has not had upper respiratory infection or influenza-like illness. Has not been diagnosed with lab-confirmed COVID-19 test   Pertinent medical history  Dano needs a return to work/school note.   Weight: 225 lbs   Dano does not smoke or use smokeless tobacco.   Weight: 225 lbs    MEDICATIONS: albuterol sulfate inhalation, Breo Ellipta inhalation, ALLERGIES: NKDA  Clinician Response:  Dear Dano,   Your symptoms show that you may have coronavirus (COVID-19). This illness can cause fever, cough and trouble breathing. Many people get a mild case and get better on their own. Some people can get very sick.  What should I do?  We would like to test you for this virus.   1. Please call [phone] to schedule your visit. Explain that you were referred by [facility] to have a COVID-19 test. Be ready to share your [facility] visit ID number.  The following will serve as your written order for this COVID Test, ordered by me, for the indication of suspected COVID [Z20.828]: The test will be ordered in [employer] [facility], our electronic health  "record, after you are scheduled. It will show as ordered and authorized by Jeff Gale MD.  Order: COVID-19 (Coronavirus) PCR for [facility]TOMATIC [facility].      2. When it's time for your COVID test:  Stay at least 6 feet away from others. (If someone will drive you to your test, stay in the backseat, as far away from the  as you can.)   Cover your mouth and nose with a mask, tissue or washcloth.  Go straight to the testing site. Don't make any stops on the way there or back.      3.Starting now: Stay home and away from others (self-isolate) until:   You've had no fever---and no medicine that reduces fever---for one full day (24 hours). And...   Your other symptoms have gotten better. For example, your cough or breathing has improved. And...   At least 10 days have passed since your symptoms started.       During this time, don't leave the house except for testing or medical care.   Stay in your own room, even for meals. Use your own bathroom if you can.   Stay away from others in your home. No hugging, kissing or shaking hands. No visitors.  Don't go to work, school or anywhere else.    Clean \"high touch\" surfaces often (doorknobs, counters, handles, etc.). Use a household cleaning spray or wipes. You'll find a full list of  on the EPA website: www.epa.gov/pesticide-registration/list-n-disinfectants-use-against-sars-cov-2.   Cover your mouth and nose with a mask, tissue or washcloth to avoid spreading germs.  Wash your hands and face often. Use soap and water.  Caregivers in these groups are at risk for severe illness due to COVID-19:  o People 65 years and older  o People who live in a nursing home or long-term care facility  o People with chronic disease (lung, heart, cancer, diabetes, kidney, liver, immunologic)  o People who have a weakened immune system, including those who:   Are in cancer treatment  Take medicine that weakens the immune system, such as corticosteroids  Had a bone marrow " or organ transplant  Have an immune deficiency  Have poorly controlled HIV or AIDS  Are obese (body mass index of 40 or higher)  Smoke regularly   o Caregivers should wear gloves while washing dishes, handling laundry and cleaning bedrooms and bathrooms.  o Use caution when washing and drying laundry: Don't shake dirty laundry, and use the warmest water setting that you can.  o For more tips, go to www.cdc.gov/coronavirus/2019-ncov/downloads/10Things.pdf.    4.Sign up for [employer]. We know it's scary to hear that you might have COVID-19. We want to track your symptoms to make sure you're okay over the next 2 weeks. Please look for an email from [facility] [employer]---this is a free, online program that we'll use to keep in touch. To sign up, follow the link in the email. Learn more at [email or web address]  How can I take care of myself?   Get lots of rest. Drink extra fluids (unless a doctor has told you not to).   Take Tylenol (acetaminophen) for fever or pain. If you have liver or kidney problems, ask your family doctor if it's okay to take Tylenol.   Adults can take either:    650 mg (two 325 mg pills) every 4 to 6 hours, or...   1,000 mg (two 500 mg pills) every 8 hours as needed.    Note: Don't take more than 3,000 mg in one day. Acetaminophen is found in many medicines (both prescribed and over-the-counter medicines). Read all labels to be sure you don't take too much.   For children, check the Tylenol bottle for the right dose. The dose is based on the child's age or weight.    If you have other health problems (like cancer, heart failure, an organ transplant or severe kidney disease): Call your specialty clinic if you don't feel better in the next 2 days.       Know when to call 911. Emergency warning signs include:    Trouble breathing or shortness of breath Pain or pressure in the chest that doesn't go away Feeling confused like you haven't felt before, or not being able to wake up Bluish-colored  lips or face.  Where can I get more information?   [facility] -- About COVID-19: [email or web address]   CDC -- What to Do If You're Sick: www.cdc.gov/coronavirus/2019-ncov/about/steps-when-sick.html   CDC -- Ending Home Isolation: www.cdc.gov/coronavirus/2019-ncov/hcp/disposition-in-home-patients.html   [facility] [place][note 2] -- Caring for Someone: www.cdc.gov/coronavirus/2019-ncov/if-you-are-sick/care-for-someone.html   [facility] -- Interim Guidance for Hospital Discharge to Home: www.[facility].mn.us/diseases/coronavirus/hcp/hospdischarge.pdf   [facility] clinical trials (COVID-19 research studies): clinicalaffairs.[facility]-clinical-trials    Below are the COVID-19 hotlines at the [place] Department of Health ([facility]). Interpreters are available.    For health questions: Call 848-631-0568 or 1-299.847.3513 (7 a.m. to 7 p.m.) For questions about schools and childcare: Call 066-405-0568 or 1-772.186.3771 (7 a.m. to 7 p.m.)    Diagnosis: Cough  Diagnosis ICD: R05

## 2020-09-28 ENCOUNTER — MYC REFILL (OUTPATIENT)
Dept: FAMILY MEDICINE | Facility: CLINIC | Age: 45
End: 2020-09-28

## 2020-09-28 DIAGNOSIS — J45.50 SEVERE PERSISTENT ASTHMA WITHOUT COMPLICATION (H): ICD-10-CM

## 2020-09-28 RX ORDER — IPRATROPIUM BROMIDE AND ALBUTEROL SULFATE 2.5; .5 MG/3ML; MG/3ML
1 SOLUTION RESPIRATORY (INHALATION) EVERY 4 HOURS PRN
Qty: 270 ML | Refills: 1 | Status: SHIPPED | OUTPATIENT
Start: 2020-09-28 | End: 2021-11-06

## 2020-09-28 NOTE — TELEPHONE ENCOUNTER
"Requested Prescriptions   Pending Prescriptions Disp Refills    ipratropium - albuterol 0.5 mg/2.5 mg/3 mL (DUONEB) 0.5-2.5 (3) MG/3ML neb solution 270 mL 1     Sig: Take 1 vial (3 mLs) by nebulization every 4 hours as needed for shortness of breath / dyspnea or wheezing       Short-Acting Beta Agonist Inhalers Protocol  Failed - 9/28/2020  8:57 AM        Failed - Asthma control assessment score within normal limits in last 6 months     Please review ACT score.           Failed - Recent (6 mo) or future (30 days) visit within the authorizing provider's specialty     Patient had office visit in the last 6 months or has a visit in the next 30 days with authorizing provider or within the authorizing provider's specialty.  See \"Patient Info\" tab in inbasket, or \"Choose Columns\" in Meds & Orders section of the refill encounter.            Passed - Patient is age 12 or older        Passed - Medication is active on med list       Asthma Nebs Protocol Failed - 9/28/2020  8:57 AM        Failed - Asthma control assessment score within normal limits in last 6 months     Please review ACT score.           Failed - Recent (6 mo) or future (30 days) visit within the authorizing provider's specialty     Patient had office visit in the last 6 months or has a visit in the next 30 days with authorizing provider or within the authorizing provider's specialty.  See \"Patient Info\" tab in inbasket, or \"Choose Columns\" in Meds & Orders section of the refill encounter.            Passed - Patient is age 4 years or older        Passed - Medication is active on med list             "

## 2020-10-10 ENCOUNTER — TRANSFERRED RECORDS (OUTPATIENT)
Dept: HEALTH INFORMATION MANAGEMENT | Facility: CLINIC | Age: 45
End: 2020-10-10

## 2020-10-23 ENCOUNTER — TRANSFERRED RECORDS (OUTPATIENT)
Dept: HEALTH INFORMATION MANAGEMENT | Facility: CLINIC | Age: 45
End: 2020-10-23

## 2020-11-06 ENCOUNTER — MYC MEDICAL ADVICE (OUTPATIENT)
Dept: FAMILY MEDICINE | Facility: CLINIC | Age: 45
End: 2020-11-06

## 2020-11-06 ENCOUNTER — E-VISIT (OUTPATIENT)
Dept: FAMILY MEDICINE | Facility: CLINIC | Age: 45
End: 2020-11-06
Payer: COMMERCIAL

## 2020-11-06 DIAGNOSIS — R05.9 COUGH: ICD-10-CM

## 2020-11-06 DIAGNOSIS — J45.50 SEVERE PERSISTENT ASTHMA WITHOUT COMPLICATION (H): ICD-10-CM

## 2020-11-06 DIAGNOSIS — U07.1 2019 NOVEL CORONAVIRUS DISEASE (COVID-19): ICD-10-CM

## 2020-11-06 DIAGNOSIS — Z20.822 SUSPECTED COVID-19 VIRUS INFECTION: Primary | ICD-10-CM

## 2020-11-06 DIAGNOSIS — J45.51 SEVERE PERSISTENT ASTHMA WITH EXACERBATION (H): ICD-10-CM

## 2020-11-06 PROCEDURE — 99421 OL DIG E/M SVC 5-10 MIN: CPT | Performed by: FAMILY MEDICINE

## 2020-11-06 RX ORDER — PREDNISONE 20 MG/1
40 TABLET ORAL DAILY
Qty: 14 TABLET | Refills: 0 | Status: SHIPPED | OUTPATIENT
Start: 2020-11-06 | End: 2021-10-03

## 2020-11-06 RX ORDER — ALBUTEROL SULFATE 90 UG/1
AEROSOL, METERED RESPIRATORY (INHALATION)
Qty: 8.5 G | Refills: 3 | Status: SHIPPED | OUTPATIENT
Start: 2020-11-06 | End: 2021-05-03

## 2020-11-06 NOTE — PATIENT INSTRUCTIONS
"  Dear Dano Kennedy,    Your symptoms show that you may have coronavirus (COVID-19). This illness can cause fever, cough and trouble breathing. Many people get a mild case and get better on their own. Some people can get very sick.    Will I be tested for COVID-19?  We would like to test you for this virus. I have placed an order for this test and you will be called to schedule your COVID-19 curbside test. If you need to schedule in [facility] please call [phone].  If you need to schedule in the [place] area please call [phone].    When it's time for your COVID test:  Stay at least 6 feet away from others. (If someone will drive you to your test, stay in the backseat, as far away from the  as you can.)  Cover your mouth and nose with a mask, tissue or washcloth.  Go straight to the testing site. Don't make any stops on the way there or back.    Starting now:     Stay home and away from others (self-isolate) until:  o You've had no fever--and no medicine that reduces fever--for one full day (24 hours). And   o Your other symptoms have gotten better. For example, your cough or breathing has improved. And   o At least 10 days have passed since your symptoms started.      During this time, don't leave the house except for testing or medical care.  o Stay in your own room, even for meals. Use your own bathroom if you can.  o Stay away from others in your home. No hugging, kissing or shaking hands. No visitors.  o Don't go to work, school or anywhere else.    Clean \"high touch\" surfaces often (doorknobs, counters, handles, etc.). Use a household cleaning spray or wipes. You'll find a full list of  on the EPA website: www.epa.gov/pesticide-registration/list-n-disinfectants-use-against-sars-cov-2.    Cover your mouth and nose with a mask, tissue or washcloth to avoid spreading germs.    Wash your hands and face often. Use soap and water.    People in these groups are at risk for severe " illness due to COVID-19:  o People 65 years and older  o People who live in a nursing home or long-term care facility  o People with chronic disease (lung, heart, cancer, diabetes, kidney, liver, immunologic)  o People who have a weakened immune system, including those who:  - Are in cancer treatment  - Take medicine that weakens the immune system, such as corticosteroids  - Had a bone marrow or organ transplant  - Have an immune deficiency  - Have poorly controlled HIV or AIDS  - Are obese (body mass index of 40 or higher)  - Smoke regularly      Caregivers should wear gloves while washing dishes, handling laundry and cleaning bedrooms and bathrooms.    Use caution when washing and drying laundry: Don't shake dirty laundry, and use the warmest water setting that you can.    For more tips, go to www.cdc.gov/coronavirus/2019-ncov/downloads/10Things.pdf.    Sign up for [employer]. We know it's scary to hear that you might have COVID-19. We want to track your symptoms to make sure you're okay over the next 2 weeks. Please look for an email from [employer]--this is a free, online program that we'll use to keep in touch. To sign up, follow the link in the email you will receive. Learn more at [email or web address]    How can I take care of myself?    Get lots of rest. Drink extra fluids (unless a doctor has told you not to)    Take Tylenol (acetaminophen) for fever or pain. If you have liver or kidney problems, ask your family doctor if it's okay to take Tylenol.  Adults can take either:    650 mg (two 325 mg pills) every 4 to 6 hours, or     1,000 mg (two 500 mg pills) every 8 hours as needed.    Note: Don't take more than 3,000 mg in one day. Acetaminophen is found in many medicines (both prescribed and over-the-counter medicines). Read all labels to be sure you don't take too much.  For children, check the Tylenol bottle for the right dose. The dose is based on the child's age or weight.    If you have  other health problems (like cancer, heart failure, an organ transplant or severe kidney disease): Call your specialty clinic if you don't feel better in the next 2 days.    Know when to call 911. Emergency warning signs include:  Trouble breathing or shortness of breath  Pain or pressure in the chest that doesn't go away  Feeling confused like you haven't felt before, or not being able to wake up  Bluish-colored lips or face    Where can I get more information?     [employer] - About COVID-19: [email or web address]  CDC - What to Do If You're Sick: www.cdc.gov/coronavirus/2019-ncov/about/steps-when-sick.html

## 2020-11-18 ENCOUNTER — TRANSFERRED RECORDS (OUTPATIENT)
Dept: HEALTH INFORMATION MANAGEMENT | Facility: CLINIC | Age: 45
End: 2020-11-18

## 2021-01-15 ENCOUNTER — HEALTH MAINTENANCE LETTER (OUTPATIENT)
Age: 46
End: 2021-01-15

## 2021-02-18 ENCOUNTER — OFFICE VISIT - HEALTHEAST (OUTPATIENT)
Dept: FAMILY MEDICINE | Facility: CLINIC | Age: 46
End: 2021-02-18

## 2021-02-18 DIAGNOSIS — J45.40 MODERATE PERSISTENT ASTHMA WITHOUT COMPLICATION: ICD-10-CM

## 2021-02-18 DIAGNOSIS — Z13.220 LIPID SCREENING: ICD-10-CM

## 2021-02-18 DIAGNOSIS — Z00.00 ANNUAL PHYSICAL EXAM: ICD-10-CM

## 2021-02-18 DIAGNOSIS — R05.9 COUGH: ICD-10-CM

## 2021-02-18 DIAGNOSIS — E66.812 CLASS 2 SEVERE OBESITY DUE TO EXCESS CALORIES WITH SERIOUS COMORBIDITY AND BODY MASS INDEX (BMI) OF 36.0 TO 36.9 IN ADULT (H): ICD-10-CM

## 2021-02-18 DIAGNOSIS — Z13.1 SCREENING FOR DIABETES MELLITUS: ICD-10-CM

## 2021-02-18 DIAGNOSIS — E66.01 CLASS 2 SEVERE OBESITY DUE TO EXCESS CALORIES WITH SERIOUS COMORBIDITY AND BODY MASS INDEX (BMI) OF 36.0 TO 36.9 IN ADULT (H): ICD-10-CM

## 2021-02-18 DIAGNOSIS — U07.1 2019 NOVEL CORONAVIRUS DISEASE (COVID-19): ICD-10-CM

## 2021-02-18 DIAGNOSIS — R06.02 SHORTNESS OF BREATH: ICD-10-CM

## 2021-02-18 LAB
ANION GAP SERPL CALCULATED.3IONS-SCNC: 10 MMOL/L (ref 5–18)
BUN SERPL-MCNC: 14 MG/DL (ref 8–22)
CALCIUM SERPL-MCNC: 9.5 MG/DL (ref 8.5–10.5)
CHLORIDE BLD-SCNC: 106 MMOL/L (ref 98–107)
CHOLEST SERPL-MCNC: 211 MG/DL
CO2 SERPL-SCNC: 26 MMOL/L (ref 22–31)
CREAT SERPL-MCNC: 0.92 MG/DL (ref 0.7–1.3)
ERYTHROCYTE [DISTWIDTH] IN BLOOD BY AUTOMATED COUNT: 12.3 % (ref 11–14.5)
FASTING STATUS PATIENT QL REPORTED: NO
GFR SERPL CREATININE-BSD FRML MDRD: >60 ML/MIN/1.73M2
GLUCOSE BLD-MCNC: 81 MG/DL (ref 70–125)
HCT VFR BLD AUTO: 42.5 % (ref 40–54)
HDLC SERPL-MCNC: 35 MG/DL
HGB BLD-MCNC: 14.3 G/DL (ref 14–18)
LDLC SERPL CALC-MCNC: 121 MG/DL
MCH RBC QN AUTO: 28.6 PG (ref 27–34)
MCHC RBC AUTO-ENTMCNC: 33.6 G/DL (ref 32–36)
MCV RBC AUTO: 85 FL (ref 80–100)
PLATELET # BLD AUTO: 247 THOU/UL (ref 140–440)
PMV BLD AUTO: 10 FL (ref 7–10)
POTASSIUM BLD-SCNC: 4.4 MMOL/L (ref 3.5–5)
RBC # BLD AUTO: 5 MILL/UL (ref 4.4–6.2)
SODIUM SERPL-SCNC: 142 MMOL/L (ref 136–145)
TRIGL SERPL-MCNC: 276 MG/DL
WBC: 8.7 THOU/UL (ref 4–11)

## 2021-02-18 ASSESSMENT — MIFFLIN-ST. JEOR: SCORE: 1903.68

## 2021-03-23 DIAGNOSIS — J45.50 SEVERE PERSISTENT ASTHMA WITHOUT COMPLICATION (H): ICD-10-CM

## 2021-03-25 NOTE — TELEPHONE ENCOUNTER
There is not a current, normal ACT on file in the last 6 months.  RN unable to refill medication.    Thu Bishop BSN, RN

## 2021-04-07 ENCOUNTER — VIRTUAL VISIT (OUTPATIENT)
Dept: PHYSICAL MEDICINE AND REHAB | Facility: CLINIC | Age: 46
End: 2021-04-07
Payer: OTHER MISCELLANEOUS

## 2021-04-07 VITALS — OXYGEN SATURATION: 96 % | HEART RATE: 89 BPM | HEIGHT: 67 IN | WEIGHT: 225 LBS | BODY MASS INDEX: 35.31 KG/M2

## 2021-04-07 DIAGNOSIS — J45.40 MODERATE PERSISTENT ASTHMA, UNSPECIFIED WHETHER COMPLICATED: ICD-10-CM

## 2021-04-07 DIAGNOSIS — G93.31 POST VIRAL SYNDROME: Primary | ICD-10-CM

## 2021-04-07 PROCEDURE — 99204 OFFICE O/P NEW MOD 45 MIN: CPT | Mod: 95 | Performed by: PHYSICAL MEDICINE & REHABILITATION

## 2021-04-07 SDOH — HEALTH STABILITY: MENTAL HEALTH: HOW MANY STANDARD DRINKS CONTAINING ALCOHOL DO YOU HAVE ON A TYPICAL DAY?: 1 OR 2

## 2021-04-07 SDOH — HEALTH STABILITY: MENTAL HEALTH: HOW OFTEN DO YOU HAVE 6 OR MORE DRINKS ON ONE OCCASION?: NEVER

## 2021-04-07 SDOH — HEALTH STABILITY: MENTAL HEALTH: HOW OFTEN DO YOU HAVE A DRINK CONTAINING ALCOHOL?: 2-4 TIMES A MONTH

## 2021-04-07 ASSESSMENT — ENCOUNTER SYMPTOMS
NECK PAIN: 0
SEIZURES: 0
WHEEZING: 1
JOINT SWELLING: 0
WEAKNESS: 1
INCREASED ENERGY: 1
MYALGIAS: 1
TREMORS: 0
POLYPHAGIA: 0
POSTURAL DYSPNEA: 0
FEVER: 0
FATIGUE: 1
STIFFNESS: 1
SNORES LOUDLY: 1
DECREASED APPETITE: 0
HEMOPTYSIS: 0
MEMORY LOSS: 0
HALLUCINATIONS: 0
WEIGHT LOSS: 0
PARALYSIS: 0
ALTERED TEMPERATURE REGULATION: 0
POLYDIPSIA: 0
SHORTNESS OF BREATH: 1
HEADACHES: 1
SPEECH CHANGE: 0
WEIGHT GAIN: 0
SPUTUM PRODUCTION: 1
DISTURBANCES IN COORDINATION: 1
COUGH: 1
DIZZINESS: 1
NUMBNESS: 0
CHILLS: 0
MUSCLE CRAMPS: 0
NIGHT SWEATS: 0
LOSS OF CONSCIOUSNESS: 0
DYSPNEA ON EXERTION: 1
ARTHRALGIAS: 1
COUGH DISTURBING SLEEP: 1
TINGLING: 0
MUSCLE WEAKNESS: 1
BACK PAIN: 0

## 2021-04-07 ASSESSMENT — ANXIETY QUESTIONNAIRES
5. BEING SO RESTLESS THAT IT IS HARD TO SIT STILL: NOT AT ALL
7. FEELING AFRAID AS IF SOMETHING AWFUL MIGHT HAPPEN: NOT AT ALL
7. FEELING AFRAID AS IF SOMETHING AWFUL MIGHT HAPPEN: NOT AT ALL
1. FEELING NERVOUS, ANXIOUS, OR ON EDGE: NOT AT ALL
6. BECOMING EASILY ANNOYED OR IRRITABLE: NOT AT ALL
3. WORRYING TOO MUCH ABOUT DIFFERENT THINGS: NOT AT ALL
GAD7 TOTAL SCORE: 0
GAD7 TOTAL SCORE: 0
4. TROUBLE RELAXING: NOT AT ALL
2. NOT BEING ABLE TO STOP OR CONTROL WORRYING: NOT AT ALL

## 2021-04-07 ASSESSMENT — PATIENT HEALTH QUESTIONNAIRE - PHQ9
SUM OF ALL RESPONSES TO PHQ QUESTIONS 1-9: 1
SUM OF ALL RESPONSES TO PHQ QUESTIONS 1-9: 1
10. IF YOU CHECKED OFF ANY PROBLEMS, HOW DIFFICULT HAVE THESE PROBLEMS MADE IT FOR YOU TO DO YOUR WORK, TAKE CARE OF THINGS AT HOME, OR GET ALONG WITH OTHER PEOPLE: NOT DIFFICULT AT ALL

## 2021-04-07 ASSESSMENT — PAIN SCALES - GENERAL: PAINLEVEL: NO PAIN (0)

## 2021-04-07 ASSESSMENT — MIFFLIN-ST. JEOR: SCORE: 1859.22

## 2021-04-07 NOTE — NURSING NOTE
"Chief Complaint   Patient presents with     Covid Concern     COVID DX 11/4/2020 - short of breath and cough, fatigue        Vitals:    04/07/21 1012   Pulse: 89   SpO2: 96%   Weight: 102.1 kg (225 lb)   Height: 1.702 m (5' 7\")       Body mass index is 35.24 kg/m .                          "

## 2021-04-07 NOTE — PROGRESS NOTES
"Dano is a 46 year old who is being evaluated via a billable video visit.      How would you like to obtain your AVS? MyChart  If the video visit is dropped, the invitation should be resent by: Text to cell phone: [phone]  Will anyone else be joining your video visit? No      Video Start Time: 11:01 AM  Video-Visit Details    Type of service:  Video Visit    Video End Time:11:26 AM    Originating Location (pt. Location): Home    Distant Location (provider location):  [facility] PHYSICAL MEDICINE AND REHABILITATION CLINIC [place]     Platform used for Video Visit: BlogRadio    PM&R POST COVID19 CLINIC NOTE     History of COVID-19 infection: 11/18/2020. Presented to the ED outside the system, chart was reviewed, work up included white count which was high at 12.9 and high abs neutrophilic count.   BMP was normal.   D Dimer was normal   CXR was normal     He was sent home with steroids, and albuterol inhaler.     Date of first symptoms: 4th November 2020 via a spit test.   His symptoms were fatigue SOB and cough.   Diagnosis:initially via spit test. 11/4/2020  Hospitalization: No  Treatment: Ambulatory: Antibodies- No  Current Symptoms:  Pulse 89   Ht 1.702 m (5' 7\")   Wt 102.1 kg (225 lb)   SpO2 96%   BMI 35.24 kg/m    Cough: none   Shortness of breath (functional assessment based on ADLS): he has mild cough at night, he gets OOB when he exercises. he fatigues faster, and muscles ache faster.    Fatigue (functional assessment based on ADLS): fatigue is ongoing. he notices fatigue with minimal activity. he is working through it.      He is a . His symptoms were milder at first. Fatigue and sniffles/headache were present.   About day 6th, he developed SOB/soreness. His symptoms have worsened in the last month, he has used inhaler consistently. He has sense of chest tightness. He endorses that he checks on oxygen saturations are above 95% oxygen saturations.   He is working full time.   He " appears to have post nasal drip. He reports that he had sinus infection in 2010 with history of deviated septum and has seasonal allergies.   He has h/o Asthma. Asked when he had a PFT, he states he had it done a year ago.   He has not had PT/Pulmonary rehab      PHQ9 and GAD7 scores are within normal limits       Goals of Care:  Post-COVID-19 Infection precautions, risk of recurrence, timing of vaccination (if applicable)     Postviral fatigue PT/OT referral, how conserve and ration it during the day. Then pulmonary rehabilitation        Exercise Intolerance patient handout on energy conservation and gradual return to exercise     Cognitive/memory deficits Does note mental fog, including slowed processing. Recommend Neuropsych evaluation.      Mood disorder PHQ9 and GAD7 are normal today.              Dyspnea/Cough Continue to asses oxygen saturations at home.  based on symptoms will refer for a PFT, followed pulmonary rehab.                          Current concerns: No    PHQ Assesment Total Score(s) 4/7/2021   PHQ-9 Score 1   Some recent data might be hidden     ZEHRA-7 Results 4/7/2021   ZEHRA 7 TOTAL SCORE 0 (minimal anxiety)   Some recent data might be hidden     No flowsheet data found.  No flowsheet data found.    No flowsheet data found.    Past Medical History:   Diagnosis Date     Allergic rhinitis, cause unspecified      Asthma      Complication of anesthesia     did not respond well to succs     Unspecified asthma(493.90)        Past Surgical History:   Procedure Laterality Date     DAVINCI XI ASSISTED RESECTION RECTOSIGMOID N/A 11/26/2019    Procedure: ROBOTIC ASSISTED SIGMOID COLECTOMY. MOBILIZATION OF SPLENIC FLESURE;  Surgeon: Travis Brand MD;  Location: [facility] OR     ENDOSCOPIC POLYPECTOMY NASAL       LASER HOLMIUM LITHOTRIPSY URETER(S), INSERT STENT, COMBINED Left 1/14/2015    Procedure: COMBINED CYSTOSCOPY, URETEROSCOPY, LASER HOLMIUM LITHOTRIPSY URETER(S), INSERT STENT;  Surgeon: Derrek Cobb  MD Adalberto;  Location: [facility] OR       Family History   Problem Relation Age of Onset     Glaucoma Father      Glaucoma Maternal Grandfather      Macular Degeneration No family hx of        Social History     Tobacco Use     Smoking status: Never Smoker     Smokeless tobacco: Never Used   Substance Use Topics     Alcohol use: Yes     Drug use: No         Current Outpatient Medications:      Acetaminophen (TYLENOL PO), Take 2 tablets by mouth daily as needed for mild pain or fever, Disp: , Rfl:      albuterol (2.5 MG/3ML) 0.083% neb solution, Take 1 vial (2.5 mg) by nebulization every 4 hours as needed for shortness of breath / dyspnea or wheezing, Disp: 75 mL, Rfl: 3     albuterol (PROAIR HFA/PROVENTIL HFA/VENTOLIN HFA) 108 (90 Base) MCG/ACT inhaler, INHALE 2 PUFFS INTO THE LUNGS EVERY 4 HOURS AS NEEDED FOR SHORTNESS OF BREATH OR DIFFICULT BREATHING OR WHEEZING, Disp: 8.5 g, Rfl: 3     EPINEPHrine (EPIPEN 2-DONNY) 0.3 MG/0.3ML injection 2-pack, Inject 0.3 mLs (0.3 mg) into the muscle once as needed for anaphylaxis, Disp: 0.6 mL, Rfl: 0     fexofenadine-pseudoePHEDrine (ALLEGRA-D 24) 180-240 MG 24 hr tablet, Take 1 tablet by mouth daily, Disp: 90 tablet, Rfl: 1     fluticasone-vilanterol (BREO ELLIPTA) 200-25 MCG/INH inhaler, Inhale 1 puff into the lungs daily Needs to be seen for further refills, Disp: 28 each, Rfl: 0     ipratropium - albuterol 0.5 mg/2.5 mg/3 mL (DUONEB) 0.5-2.5 (3) MG/3ML neb solution, Take 1 vial (3 mLs) by nebulization every 4 hours as needed for shortness of breath / dyspnea or wheezing, Disp: 270 mL, Rfl: 1     LORazepam (ATIVAN) 0.5 MG tablet, Take 1 tablet (0.5 mg) by mouth every 4 hours as needed for muscle spasms, Disp: 15 tablet, Rfl: 0     Multiple Vitamins-Minerals (MULTI-VITAMIN GUMMIES) CHEW, Take 2 chew tab by mouth daily as needed, Disp: , Rfl:      ondansetron (ZOFRAN) 4 MG tablet, Take 2 tablets (8 mg) by mouth every 8 hours as needed for nausea, Disp: 20 tablet, Rfl: 1     order for  DME, Equipment being ordered: nebulizer, Disp: 1 Device, Rfl: 0     order for DME, Nebulizer tubing and mouthpiece, Disp: 1 Device, Rfl: 0     ORDER FOR DME, Respironics REMSTAR 60 Series Auto CPAP 5-18cm H2O, MIrage Fx standard nasal mask, Disp: , Rfl:      oxyCODONE (ROXICODONE) 5 MG tablet, Take 1-2 tablets (5-10 mg) by mouth every 4 hours as needed for moderate to severe pain, Disp: 25 tablet, Rfl: 0     predniSONE (DELTASONE) 20 MG tablet, Take 2 tablets (40 mg) by mouth daily With food in am, Disp: 14 tablet, Rfl: 0     scopolamine (TRANSDERM) 1 MG/3DAYS 72 hr patch, Apply 1 patch to hairless area behind one ear at least 4 hours before travel.  Remove old patch and change every 3 days (72 hours)., Disp: 5 patch, Rfl: 0    Answers for HPI/ROS submitted by the patient on 4/7/2021   ZEHRA 7 TOTAL SCORE: 0  If you checked off any problems, how difficult have these problems made it for you to do your work, take care of things at home, or get along with other people?: Not difficult at all  PHQ9 TOTAL SCORE: 1  General Symptoms: Yes  Skin Symptoms: No  HENT Symptoms: No  EYE SYMPTOMS: No  HEART SYMPTOMS: No  LUNG SYMPTOMS: Yes  INTESTINAL SYMPTOMS: No  URINARY SYMPTOMS: No  REPRODUCTIVE SYMPTOMS: No  SKELETAL SYMPTOMS: Yes  BLOOD SYMPTOMS: No  NERVOUS SYSTEM SYMPTOMS: Yes  MENTAL HEALTH SYMPTOMS: No  Fever: No  Loss of appetite: No  Weight loss: No  Weight gain: No  Fatigue: Yes  Night sweats: No  Chills: No  Increased stress: No  Excessive hunger: No  Excessive thirst: No  Feeling hot or cold when others believe the temperature is normal: No  Loss of height: No  Post-operative complications: No  Surgical site pain: No  Hallucinations: No  Change in or Loss of Energy: Yes  Hyperactivity: No  Confusion: No  Cough: Yes  Sputum or phlegm: Yes  Coughing up blood: No  Difficulty breating or shortness of breath: Yes  Snoring: Yes  Wheezing: Yes  Difficulty breathing on exertion: Yes  Nighttime Cough: Yes  Difficulty  breathing when lying flat: No  Back pain: No  Muscle aches: Yes  Neck pain: No  Swollen joints: No  Joint pain: Yes  Bone pain: No  Muscle cramps: No  Muscle weakness: Yes  Joint stiffness: Yes  Bone fracture: No  Trouble with coordination: Yes  Dizziness or trouble with balance: Yes  Fainting or black-out spells: No  Memory loss: No  Headache: Yes  Seizures: No  Speech problems: No  Tingling: No  Tremor: No  Weakness: Yes  Difficulty walking: No  Paralysis: No  Numbness: No

## 2021-04-07 NOTE — LETTER
"4/7/2021       RE: Dano Kennedy  [address]  [place] [address]     Dear Colleague,    Thank you for referring your patient, Dano Kennedy, to the [facility] PHYSICAL MEDICINE AND REHABILITATION CLINIC [place] at [facility]. Please see a copy of my visit note below.    Dano is a 46 year old who is being evaluated via a billable video visit.      How would you like to obtain your AVS? MyChart  If the video visit is dropped, the invitation should be resent by: Text to cell phone: [phone]  Will anyone else be joining your video visit? No      Video-Visit Details    Type of service:  Video Visit    Video Start Time: 11:01 AM  Video End Time:11:26 AM    Originating Location (pt. Location): Home    Distant Location (provider location):  [facility] PHYSICAL MEDICINE AND REHABILITATION [facility]     Platform used for Video Visit: [employer]mbit[employer]    PM&R POST COVID19 CLINIC NOTE     History of COVID-19 infection: 11/18/2020. Presented to the ED outside the system, chart was reviewed, work up included white count which was high at 12.9 and high abs neutrophilic count.   BMP was normal.   D Dimer was normal   CXR was normal     He was sent home with steroids, and albuterol inhaler.     Date of first symptoms: 4th November 2020 via a spit test.   His symptoms were fatigue SOB and cough.   Diagnosis:initially via spit test. 11/4/2020  Hospitalization: No  Treatment: Ambulatory: Antibodies- No  Current Symptoms:  Pulse 89   Ht 1.702 m (5' 7\")   Wt 102.1 kg (225 lb)   SpO2 96%   BMI 35.24 kg/m    Cough: none   Shortness of breath (functional assessment based on ADLS): he has mild cough at night, he gets OOB when he exercises. he fatigues faster, and muscles ache faster.    Fatigue (functional assessment based on ADLS): fatigue is ongoing. he notices fatigue with minimal activity. he is working through it.      He is a . His " symptoms were milder at first. Fatigue and sniffles/headache were present.   About day 6th, he developed SOB/soreness. His symptoms have worsened in the last month, he has used inhaler consistently. He has sense of chest tightness. He endorses that he checks on oxygen saturations are above 95% oxygen saturations.   He is working full time.   He appears to have post nasal drip. He reports that he had sinus infection in 2010 with history of deviated septum and has seasonal allergies.   He has h/o Asthma. Asked when he had a PFT, he states he had it done a year ago.   He has not had PT/Pulmonary rehab      PHQ9 and GAD7 scores are within normal limits       Goals of Care:  Post-COVID-19 Infection precautions, risk of recurrence, timing of vaccination (if applicable)     Postviral fatigue PT/OT referral, how conserve and ration it during the day. Then pulmonary rehabilitation        Exercise Intolerance patient handout on energy conservation and gradual return to exercise     Cognitive/memory deficits Does note mental fog, including slowed processing. Recommend Neuropsych evaluation.      Mood disorder PHQ9 and GAD7 are normal today.              Dyspnea/Cough Continue to asses oxygen saturations at home.  based on symptoms will refer for a PFT, followed pulmonary rehab.                          Current concerns: No    PHQ Assesment Total Score(s) 4/7/2021   PHQ-9 Score 1   Some recent data might be hidden     ZEHRA-7 Results 4/7/2021   ZEHRA 7 TOTAL SCORE 0 (minimal anxiety)   Some recent data might be hidden     No flowsheet data found.  No flowsheet data found.    No flowsheet data found.    Past Medical History:   Diagnosis Date     Allergic rhinitis, cause unspecified      Asthma      Complication of anesthesia     did not respond well to succs     Unspecified asthma(493.90)        Past Surgical History:   Procedure Laterality Date     DAVINCI XI ASSISTED RESECTION RECTOSIGMOID N/A 11/26/2019    Procedure: ROBOTIC  ASSISTED SIGMOID COLECTOMY. MOBILIZATION OF SPLENIC FLESURE;  Surgeon: Travis Brand MD;  Location: [facility] OR     ENDOSCOPIC POLYPECTOMY NASAL       LASER HOLMIUM LITHOTRIPSY URETER(S), INSERT STENT, COMBINED Left 1/14/2015    Procedure: COMBINED CYSTOSCOPY, URETEROSCOPY, LASER HOLMIUM LITHOTRIPSY URETER(S), INSERT STENT;  Surgeon: Derrek Cobb MD;  Location: [facility] OR       Family History   Problem Relation Age of Onset     Glaucoma Father      Glaucoma Maternal Grandfather      Macular Degeneration No family hx of        Social History     Tobacco Use     Smoking status: Never Smoker     Smokeless tobacco: Never Used   Substance Use Topics     Alcohol use: Yes     Drug use: No         Current Outpatient Medications:      Acetaminophen (TYLENOL PO), Take 2 tablets by mouth daily as needed for mild pain or fever, Disp: , Rfl:      albuterol (2.5 MG/3ML) 0.083% neb solution, Take 1 vial (2.5 mg) by nebulization every 4 hours as needed for shortness of breath / dyspnea or wheezing, Disp: 75 mL, Rfl: 3     albuterol (PROAIR HFA/PROVENTIL HFA/VENTOLIN HFA) 108 (90 Base) MCG/ACT inhaler, INHALE 2 PUFFS INTO THE LUNGS EVERY 4 HOURS AS NEEDED FOR SHORTNESS OF BREATH OR DIFFICULT BREATHING OR WHEEZING, Disp: 8.5 g, Rfl: 3     EPINEPHrine (EPIPEN 2-DONNY) 0.3 MG/0.3ML injection 2-pack, Inject 0.3 mLs (0.3 mg) into the muscle once as needed for anaphylaxis, Disp: 0.6 mL, Rfl: 0     fexofenadine-pseudoePHEDrine (ALLEGRA-D 24) 180-240 MG 24 hr tablet, Take 1 tablet by mouth daily, Disp: 90 tablet, Rfl: 1     fluticasone-vilanterol (BREO ELLIPTA) 200-25 MCG/INH inhaler, Inhale 1 puff into the lungs daily Needs to be seen for further refills, Disp: 28 each, Rfl: 0     ipratropium - albuterol 0.5 mg/2.5 mg/3 mL (DUONEB) 0.5-2.5 (3) MG/3ML neb solution, Take 1 vial (3 mLs) by nebulization every 4 hours as needed for shortness of breath / dyspnea or wheezing, Disp: 270 mL, Rfl: 1     LORazepam (ATIVAN) 0.5 MG tablet, Take 1 tablet  (0.5 mg) by mouth every 4 hours as needed for muscle spasms, Disp: 15 tablet, Rfl: 0     Multiple Vitamins-Minerals (MULTI-VITAMIN GUMMIES) CHEW, Take 2 chew tab by mouth daily as needed, Disp: , Rfl:      ondansetron (ZOFRAN) 4 MG tablet, Take 2 tablets (8 mg) by mouth every 8 hours as needed for nausea, Disp: 20 tablet, Rfl: 1     order for DME, Equipment being ordered: nebulizer, Disp: 1 Device, Rfl: 0     order for DME, Nebulizer tubing and mouthpiece, Disp: 1 Device, Rfl: 0     ORDER FOR DME, Respironics REMSTAR 60 Series Auto CPAP 5-18cm H2O, MIrage Fx standard nasal mask, Disp: , Rfl:      oxyCODONE (ROXICODONE) 5 MG tablet, Take 1-2 tablets (5-10 mg) by mouth every 4 hours as needed for moderate to severe pain, Disp: 25 tablet, Rfl: 0     predniSONE (DELTASONE) 20 MG tablet, Take 2 tablets (40 mg) by mouth daily With food in am, Disp: 14 tablet, Rfl: 0     scopolamine (TRANSDERM) 1 MG/3DAYS 72 hr patch, Apply 1 patch to hairless area behind one ear at least 4 hours before travel.  Remove old patch and change every 3 days (72 hours)., Disp: 5 patch, Rfl: 0    Again, thank you for allowing me to participate in the care of your patient.      Sincerely,    Yue Marin MD

## 2021-04-08 ASSESSMENT — ANXIETY QUESTIONNAIRES: GAD7 TOTAL SCORE: 0

## 2021-04-15 ENCOUNTER — HOSPITAL ENCOUNTER (OUTPATIENT)
Dept: OCCUPATIONAL THERAPY | Facility: CLINIC | Age: 46
End: 2021-04-15
Attending: PHYSICAL MEDICINE & REHABILITATION
Payer: OTHER MISCELLANEOUS

## 2021-04-15 DIAGNOSIS — G93.31 POST VIRAL SYNDROME: ICD-10-CM

## 2021-04-15 DIAGNOSIS — R29.818 FINE MOTOR IMPAIRMENT: ICD-10-CM

## 2021-04-15 DIAGNOSIS — R29.898 FINE MOTOR IMPAIRMENT: ICD-10-CM

## 2021-04-15 DIAGNOSIS — R53.83 OTHER FATIGUE: Primary | ICD-10-CM

## 2021-04-15 PROCEDURE — 97110 THERAPEUTIC EXERCISES: CPT | Mod: GO | Performed by: OCCUPATIONAL THERAPIST

## 2021-04-15 PROCEDURE — 97165 OT EVAL LOW COMPLEX 30 MIN: CPT | Mod: GO | Performed by: OCCUPATIONAL THERAPIST

## 2021-04-16 NOTE — PROGRESS NOTES
04/15/21 1100   Quick Adds   Type of Visit Initial Outpatient Occupational Therapy Evaluation   General Information   Start Of Care Date 04/15/21   Referring Physician Yue Marin MD   Orders Evaluate and treat as indicated   Other Orders Covid 19 Program   Orders Date 04/07/21   Medical Diagnosis Post Viral Syndrome   Onset of Illness/Injury or Date of Surgery 11/04/20   Surgical/Medical History Reviewed Yes   Additional Occupational Profile Info/Pertinent History of Current Problem Pt is 45 yo male who tested positive for Covid 19 in November 2020, but still has residual symptoms that affect breathing and contribute to fatigue, body aches, and sluggishness.  Pts stamina seems to be impaired as well.    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   Patient/family Goals Statement Pt would like to maximize the return to his prior level of function/stamina.   Pain   Pain comments Pt has intermittent body aches   Fall Risk Screen   Fall screen completed by OT   Have you fallen 2 or more times in the past year? No   Have you fallen and had an injury in the past year? No   Is patient a fall risk? No   Abuse Screen (yes response referral indicated)   Feels Unsafe at Home or Work/School no   Feels Threatened by Someone no   Does Anyone Try to Keep You From Having Contact with Others or Doing Things Outside Your Home? no   Physical Signs of Abuse Present no   Cognitive Status Examination   Orientation Orientation to person, place and  time   Level of Consciousness Alert   Follows Commands and Answers Questions 100% of the time   Memory Impaired   Memory Comments Pt recalled 2/5 novel words with a five minute delay   Cognitive Comment Pt completed MoCA cog screen with BNL score of 25/30 where WNL is 26/30 and greater indicating that pt would benefit from further cognitive assessment   Range of Motion (ROM)   ROM Comments WFL   Strength   Strength Comments WFL   Hand Strength   Hand Dominance Right   Left Hand  (pounds) 117 pounds   Right Hand  (pounds) 123 pounds   Left Lateral Pinch (pounds) 26 pounds   Right Lateral Pinch (pounds) 26 pounds   Left Three Point Pinch (pounds) 25 pounds   Right Three Point Pinch (pounds) 26 pounds   Hand Strength Comments WNL   Coordination   Left Hand, Nine Hole Peg Test (seconds) 22   Right Hand, Nine Hole Peg Test (seconds) 24   Left Hand, Box and Blocks Test (cubes transferred in 1 minute) 69   Right Hand, Box and Blocks Test (cubes transferred in 1 minute) 71   Coordination Comments FMC on R ,BNL compared to norms for age and gender   Bathing   Level of [employer] [place] - Bathing independent   Bathing Comments more fatiguing to complete everyday tasks   Upper Body Dressing   Level of [place]: Dress Upper Body independent   Upper Body Dressing Comments more fatiguing to complete everyday tasks   Lower Body Dressing   Level of [place]: Dress Lower Body independent   Lower Body Dressing Comments more fatiguing to complete everyday tasks   Toileting   Level of [employer] [place]: Toilet independent   Grooming   Level of [employer] [place]: Grooming independent   Eating/Self-Feeding   Level of [place]: Eating independent   Planned Therapy Interventions   Planned Therapy Interventions ADL training;IADL training;Cognitive performance testing;Coordination training;Self care/Home management;Visual perception    OT Goal 1   Goal Identifier Energy conservation fatigue management strategies   Goal Description  Patient to verbalize and independently utilize 3 strategies for energy conservation/work simplification and fatigue management for improved independence with ADL/IADLs at home and in the community, and demonstrate use of these tasks during session    Target Date 06/10/21    OT Goal 2   Goal Identifier Numerical reasoning problem solving/memory compensation   Goal Description Pt will complete numerical reasoning and complex short-term memory tasks (using compensatory strategies prn) in the appropriate amount of time and with 90% accuracy to improve problem solving ability and increase safety and independence with ADL/IADLs at home, work and in the community.   Target Date 06/10/21    OT Goal 3   Goal Identifier HEP   Goal Description Pt will demonstrate good follow through at home of a B UE HEP for strength  and  coordination with SBA and min cues to increase functional strength and coordination   Target Date 06/10/21   Clinical Impression   Criteria for Skilled Therapeutic Interventions Met Yes, treatment indicated   OT Diagnosis decreased adls/iadls   Influenced by the following impairments fatigue, decreased activity tolerance, body aches, decreased fmc, breathing difficulty, decreased memory   Assessment of Occupational Performance 1-3 Performance Deficits   Identified Performance Deficits impaired stamina and need for rest breaks between tasks, more difficulty manipulating small objects with R hand.   Clinical Decision Making (Complexity) Low complexity   Therapy Frequency 1x/week   Predicted Duration of Therapy Intervention (days/wks) 8 weeks   Risks and Benefits of Treatment have been explained. Yes   Patient, Family & other staff in agreement with plan of care Yes   Clinical Impression Comments Pt will benefit from OT services to address the above goals   Education Assessment   Barriers To Learning No Barriers   Preferred Learning Style Listening;Reading;Demonstration;Pictures/video   Total Evaluation Time    OT Eval, Low Complexity Minutes (81792) 40

## 2021-04-26 ENCOUNTER — HOSPITAL ENCOUNTER (OUTPATIENT)
Dept: PHYSICAL THERAPY | Facility: CLINIC | Age: 46
End: 2021-04-26
Attending: PHYSICAL MEDICINE & REHABILITATION
Payer: OTHER MISCELLANEOUS

## 2021-04-26 DIAGNOSIS — R53.81 PHYSICAL DECONDITIONING: Primary | ICD-10-CM

## 2021-04-26 PROCEDURE — 97161 PT EVAL LOW COMPLEX 20 MIN: CPT | Mod: GP | Performed by: PHYSICAL THERAPIST

## 2021-04-26 PROCEDURE — 97110 THERAPEUTIC EXERCISES: CPT | Mod: GP | Performed by: PHYSICAL THERAPIST

## 2021-04-26 PROCEDURE — 97530 THERAPEUTIC ACTIVITIES: CPT | Mod: GP | Performed by: PHYSICAL THERAPIST

## 2021-04-26 ASSESSMENT — 6 MINUTE WALK TEST (6MWT): TOTAL DISTANCE WALKED (FT): 1670

## 2021-04-26 NOTE — PROGRESS NOTES
"   04/26/21 1404   Quick Adds   Type of Visit Initial OP PT Evaluation   General Information   Start of Care Date 04/26/21   Referring Physician Yue Marin MD   Orders Evaluate and Treat as Indicated   Order Date 04/07/21   Medical Diagnosis post viral syndrome G93.3   Onset of illness/injury or Date of Surgery 11/04/20   Surgical/Medical history reviewed Yes   Pertinent history of current problem (include personal factors and/or comorbidities that impact the POC) Pt is 47 yo male who tested positive for Covid 19 in November 2020, but still has residual symptoms that affect breathing and contribute to fatigue, body aches, and sluggishness.  Pts stamina seems to be impaired as well.  Pt has asthma at baseline wiht use of inhaler prn.    Patient role/Employment history Employed   Living environment House/townhome   Home/Community Accessibility Comments 3 level home; uses all three levels; SOB now. Especially with talking to someone.    Patient/Family Goals Statement \"Get back to myself\"    General Information Comments Pre COVID: pt was participating in group fitness 3x/wk mix of cardio and strength training.  Pt was able to bike in summer, walk with dog, etc.  Pt utilziing daily inhaler Albutol and nebulizer ~every other day.    Fall Risk Screen   Fall screen completed by PT   Have you fallen 2 or more times in the past year? No   Have you fallen and had an injury in the past year? No   Is patient a fall risk? No   Abuse Screen (yes response referral indicated)   Feels Unsafe at Home or Work/School no   Feels Threatened by Someone no   Does Anyone Try to Keep You From Having Contact with Others or Doing Things Outside Your Home? no   Physical Signs of Abuse Present no   Pain   Patient currently in pain No   Cognitive Status Examination   Orientation orientation to person, place and time   Level of Consciousness alert   Follows Commands and Answers Questions 100% of the time;able to follow multistep " "instructions   Personal Safety and Judgment intact   Memory intact   Observation   Observation Pt arrives today alert and oriented.    Integumentary   Integumentary No deficits were identified   Posture   Posture Forward head position;Protracted shoulders   Range of Motion (ROM)   ROM Comment WFL UEs; limited by poor posture /forward flexed posture.   Strength   Strength Comments intact grossly; functional stand no UEs from 18\" surface, completes heel raises x20 no UE usebilat    Bed Mobility   Bed Mobility Comments IND supine<>sit   Transfer Skills   Transfer Comments sit<>stand IND no arms from 18\" chair   Gait   Gait Comments IND and healthy gait pattern, noted WBOS   Gait Special Tests   Gait Special Tests SIX MINUTE WALK TEST;OTHER;FUNCTIONAL GAIT ASSESSMENT   Gait Special Tests Functional Gait Assessment Score out of 30   Comments *Test at next session   Gait Special Tests Six Minute Walk Test   Feet 1670 Feet   Comments Reports 5/10 exertion with 128bpm HR and 96% Spo2 following   Gait Special Tests Other   Comments 2Min step test =113 steps, 136bpm HR, 95% Spo2 and 6/10 exertion on OMNI scale   Balance   Balance Comments Steady througout session; will test FGA at next session   Balance Special Tests   Balance Special Tests Single leg stance right;Single leg stance left;Sit to stand reps   Balance Special Tests Sit to Stand Reps in 30 Seconds   Reps in 30 seconds 14   Height 18\"   Comments Reports 3/10 exertion on OMNI scale following and leg burns   Sensory Examination   Sensory Perception no deficits were identified   Coordination   Coordination no deficits were identified   Muscle Tone   Muscle Tone no deficits were identified   Planned Therapy Interventions   Planned Therapy Interventions balance training;gait training;neuromuscular re-education;strengthening;transfer training;stretching   Clinical Impression   Criteria for Skilled Therapeutic Interventions Met yes, treatment indicated   PT Diagnosis " deconditioning   Influenced by the following impairments decreased fuctonal endurance, impaired posture,   Functional limitations due to impairments difficulty with long distance and cardio demands   Clinical Presentation Unstable/Unpredictable   Clinical Presentation Rationale varying levels of fatigue, strong PLOF   Clinical Decision Making (Complexity) Low complexity   Therapy Frequency 1 time/week   Predicted Duration of Therapy Intervention (days/wks) 6 weeks   Risk & Benefits of therapy have been explained Yes   Patient, Family & other staff in agreement with plan of care Yes   Clinical Impression Comments Pt is a 46 y.o. male who has a strong PLOF with IND of 3x/wk group exercise, full time as  and mobile around the home and community with walking dog and completing yard work, now short of breath when climbing flight of stairs in his home and has noticed he is unable to carry a conversation when exercising at all.    GOALS   PT Eval Goals 1;2;3;4   Goal 1   Goal Identifier HEP   Goal Description Pt will demonstrate IND with progressive HEP in order to maintain and progress goals in home environement.    Target Date 06/10/21   Goal 2   Goal Identifier 30sec sit<>stand   Goal Description Pt will complete > 18stands during 30sec sit<>stand test to demontrate increasing LE strength with <4/10 on OMNIn scale.    Target Date 06/10/21   Goal 3   Goal Identifier 2min step test   Goal Description Pt will complete >130steps during 2min step test to demonstrate increased muscular and functional endurance as well with <6/10 exertion on OMNI scale.   Target Date 06/10/21   Goal 4   Goal Identifier 6MWT   Goal Description Pt will ambulate >572meters/1876ft in order to achieve/better than the norms for community dwelling elderly, in order to returnt to group exercise and work without restriction with <5/10 exertion on OMNI scale.   Target Date 06/10/21   Total Evaluation Time   PT Eval, Low Complexity Minutes  ([id number]) 55

## 2021-04-26 NOTE — ADDENDUM NOTE
Encounter addended by: Chioma Torre, PT on: 4/26/2021 3:47 PM   Actions taken: Visit diagnoses modified

## 2021-05-01 DIAGNOSIS — J45.50 SEVERE PERSISTENT ASTHMA WITHOUT COMPLICATION (H): ICD-10-CM

## 2021-05-02 DIAGNOSIS — J45.50 SEVERE PERSISTENT ASTHMA WITHOUT COMPLICATION (H): ICD-10-CM

## 2021-05-02 RX ORDER — ALBUTEROL SULFATE 90 UG/1
AEROSOL, METERED RESPIRATORY (INHALATION)
Qty: 8.5 G | Refills: 3 | Status: CANCELLED | OUTPATIENT
Start: 2021-05-02

## 2021-05-03 ENCOUNTER — TELEPHONE (OUTPATIENT)
Dept: FAMILY MEDICINE | Facility: CLINIC | Age: 46
End: 2021-05-03

## 2021-05-03 RX ORDER — ALBUTEROL SULFATE 90 UG/1
1-2 POWDER, METERED RESPIRATORY (INHALATION) 4 TIMES DAILY
Qty: 1 EACH | Refills: 3 | Status: SHIPPED | OUTPATIENT
Start: 2021-05-03 | End: 2022-07-06

## 2021-05-03 NOTE — TELEPHONE ENCOUNTER
Prior Authorization Retail Medication Request    Medication/Dose: albuterol (PROAIR RESPICLICK) 108 (90 Base) MCG/ACT inhaler  ICD code (if different than what is on RX):  Severe persistent asthma without complication [J45.50]   Previously Tried and Failed:    Rationale:      Insurance Phone #:  [phone]  Insurance ID:  [id number]      Pharmacy Information (if different than what is on RX)  Name:  [facility]  Phone:  [phone]

## 2021-05-03 NOTE — TELEPHONE ENCOUNTER
Routing refill request to provider for review/approval because:  Cathryn given x1 and patient did not follow up, please advise  Delaney Yang BSN, RN

## 2021-05-04 RX ORDER — IPRATROPIUM BROMIDE AND ALBUTEROL SULFATE 2.5; .5 MG/3ML; MG/3ML
SOLUTION RESPIRATORY (INHALATION)
Qty: 270 ML | Refills: 1 | OUTPATIENT
Start: 2021-05-04

## 2021-05-05 NOTE — TELEPHONE ENCOUNTER
Central Prior Authorization Team   Phone: [phone]      PA Initiation    Medication: albuterol (PROAIR RESPICLICK) 108 (90 Base) MCG/ACT inhaler  Insurance Company: [employer] - Phone [phone] Fax [phone]  Pharmacy Filling the Rx: [facility] #[id number] - [place], [place] - [address] AT [facility] [address]  Filling Pharmacy Phone: [phone]  Filling Pharmacy Fax:    Start Date: 5/5/2021

## 2021-05-05 NOTE — TELEPHONE ENCOUNTER
Prior Authorization Approval    Authorization Effective Date: 4/5/2021  Authorization Expiration Date: 5/5/2022  Medication: albuterol (PROAIR RESPICLICK) 108 (90 Base) MCG/ACT inhaler-APPROVED  Approved Dose/Quantity:   Reference #:     Insurance Company: [employer] - Phone [phone] Fax [phone]  Expected CoPay:       CoPay Card Available:      Foundation Assistance Needed:    Which Pharmacy is filling the prescription (Not needed for infusion/clinic administered): [id number] - [facility] [address] [place] [facility] [address]  Pharmacy Notified: Yes  Patient Notified: No    Pharmacy will notify patient when medication is ready.

## 2021-05-06 ENCOUNTER — RECORDS - HEALTHEAST (OUTPATIENT)
Dept: FAMILY MEDICINE | Facility: CLINIC | Age: 46
End: 2021-05-06

## 2021-05-11 ENCOUNTER — COMMUNICATION - HEALTHEAST (OUTPATIENT)
Dept: SCHEDULING | Facility: CLINIC | Age: 46
End: 2021-05-11

## 2021-05-11 DIAGNOSIS — J45.40 MODERATE PERSISTENT ASTHMA WITHOUT COMPLICATION: ICD-10-CM

## 2021-05-28 ASSESSMENT — ASTHMA QUESTIONNAIRES: ACT_TOTALSCORE: 12

## 2021-06-05 VITALS
WEIGHT: 234.8 LBS | OXYGEN SATURATION: 95 % | SYSTOLIC BLOOD PRESSURE: 128 MMHG | BODY MASS INDEX: 36.85 KG/M2 | DIASTOLIC BLOOD PRESSURE: 88 MMHG | HEART RATE: 70 BPM | HEIGHT: 67 IN

## 2021-06-15 NOTE — PROGRESS NOTES
"Assessment:      Healthy male exam.      Plan:      1. Annual physical exam     2. Shortness of breath  HM2(CBC w/o Differential)   3. Lipid screening  Lipid [place] RANDOM   4. Screening for diabetes mellitus  Basic Metabolic Panel   5. 2019 novel coronavirus disease (COVID-19)  Ambulatory Post-COVID Adult referral   6. Cough  XR Chest 2 Views   7. Class 2 severe obesity due to excess calories with serious comorbidity and body mass index (BMI) of 36.0 to 36.9 in adult (H)     8. Moderate persistent asthma without complication       Screening labs ordered.  He is going to start making some efforts to try to lose some weight.  Suspect that he will be one of the \"long hollers \"with his Covid diagnosis.  Should improve over the next several months.  Referred to post Covid adult clinic to see if there is anything else to offer.  If they cannot help, we can always connect with pulmonary medicine as well.  Chest x-ray to rule out pneumonia.    Subjective:      Dano Kennedy is a 45 y.o. male who presents for an annual exam. The patient reports that there is not domestic violence in his life.     Overall patient is doing well.  Diagnosed with Covid back in November 2020 and is still having persistent breathing issues.  He did have asthma prior to this and wonders if this could be related.  Works as a  which makes his job a little more difficult.  Asthma is oftentimes triggered with exercise, cats, hay, dust, and cold air.  We will switch from Advair to Breo which is working better.  He is trying to figure out how to best go about losing some weight.    Healthy Habits:   Regular Exercise: Yes  Sunscreen Use: Yes  Healthy Diet: Yes  Dental Visits Regularly: Yes  Seat Belt: Yes  Sexually active: Yes  Monthly Self Testicular Exams:  No  Hemoccults: N/A  Flex Sig: N/A  Colonoscopy: N/A  Lipid Profile: Yes  Glucose Screen: Yes  Prevention of Osteoporosis: N/A  Last Dexa: N/A  Guns at Home:  Yes  Guns Safety " Locks:  Yes      Immunization History   Administered Date(s) Administered     COVID-19,PF,Moderna 01/13/2021, 02/10/2021     INFLUENZA,SEASONAL QUAD, PF, =/> 6months 10/01/2019     Influenza,inj,MDCK,PF,Quad >4yrs 11/15/2018     Pneumo Polysac 23-V 10/30/2012     Tdap 10/06/2011     Immunization status: up to date and documented.    No exam data present    Current Outpatient Medications   Medication Sig Dispense Refill     EPINEPHrine (EPIPEN/ADRENACLICK/AUVI-Q) 0.3 mg/0.3 mL injection Inject 0.3 mg into the shoulder, thigh, or buttocks.       fexofenadine-pseudoephedrine (ALLEGRA-D 24) 180-240 mg per 24 hr tablet Take 1 tablet by mouth daily as needed.       fluticasone-vilanterol (BREO ELLIPTA) 200-25 mcg/dose DsDv inhaler Inhale 1 puff daily.       ipratropium-albuterol (DUO-NEB) 0.5-2.5 mg/3 mL nebulizer Take 3 mL by nebulization every 6 (six) hours as needed.       albuterol (PROAIR HFA;PROVENTIL HFA;VENTOLIN HFA) 90 mcg/actuation inhaler Inhale 2 puffs every 4 (four) hours as needed for wheezing.       ondansetron (ZOFRAN ODT) 4 MG disintegrating tablet Take 1 tablet (4 mg total) by mouth every 8 (eight) hours as needed. 20 tablet 0     oxyCODONE-acetaminophen (PERCOCET/ENDOCET) 5-325 mg per tablet Take 1 tablet by mouth every 4 (four) hours as needed for pain. 10 tablet 0     No current facility-administered medications for this visit.      No past medical history on file.  No past surgical history on file.  Patient has no known allergies.  No family history on file.  Social History     Socioeconomic History     Marital status: Single     Spouse name: Not on file     Number of children: Not on file     Years of education: Not on file     Highest education level: Not on file   Occupational History     Not on file   Social Needs     Financial resource strain: Not on file     Food insecurity     Worry: Not on file     Inability: Not on file     Transportation needs     Medical: Not on file     Non-medical: Not  "on file   Tobacco Use     Smoking status: Never Smoker     Smokeless tobacco: Never Used   Substance and Sexual Activity     Alcohol use: Yes     Drug use: No     Sexual activity: Not on file   Lifestyle     Physical activity     Days per week: Not on file     Minutes per session: Not on file     Stress: Not on file   Relationships     Social connections     Talks on phone: Not on file     Gets together: Not on file     Attends Restoration service: Not on file     Active member of club or organization: Not on file     Attends meetings of clubs or organizations: Not on file     Relationship status: Not on file     Intimate partner violence     Fear of current or ex partner: Not on file     Emotionally abused: Not on file     Physically abused: Not on file     Forced sexual activity: Not on file   Other Topics Concern     Not on file   Social History Narrative     Not on file       Review of Systems  General:  Denies problem  Eyes: Denies problem  Ears/Nose/Throat: Denies problem  Cardiovascular: Denies problem  Respiratory:  Remittent cough and shortness of breath, particularly with activity.  Gastrointestinal:  Denies problem  Genitourinary: Denies problem  Musculoskeletal:  Denies problem  Skin: Denies problem  Neurologic: Denies problem  Psychiatric: Denies problem  Endocrine: Denies problem  Heme/Lymphatic: Denies problem   Allergic/Immunologic: Denies problem        Objective:     Vitals:    02/18/21 1424   BP: 128/88   Pulse: 70   SpO2: 95%   Weight: (!) 234 lb 12.8 oz (106.5 kg)   Height: 5' 7\" (1.702 m)     Body mass index is 36.77 kg/m .    Physical  General Appearance: Alert, cooperative, no distress, appears stated age  Head: Normocephalic, without obvious abnormality, atraumatic  Eyes: PERRL, conjunctiva/corneas clear, EOM's intact  Ears: Normal TM's and external ear canals, both ears  Nose: Nares normal, septum midline,mucosa normal, no drainage  Throat: Lips, mucosa, and tongue normal; teeth and gums " normal  Neck: Supple, symmetrical, trachea midline, no adenopathy;  thyroid: not enlarged, symmetric, no tenderness/mass/nodules; no carotid bruit or JVD  Back: Symmetric, no curvature, ROM normal, no CVA tenderness  Lungs: Clear to auscultation bilaterally, respirations unlabored  Heart: Regular rate and rhythm, S1 and S2 normal, no murmur, rub, or gallop,  Abdomen: Soft, non-tender, bowel sounds active all four quadrants,  no masses, no organomegaly   Musculoskeletal: Normal range of motion. No joint swelling or deformity.   Extremities: Extremities normal, atraumatic, no cyanosis or edema  Skin: Skin color, texture, turgor normal, no rashes or lesions  Lymph nodes: Cervical, supraclavicular, and axillary nodes normal  Neurologic: He is alert. He has normal reflexes.   Psychiatric: He has a normal mood and affect.      Jonny Murrieta, CNP

## 2021-06-15 NOTE — PATIENT INSTRUCTIONS - HE
I placed the referral to the covid long haul program. They should be reaching out to you to get set up with their clinic. Let me know if you don't hear anything or if it's not what you're looking for.    When you need refills of medications, please let me know and I can take care of it for you.    Updating screening labs today.

## 2021-06-17 NOTE — TELEPHONE ENCOUNTER
RN cannot approve Refill Request    RN can NOT refill this medication Protocol failed and NO refill given. Last office visit: Visit date not found Last Physical: Visit date not found Last MTM visit: Visit date not found Last visit same specialty: Visit date not found.  Next visit within 3 mo: Visit date not found  Next physical within 3 mo: Visit date not found      Kemi Medrano, [facility] Triage/Med Refill 5/11/2021    Requested Prescriptions   Pending Prescriptions Disp Refills     albuterol (PROAIR HFA;PROVENTIL HFA;VENTOLIN HFA) 90 mcg/actuation inhaler   0     Sig: Inhale 2 puffs every 4 (four) hours as needed for wheezing.       There is no refill protocol information for this order

## 2021-06-21 NOTE — LETTER
Letter by Jonny Murrieta CNP at      Author: Jonny Murrieta CNP Service: -- Author Type: --    Filed:  Encounter Date: 2/18/2021 Status: (Other)       My Asthma Action Plan    Name: Dano Kennedy   YOB: 1975  Date: 2/21/2021   My doctor: Jonny Murrieta CNP   My clinic: [facility]        My Control Medicine: INHALED CORTICOSTEROIDS  My Rescue Medicine: Albuterol (Proair/Ventolin/Proventil HFA) 2-4 puffs EVERY 4 HOURS as needed. Use a spacer if recommended by your provider.    My Asthma Severity:   Moderate Persistent  Know your asthma triggers: dust, cold air, cats, hay               GREEN ZONE   Good Control    I feel good    No cough or wheeze    Can work, sleep and play without asthma symptoms     Take your asthma control medicine every day.     1. If exercise triggers your asthma, take your rescue medication    15 minutes before exercise or sports, and    During exercise if you have asthma symptoms  2. Spacer to use with inhaler: If you have a spacer, make sure to use it with your inhaler             YELLOW ZONE Getting Worse  I have ANY of these:    I do not feel good    Cough or wheeze    Chest feels tight    Wake up at night 1. Keep taking your Green Zone medications  2. Start taking your rescue medicine:    every 20 minutes for up to 1 hour. Then every 4 hours for 24-48 hours.  3. If you stay in the Yellow Zone for more than 12-24 hours, contact your doctor.  4. If you do not return to the Green Zone in 12-24 hours or you get worse, start taking your oral steroid medicine if prescribed by your provider.           RED ZONE Medical Alert - Get Help  I have ANY of these:    I feel awful    Medicine is not helping    Breathing getting harder    Trouble walking or talking    Nose opens wide to breathe     1. Take your rescue medicine NOW  2. If your provider has prescribed an oral steroid medicine, start taking it NOW  3. Call your doctor NOW  4. If you are still  in the Red Zone after 20 minutes and you have not reached your doctor:    Take your rescue medicine again and    Call 911 or go to the emergency room right away    See your regular doctor within 2 weeks of an Emergency Room or Urgent Care visit for follow-up treatment.          Annual Reminders:  Meet with Asthma Educator,  Flu Shot in the Fall, consider Pneumonia Vaccination for patients with asthma (aged 19 and older).    Pharmacy:   [id number] - [place], [place] - [address] AT [facility] [address]  [place] [address]  Phone: [phone] Fax: [phone]      Electronically signed by Jonny Murrieta CNP   Date: 02/21/21                    Asthma Triggers  How To Control Things That Make Your Asthma Worse    Triggers are things that make your asthma worse.  Look at the list below to help you find your triggers and what you can do about them.  You can help prevent asthma flare-ups by staying away from your triggers.      Trigger                                                          What you can do   Cigarette Smoke  Tobacco smoke can make asthma worse. Do not allow smoking in your home, car or around you.  Be sure no one smokes at a [employer] [facility] or school.  If you smoke, ask your health care provider for ways to help you quit.  Ask family members to quit too.  Ask your health care provider for a referral to Quit Plan to help you quit smoking, or call 2-396-024-PLAN.     Colds, Flu, Bronchitis  These are common triggers of asthma. Wash your hands often.  Dont touch your eyes, nose or mouth.  Get a flu shot every year.     Dust Mites  These are tiny bugs that live in cloth or carpet. They are too small to see. Wash sheets and blankets in hot water every week.   Encase pillows and mattress in dust mite proof covers.  Avoid having carpet if you can. If you have carpet, vacuum weekly.   Use a dust mask and HEPA vacuum.   Pollen and  Outdoor Mold  Some people are allergic to trees, grass, or weed pollen, or molds. Try to keep your windows closed.  Limit time out doors when pollen count is high.   Ask you health care provider about taking medicine during allergy season.     Animal Dander  Some people are allergic to skin flakes, urine or saliva from pets with fur or feathers. Keep pets with fur or feathers out of your home.    If you cant keep the pet outdoors, then keep the pet out of your bedroom.  Keep the bedroom door closed.  Keep pets off cloth furniture and away from stuffed toys.     Mice, Rats, and Cockroaches   Some people are allergic to the waste from these pests.   Cover food and garbage.  Clean up spills and food crumbs.  Store grease in the refrigerator.   Keep food out of the bedroom.   Indoor Mold  This can be a trigger if your home has high moisture. Fix leaking faucets, pipes, or other sources of water.   Clean moldy surfaces.  Dehumidify basement if it is damp and smelly.   Smoke, Strong Odors, and Sprays  These can reduce air quality. Stay away from strong odors and sprays, such as perfume, powder, hair spray, paints, smoke incense, paint, cleaning products, candles and new carpet.   Exercise or Sports  Some people with asthma have this trigger. Be active!  Ask your doctor about taking medicine before sports or exercise to prevent symptoms.    Warm up for 5-10 minutes before and after sports or exercise.     Other Triggers of Asthma  Cold air:  Cover your nose and mouth with a scarf.  Sometimes laughing or crying can be a trigger.  Some medicines and food can trigger asthma.

## 2021-08-24 ENCOUNTER — MYC MEDICAL ADVICE (OUTPATIENT)
Dept: FAMILY MEDICINE | Facility: CLINIC | Age: 46
End: 2021-08-24

## 2021-08-25 NOTE — TELEPHONE ENCOUNTER
Dano, I have checked and You will need to call [facility] and discuss with them, the surgery that is scheduled at [facility],will have to be changed to an in network hospital. [facility] is not on the list of preferred providers with your insurance. Needs to be performed at a [facility] facility or hospital as that is where your insurance allows you to go.

## 2021-09-01 ENCOUNTER — TELEPHONE (OUTPATIENT)
Dept: FAMILY MEDICINE | Facility: CLINIC | Age: 46
End: 2021-09-01

## 2021-09-01 NOTE — TELEPHONE ENCOUNTER
Reason for Call: Request for an order or referral:    Order or referral being requested: Pre procedure covid test, patient unable to get testing done at [facility] due to insurance, needs to have performed at [facility]    Date needed: as soon as possible    Has the patient been seen by the PCP for this problem? Not Applicable    Additional comments: Asking if PCP could place order for pre procedure covid testing for surgery date of service 09/13. Patent states that pre procedure covid test should be done on 09/08.      Phone number Patient can be reached at:  Cell number on file:    Telephone Information:   Mobile [phone]       Best Time:   Anytime    Can we leave a detailed message on this number?  YES    Call taken on 9/1/2021 at 12:53 PM by Emi Araujo

## 2021-09-01 NOTE — TELEPHONE ENCOUNTER
COVID lab appt scheduled per pt request. Will get order during pre op Oct 1 with Jonny.  Surgery date is 10/13/21.

## 2021-09-04 ENCOUNTER — HEALTH MAINTENANCE LETTER (OUTPATIENT)
Age: 46
End: 2021-09-04

## 2021-10-01 ENCOUNTER — OFFICE VISIT (OUTPATIENT)
Dept: FAMILY MEDICINE | Facility: CLINIC | Age: 46
End: 2021-10-01
Payer: COMMERCIAL

## 2021-10-01 VITALS
DIASTOLIC BLOOD PRESSURE: 84 MMHG | HEART RATE: 94 BPM | BODY MASS INDEX: 36.88 KG/M2 | OXYGEN SATURATION: 97 % | SYSTOLIC BLOOD PRESSURE: 136 MMHG | WEIGHT: 235 LBS | HEIGHT: 67 IN

## 2021-10-01 DIAGNOSIS — E78.5 HYPERLIPIDEMIA LDL GOAL <130: ICD-10-CM

## 2021-10-01 DIAGNOSIS — G47.33 OSA (OBSTRUCTIVE SLEEP APNEA): ICD-10-CM

## 2021-10-01 DIAGNOSIS — E66.01 MORBID OBESITY (H): ICD-10-CM

## 2021-10-01 DIAGNOSIS — Z01.818 PREOP GENERAL PHYSICAL EXAM: Primary | ICD-10-CM

## 2021-10-01 DIAGNOSIS — J45.50 SEVERE PERSISTENT ASTHMA WITHOUT COMPLICATION (H): ICD-10-CM

## 2021-10-01 LAB
ERYTHROCYTE [DISTWIDTH] IN BLOOD BY AUTOMATED COUNT: 13.3 % (ref 10–15)
HCT VFR BLD AUTO: 42.1 % (ref 40–53)
HGB BLD-MCNC: 13.8 G/DL (ref 13.3–17.7)
MCH RBC QN AUTO: 28.5 PG (ref 26.5–33)
MCHC RBC AUTO-ENTMCNC: 32.8 G/DL (ref 31.5–36.5)
MCV RBC AUTO: 87 FL (ref 78–100)
PLATELET # BLD AUTO: 207 10E3/UL (ref 150–450)
RBC # BLD AUTO: 4.85 10E6/UL (ref 4.4–5.9)
WBC # BLD AUTO: 8.8 10E3/UL (ref 4–11)

## 2021-10-01 PROCEDURE — 99215 OFFICE O/P EST HI 40 MIN: CPT | Performed by: NURSE PRACTITIONER

## 2021-10-01 PROCEDURE — 85027 COMPLETE CBC AUTOMATED: CPT | Performed by: NURSE PRACTITIONER

## 2021-10-01 PROCEDURE — 36415 COLL VENOUS BLD VENIPUNCTURE: CPT | Performed by: NURSE PRACTITIONER

## 2021-10-01 ASSESSMENT — MIFFLIN-ST. JEOR: SCORE: 1896.64

## 2021-10-01 NOTE — PATIENT INSTRUCTIONS
I will get your paperwork faxed to your surgeon.    Follow your surgeon's directions regarding eating and drinking prior to surgery.     Medications you can take the morning of surgery with a small sip of water include respiratory treatments.    No advil, ibuprofen, aleve, naproxen, or aspirin a week before surgery. Tylenol is ok.    Stop all supplements a week before surgery.    Your surgeon will manage your pain after your procedure.    I'll pass this insurance thing onto the team and we'll keep you updated as we get more information here.

## 2021-10-01 NOTE — PROGRESS NOTES
[facility]  [address],   [place] [address] [place]  [facility] [address]  Phone: [phone]  Fax: [phone]  Primary Provider: Humberto Murrieta  Pre-op Performing Provider: HUMBERTO MURRIETA      PREOPERATIVE EVALUATION:  Today's date: 10/1/2021    Dano Kennedy is a 46 year old male who presents for a preoperative evaluation.    Surgical Information:  Surgery/Procedure: resection of right inferior turbinate, endoscopic resection right superior nasal tumor, possible external approach, with image guidance    Surgery Location: [facility]   Surgeon: Dr. Ericka Ritchie  Surgery Date: 10/13/21  Time of Surgery: Unknown.  Where patient plans to recover: At home with family  Fax number for surgical facility: [phone] and [phone].    Type of Anesthesia Anticipated: General    Assessment & Plan     The proposed surgical procedure is considered INTERMEDIATE risk.    Medically optimized for surgery.  Increased risk of obesity related hypoventilation with weight and sleep apnea history.  Encouraged to use breathing treatments the morning of procedure.    Preop general physical exam  - CBC with platelets; Future  - Asymptomatic COVID-19 Virus (Coronavirus) by PCR; Future  - CBC with platelets    Severe persistent asthma without complicatiom    WENDY (obstructive sleep apnea)    Obesity (BMI 35.0-39.9) with comorbidity (H)    Hyperlipidemia LDL goal <130           Risks and Recommendations:  The patient has the following additional risks and recommendations for perioperative complications:   - No identified additional risk factors other than previously addressed    Medication Instructions:  Hold NSAIDs a week prior.  Take Breo Ellipta and as needed albuterol the morning of procedure.  Hold supplements a week prior.    RECOMMENDATION:  APPROVAL GIVEN to proceed with proposed procedure, without further diagnostic evaluation.      At least minutes spent on the  date of the encounter doing chart review, coordinating with insurance the authorization for this to be done at [facility], history and exam, documentation and further activities per the note    Subjective     HPI related to upcoming procedure: Patient has had persistent sinus issues.  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    Preop Questions 10/1/2021   1. Have you ever had a heart attack or stroke? No   2. Have you ever had surgery on your heart or blood vessels, such as a stent placement, a coronary artery bypass, or surgery on an artery in your head, neck, heart, or legs? No   3. Do you have chest pain with activity? No   4. Do you have a history of  heart failure? No   5. Do you currently have a cold, bronchitis or symptoms of other infection? No   6. Do you have a cough, shortness of breath, or wheezing? No   7. Do you or anyone in your family have previous history of blood clots? No   8. Do you or does anyone in your family have a serious bleeding problem such as prolonged bleeding following surgeries or cuts? No   9. Have you ever had problems with anemia or been told to take iron pills? No   10. Have you had any abnormal blood loss such as black, tarry or bloody stools? No   11. Have you ever had a blood transfusion? No   12. Are you willing to have a blood transfusion if it is medically needed before, during, or after your surgery? Yes   13. Have you or any of your relatives ever had problems with anesthesia? YES - Struggled with succinylcholine   14. Do you have sleep apnea, excessive snoring or daytime drowsiness? YES - lili and has cpap. Doesn't use consistently   14a. Do you have a CPAP machine? Yes   15. Do you have any artifical heart valves or other implanted medical devices like a pacemaker, defibrillator, or continuous glucose monitor? No   16. Do you have artificial joints? No   17. Are you allergic to latex? No        Health Care Directive:  Patient does not have a Health Care Directive or Living Will: Discussed advance care planning with patient; information given to patient to review.    Preoperative Review of :   reviewed - no record of controlled substances prescribed.    Status of Chronic Conditions:  See problem list for active medical problems.  Problems all longstanding and stable, except as noted/documented.  See ROS for pertinent symptoms related to these conditions.      Review of Systems  CONSTITUTIONAL: NEGATIVE for fever, chills, change in weight  INTEGUMENTARY/SKIN: NEGATIVE for worrisome rashes, moles or lesions  EYES: NEGATIVE for vision changes or irritation  ENT/MOUTH: NEGATIVE for ear, mouth and throat problems  RESP: NEGATIVE for significant cough or SOB  CV: NEGATIVE for chest pain, palpitations or peripheral edema  GI: NEGATIVE for nausea, abdominal pain, heartburn, or change in bowel habits  : NEGATIVE for frequency, dysuria, or hematuria  MUSCULOSKELETAL: NEGATIVE for significant arthralgias or myalgia  NEURO: NEGATIVE for weakness, dizziness or paresthesias  ENDOCRINE: NEGATIVE for temperature intolerance, skin/hair changes  HEME: NEGATIVE for bleeding problems  PSYCHIATRIC: NEGATIVE for changes in mood or affect    Patient Active Problem List    Diagnosis Date Noted     History of diverticulitis 11/26/2019     Priority: Medium     Obesity (BMI 35.0-39.9) with comorbidity (H) 11/13/2019     Priority: Medium     WENDY (obstructive sleep apnea) 10/10/2013     Priority: Medium     HST 10/13 (230#)- AHI estimated to be 24 (supine AHI 40.9). Oxygen Favian was 86%       Heartburn 06/03/2013     Priority: Medium     ADHD (attention deficit hyperactivity disorder) 10/22/2012     Priority: Medium     GSE (gluten-sensitive enteropathy) 10/06/2011     Priority: Medium     Free of symptoms with gluten free diet        Hyperlipidemia LDL goal <130 10/06/2011     Priority: Medium     Anxiety 03/30/2011      Priority: Medium     Severe persistent asthma without complication 03/30/2011     Priority: Medium     Seasonal allergic rhinitis 03/30/2011     Priority: Medium      Past Medical History:   Diagnosis Date     Allergic rhinitis, cause unspecified      Asthma      Complication of anesthesia     did not respond well to succs     Unspecified asthma(493.90)      Past Surgical History:   Procedure Laterality Date     DAVINCI XI ASSISTED RESECTION RECTOSIGMOID N/A 11/26/2019    Procedure: ROBOTIC ASSISTED SIGMOID COLECTOMY. MOBILIZATION OF SPLENIC FLESURE;  Surgeon: Travis Brand MD;  Location: [facility] OR     ENDOSCOPIC POLYPECTOMY NASAL       LAPAROSCOPIC ASSISTED COLECTOMY       LASER HOLMIUM LITHOTRIPSY URETER(S), INSERT STENT, COMBINED Left 1/14/2015    Procedure: COMBINED CYSTOSCOPY, URETEROSCOPY, LASER HOLMIUM LITHOTRIPSY URETER(S), INSERT STENT;  Surgeon: Derrek Cobb MD;  Location: [facility] OR     LITHOTRIPSY       SEPTOPLASTY       Current Outpatient Medications   Medication Sig Dispense Refill     Acetaminophen (TYLENOL PO) Take 2 tablets by mouth daily as needed for mild pain or fever       albuterol (2.5 MG/3ML) 0.083% neb solution Take 1 vial (2.5 mg) by nebulization every 4 hours as needed for shortness of breath / dyspnea or wheezing 75 mL 3     albuterol (PROAIR RESPICLICK) 108 (90 Base) MCG/ACT inhaler Inhale 1-2 puffs into the lungs 4 times daily 1 each 3     BREO ELLIPTA 200-25 MCG/INH Inhaler INHALE 1 PUFF INTO THE LUNGS DAILY 28 each 4     fexofenadine-pseudoePHEDrine (ALLEGRA-D 24) 180-240 MG 24 hr tablet Take 1 tablet by mouth daily 90 tablet 1     ipratropium - albuterol 0.5 mg/2.5 mg/3 mL (DUONEB) 0.5-2.5 (3) MG/3ML neb solution Take 1 vial (3 mLs) by nebulization every 4 hours as needed for shortness of breath / dyspnea or wheezing 270 mL 1     Multiple Vitamins-Minerals (MULTI-VITAMIN GUMMIES) CHEW Take 2 chew tab by mouth daily as needed       order for DME Equipment being ordered: nebulizer 1  "Device 0     order for DME Nebulizer tubing and mouthpiece 1 Device 0     ORDER FOR DME Respironics REMSTAR 60 Series Auto CPAP 5-18cm H2O, MIrage Fx standard nasal mask       EPINEPHrine (EPIPEN 2-DONNY) 0.3 MG/0.3ML injection 2-pack Inject 0.3 mLs (0.3 mg) into the muscle once as needed for anaphylaxis (Patient not taking: Reported on 4/7/2021) 0.6 mL 0       Allergies   Allergen Reactions     Bee Pollen Anaphylaxis     Cats Difficulty breathing     Mold      Nkda [No Known Drug Allergies]      Trees         Social History     Tobacco Use     Smoking status: Never Smoker     Smokeless tobacco: Never Used   Substance Use Topics     Alcohol use: Yes     Family History   Problem Relation Age of Onset     Glaucoma Father      Glaucoma Maternal Grandfather      Macular Degeneration No family hx of      Hyperlipidemia Mother      No Known Problems Father      History   Drug Use No         Objective     /84   Pulse 94   Ht 1.689 m (5' 6.5\")   Wt 106.6 kg (235 lb)   SpO2 97%   BMI 37.36 kg/m      Physical Exam    GENERAL APPEARANCE: healthy, alert and no distress     EYES: EOMI,  PERRL     HENT: ear canals and TM's normal and nose and mouth without ulcers or lesions     NECK: no adenopathy, no asymmetry, masses, or scars and thyroid normal to palpation     RESP: lungs clear to auscultation - no rales, rhonchi or wheezes     CV: regular rates and rhythm, normal S1 S2, no S3 or S4 and no murmur, click or rub     ABDOMEN:  soft, nontender, no HSM or masses and bowel sounds normal     MS: extremities normal- no gross deformities noted, no evidence of inflammation in joints, FROM in all extremities.     SKIN: no suspicious lesions or rashes     NEURO: Normal strength and tone, sensory exam grossly normal, mentation intact and speech normal     PSYCH: mentation appears normal. and affect normal/bright     LYMPHATICS: No cervical adenopathy    Recent Labs   Lab Test 02/18/21  1518 07/13/20  2209   HGB 14.3 13.4*   PLT " 247 216    143   POTASSIUM 4.4 3.9   CR 0.92 1.00        Diagnostics:  Recent Results (from the past 168 hour(s))   CBC with platelets    Collection Time: 10/01/21 11:41 AM   Result Value Ref Range    WBC Count 8.8 4.0 - 11.0 10e3/uL    RBC Count 4.85 4.40 - 5.90 10e6/uL    Hemoglobin 13.8 13.3 - 17.7 g/dL    Hematocrit 42.1 40.0 - 53.0 %    MCV 87 78 - 100 fL    MCH 28.5 26.5 - 33.0 pg    MCHC 32.8 31.5 - 36.5 g/dL    RDW 13.3 10.0 - 15.0 %    Platelet Count 207 150 - 450 10e3/uL      No EKG required, no history of coronary heart disease, significant arrhythmia, peripheral arterial disease or other structural heart disease.    Revised Cardiac Risk Index (RCRI):  The patient has the following serious cardiovascular risks for perioperative complications:   - No serious cardiac risks = 0 points     RCRI Interpretation: 0 points: Class I (very low risk - 0.4% complication rate)           Signed Electronically by: Jonny Murrieta NP  Copy of this evaluation report is provided to requesting physician.

## 2021-10-03 PROBLEM — K57.92 ACUTE DIVERTICULITIS: Status: RESOLVED | Noted: 2019-09-29 | Resolved: 2021-10-03

## 2021-10-03 PROBLEM — J30.81 CHRONIC ALLERGIC RHINITIS DUE TO ANIMAL HAIR AND DANDER: Status: RESOLVED | Noted: 2018-01-08 | Resolved: 2021-10-03

## 2021-10-03 PROBLEM — U07.1 2019 NOVEL CORONAVIRUS DISEASE (COVID-19): Status: RESOLVED | Noted: 2020-11-06 | Resolved: 2021-10-03

## 2021-10-03 PROBLEM — Z87.19 HISTORY OF DIVERTICULITIS: Status: ACTIVE | Noted: 2019-11-26

## 2021-10-03 PROBLEM — J30.89 ALLERGIC RHINITIS DUE TO MOLD: Status: RESOLVED | Noted: 2018-01-08 | Resolved: 2021-10-03

## 2021-10-06 ENCOUNTER — HOSPITAL ENCOUNTER (EMERGENCY)
Facility: CLINIC | Age: 46
Discharge: HOME | End: 2021-10-06
Attending: EMERGENCY MEDICINE | Admitting: EMERGENCY MEDICINE
Payer: COMMERCIAL

## 2021-10-06 ENCOUNTER — APPOINTMENT (OUTPATIENT)
Dept: CT IMAGING | Facility: CLINIC | Age: 46
End: 2021-10-06
Attending: EMERGENCY MEDICINE
Payer: COMMERCIAL

## 2021-10-06 VITALS
BODY MASS INDEX: 37.04 KG/M2 | WEIGHT: 236 LBS | DIASTOLIC BLOOD PRESSURE: 82 MMHG | TEMPERATURE: 98.7 F | HEART RATE: 73 BPM | OXYGEN SATURATION: 96 % | HEIGHT: 67 IN | SYSTOLIC BLOOD PRESSURE: 141 MMHG | RESPIRATION RATE: 16 BRPM

## 2021-10-06 DIAGNOSIS — K57.32 DIVERTICULITIS OF COLON: ICD-10-CM

## 2021-10-06 LAB
ALBUMIN UR-MCNC: 20 MG/DL
ANION GAP SERPL CALCULATED.3IONS-SCNC: 9 MMOL/L (ref 5–18)
APPEARANCE UR: CLEAR
BASOPHILS # BLD AUTO: 0 10E3/UL (ref 0–0.2)
BASOPHILS NFR BLD AUTO: 0 %
BILIRUB UR QL STRIP: NEGATIVE
BUN SERPL-MCNC: 12 MG/DL (ref 8–22)
CALCIUM SERPL-MCNC: 9.7 MG/DL (ref 8.5–10.5)
CHLORIDE BLD-SCNC: 105 MMOL/L (ref 98–107)
CO2 SERPL-SCNC: 27 MMOL/L (ref 22–31)
COLOR UR AUTO: YELLOW
CREAT SERPL-MCNC: 0.9 MG/DL (ref 0.7–1.3)
EOSINOPHIL # BLD AUTO: 0.1 10E3/UL (ref 0–0.7)
EOSINOPHIL NFR BLD AUTO: 1 %
ERYTHROCYTE [DISTWIDTH] IN BLOOD BY AUTOMATED COUNT: 13.3 % (ref 10–15)
GFR SERPL CREATININE-BSD FRML MDRD: >90 ML/MIN/1.73M2
GLUCOSE BLD-MCNC: 95 MG/DL (ref 70–125)
GLUCOSE UR STRIP-MCNC: NEGATIVE MG/DL
HCT VFR BLD AUTO: 45.5 % (ref 40–53)
HGB BLD-MCNC: 14.8 G/DL (ref 13.3–17.7)
HGB UR QL STRIP: NEGATIVE
IMM GRANULOCYTES # BLD: 0 10E3/UL
IMM GRANULOCYTES NFR BLD: 0 %
KETONES UR STRIP-MCNC: NEGATIVE MG/DL
LEUKOCYTE ESTERASE UR QL STRIP: NEGATIVE
LYMPHOCYTES # BLD AUTO: 1.7 10E3/UL (ref 0.8–5.3)
LYMPHOCYTES NFR BLD AUTO: 19 %
MCH RBC QN AUTO: 27.8 PG (ref 26.5–33)
MCHC RBC AUTO-ENTMCNC: 32.5 G/DL (ref 31.5–36.5)
MCV RBC AUTO: 85 FL (ref 78–100)
MONOCYTES # BLD AUTO: 0.6 10E3/UL (ref 0–1.3)
MONOCYTES NFR BLD AUTO: 6 %
MUCOUS THREADS #/AREA URNS LPF: PRESENT /LPF
NEUTROPHILS # BLD AUTO: 6.8 10E3/UL (ref 1.6–8.3)
NEUTROPHILS NFR BLD AUTO: 74 %
NITRATE UR QL: NEGATIVE
NRBC # BLD AUTO: 0 10E3/UL
NRBC BLD AUTO-RTO: 0 /100
PH UR STRIP: 5.5 [PH] (ref 5–7)
PLATELET # BLD AUTO: 246 10E3/UL (ref 150–450)
POTASSIUM BLD-SCNC: 4.1 MMOL/L (ref 3.5–5)
RBC # BLD AUTO: 5.33 10E6/UL (ref 4.4–5.9)
RBC URINE: 1 /HPF
SODIUM SERPL-SCNC: 141 MMOL/L (ref 136–145)
SP GR UR STRIP: 1.05 (ref 1–1.03)
UROBILINOGEN UR STRIP-MCNC: <2 MG/DL
WBC # BLD AUTO: 9.3 10E3/UL (ref 4–11)
WBC URINE: 1 /HPF

## 2021-10-06 PROCEDURE — 99285 EMERGENCY DEPT VISIT HI MDM: CPT | Mod: 25

## 2021-10-06 PROCEDURE — 96374 THER/PROPH/DIAG INJ IV PUSH: CPT | Mod: 59

## 2021-10-06 PROCEDURE — 80048 BASIC METABOLIC PNL TOTAL CA: CPT | Performed by: EMERGENCY MEDICINE

## 2021-10-06 PROCEDURE — 36415 COLL VENOUS BLD VENIPUNCTURE: CPT | Performed by: EMERGENCY MEDICINE

## 2021-10-06 PROCEDURE — 250N000011 HC RX IP 250 OP 636: Performed by: PHYSICIAN ASSISTANT

## 2021-10-06 PROCEDURE — 81001 URINALYSIS AUTO W/SCOPE: CPT | Performed by: EMERGENCY MEDICINE

## 2021-10-06 PROCEDURE — 85004 AUTOMATED DIFF WBC COUNT: CPT | Performed by: EMERGENCY MEDICINE

## 2021-10-06 PROCEDURE — 250N000011 HC RX IP 250 OP 636: Performed by: EMERGENCY MEDICINE

## 2021-10-06 PROCEDURE — 74177 CT ABD & PELVIS W/CONTRAST: CPT

## 2021-10-06 RX ORDER — KETOROLAC TROMETHAMINE 15 MG/ML
15 INJECTION, SOLUTION INTRAMUSCULAR; INTRAVENOUS ONCE
Status: COMPLETED | OUTPATIENT
Start: 2021-10-06 | End: 2021-10-06

## 2021-10-06 RX ORDER — OXYCODONE AND ACETAMINOPHEN 5; 325 MG/1; MG/1
1-2 TABLET ORAL EVERY 4 HOURS PRN
Qty: 12 TABLET | Refills: 0 | Status: SHIPPED | OUTPATIENT
Start: 2021-10-06 | End: 2021-11-10

## 2021-10-06 RX ORDER — METRONIDAZOLE 500 MG/1
500 TABLET ORAL 3 TIMES DAILY
Qty: 21 TABLET | Refills: 0 | Status: SHIPPED | OUTPATIENT
Start: 2021-10-06 | End: 2021-10-13

## 2021-10-06 RX ORDER — IOPAMIDOL 755 MG/ML
100 INJECTION, SOLUTION INTRAVASCULAR ONCE
Status: COMPLETED | OUTPATIENT
Start: 2021-10-06 | End: 2021-10-06

## 2021-10-06 RX ORDER — CIPROFLOXACIN 500 MG/1
500 TABLET, FILM COATED ORAL 2 TIMES DAILY
Qty: 14 TABLET | Refills: 0 | Status: SHIPPED | OUTPATIENT
Start: 2021-10-06 | End: 2021-10-13

## 2021-10-06 RX ADMIN — KETOROLAC TROMETHAMINE 15 MG: 15 INJECTION, SOLUTION INTRAMUSCULAR; INTRAVENOUS at 13:13

## 2021-10-06 RX ADMIN — IOPAMIDOL 100 ML: 755 INJECTION, SOLUTION INTRAVENOUS at 14:21

## 2021-10-06 ASSESSMENT — ENCOUNTER SYMPTOMS
VOMITING: 0
FEVER: 0
NAUSEA: 0
ABDOMINAL PAIN: 1

## 2021-10-06 ASSESSMENT — MIFFLIN-ST. JEOR: SCORE: 1909.12

## 2021-10-06 NOTE — ED TRIAGE NOTES
Patient has LLQ abdominal pain since yesterday. Pain 6/10 at this time. Hx of diverticulitis. Nausea without vomiting

## 2021-10-06 NOTE — ED PROVIDER NOTES
EMERGENCY DEPARTMENT ENCOUNTER      NAME: Dano Kennedy  AGE: 46 year old male  YOB: 1975  MRN: [id number]  EVALUATION DATE & TIME: 10/6/2021  3:04 PM    PCP: Jonny Murrieta    ED PROVIDER: MAI King      Chief Complaint   Patient presents with     Abdominal Pain     Nausea       FINAL IMPRESSION:  1. Diverticulitis of colon        ED COURSE & MEDICAL DECISION MAKING:    Pertinent Labs & Imaging studies reviewed. (See chart for details)  46 year old male presents to the Emergency Department for evaluation of left lower quadrant pain and concern for diverticulitis.  Remote history of recurrent diverticulitis that ultimately resulted in bowel resection.  He has not had a flare for couple of years.  His symptoms today consistent with his diverticulitis pain of the past.  Laboratory testing unremarkable CT scan imaging confirmed early acute diverticulitis.  I think patient is a good candidate for outpatient management.  He will be discharged on a course of ciprofloxacin and Flagyl.  Recommended follow-up with his primary care doctor for recheck.  Prescribed a short course of pain medication.  Reviewed diagnosis treatment plan return precautions prior to discharge.      3:08 PM I met with the patient, obtained history, performed an initial exam, and discussed options and plan for diagnostics and treatment here in the ED. Updated on results. Discussed plan for discharge - patient agreeable.    Plan and all results were discussed  Time was taken to answer all questions. Patient and/or associated parties expressed understanding and were agreeable to the treatment plan.  Warning signs and ER return precautions provided. Discharged with discharge instructions outlining plan for further care and follow up.         MEDICATIONS GIVEN IN THE EMERGENCY:  Discharge Medication List as of 10/6/2021  3:59 PM      START taking these medications    Details   ciprofloxacin (CIPRO) 500 MG tablet Take 1 tablet (500  mg) by mouth 2 times daily for 7 days, Disp-14 tablet, R-0, E-Prescribe      metroNIDAZOLE (FLAGYL) 500 MG tablet Take 1 tablet (500 mg) by mouth 3 times daily for 7 days, Disp-21 tablet, R-0, E-PrescribeEat yogurt or cottage cheese daily to prevent diarrhea that can be caused by taking this antibiotic.      oxyCODONE-acetaminophen (PERCOCET) 5-325 MG tablet Take 1-2 tablets by mouth every 4 hours as needed for pain, Disp-12 tablet, R-0, E-Prescribe             NEW PRESCRIPTIONS STARTED AT TODAY'S ER VISIT  Discharge Medication List as of 10/6/2021  3:59 PM      START taking these medications    Details   ciprofloxacin (CIPRO) 500 MG tablet Take 1 tablet (500 mg) by mouth 2 times daily for 7 days, Disp-14 tablet, R-0, E-Prescribe      metroNIDAZOLE (FLAGYL) 500 MG tablet Take 1 tablet (500 mg) by mouth 3 times daily for 7 days, Disp-21 tablet, R-0, E-PrescribeEat yogurt or cottage cheese daily to prevent diarrhea that can be caused by taking this antibiotic.      oxyCODONE-acetaminophen (PERCOCET) 5-325 MG tablet Take 1-2 tablets by mouth every 4 hours as needed for pain, Disp-12 tablet, R-0, E-Prescribe         CONTINUE these medications which have NOT CHANGED    Details   Acetaminophen (TYLENOL PO) Take 2 tablets by mouth daily as needed for mild pain or fever, Historical      albuterol (2.5 MG/3ML) 0.083% neb solution Take 1 vial (2.5 mg) by nebulization every 4 hours as needed for shortness of breath / dyspnea or wheezing, Disp-75 mL, R-3, E-Prescribe      albuterol (PROAIR RESPICLICK) 108 (90 Base) MCG/ACT inhaler Inhale 1-2 puffs into the lungs 4 times daily, Disp-1 each, R-3, E-Prescribe      BREO ELLIPTA 200-25 MCG/INH Inhaler INHALE 1 PUFF INTO THE LUNGS DAILY, Disp-28 each, R-4, E-Prescribe      EPINEPHrine (EPIPEN 2-DONNY) 0.3 MG/0.3ML injection 2-pack Inject 0.3 mLs (0.3 mg) into the muscle once as needed for anaphylaxis, Disp-0.6 mL,R-0, E-Prescribe      fexofenadine-pseudoePHEDrine (ALLEGRA-D 24) 180-240  MG 24 hr tablet Take 1 tablet by mouth daily, Disp-90 tablet,R-1, E-Prescribe      ipratropium - albuterol 0.5 mg/2.5 mg/3 mL (DUONEB) 0.5-2.5 (3) MG/3ML neb solution Take 1 vial (3 mLs) by nebulization every 4 hours as needed for shortness of breath / dyspnea or wheezing, Disp-270 mL, R-1, E-Prescribe      Multiple Vitamins-Minerals (MULTI-VITAMIN GUMMIES) CHEW Take 2 chew tab by mouth daily as needed, Historical      !! order for DME Equipment being ordered: nebulizerDisp-1 Device, R-0, Local Print      !! order for DME Nebulizer tubing and mouthpieceDisp-1 Device, R-0, Local Print      !! ORDER FOR DME Respironics REMSTAR 60 Series Auto CPAP 5-18cm H2O, MIrage Fx standard nasal maskHistorical       !! - Potential duplicate medications found. Please discuss with provider.        =================================================================    HPI    Patient information was obtained from: Patient    Use of Intrepreter: N/A      Dano Kennedy is a 46 year old male with a pertinent medical history of hyperlipidemia, rectosigmoid resection, and diverticulitis, who presents by walk in for evaluation of abdominal pain.    Since yesterday, patient reports constant LLQ abdominal pain that comes and goes in intensity. Currently rates at 6/10. He notes a history of diverticulitis and had a colon resection 2 years ago. He denies any recurrent episodes of diverticulitis since his surgery, but states this feels identical to his diverticulitis in the past. He denies any fever, nausea, vomiting, stool changes, or any other symptoms at this time.    REVIEW OF SYSTEMS   Review of Systems   Constitutional: Negative for fever.   Gastrointestinal: Positive for abdominal pain (LLQ). Negative for nausea and vomiting.        Negative for stool changes.   All other systems reviewed and are negative.    PAST MEDICAL HISTORY:  Past Medical History:   Diagnosis Date     Allergic rhinitis, cause unspecified      Asthma       Complication of anesthesia     did not respond well to succs     Unspecified asthma(493.90)        PAST SURGICAL HISTORY:  Past Surgical History:   Procedure Laterality Date     DAVINCI XI ASSISTED RESECTION RECTOSIGMOID N/A 11/26/2019    Procedure: ROBOTIC ASSISTED SIGMOID COLECTOMY. MOBILIZATION OF SPLENIC FLESURE;  Surgeon: Travis Brand MD;  Location: [facility] OR     ENDOSCOPIC POLYPECTOMY NASAL       LAPAROSCOPIC ASSISTED COLECTOMY       LASER HOLMIUM LITHOTRIPSY URETER(S), INSERT STENT, COMBINED Left 1/14/2015    Procedure: COMBINED CYSTOSCOPY, URETEROSCOPY, LASER HOLMIUM LITHOTRIPSY URETER(S), INSERT STENT;  Surgeon: Derrek Cobb MD;  Location: [facility] OR     LITHOTRIPSY       SEPTOPLASTY         ALLERGIES:  Allergies   Allergen Reactions     Bee Pollen Anaphylaxis     Cats Difficulty breathing     Mold      Nkda [No Known Drug Allergies]      Trees        FAMILY HISTORY:  Family History   Problem Relation Age of Onset     Glaucoma Father      Glaucoma Maternal Grandfather      Macular Degeneration No family hx of      Hyperlipidemia Mother      No Known Problems Father        SOCIAL HISTORY:   Social History     Socioeconomic History     Marital status: Patient Declined     Spouse name: Not on file     Number of children: Not on file     Years of education: Not on file     Highest education level: Not on file   Occupational History     Not on file   Tobacco Use     Smoking status: Never Smoker     Smokeless tobacco: Never Used   Substance and Sexual Activity     Alcohol use: Yes     Drug use: No     Sexual activity: Yes     Partners: Female   Other Topics Concern     Parent/sibling w/ CABG, MI or angioplasty before 65F 55M? Not Asked   Social History Narrative     Not on file     Social Determinants of Health     Financial Resource Strain:      Difficulty of Paying Living Expenses:    Food Insecurity:      Worried About Running Out of Food in the Last Year:      Ran Out of Food in the Last Year:   "  Transportation Needs:      Lack of Transportation (Medical):      Lack of Transportation (Non-Medical):    Physical Activity:      Days of Exercise per Week:      Minutes of Exercise per Session:    Stress:      Feeling of Stress :    Social Connections:      Frequency of Communication with Friends and Family:      Frequency of Social Gatherings with Friends and Family:      Attends Sikhism Services:      Active Member of Clubs or Organizations:      Attends Club or Organization Meetings:      Marital Status:    Intimate Partner Violence:      Fear of Current or Ex-Partner:      Emotionally Abused:      Physically Abused:      Sexually Abused:        VITALS:  Patient Vitals for the past 24 hrs:   BP Temp Temp src Pulse Resp SpO2 Height Weight   10/06/21 1600 (!) 141/82 -- -- 73 16 96 % -- --   10/06/21 1301 -- -- -- -- 18 -- -- --   10/06/21 1300 (!) 168/92 98.7  F (37.1  C) Oral 79 -- 96 % 1.702 m (5' 7\") 107 kg (236 lb)       PHYSICAL EXAM    Constitutional:  Well developed, Well nourished, NAD  HENT:  Normocephalic, Atraumatic, Bilateral external ears normal, Oropharynx moist Nose normal.   Neck: Normal range of motion   Eyes: Conjunctiva normal, No discharge.   Respiratory:  Normal breath sounds, No respiratory distress, No wheezing.  No cough.  Cardiovascular:  Normal heart rate, Normal rhythm. No murmur appreciated.  Chest wall non-tender.  GI:   Left lower quadrant pain to palpation.  : No CVA tenderness  Musculoskeletal: Full ROM.  No edema appreciated.  No cyanosis. Good ROM of major joints.  Integument:  Warm, Dry, No erythema, No rash.    Neurologic:  Alert & oriented.  No focal deficits appreciated.  Ambulatory.  Psychiatric:  Affect normal, Judgment normal, Mood normal.     LAB:  All pertinent labs reviewed and interpreted.  Results for orders placed or performed during the hospital encounter of 10/06/21   CT Abdomen Pelvis w Contrast    Impression    IMPRESSION:     1.  Very subtle stranding " along the proximal sigmoid colon raising concern for early acute diverticulitis.    2.  Remaining bowel negative.    3.  No free air or abscess.    4.  Hepatic steatosis.       Basic metabolic panel   Result Value Ref Range    Sodium 141 136 - 145 mmol/L    Potassium 4.1 3.5 - 5.0 mmol/L    Chloride 105 98 - 107 mmol/L    Carbon Dioxide (CO2) 27 22 - 31 mmol/L    Anion Gap 9 5 - 18 mmol/L    Urea Nitrogen 12 8 - 22 mg/dL    Creatinine 0.90 0.70 - 1.30 mg/dL    Calcium 9.7 8.5 - 10.5 mg/dL    Glucose 95 70 - 125 mg/dL    GFR Estimate >90 >60 mL/min/1.73m2   CBC with platelets and differential   Result Value Ref Range    WBC Count 9.3 4.0 - 11.0 10e3/uL    RBC Count 5.33 4.40 - 5.90 10e6/uL    Hemoglobin 14.8 13.3 - 17.7 g/dL    Hematocrit 45.5 40.0 - 53.0 %    MCV 85 78 - 100 fL    MCH 27.8 26.5 - 33.0 pg    MCHC 32.5 31.5 - 36.5 g/dL    RDW 13.3 10.0 - 15.0 %    Platelet Count 246 150 - 450 10e3/uL    % Neutrophils 74 %    % Lymphocytes 19 %    % Monocytes 6 %    % Eosinophils 1 %    % Basophils 0 %    % Immature Granulocytes 0 %    NRBCs per 100 WBC 0 <1 /100    Absolute Neutrophils 6.8 1.6 - 8.3 10e3/uL    Absolute Lymphocytes 1.7 0.8 - 5.3 10e3/uL    Absolute Monocytes 0.6 0.0 - 1.3 10e3/uL    Absolute Eosinophils 0.1 0.0 - 0.7 10e3/uL    Absolute Basophils 0.0 0.0 - 0.2 10e3/uL    Absolute Immature Granulocytes 0.0 <=0.0 10e3/uL    Absolute NRBCs 0.0 10e3/uL   UA with Microscopic reflex to Culture    Specimen: Urine, Clean Catch   Result Value Ref Range    Color Urine Yellow Colorless, Straw, Light Yellow, Yellow    Appearance Urine Clear Clear    Glucose Urine Negative Negative mg/dL    Bilirubin Urine Negative Negative    Ketones Urine Negative Negative mg/dL    Specific Gravity Urine 1.046 (H) 1.001 - 1.030    Blood Urine Negative Negative    pH Urine 5.5 5.0 - 7.0    Protein Albumin Urine 20  (A) Negative mg/dL    Urobilinogen Urine <2.0 <2.0 mg/dL    Nitrite Urine Negative Negative    Leukocyte Esterase  Urine Negative Negative    Mucus Urine Present (A) None Seen /LPF    RBC Urine 1 <=2 /HPF    WBC Urine 1 <=5 /HPF       RADIOLOGY:  Reviewed all pertinent imaging. Please see official radiology report.  CT Abdomen Pelvis w Contrast   Final Result   IMPRESSION:       1.  Very subtle stranding along the proximal sigmoid colon raising concern for early acute diverticulitis.      2.  Remaining bowel negative.      3.  No free air or abscess.      4.  Hepatic steatosis.                I, Ge Cordero, am serving as a scribe to document services personally performed by Dr. King based on my observation and the provider's statements to me. I, Paolo King MD attest that Ge Cordero is acting in a scribe capacity, has observed my performance of the services and has documented them in accordance with my direction.    Paolo King M.D.  Emergency Medicine  [facility] EMERGENCY ROOM  [address] [facility] [address]  [phone]  Dept: [phone]     Paolo King MD  10/06/21 8362

## 2021-10-06 NOTE — DISCHARGE INSTRUCTIONS
Your CT scan demonstrated mild diverticulitis.  Please take antibiotics as prescribed.  Rest and drink plenty of fluids.  Expect improvement to your symptoms over the next 48 to 72 hours.  If you have escalating pain develop fever or worsening symptoms please return to your nearest emergency department for repeat assessment.

## 2021-10-07 ENCOUNTER — PATIENT OUTREACH (OUTPATIENT)
Dept: CARE COORDINATION | Facility: CLINIC | Age: 46
End: 2021-10-07

## 2021-10-07 DIAGNOSIS — Z71.89 OTHER SPECIFIED COUNSELING: ICD-10-CM

## 2021-10-07 NOTE — PROGRESS NOTES
"Clinic Care Coordination Contact  [facility]: Post-Discharge Note  SITUATION                                                      Admission:    Admission Date: 10/06/21   Reason for Admission: Abdominal Pain   Nausea  Discharge:   Discharge Date: 10/06/21  Discharge Diagnosis: Diverticulitis of colon    BACKGROUND                                                      Dano Kennedy is a 46 year old male with a pertinent medical history of hyperlipidemia, rectosigmoid resection, and diverticulitis, who presents by walk in for evaluation of abdominal pain.     Since yesterday, patient reports constant LLQ abdominal pain that comes and goes in intensity. Currently rates at 6/10. He notes a history of diverticulitis and had a colon resection 2 years ago. He denies any recurrent episodes of diverticulitis since his surgery, but states this feels identical to his diverticulitis in the past. He denies any fever, nausea, vomiting, stool changes, or any other symptoms at this time.       ASSESSMENT      Enrollment  Primary Care Care Coordination Status: Not a Candidate    Discharge Assessment  How are you doing now that you are home?: \" I'm doing okay\"  How are your symptoms? (Red Flag symptoms escalate to triage hotline per guidelines): Improved  Do you feel your condition is stable enough to be safe at home until your provider visit?: Yes  Does the patient have their discharge instructions? : No - Review discharge instructions  Does the patient have questions regarding their discharge instructions? : No  Were you started on any new medications or were there changes to any of your previous medications? : Yes  Does the patient have all of their medications?: Yes  Do you have questions regarding any of your medications? : No  Do you have all of your needed medical supplies or equipment (DME)?  (i.e. oxygen tank, CPAP, cane, etc.): Yes  Discharge follow-up appointment scheduled within 14 calendar days? : Yes  Discharge " Follow Up Appointment Date: 10/13/21  Discharge Follow Up Appointment Scheduled with?: Specialty Care Provider    Post-op ([facility] Only)  If the patient had a surgery or procedure, do they have any questions for a nurse?: No             PLAN                                                      Outpatient Plan: Schedule an appointment with Jonny Murrieta NP  as soon as possible for a visit    Future Appointments   Date Time Provider Department Center   10/8/2021  2:45 PM [facility] COVID LAB [facility]         For any urgent concerns, please contact our 24 hour nurse triage line: [phone] ([phone])         Eli Gordon MA

## 2021-10-08 ENCOUNTER — LAB (OUTPATIENT)
Dept: LAB | Facility: CLINIC | Age: 46
End: 2021-10-08
Payer: COMMERCIAL

## 2021-10-08 DIAGNOSIS — J45.50 SEVERE PERSISTENT ASTHMA WITHOUT COMPLICATION (H): ICD-10-CM

## 2021-10-08 DIAGNOSIS — Z01.818 PREOP GENERAL PHYSICAL EXAM: ICD-10-CM

## 2021-10-08 PROCEDURE — U0005 INFEC AGEN DETEC AMPLI PROBE: HCPCS | Performed by: NURSE PRACTITIONER

## 2021-10-08 PROCEDURE — U0003 INFECTIOUS AGENT DETECTION BY NUCLEIC ACID (DNA OR RNA); SEVERE ACUTE RESPIRATORY SYNDROME CORONAVIRUS 2 (SARS-COV-2) (CORONAVIRUS DISEASE [COVID-19]), AMPLIFIED PROBE TECHNIQUE, MAKING USE OF HIGH THROUGHPUT TECHNOLOGIES AS DESCRIBED BY CMS-2020-01-R: HCPCS | Performed by: NURSE PRACTITIONER

## 2021-10-09 LAB — SARS-COV-2 RNA RESP QL NAA+PROBE: NEGATIVE

## 2021-10-09 NOTE — TELEPHONE ENCOUNTER
"Routing refill request to provider for review/approval because:  Labs not current:  ACT  Last OV > 6 months ago    Last Written Prescription Date:  5/3/21  Last Fill Quantity: 28,  # refills: 4   Last office visit provider:  2/8/21     Requested Prescriptions   Pending Prescriptions Disp Refills     BREO ELLIPTA 200-25 MCG/INH Inhaler [Pharmacy Med Name: BREO ELLIPTA 200-25MCG ORAL INH(30)]       Sig: INHALE 1 PUFF BY MOUTH DAILY       Inhaled Steroids Protocol Failed - 10/8/2021  7:54 AM        Failed - Asthma control assessment score within normal limits in last 6 months     Please review ACT score.           Failed - Recent (6 mo) or future (30 days) visit within the authorizing provider's specialty     Patient had office visit in the last 6 months or has a visit in the next 30 days with authorizing provider or within the authorizing provider's specialty.  See \"Patient Info\" tab in inbasket, or \"Choose Columns\" in Meds & Orders section of the refill encounter.            Passed - Patient is age 12 or older        Passed - Medication is active on med list             aidan gonzalez RN 10/09/21 10:41 AM  "

## 2021-11-01 ENCOUNTER — TRANSFERRED RECORDS (OUTPATIENT)
Dept: HEALTH INFORMATION MANAGEMENT | Facility: CLINIC | Age: 46
End: 2021-11-01
Payer: COMMERCIAL

## 2021-11-01 ENCOUNTER — TELEPHONE (OUTPATIENT)
Dept: FAMILY MEDICINE | Facility: CLINIC | Age: 46
End: 2021-11-01

## 2021-11-03 ENCOUNTER — E-VISIT (OUTPATIENT)
Dept: FAMILY MEDICINE | Facility: CLINIC | Age: 46
End: 2021-11-03
Payer: COMMERCIAL

## 2021-11-03 DIAGNOSIS — J06.9 VIRAL URI WITH COUGH: Primary | ICD-10-CM

## 2021-11-03 PROCEDURE — 99421 OL DIG E/M SVC 5-10 MIN: CPT | Performed by: PHYSICIAN ASSISTANT

## 2021-11-03 NOTE — PATIENT INSTRUCTIONS
Dear Dano Kennedy    You are too old to be tested for RSV.  People in your age group do not get RSV.  You likely have a viral upper respiratory infection causing asthma exacerbation.  If your symptoms are not improving or they worsen while you are taking your albuterol and prednisone then be seen in the urgent care    Thanks for choosing us as your health care partner,    Toi Garsia PA-C

## 2021-11-05 ENCOUNTER — HOSPITAL ENCOUNTER (EMERGENCY)
Facility: CLINIC | Age: 46
Discharge: HOME | End: 2021-11-06
Attending: EMERGENCY MEDICINE | Admitting: EMERGENCY MEDICINE
Payer: COMMERCIAL

## 2021-11-05 DIAGNOSIS — R42 LIGHTHEADED: ICD-10-CM

## 2021-11-05 DIAGNOSIS — R06.02 EXERTIONAL SHORTNESS OF BREATH: ICD-10-CM

## 2021-11-05 DIAGNOSIS — J45.901 EXACERBATION OF ASTHMA, UNSPECIFIED ASTHMA SEVERITY, UNSPECIFIED WHETHER PERSISTENT: ICD-10-CM

## 2021-11-05 PROCEDURE — 96361 HYDRATE IV INFUSION ADD-ON: CPT

## 2021-11-05 PROCEDURE — 99285 EMERGENCY DEPT VISIT HI MDM: CPT | Mod: 25

## 2021-11-05 PROCEDURE — 96365 THER/PROPH/DIAG IV INF INIT: CPT | Mod: 59

## 2021-11-05 PROCEDURE — 96375 TX/PRO/DX INJ NEW DRUG ADDON: CPT

## 2021-11-06 ENCOUNTER — E-VISIT (OUTPATIENT)
Dept: URGENT CARE | Facility: URGENT CARE | Age: 46
End: 2021-11-06

## 2021-11-06 ENCOUNTER — MYC MEDICAL ADVICE (OUTPATIENT)
Dept: FAMILY MEDICINE | Facility: CLINIC | Age: 46
End: 2021-11-06

## 2021-11-06 ENCOUNTER — APPOINTMENT (OUTPATIENT)
Dept: CT IMAGING | Facility: CLINIC | Age: 46
End: 2021-11-06
Attending: EMERGENCY MEDICINE
Payer: COMMERCIAL

## 2021-11-06 VITALS
HEART RATE: 82 BPM | SYSTOLIC BLOOD PRESSURE: 149 MMHG | RESPIRATION RATE: 13 BRPM | DIASTOLIC BLOOD PRESSURE: 86 MMHG | TEMPERATURE: 97.7 F | BODY MASS INDEX: 36.02 KG/M2 | WEIGHT: 230 LBS | OXYGEN SATURATION: 96 %

## 2021-11-06 DIAGNOSIS — J45.50 SEVERE PERSISTENT ASTHMA WITHOUT COMPLICATION (H): ICD-10-CM

## 2021-11-06 DIAGNOSIS — R06.2 WHEEZE: Primary | ICD-10-CM

## 2021-11-06 LAB
ALBUMIN SERPL-MCNC: 4.2 G/DL (ref 3.5–5)
ALP SERPL-CCNC: 116 U/L (ref 45–120)
ALT SERPL W P-5'-P-CCNC: 91 U/L (ref 0–45)
ANION GAP SERPL CALCULATED.3IONS-SCNC: 15 MMOL/L (ref 5–18)
AST SERPL W P-5'-P-CCNC: 40 U/L (ref 0–40)
BASOPHILS # BLD AUTO: 0 10E3/UL (ref 0–0.2)
BASOPHILS NFR BLD AUTO: 0 %
BILIRUB SERPL-MCNC: 0.3 MG/DL (ref 0–1)
BNP SERPL-MCNC: <10 PG/ML (ref 0–36)
BUN SERPL-MCNC: 17 MG/DL (ref 8–22)
C REACTIVE PROTEIN LHE: <0.1 MG/DL (ref 0–0.8)
CALCIUM SERPL-MCNC: 9.8 MG/DL (ref 8.5–10.5)
CHLORIDE BLD-SCNC: 106 MMOL/L (ref 98–107)
CO2 SERPL-SCNC: 18 MMOL/L (ref 22–31)
CREAT SERPL-MCNC: 1.02 MG/DL (ref 0.7–1.3)
EOSINOPHIL # BLD AUTO: 0 10E3/UL (ref 0–0.7)
EOSINOPHIL NFR BLD AUTO: 0 %
ERYTHROCYTE [DISTWIDTH] IN BLOOD BY AUTOMATED COUNT: 13.1 % (ref 10–15)
FLUAV RNA SPEC QL NAA+PROBE: NEGATIVE
FLUBV RNA RESP QL NAA+PROBE: NEGATIVE
GFR SERPL CREATININE-BSD FRML MDRD: 88 ML/MIN/1.73M2
GLUCOSE BLD-MCNC: 133 MG/DL (ref 70–125)
HCT VFR BLD AUTO: 42.9 % (ref 40–53)
HGB BLD-MCNC: 14.5 G/DL (ref 13.3–17.7)
IMM GRANULOCYTES # BLD: 0.2 10E3/UL
IMM GRANULOCYTES NFR BLD: 1 %
LACTATE SERPL-SCNC: 2.6 MMOL/L (ref 0.7–2)
LACTATE SERPL-SCNC: 2.9 MMOL/L (ref 0.7–2)
LYMPHOCYTES # BLD AUTO: 3.3 10E3/UL (ref 0.8–5.3)
LYMPHOCYTES NFR BLD AUTO: 20 %
MCH RBC QN AUTO: 28.2 PG (ref 26.5–33)
MCHC RBC AUTO-ENTMCNC: 33.8 G/DL (ref 31.5–36.5)
MCV RBC AUTO: 84 FL (ref 78–100)
MONOCYTES # BLD AUTO: 1.3 10E3/UL (ref 0–1.3)
MONOCYTES NFR BLD AUTO: 8 %
NEUTROPHILS # BLD AUTO: 11.9 10E3/UL (ref 1.6–8.3)
NEUTROPHILS NFR BLD AUTO: 71 %
NRBC # BLD AUTO: 0 10E3/UL
NRBC BLD AUTO-RTO: 0 /100
PLATELET # BLD AUTO: 311 10E3/UL (ref 150–450)
POTASSIUM BLD-SCNC: 3.2 MMOL/L (ref 3.5–5)
PROT SERPL-MCNC: 7.7 G/DL (ref 6–8)
RBC # BLD AUTO: 5.14 10E6/UL (ref 4.4–5.9)
SARS-COV-2 RNA RESP QL NAA+PROBE: NEGATIVE
SODIUM SERPL-SCNC: 139 MMOL/L (ref 136–145)
T4 FREE SERPL-MCNC: 0.95 NG/DL (ref 0.7–1.8)
TROPONIN I SERPL-MCNC: <0.01 NG/ML (ref 0–0.29)
TROPONIN I SERPL-MCNC: <0.01 NG/ML (ref 0–0.29)
TSH SERPL DL<=0.005 MIU/L-ACNC: 5.11 UIU/ML (ref 0.3–5)
WBC # BLD AUTO: 16.9 10E3/UL (ref 4–11)

## 2021-11-06 PROCEDURE — 84484 ASSAY OF TROPONIN QUANT: CPT | Performed by: EMERGENCY MEDICINE

## 2021-11-06 PROCEDURE — 93005 ELECTROCARDIOGRAM TRACING: CPT | Performed by: EMERGENCY MEDICINE

## 2021-11-06 PROCEDURE — 84443 ASSAY THYROID STIM HORMONE: CPT | Performed by: EMERGENCY MEDICINE

## 2021-11-06 PROCEDURE — 36415 COLL VENOUS BLD VENIPUNCTURE: CPT | Performed by: EMERGENCY MEDICINE

## 2021-11-06 PROCEDURE — 250N000011 HC RX IP 250 OP 636: Performed by: EMERGENCY MEDICINE

## 2021-11-06 PROCEDURE — 83880 ASSAY OF NATRIURETIC PEPTIDE: CPT | Performed by: EMERGENCY MEDICINE

## 2021-11-06 PROCEDURE — 86141 C-REACTIVE PROTEIN HS: CPT | Performed by: EMERGENCY MEDICINE

## 2021-11-06 PROCEDURE — 258N000003 HC RX IP 258 OP 636: Performed by: EMERGENCY MEDICINE

## 2021-11-06 PROCEDURE — 84075 ASSAY ALKALINE PHOSPHATASE: CPT | Performed by: EMERGENCY MEDICINE

## 2021-11-06 PROCEDURE — 87040 BLOOD CULTURE FOR BACTERIA: CPT | Performed by: EMERGENCY MEDICINE

## 2021-11-06 PROCEDURE — 84439 ASSAY OF FREE THYROXINE: CPT | Performed by: EMERGENCY MEDICINE

## 2021-11-06 PROCEDURE — 99421 OL DIG E/M SVC 5-10 MIN: CPT | Performed by: FAMILY MEDICINE

## 2021-11-06 PROCEDURE — 87636 SARSCOV2 & INF A&B AMP PRB: CPT | Performed by: EMERGENCY MEDICINE

## 2021-11-06 PROCEDURE — 83605 ASSAY OF LACTIC ACID: CPT | Performed by: EMERGENCY MEDICINE

## 2021-11-06 PROCEDURE — 85025 COMPLETE CBC W/AUTO DIFF WBC: CPT | Performed by: EMERGENCY MEDICINE

## 2021-11-06 PROCEDURE — 71275 CT ANGIOGRAPHY CHEST: CPT

## 2021-11-06 RX ORDER — PREDNISONE 10 MG/1
TABLET ORAL
Qty: 7 TABLET | Refills: 0 | Status: SHIPPED | OUTPATIENT
Start: 2021-11-06 | End: 2021-11-18

## 2021-11-06 RX ORDER — ALBUTEROL SULFATE 0.83 MG/ML
2.5 SOLUTION RESPIRATORY (INHALATION) EVERY 4 HOURS PRN
Qty: 75 ML | Refills: 3 | Status: ON HOLD | OUTPATIENT
Start: 2021-11-06 | End: 2023-08-30

## 2021-11-06 RX ORDER — MAGNESIUM SULFATE HEPTAHYDRATE 40 MG/ML
2 INJECTION, SOLUTION INTRAVENOUS ONCE
Status: COMPLETED | OUTPATIENT
Start: 2021-11-06 | End: 2021-11-06

## 2021-11-06 RX ORDER — METHYLPREDNISOLONE SODIUM SUCCINATE 125 MG/2ML
125 INJECTION, POWDER, LYOPHILIZED, FOR SOLUTION INTRAMUSCULAR; INTRAVENOUS ONCE
Status: COMPLETED | OUTPATIENT
Start: 2021-11-06 | End: 2021-11-06

## 2021-11-06 RX ORDER — AZITHROMYCIN 250 MG/1
TABLET, FILM COATED ORAL
Qty: 6 TABLET | Refills: 0 | Status: SHIPPED | OUTPATIENT
Start: 2021-11-06 | End: 2021-11-18

## 2021-11-06 RX ORDER — SODIUM CHLORIDE 9 MG/ML
INJECTION, SOLUTION INTRAVENOUS CONTINUOUS
Status: DISCONTINUED | OUTPATIENT
Start: 2021-11-06 | End: 2021-11-06 | Stop reason: HOSPADM

## 2021-11-06 RX ORDER — IOPAMIDOL 755 MG/ML
100 INJECTION, SOLUTION INTRAVASCULAR ONCE
Status: COMPLETED | OUTPATIENT
Start: 2021-11-06 | End: 2021-11-06

## 2021-11-06 RX ORDER — IPRATROPIUM BROMIDE AND ALBUTEROL SULFATE 2.5; .5 MG/3ML; MG/3ML
1 SOLUTION RESPIRATORY (INHALATION) EVERY 4 HOURS PRN
Qty: 270 ML | Refills: 1 | Status: SHIPPED | OUTPATIENT
Start: 2021-11-06 | End: 2022-12-12

## 2021-11-06 RX ADMIN — METHYLPREDNISOLONE SODIUM SUCCINATE 125 MG: 125 INJECTION, POWDER, FOR SOLUTION INTRAMUSCULAR; INTRAVENOUS at 00:26

## 2021-11-06 RX ADMIN — IOPAMIDOL 100 ML: 755 INJECTION, SOLUTION INTRAVENOUS at 00:47

## 2021-11-06 RX ADMIN — MAGNESIUM SULFATE HEPTAHYDRATE 2 G: 40 INJECTION, SOLUTION INTRAVENOUS at 01:44

## 2021-11-06 RX ADMIN — SODIUM CHLORIDE 1000 ML: 9 INJECTION, SOLUTION INTRAVENOUS at 00:26

## 2021-11-06 ASSESSMENT — ENCOUNTER SYMPTOMS
COUGH: 1
CHEST TIGHTNESS: 1
ABDOMINAL PAIN: 0
FEVER: 0
SHORTNESS OF BREATH: 1

## 2021-11-06 NOTE — ED PROVIDER NOTES
EMERGENCY DEPARTMENT ENCOUNTER       ED Course & Medical Decision Making         11:53 PM I met with the patient for the initial interview and physical examination. Discussed plan for treatment and workup in the ED.        I did see and examined the patient.  IV was established and he was placed on the monitor.  Diagnostics were ordered.    I did independently interpret EKG done today at 2352.  Sinus tachycardia with a rate of 114 bpm.  Intervals and axis are normal.  No significant ST elevation or depression.  No pathological Q waves.  Compared to EKG from November 2019 the rate is significantly increased.    Given that this does feel different than his regular asthma and he is tachycardic I do feel that ruling out pulmonary embolism would be indicated.  I will also send off a troponin and check some additional labs and repeat his Covid test.    He was treated with some IV fluids to help with his tachycardia and given some IV Solu-Medrol.    He will be signed out to the incoming physician at shift change with CT pending    Prior to making a final disposition on this patient the results of patient's tests and other diagnostic studies were discussed with the patient. All questions were answered. Patient expressed understanding of the plan and was amenable to it.  I did see the patient while wearing full COVID-compliant PPE.     Medications   0.9% sodium chloride BOLUS (1,000 mLs Intravenous New Bag 11/6/21 0026)     Followed by   sodium chloride 0.9% infusion (has no administration in time range)   iopamidol (ISOVUE-370) solution 100 mL (has no administration in time range)   methylPREDNISolone sodium succinate (solu-MEDROL) injection 125 mg (125 mg Intravenous Given 11/6/21 0026)       Final Impression     Shortness of breath      Chief Complaint     Chief Complaint   Patient presents with     Shortness of Breath       No notes on file    HPI       Dano Kennedy is a 46 year old male with a pertinent medical  history of severe persistent asthma, hyperlipidemia, dyslipidemia, anxiety, and diverticulitis, who presents via EMS for evaluation of shortness of breath.     Patient reports shortness of breath and chest tightness for a little over 1 week. He also endorses a cough and states that it feels as if someone is sitting on his chest. He was seen in the Urgency Room earlier this week and tested negative for COVID-19. He also had a CXR and was treated for asthma exacerbation with a course of steroids which he has since finished. He has also been taking albuterol at home without relief. He believes that his current symptoms feel different and worse than his typical asthma exacerbation. Patient denies any known cardiac history including any history of blood clots or leg swelling. Patient denies any fevers, and any other symptoms or complaints at this time.     Of note, patient has a history of diverticulitis and had onset of abdominal pain 3 days ago and was started on Augmentin with improvement.       I, Julia Yoo am serving as a scribe to document services personally performed by Bill Bradley M.D. based on my observation and the provider's statements to me. I, Bill Bradley M.D attest that Julia Yoo is acting in a scribe capacity, has observed my performance of the services and has documented them in accordance with my direction.    Past Medical History     Past Medical History:   Diagnosis Date     Allergic rhinitis, cause unspecified      Asthma      Complication of anesthesia      Unspecified asthma(493.90)      Past Surgical History:   Procedure Laterality Date     DAVINCI XI ASSISTED RESECTION RECTOSIGMOID N/A 11/26/2019    Procedure: ROBOTIC ASSISTED SIGMOID COLECTOMY. MOBILIZATION OF SPLENIC FLESURE;  Surgeon: Travis Brand MD;  Location: [facility] OR     ENDOSCOPIC POLYPECTOMY NASAL       LAPAROSCOPIC ASSISTED COLECTOMY       LASER HOLMIUM LITHOTRIPSY URETER(S), INSERT STENT, COMBINED Left 1/14/2015     Procedure: COMBINED CYSTOSCOPY, URETEROSCOPY, LASER HOLMIUM LITHOTRIPSY URETER(S), INSERT STENT;  Surgeon: Derrek Cobb MD;  Location: [facility] OR     LITHOTRIPSY       SEPTOPLASTY       Family History   Problem Relation Age of Onset     Glaucoma Father      Glaucoma Maternal Grandfather      Macular Degeneration No family hx of      Hyperlipidemia Mother      No Known Problems Father       Social History     Tobacco Use     Smoking status: Never Smoker     Smokeless tobacco: Never Used   Substance Use Topics     Alcohol use: Yes     Drug use: No       Relevant past medical, surgical, family and social history as documented above, has been reviewed and discussed with patient. No changes or additions, unless otherwise noted in the HPI.    Current Medications     Acetaminophen (TYLENOL PO)  albuterol (2.5 MG/3ML) 0.083% neb solution  albuterol (PROAIR RESPICLICK) 108 (90 Base) MCG/ACT inhaler  BREO ELLIPTA 200-25 MCG/INH Inhaler  EPINEPHrine (EPIPEN 2-DONNY) 0.3 MG/0.3ML injection 2-pack  fexofenadine-pseudoePHEDrine (ALLEGRA-D 24) 180-240 MG 24 hr tablet  ipratropium - albuterol 0.5 mg/2.5 mg/3 mL (DUONEB) 0.5-2.5 (3) MG/3ML neb solution  Multiple Vitamins-Minerals (MULTI-VITAMIN GUMMIES) CHEW  order for DME  order for DME  ORDER FOR DME  oxyCODONE-acetaminophen (PERCOCET) 5-325 MG tablet        Allergies     Allergies   Allergen Reactions     Bee Pollen Anaphylaxis     Cats Difficulty breathing     Mold      Nkda [No Known Drug Allergies]      Trees        Review of Systems     Review of Systems   Constitutional: Negative for fever.   Respiratory: Positive for cough, chest tightness and shortness of breath.    Cardiovascular: Negative for chest pain.   Gastrointestinal: Negative for abdominal pain.   All other systems reviewed and are negative.       Remainder of systems reviewed, unless noted in HPI all others negative.    Physical Exam     BP (!) 149/86   Pulse (!) 123   Temp 97.7  F (36.5  C) (Oral)   Resp 24    Wt 104.3 kg (230 lb)   SpO2 98%   BMI 36.02 kg/m      Physical Exam  Vitals and nursing note reviewed.   Constitutional:       General: He is not in acute distress.  HENT:      Head: Normocephalic.      Nose: Nose normal.   Eyes:      General: No scleral icterus.  Cardiovascular:      Rate and Rhythm: Tachycardia present.   Pulmonary:      Comments: Mild conversational dyspnea.  Decreased throughout with mild wheezing but no respiratory distress.  Musculoskeletal:      Cervical back: Neck supple.   Skin:     Findings: No rash.   Neurological:      Mental Status: He is alert.   Psychiatric:         Mood and Affect: Mood normal.             Labs & Imaging         Labs Ordered and Resulted from Time of ED Arrival to Time of ED Departure   LACTIC ACID WHOLE BLOOD - Abnormal       Result Value    Lactic Acid 2.9 (*)    CBC WITH PLATELETS AND DIFFERENTIAL - Abnormal    WBC Count 16.9 (*)     RBC Count 5.14      Hemoglobin 14.5      Hematocrit 42.9      MCV 84      MCH 28.2      MCHC 33.8      RDW 13.1      Platelet Count 311      % Neutrophils 71      % Lymphocytes 20      % Monocytes 8      % Eosinophils 0      % Basophils 0      % Immature Granulocytes 1      NRBCs per 100 WBC 0      Absolute Neutrophils 11.9 (*)     Absolute Lymphocytes 3.3      Absolute Monocytes 1.3      Absolute Eosinophils 0.0      Absolute Basophils 0.0      Absolute Immature Granulocytes 0.2 (*)     Absolute NRBCs 0.0     COMPREHENSIVE METABOLIC PANEL   TROPONIN I   B-TYPE NATRIURETIC PEPTIDE ( [facility] ONLY)   CRP INFLAMMATION   TSH WITH FREE T4 REFLEX   INFLUENZA A/B & SARS-COV2 PCR MULTIPLEX   BLOOD CULTURE         Results for orders placed or performed during the hospital encounter of 11/05/21   Lactic acid whole blood   Result Value Ref Range    Lactic Acid 2.9 (H) 0.7 - 2.0 mmol/L   CBC with platelets and differential   Result Value Ref Range    WBC Count 16.9 (H) 4.0 - 11.0 10e3/uL    RBC Count 5.14 4.40 - 5.90 10e6/uL    Hemoglobin 14.5  13.3 - 17.7 g/dL    Hematocrit 42.9 40.0 - 53.0 %    MCV 84 78 - 100 fL    MCH 28.2 26.5 - 33.0 pg    MCHC 33.8 31.5 - 36.5 g/dL    RDW 13.1 10.0 - 15.0 %    Platelet Count 311 150 - 450 10e3/uL    % Neutrophils 71 %    % Lymphocytes 20 %    % Monocytes 8 %    % Eosinophils 0 %    % Basophils 0 %    % Immature Granulocytes 1 %    NRBCs per 100 WBC 0 <1 /100    Absolute Neutrophils 11.9 (H) 1.6 - 8.3 10e3/uL    Absolute Lymphocytes 3.3 0.8 - 5.3 10e3/uL    Absolute Monocytes 1.3 0.0 - 1.3 10e3/uL    Absolute Eosinophils 0.0 0.0 - 0.7 10e3/uL    Absolute Basophils 0.0 0.0 - 0.2 10e3/uL    Absolute Immature Granulocytes 0.2 (H) <=0.0 10e3/uL    Absolute NRBCs 0.0 10e3/uL               Bill Bradley MD  11/06/21 0042

## 2021-11-06 NOTE — ED NOTES
Bed: [facility]-01  Expected date: 11/5/21  Expected time: 11:43 PM  Means of arrival: Ambulance  Comments:  [place]

## 2021-11-06 NOTE — ED PROVIDER NOTES
eMERGENCY dEPARTMENT PROGRESS NOTE         ED COURSE AND MEDICAL DECISION MAKING  Patient was signed out to me by Dr. Bradley at 1:44 AM    3:50 AM Updated patient. Patient feels better, reporting that he still feels a little tight but symptoms have significantly improved.     Dano Kennedy is a 46 year old male who presents for evaluation of shortness of breath and chest tightness that has persisted for ~1 week. He endorses in cough. Seen in the urgency room where he tested negative for COVID. Hx of asthma, just recently finishing a course of steroids. He has been taking albuterol with no relief.      Patient's labs back here with lactic acid is slightly downtrending.  Troponin within normal limits.  Reevaluated patient who dates he feels significantly improved.  He does have nebs and duo nebs at home.  Previous provider did write him for prednisone and antibiotics.  Will discharge at this time with strict return precautions.  Patient was in agreement with plan.    LAB  Pertinent labs results reviewed   Results for orders placed or performed during the hospital encounter of 11/05/21   CT Chest Pulmonary Embolism w Contrast    Impression    IMPRESSION:  1.  No evidence for pulmonary embolism.    2.  Normal caliber aorta without dissection.    3.  Minimal dependent atelectasis. No infiltrates or effusions.   Comprehensive metabolic panel   Result Value Ref Range    Sodium 139 136 - 145 mmol/L    Potassium 3.2 (L) 3.5 - 5.0 mmol/L    Chloride 106 98 - 107 mmol/L    Carbon Dioxide (CO2) 18 (L) 22 - 31 mmol/L    Anion Gap 15 5 - 18 mmol/L    Urea Nitrogen 17 8 - 22 mg/dL    Creatinine 1.02 0.70 - 1.30 mg/dL    Calcium 9.8 8.5 - 10.5 mg/dL    Glucose 133 (H) 70 - 125 mg/dL    Alkaline Phosphatase 116 45 - 120 U/L    AST 40 0 - 40 U/L    ALT 91 (H) 0 - 45 U/L    Protein Total 7.7 6.0 - 8.0 g/dL    Albumin 4.2 3.5 - 5.0 g/dL    Bilirubin Total 0.3 0.0 - 1.0 mg/dL    GFR Estimate 88 >60 mL/min/1.73m2   Lactic acid whole  blood   Result Value Ref Range    Lactic Acid 2.9 (H) 0.7 - 2.0 mmol/L   Result Value Ref Range    Troponin I <0.01 0.00 - 0.29 ng/mL   B-Type Natriuretic Peptide ([facility] Only)   Result Value Ref Range    BNP <10 0 - 36 pg/mL   CRP inflammation   Result Value Ref Range    CRP <0.1 0.0-<0.8 mg/dL   TSH with free T4 reflex   Result Value Ref Range    TSH 5.11 (H) 0.30 - 5.00 uIU/mL   Symptomatic Influenza A/B & SARS-CoV2 (COVID-19) Virus PCR Multiplex Nasopharyngeal    Specimen: Nasopharyngeal; Swab   Result Value Ref Range    Influenza A target Negative Negative    Influenza B target Negative Negative    SARS CoV2 PCR Negative Negative   CBC with platelets and differential   Result Value Ref Range    WBC Count 16.9 (H) 4.0 - 11.0 10e3/uL    RBC Count 5.14 4.40 - 5.90 10e6/uL    Hemoglobin 14.5 13.3 - 17.7 g/dL    Hematocrit 42.9 40.0 - 53.0 %    MCV 84 78 - 100 fL    MCH 28.2 26.5 - 33.0 pg    MCHC 33.8 31.5 - 36.5 g/dL    RDW 13.1 10.0 - 15.0 %    Platelet Count 311 150 - 450 10e3/uL    % Neutrophils 71 %    % Lymphocytes 20 %    % Monocytes 8 %    % Eosinophils 0 %    % Basophils 0 %    % Immature Granulocytes 1 %    NRBCs per 100 WBC 0 <1 /100    Absolute Neutrophils 11.9 (H) 1.6 - 8.3 10e3/uL    Absolute Lymphocytes 3.3 0.8 - 5.3 10e3/uL    Absolute Monocytes 1.3 0.0 - 1.3 10e3/uL    Absolute Eosinophils 0.0 0.0 - 0.7 10e3/uL    Absolute Basophils 0.0 0.0 - 0.2 10e3/uL    Absolute Immature Granulocytes 0.2 (H) <=0.0 10e3/uL    Absolute NRBCs 0.0 10e3/uL   Troponin I (second draw)   Result Value Ref Range    Troponin I <0.01 0.00 - 0.29 ng/mL   Lactic acid whole blood   Result Value Ref Range    Lactic Acid 2.6 (H) 0.7 - 2.0 mmol/L         RADIOLOGY    Pertinent imaging reviewed   Please see official radiology report.  CT Chest Pulmonary Embolism w Contrast   Final Result   IMPRESSION:   1.  No evidence for pulmonary embolism.      2.  Normal caliber aorta without dissection.      3.  Minimal dependent  atelectasis. No infiltrates or effusions.          FINAL IMPRESSION    1. Exacerbation of asthma, unspecified asthma severity, unspecified whether persistent    2. Exertional shortness of breath    3. Lightheaded              Cynthia Edwards MD  11/06/21 3580

## 2021-11-06 NOTE — ED TRIAGE NOTES
Patient presents via EMS with c/o shortness of breath throughout the last week. Hx asthma, has been using nebulizers and inhalers than usual with no relief. Was seen at urgency room earlier this week, negative COVID and was prescribed 5 days of prednisone, finished today. 1.5 hours PTA patient reports feeling unable to catch breath. EMS gave a duoneb en route with no relief. Pt denies pain, but reports tightness in chest when breathing.

## 2021-11-08 ENCOUNTER — VIRTUAL VISIT (OUTPATIENT)
Dept: FAMILY MEDICINE | Facility: CLINIC | Age: 46
End: 2021-11-08
Payer: COMMERCIAL

## 2021-11-08 DIAGNOSIS — R10.13 DYSPEPSIA: Primary | ICD-10-CM

## 2021-11-08 PROCEDURE — 99214 OFFICE O/P EST MOD 30 MIN: CPT | Mod: GT | Performed by: FAMILY MEDICINE

## 2021-11-08 RX ORDER — SUCRALFATE 1 G/1
1 TABLET ORAL 4 TIMES DAILY
Qty: 28 TABLET | Refills: 0 | Status: SHIPPED | OUTPATIENT
Start: 2021-11-08 | End: 2021-11-15

## 2021-11-08 NOTE — PROGRESS NOTES
Dano is a 46 year old who is being evaluated via a billable video visit.      How would you like to obtain your AVS? MyChart  If the video visit is dropped, the invitation should be resent by: Text to cell phone: [phone]  Will anyone else be joining your video visit? No      Video Start Time: 2:30 PM    Assessment & Plan     Dyspepsia  His symptoms of shortness of breath which has been refractory to bronchodilator therapy, antibiotics is possibly due to acid reflux disease.  I carefully looked at his chart with the several outpatient visits including ER visits, urgent care and multiple ER visits.  His work-up has been negative for congestive heart failure, pulmonary embolism. In the last ER visit, he had minimal atelectasis and was told that he had walking pneumonia and was discharged on a Z-Kenn which has been not helpful for symptoms. Today, he has clear symptoms of dyspepsia based.  His shortness of breath could have been caused from some aspiration pneumonitis.  As he also was complaining of epigastric abdominal discomfort, I think we need to escalate his PPI therapy to twice daily, also trial sucralfate.  If refractory, recommend in face person person evaluation or also possibly set him up with an outpatient EGD for further evaluation for such etiologies such as a hiatal hernia.  - omeprazole (PRILOSEC) 20 MG DR capsule  Dispense: 28 capsule; Refill: 0  - sucralfate (CARAFATE) 1 GM tablet  Dispense: 28 tablet; Refill: 0    Return in about 3 days (around 11/11/2021) for If symptoms do not improve or gets worse..    Alex Nichols MD  [facility]    Jada Matson is a 46 year old who presents for the following health issues     HPI       She has concerns for cough and shortness of breath.  Has been evaluated several times in both electronic visits, urgent care center in the ER.  Started approximately a month ago when he was first evaluated for diverticulosis.  He has been on  numerous antibiotics, bronchodilator therapies and prednisone without improvement and relief.    Has associated symptoms of sour taste in mouth, waking up in the middle of the night coughing.  Symptoms worse when laying flat.  Has been somewhat improving with omeprazole.  Recently just started using the CPAP as well.    Her last ER visit, negative valuation for congestive heart failure, CT with nonspecific minimal dependent atelectasis        Review of Systems         Objective           Vitals:  No vitals were obtained today due to virtual visit.    Physical Exam   GENERAL: Healthy, alert and no distress  EYES: Eyes grossly normal to inspection.  No discharge or erythema, or obvious scleral/conjunctival abnormalities.  RESP: No audible wheeze, cough, or visible cyanosis.  No visible retractions or increased work of breathing.    SKIN: Visible skin clear. No significant rash, abnormal pigmentation or lesions.  NEURO: Cranial nerves grossly intact.  Mentation and speech appropriate for age.  PSYCH: Mentation appears normal, affect normal/bright, judgement and insight intact, normal speech and appearance well-groomed.                Video-Visit Details    Type of service:  Video Visit    Video End Time:4:36 PM    Originating Location (pt. Location): Home    Distant Location (provider location):  [facility]     Platform used for Video Visit: [employer]

## 2021-11-09 ENCOUNTER — HOSPITAL ENCOUNTER (EMERGENCY)
Facility: CLINIC | Age: 46
Discharge: HOME | End: 2021-11-10
Attending: EMERGENCY MEDICINE | Admitting: EMERGENCY MEDICINE
Payer: COMMERCIAL

## 2021-11-09 ENCOUNTER — APPOINTMENT (OUTPATIENT)
Dept: CT IMAGING | Facility: CLINIC | Age: 46
End: 2021-11-09
Attending: EMERGENCY MEDICINE
Payer: COMMERCIAL

## 2021-11-09 DIAGNOSIS — R10.84 ABDOMINAL PAIN, GENERALIZED: ICD-10-CM

## 2021-11-09 DIAGNOSIS — K57.32 DIVERTICULITIS OF COLON: ICD-10-CM

## 2021-11-09 LAB
ATRIAL RATE - MUSE: 114 BPM
ATRIAL RATE - MUSE: 93 BPM
BASOPHILS # BLD AUTO: 0 10E3/UL (ref 0–0.2)
BASOPHILS NFR BLD AUTO: 0 %
DIASTOLIC BLOOD PRESSURE - MUSE: 86 MMHG
DIASTOLIC BLOOD PRESSURE - MUSE: NORMAL MMHG
EOSINOPHIL # BLD AUTO: 0 10E3/UL (ref 0–0.7)
EOSINOPHIL NFR BLD AUTO: 0 %
ERYTHROCYTE [DISTWIDTH] IN BLOOD BY AUTOMATED COUNT: 13 % (ref 10–15)
HCT VFR BLD AUTO: 44 % (ref 40–53)
HGB BLD-MCNC: 14.8 G/DL (ref 13.3–17.7)
IMM GRANULOCYTES # BLD: 0.2 10E3/UL
IMM GRANULOCYTES NFR BLD: 1 %
INTERPRETATION ECG - MUSE: NORMAL
INTERPRETATION ECG - MUSE: NORMAL
LYMPHOCYTES # BLD AUTO: 2 10E3/UL (ref 0.8–5.3)
LYMPHOCYTES NFR BLD AUTO: 14 %
MCH RBC QN AUTO: 28.5 PG (ref 26.5–33)
MCHC RBC AUTO-ENTMCNC: 33.6 G/DL (ref 31.5–36.5)
MCV RBC AUTO: 85 FL (ref 78–100)
MONOCYTES # BLD AUTO: 0.8 10E3/UL (ref 0–1.3)
MONOCYTES NFR BLD AUTO: 5 %
NEUTROPHILS # BLD AUTO: 12 10E3/UL (ref 1.6–8.3)
NEUTROPHILS NFR BLD AUTO: 80 %
NRBC # BLD AUTO: 0 10E3/UL
NRBC BLD AUTO-RTO: 0 /100
P AXIS - MUSE: 64 DEGREES
P AXIS - MUSE: 74 DEGREES
PLATELET # BLD AUTO: 284 10E3/UL (ref 150–450)
PR INTERVAL - MUSE: 134 MS
PR INTERVAL - MUSE: 136 MS
QRS DURATION - MUSE: 82 MS
QRS DURATION - MUSE: 96 MS
QT - MUSE: 332 MS
QT - MUSE: 350 MS
QTC - MUSE: 435 MS
QTC - MUSE: 457 MS
R AXIS - MUSE: 76 DEGREES
R AXIS - MUSE: 87 DEGREES
RBC # BLD AUTO: 5.2 10E6/UL (ref 4.4–5.9)
SYSTOLIC BLOOD PRESSURE - MUSE: 149 MMHG
SYSTOLIC BLOOD PRESSURE - MUSE: NORMAL MMHG
T AXIS - MUSE: -15 DEGREES
T AXIS - MUSE: 37 DEGREES
VENTRICULAR RATE- MUSE: 114 BPM
VENTRICULAR RATE- MUSE: 93 BPM
WBC # BLD AUTO: 15 10E3/UL (ref 4–11)

## 2021-11-09 PROCEDURE — C9113 INJ PANTOPRAZOLE SODIUM, VIA: HCPCS | Performed by: EMERGENCY MEDICINE

## 2021-11-09 PROCEDURE — C9803 HOPD COVID-19 SPEC COLLECT: HCPCS

## 2021-11-09 PROCEDURE — 80053 COMPREHEN METABOLIC PANEL: CPT | Performed by: EMERGENCY MEDICINE

## 2021-11-09 PROCEDURE — 87635 SARS-COV-2 COVID-19 AMP PRB: CPT | Performed by: EMERGENCY MEDICINE

## 2021-11-09 PROCEDURE — 99285 EMERGENCY DEPT VISIT HI MDM: CPT | Mod: 25

## 2021-11-09 PROCEDURE — 258N000003 HC RX IP 258 OP 636: Performed by: EMERGENCY MEDICINE

## 2021-11-09 PROCEDURE — 93005 ELECTROCARDIOGRAM TRACING: CPT | Performed by: EMERGENCY MEDICINE

## 2021-11-09 PROCEDURE — 250N000011 HC RX IP 250 OP 636: Performed by: EMERGENCY MEDICINE

## 2021-11-09 PROCEDURE — 74176 CT ABD & PELVIS W/O CONTRAST: CPT

## 2021-11-09 PROCEDURE — 84484 ASSAY OF TROPONIN QUANT: CPT | Performed by: EMERGENCY MEDICINE

## 2021-11-09 PROCEDURE — 96375 TX/PRO/DX INJ NEW DRUG ADDON: CPT

## 2021-11-09 PROCEDURE — 96361 HYDRATE IV INFUSION ADD-ON: CPT

## 2021-11-09 PROCEDURE — 36415 COLL VENOUS BLD VENIPUNCTURE: CPT | Performed by: EMERGENCY MEDICINE

## 2021-11-09 PROCEDURE — 83690 ASSAY OF LIPASE: CPT | Performed by: EMERGENCY MEDICINE

## 2021-11-09 PROCEDURE — 96374 THER/PROPH/DIAG INJ IV PUSH: CPT

## 2021-11-09 PROCEDURE — 85025 COMPLETE CBC W/AUTO DIFF WBC: CPT | Performed by: EMERGENCY MEDICINE

## 2021-11-09 RX ORDER — MORPHINE SULFATE 4 MG/ML
4 INJECTION, SOLUTION INTRAMUSCULAR; INTRAVENOUS EVERY 30 MIN PRN
Status: DISCONTINUED | OUTPATIENT
Start: 2021-11-09 | End: 2021-11-10 | Stop reason: HOSPADM

## 2021-11-09 RX ORDER — ONDANSETRON 2 MG/ML
4 INJECTION INTRAMUSCULAR; INTRAVENOUS ONCE
Status: COMPLETED | OUTPATIENT
Start: 2021-11-09 | End: 2021-11-09

## 2021-11-09 RX ADMIN — ONDANSETRON 4 MG: 2 INJECTION INTRAMUSCULAR; INTRAVENOUS at 23:34

## 2021-11-09 RX ADMIN — SODIUM CHLORIDE 1000 ML: 9 INJECTION, SOLUTION INTRAVENOUS at 23:32

## 2021-11-09 RX ADMIN — MORPHINE SULFATE 4 MG: 4 INJECTION INTRAVENOUS at 23:35

## 2021-11-09 RX ADMIN — PANTOPRAZOLE SODIUM 40 MG: 40 INJECTION, POWDER, FOR SOLUTION INTRAVENOUS at 23:32

## 2021-11-09 ASSESSMENT — ENCOUNTER SYMPTOMS
NAUSEA: 1
SHORTNESS OF BREATH: 1
ABDOMINAL PAIN: 1
FLANK PAIN: 0

## 2021-11-09 ASSESSMENT — MIFFLIN-ST. JEOR: SCORE: 1881.9

## 2021-11-10 VITALS
BODY MASS INDEX: 36.1 KG/M2 | RESPIRATION RATE: 18 BRPM | SYSTOLIC BLOOD PRESSURE: 118 MMHG | OXYGEN SATURATION: 98 % | DIASTOLIC BLOOD PRESSURE: 59 MMHG | TEMPERATURE: 98.8 F | WEIGHT: 230 LBS | HEIGHT: 67 IN | HEART RATE: 77 BPM

## 2021-11-10 LAB
ALBUMIN SERPL-MCNC: 4.1 G/DL (ref 3.5–5)
ALP SERPL-CCNC: 110 U/L (ref 45–120)
ALT SERPL W P-5'-P-CCNC: 92 U/L (ref 0–45)
ANION GAP SERPL CALCULATED.3IONS-SCNC: 9 MMOL/L (ref 5–18)
AST SERPL W P-5'-P-CCNC: 27 U/L (ref 0–40)
BILIRUB SERPL-MCNC: 0.8 MG/DL (ref 0–1)
BUN SERPL-MCNC: 14 MG/DL (ref 8–22)
CALCIUM SERPL-MCNC: 9.6 MG/DL (ref 8.5–10.5)
CHLORIDE BLD-SCNC: 104 MMOL/L (ref 98–107)
CO2 SERPL-SCNC: 24 MMOL/L (ref 22–31)
CREAT SERPL-MCNC: 0.95 MG/DL (ref 0.7–1.3)
GFR SERPL CREATININE-BSD FRML MDRD: >90 ML/MIN/1.73M2
GLUCOSE BLD-MCNC: 106 MG/DL (ref 70–125)
LIPASE SERPL-CCNC: 20 U/L (ref 0–52)
POTASSIUM BLD-SCNC: 4.1 MMOL/L (ref 3.5–5)
PROT SERPL-MCNC: 7.5 G/DL (ref 6–8)
SARS-COV-2 RNA RESP QL NAA+PROBE: NEGATIVE
SODIUM SERPL-SCNC: 137 MMOL/L (ref 136–145)
TROPONIN I SERPL-MCNC: 0.01 NG/ML (ref 0–0.29)

## 2021-11-10 RX ORDER — OXYCODONE AND ACETAMINOPHEN 5; 325 MG/1; MG/1
1 TABLET ORAL EVERY 6 HOURS PRN
Qty: 12 TABLET | Refills: 0 | Status: SHIPPED | OUTPATIENT
Start: 2021-11-10 | End: 2022-01-25

## 2021-11-10 RX ORDER — METRONIDAZOLE 500 MG/1
500 TABLET ORAL 2 TIMES DAILY
Qty: 14 TABLET | Refills: 0 | Status: SHIPPED | OUTPATIENT
Start: 2021-11-10 | End: 2021-11-17

## 2021-11-10 NOTE — DISCHARGE INSTRUCTIONS
Encourage fluids.  Clear liquid diet only for the next few days.  Thereafter, advance diet as tolerated.  Ice or heat off-and-on to sore areas can help with pain.  Tylenol or ibuprofen as tolerated can help with pain.  1 Percocet every 6 hours instead only if needed for stronger pain.  Augmentin and Flagyl as prescribed.  See your doctor later on this week for follow-up.  See them sooner or return if worse or problems.

## 2021-11-10 NOTE — ED PROVIDER NOTES
EMERGENCY DEPARTMENT ENCOUNTER      NAME: Dano Kennedy  AGE: 46 year old male  YOB: 1975  MRN: [id number]  EVALUATION DATE & TIME: 2021 10:54 PM    PCP: Jonny Murrieta    ED PROVIDER: Manan Che M.D.      Chief Complaint   Patient presents with     Chest Pain         FINAL IMPRESSION:  1.  Acute abdominal pain.  2.  Acute diverticulitis.    ED COURSE & MEDICAL DECISION MAKIN:00 PM I met with the patient to gather history and to perform my initial exam. We discussed plans for the ED course, including diagnostic testing and treatment. PPE worn: cloth mask.  Patient with epigastric pain for the last week or so.  Increased recently.  Seen in the ER last week and diagnosed with gastritis or GERD and started on a PPI.  He notes no change in symptoms.  He notes the pressure in epigastric area has increased today.  12:55 AM.  Laboratory work unremarkable.  EKG unremarkable.  White blood cell count mildly elevated at 15,000 with 88% neutrophils.  CT showing left lower quadrant either epiploic appendagitis or diverticulitis.  Patient will be discharged home on Augmentin, Flagyl, and pain medications.  Patient in agreement with the plan.    Pertinent Labs & Imaging studies reviewed. (See chart for details)      46 year old male presents to the Emergency Department for evaluation of epigastric abdominal pain.    At the conclusion of the encounter I discussed the results of all of the tests and the disposition. The questions were answered. The patient or family acknowledged understanding and was agreeable with the care plan.       MEDICATIONS GIVEN IN THE EMERGENCY:  Medications   morphine (PF) injection 4 mg (4 mg Intravenous Given 21)   0.9% sodium chloride BOLUS (1,000 mLs Intravenous New Bag 21)   ondansetron (ZOFRAN) injection 4 mg (4 mg Intravenous Given 21)   pantoprazole (PROTONIX) IV push injection 40 mg (40 mg Intravenous Given 21)       NEW  PRESCRIPTIONS STARTED AT TODAY'S ER VISIT  New Prescriptions    No medications on file          =================================================================    HPI    Patient information was obtained from: Patient    Use of : N/A       Dano Kennedy is a 46 year old male with a pertinent history of asthma, diverticulitis s/p sigmoid colectomy, who presents to this ED by private car with  for evaluation of abdominal pain, shortness of breath.     Patient reports he has had epigastric pain for the past week. He endorses shortness of breath due to the pressure and notes he has difficulty getting in a deep breath. He was seen in the ER where cardiac etiology was ruled out and was given a PPI, but he notes his pain is still persisting. Patient had a virtual visit yesterday where the physician thought it could potentially be due to a hiatal hernia. His pain does not radiate anywhere. He rates the pain at 8/10. Patient notes he becomes very nauseous after eating the past few days.      He does not identify any waxing or waning symptoms otherwise, exacerbating or alleviating features,associated symptoms except as mentioned. He denies any pain related complaints.    REVIEW OF SYSTEMS   Review of Systems   Respiratory: Positive for shortness of breath.    Gastrointestinal: Positive for abdominal pain and nausea.   Genitourinary: Negative for flank pain.   All other systems reviewed and are negative.       PAST MEDICAL HISTORY:  Past Medical History:   Diagnosis Date     Allergic rhinitis, cause unspecified      Asthma      Complication of anesthesia     did not respond well to succs     Unspecified asthma(493.90)        PAST SURGICAL HISTORY:  Past Surgical History:   Procedure Laterality Date     DAVINCI XI ASSISTED RESECTION RECTOSIGMOID N/A 11/26/2019    Procedure: ROBOTIC ASSISTED SIGMOID COLECTOMY. MOBILIZATION OF SPLENIC FLESURE;  Surgeon: Travis Brand MD;  Location: [facility] OR     ENDOSCOPIC  POLYPECTOMY NASAL       LAPAROSCOPIC ASSISTED COLECTOMY       LASER HOLMIUM LITHOTRIPSY URETER(S), INSERT STENT, COMBINED Left 1/14/2015    Procedure: COMBINED CYSTOSCOPY, URETEROSCOPY, LASER HOLMIUM LITHOTRIPSY URETER(S), INSERT STENT;  Surgeon: Derrek Cobb MD;  Location: [facility] OR     LITHOTRIPSY       SEPTOPLASTY             CURRENT MEDICATIONS:    Acetaminophen (TYLENOL PO)  albuterol (PROAIR RESPICLICK) 108 (90 Base) MCG/ACT inhaler  albuterol (PROVENTIL) (2.5 MG/3ML) 0.083% neb solution  azithromycin (ZITHROMAX Z-DONNY) 250 MG tablet  BREO ELLIPTA 200-25 MCG/INH Inhaler  EPINEPHrine (EPIPEN 2-DONNY) 0.3 MG/0.3ML injection 2-pack  fexofenadine-pseudoePHEDrine (ALLEGRA-D 24) 180-240 MG 24 hr tablet  ipratropium - albuterol 0.5 mg/2.5 mg/3 mL (DUONEB) 0.5-2.5 (3) MG/3ML neb solution  Multiple Vitamins-Minerals (MULTI-VITAMIN GUMMIES) CHEW  omeprazole (PRILOSEC) 20 MG DR capsule  order for DME  order for DME  ORDER FOR DME  oxyCODONE-acetaminophen (PERCOCET) 5-325 MG tablet  predniSONE (DELTASONE) 10 MG tablet  sucralfate (CARAFATE) 1 GM tablet        ALLERGIES:  Allergies   Allergen Reactions     Bee Pollen Anaphylaxis     Cats Difficulty breathing     Mold      Nkda [No Known Drug Allergies]      Trees        FAMILY HISTORY:  Family History   Problem Relation Age of Onset     Glaucoma Father      Glaucoma Maternal Grandfather      Macular Degeneration No family hx of      Hyperlipidemia Mother      No Known Problems Father        SOCIAL HISTORY:   Social History     Socioeconomic History     Marital status: Patient Declined     Spouse name: None     Number of children: None     Years of education: None     Highest education level: None   Occupational History     None   Tobacco Use     Smoking status: Never Smoker     Smokeless tobacco: Never Used   Vaping Use     Vaping Use: Never used   Substance and Sexual Activity     Alcohol use: Yes     Comment: 1-2 per month     Drug use: No     Sexual activity: Yes      "Partners: Female   Other Topics Concern     Parent/sibling w/ CABG, MI or angioplasty before 65F 55M? Not Asked   Social History Narrative     None     Social Determinants of Health     Financial Resource Strain: Not on file   Food Insecurity: Not on file   Transportation Needs: Not on file   Physical Activity: Not on file   Stress: Not on file   Social Connections: Not on file   Intimate Partner Violence: Not on file   Housing Stability: Not on file   No tobacco or drugs.  Occasional alcohol.    VITALS:  /79   Pulse 77   Temp 98.9  F (37.2  C) (Oral)   Resp 20   Ht 1.702 m (5' 7\")   Wt 104.3 kg (230 lb)   SpO2 98%   BMI 36.02 kg/m      PHYSICAL EXAM    Vital Signs:  /79   Pulse 77   Temp 98.9  F (37.2  C) (Oral)   Resp 20   Ht 1.702 m (5' 7\")   Wt 104.3 kg (230 lb)   SpO2 98%   BMI 36.02 kg/m    General:  On entering the room appears to be in moderate pain or discomfort.  Neck:  Neck supple with full range of motion and nontender.    Back:  Back and spine are nontender.  No costovertebral angle tenderness.    HEENT:  Oropharynx clear with moist mucous membranes.  HEENT unremarkable.    Pulmonary:  Chest clear to auscultation without rhonchi rales or wheezing.    Cardiovascular:  Cardiac regular rate and rhythm without murmurs rubs or gallops.    Abdomen:  Abdomen soft nontender, except in the epigastric and mid abdominal area..  There is no rebound or guarding.    Muskuloskeletal:  he moves all 4 without any difficulty and has normal neurovascular exams.  Extremities without clubbing, cyanosis, or edema.  Legs and calves are nontender.    Neuro:  he is alert and oriented ×3 and moves all extremities symmetrically.    Psych:  Normal affect.    Skin:  Unremarkable and warm and dry.       LAB:  All pertinent labs reviewed and interpreted.  Labs Ordered and Resulted from Time of ED Arrival to Time of ED Departure   COMPREHENSIVE METABOLIC PANEL - Abnormal       Result Value    Sodium 137      " Potassium 4.1      Chloride 104      Carbon Dioxide (CO2) 24      Anion Gap 9      Urea Nitrogen 14      Creatinine 0.95      Calcium 9.6      Glucose 106      Alkaline Phosphatase 110      AST 27      ALT 92 (*)     Protein Total 7.5      Albumin 4.1      Bilirubin Total 0.8      GFR Estimate >90     CBC WITH PLATELETS AND DIFFERENTIAL - Abnormal    WBC Count 15.0 (*)     RBC Count 5.20      Hemoglobin 14.8      Hematocrit 44.0      MCV 85      MCH 28.5      MCHC 33.6      RDW 13.0      Platelet Count 284      % Neutrophils 80      % Lymphocytes 14      % Monocytes 5      % Eosinophils 0      % Basophils 0      % Immature Granulocytes 1      NRBCs per 100 WBC 0      Absolute Neutrophils 12.0 (*)     Absolute Lymphocytes 2.0      Absolute Monocytes 0.8      Absolute Eosinophils 0.0      Absolute Basophils 0.0      Absolute Immature Granulocytes 0.2 (*)     Absolute NRBCs 0.0     LIPASE - Normal    Lipase 20     TROPONIN I - Normal    Troponin I 0.01     COVID-19 VIRUS (CORONAVIRUS) BY PCR - Normal    SARS CoV2 PCR Negative         RADIOLOGY:  Reviewed all pertinent imaging. Please see official radiology report.  CT Abdomen Pelvis w/o Contrast   Final Result   IMPRESSION:    1.  Subtle region of circumscribed fat left lower quadrant could be a focus of epiploic appendagitis or minimal diverticulitis. No bowel obstruction or abscess.   2.  Tiny nonobstructing right renal calculus.                    EKG:    Normal sinus rhythm at 93, erratic baseline questionable tremor.  Otherwise normal EKG.  Very similar to previous EKG of November 5, 2021.    I have independently reviewed and interpreted the EKG(s) documented above.    PROCEDURES:         I, Jessica Stovall, am serving as a scribe to document services personally performed by Dr. Che based on my observation and the provider's statements to me. I, Manan Che MD attest that Jessica Stovall is acting in a scribe capacity, has observed my performance of the  services and has documented them in accordance with my direction.    Manan Che M.D.  Emergency Medicine  [facility] EMERGENCY ROOM  [address] [address]  [phone]  Dept: [phone]     Manan Che MD  11/10/21 0059

## 2021-11-10 NOTE — ED TRIAGE NOTES
Reports epigastric pain/pressure x 1 week w/ sob, cough  Seen in ED last week for same symptoms, placed on ppi 2 days ago but reporting not getting any relief and pressure has gotten worse  Denies any fever

## 2021-11-11 LAB — BACTERIA BLD CULT: NO GROWTH

## 2021-11-15 DIAGNOSIS — Z11.59 ENCOUNTER FOR SCREENING FOR OTHER VIRAL DISEASES: ICD-10-CM

## 2021-11-17 ENCOUNTER — LAB (OUTPATIENT)
Dept: LAB | Facility: CLINIC | Age: 46
End: 2021-11-17
Attending: INTERNAL MEDICINE
Payer: COMMERCIAL

## 2021-11-17 DIAGNOSIS — Z11.59 ENCOUNTER FOR SCREENING FOR OTHER VIRAL DISEASES: ICD-10-CM

## 2021-11-17 PROCEDURE — U0003 INFECTIOUS AGENT DETECTION BY NUCLEIC ACID (DNA OR RNA); SEVERE ACUTE RESPIRATORY SYNDROME CORONAVIRUS 2 (SARS-COV-2) (CORONAVIRUS DISEASE [COVID-19]), AMPLIFIED PROBE TECHNIQUE, MAKING USE OF HIGH THROUGHPUT TECHNOLOGIES AS DESCRIBED BY CMS-2020-01-R: HCPCS

## 2021-11-17 PROCEDURE — U0005 INFEC AGEN DETEC AMPLI PROBE: HCPCS

## 2021-11-17 NOTE — PROGRESS NOTES
[facility]    Outpatient Occupational Therapy Discharge Note  Patient: Dano Kennedy  : 1975    Beginning/End Dates of Reporting Period:  4/15/2021 to 4/15/2021    Referring Provider: Yue Marin MD    Therapy Diagnosis: decreased adls/iadls    Client Self Report: Pt notes he thought he would be back to his old self by now.  Pt comments that his SO is a nurse who had different symptoms than she did such as loss of taste and smell.    Objective Measurements:     Objective Measure: Dynavision   Details: Mode A 77 WNL where WNL is 52 and greater   Objective Measure: FACIT   Details: 29 BNL where WNL is 40 plus or minus 10 points, indicating BNL score for energy levels           Goals:     Goal Identifier Energy conservation fatigue management strategies   Goal Description Patient to verbalize and independently utilize 3 strategies for energy conservation/work simplification and fatigue management for improved independence with ADL/IADLs at home and in the community, and demonstrate use of these tasks during session    Target Date 06/10/21   Date Met      Progress (detail required for progress note):  Not met or addressed     Goal Identifier Numerical reasoning problem solving/memory compensation   Goal Description Pt will complete numerical reasoning and complex short-term memory tasks (using compensatory strategies prn) in the appropriate amount of time and with 90% accuracy to improve problem solving ability and increase safety and independence with ADL/IADLs at home, work and in the community.   Target Date 06/10/21   Date Met      Progress (detail required for progress note):  Not met or addressed     Goal Identifier HEP   Goal Description Pt will demonstrate good follow through at home of a B UE HEP for strength  and  coordination with SBA and min cues to increase functional strength and  coordination   Target Date 06/10/21   Date Met      Progress (detail required for progress note):  Addressed but not met       Discharge from therapy.    Discharge:    Reason for Discharge: Patient has failed to schedule further appointments.

## 2021-11-17 NOTE — ADDENDUM NOTE
Encounter addended by: Brooke Britton, SUNIL on: 11/17/2021 11:10 AM   Actions taken: Clinical Note Signed, Episode resolved

## 2021-11-18 LAB — SARS-COV-2 RNA RESP QL NAA+PROBE: NEGATIVE

## 2021-11-19 ENCOUNTER — HOSPITAL ENCOUNTER (OUTPATIENT)
Facility: CLINIC | Age: 46
Discharge: HOME | End: 2021-11-19
Attending: INTERNAL MEDICINE | Admitting: INTERNAL MEDICINE
Payer: COMMERCIAL

## 2021-11-19 VITALS
RESPIRATION RATE: 16 BRPM | WEIGHT: 230 LBS | HEART RATE: 75 BPM | OXYGEN SATURATION: 96 % | HEIGHT: 67 IN | BODY MASS INDEX: 36.1 KG/M2 | TEMPERATURE: 97.1 F | SYSTOLIC BLOOD PRESSURE: 119 MMHG | DIASTOLIC BLOOD PRESSURE: 92 MMHG

## 2021-11-19 LAB — UPPER GI ENDOSCOPY: NORMAL

## 2021-11-19 PROCEDURE — 250N000011 HC RX IP 250 OP 636: Performed by: INTERNAL MEDICINE

## 2021-11-19 PROCEDURE — 88305 TISSUE EXAM BY PATHOLOGIST: CPT | Mod: 26 | Performed by: PATHOLOGY

## 2021-11-19 PROCEDURE — 43239 EGD BIOPSY SINGLE/MULTIPLE: CPT | Performed by: INTERNAL MEDICINE

## 2021-11-19 PROCEDURE — 250N000009 HC RX 250: Performed by: INTERNAL MEDICINE

## 2021-11-19 PROCEDURE — 999N000099 HC STATISTIC MODERATE SEDATION < 10 MIN: Performed by: INTERNAL MEDICINE

## 2021-11-19 PROCEDURE — 88305 TISSUE EXAM BY PATHOLOGIST: CPT | Mod: TC | Performed by: INTERNAL MEDICINE

## 2021-11-19 RX ORDER — FENTANYL CITRATE 50 UG/ML
25-50 INJECTION, SOLUTION INTRAMUSCULAR; INTRAVENOUS
Status: DISCONTINUED | OUTPATIENT
Start: 2021-11-19 | End: 2021-11-19 | Stop reason: HOSPADM

## 2021-11-19 RX ORDER — ONDANSETRON 2 MG/ML
4 INJECTION INTRAMUSCULAR; INTRAVENOUS EVERY 6 HOURS PRN
Status: DISCONTINUED | OUTPATIENT
Start: 2021-11-19 | End: 2021-11-19 | Stop reason: HOSPADM

## 2021-11-19 RX ORDER — FLUMAZENIL 0.1 MG/ML
0.2 INJECTION, SOLUTION INTRAVENOUS
Status: DISCONTINUED | OUTPATIENT
Start: 2021-11-19 | End: 2021-11-19 | Stop reason: HOSPADM

## 2021-11-19 RX ORDER — EPINEPHRINE 1 MG/ML
0.1 INJECTION, SOLUTION INTRAMUSCULAR; SUBCUTANEOUS
Status: DISCONTINUED | OUTPATIENT
Start: 2021-11-19 | End: 2021-11-19 | Stop reason: HOSPADM

## 2021-11-19 RX ORDER — SIMETHICONE 40MG/0.6ML
133 SUSPENSION, DROPS(FINAL DOSAGE FORM)(ML) ORAL
Status: DISCONTINUED | OUTPATIENT
Start: 2021-11-19 | End: 2021-11-19 | Stop reason: HOSPADM

## 2021-11-19 RX ORDER — FENTANYL CITRATE 50 UG/ML
50-100 INJECTION, SOLUTION INTRAMUSCULAR; INTRAVENOUS
Status: COMPLETED | OUTPATIENT
Start: 2021-11-19 | End: 2021-11-19

## 2021-11-19 RX ORDER — PROCHLORPERAZINE MALEATE 10 MG
10 TABLET ORAL EVERY 6 HOURS PRN
Status: DISCONTINUED | OUTPATIENT
Start: 2021-11-19 | End: 2021-11-19 | Stop reason: HOSPADM

## 2021-11-19 RX ORDER — ATROPINE SULFATE 0.4 MG/ML
0.4 AMPUL (ML) INJECTION
Status: DISCONTINUED | OUTPATIENT
Start: 2021-11-19 | End: 2021-11-19 | Stop reason: HOSPADM

## 2021-11-19 RX ORDER — ONDANSETRON 2 MG/ML
4 INJECTION INTRAMUSCULAR; INTRAVENOUS
Status: DISCONTINUED | OUTPATIENT
Start: 2021-11-19 | End: 2021-11-19 | Stop reason: HOSPADM

## 2021-11-19 RX ORDER — LIDOCAINE 40 MG/G
CREAM TOPICAL
Status: DISCONTINUED | OUTPATIENT
Start: 2021-11-19 | End: 2021-11-19 | Stop reason: HOSPADM

## 2021-11-19 RX ORDER — NALOXONE HYDROCHLORIDE 0.4 MG/ML
0.4 INJECTION, SOLUTION INTRAMUSCULAR; INTRAVENOUS; SUBCUTANEOUS
Status: DISCONTINUED | OUTPATIENT
Start: 2021-11-19 | End: 2021-11-19 | Stop reason: HOSPADM

## 2021-11-19 RX ORDER — ONDANSETRON 4 MG/1
4 TABLET, ORALLY DISINTEGRATING ORAL EVERY 6 HOURS PRN
Status: DISCONTINUED | OUTPATIENT
Start: 2021-11-19 | End: 2021-11-19 | Stop reason: HOSPADM

## 2021-11-19 RX ORDER — NALOXONE HYDROCHLORIDE 0.4 MG/ML
0.2 INJECTION, SOLUTION INTRAMUSCULAR; INTRAVENOUS; SUBCUTANEOUS
Status: DISCONTINUED | OUTPATIENT
Start: 2021-11-19 | End: 2021-11-19 | Stop reason: HOSPADM

## 2021-11-19 RX ADMIN — TOPICAL ANESTHETIC 0.5 ML: 200 SPRAY DENTAL; PERIODONTAL at 09:04

## 2021-11-19 RX ADMIN — MIDAZOLAM 2 MG: 1 INJECTION INTRAMUSCULAR; INTRAVENOUS at 09:04

## 2021-11-19 RX ADMIN — FENTANYL CITRATE 100 MCG: 0.05 INJECTION, SOLUTION INTRAMUSCULAR; INTRAVENOUS at 09:04

## 2021-11-19 ASSESSMENT — MIFFLIN-ST. JEOR: SCORE: 1881.9

## 2021-11-19 NOTE — DISCHARGE INSTRUCTIONS
Understanding H. pylori and Ulcers  Traditionally, ulcers, or sores in the lining of your digestive tract, were thought to be caused by too much spicy food, stress, or an anxious personality. We now know that most ulcers are probably due to infection with bacteria known as Helicobacter pylori (H. pylori).     H. pylori invade and disturb the lining of the digestive tract. Acid may weaken the area, causing an ulcer.      Common Ulcer Symptoms  Burning, cramping, or hunger-like pain in the stomach area, often one to three hours after a meal or in the middle of the night  Pain that gets better or worse with eating  Nausea or vomiting  Black, tarry, or bloody stools (which means the ulcer is bleeding)  Or you may have no symptoms.     An ulcer can form in two areas of the digestive tract; the stomach and the duodenum (where the stomach meets the small intestine).      Your Evaluation  An evaluation by your doctor can show if you have an ulcer and determine whether it was caused by H. pylori. Your doctor may ask you questions, examine you, and possibly do some tests. These may include:  A special X-ray called a barium upper gastrointestinal series, to help locate an ulcer. During the test, you drink a chalky liquid. This liquid helps the ulcer show up on the X-ray.  An endoscopic exam, done with a long tube passed through your mouth into your stomach, to give the doctor a closer look at your ulcer. You will be lightly sedated for this procedure. Your doctor can also take a tissue sample to test for H. pylori.  Blood, stool, and breath tests are also available to show whether you have H. pylori in your digestive tract.  Your Treatment  To kill H. pylori so your ulcer can heal, your doctor will probably prescribe antibiotics. Other ulcer medications that help reduce stomach acid may also be prescribed as well. Testing after treatment is recommended to be sure the H. pylori infection is gone. Usually, killing H. pylori  helps keep the ulcer from returning.    [id number] [facility], [address] [place] [address], [place] [address]. All rights reserved. This information is not intended as a substitute for professional medical care. Always follow your healthcare professional's instructions.  Celiac Disease    Celiac disease is caused by an immune-based reaction to gluten in food. Gluten is a protein found in many grains such as wheat, barley, and rye. Celiac disease affects tiny, fingerlike stalks (villi) in the small bowel (intestine). Normally, the villi make it possible for the small bowel to absorb nutrients from the food you eat. But celiac disease damages the villi. As a result, you can t absorb the nutrients you need, even if you eat plenty of food. Celiac disease is an autoimmune disease. You can manage the disease by removing gluten from your diet. This relieves your symptoms. It also reverses the damage to your small bowel. Celiac disease is sometimes called celiac sprue.   Causes of celiac disease  Celiac disease may have a genetic component. This means it can be passed down in families. If your healthcare provider thinks that you have celiac disease, he or she may advise that other members of your family be checked for it as well.   Symptoms of celiac disease   The symptoms of celiac disease can vary for each person. Some people have no symptoms at all. If symptoms do happen, they can include:     Diarrhea, constipation, or both    Light colored, foul-smelling or fatty stool    Belly pain and cramping    Belly swelling or bloating    Weight loss    Bone or joint pain    Iron deficiency    Headaches    Tiredness and loss of energy    Mood changes, irritability, and depression    Infertility    Unexplained elevated liver tests    Canker sores    Skin rash    Tooth enamel problems  Diagnosing celiac disease  Your healthcare provider will ask about your symptoms and health history. You ll also have a physical exam. Tests are then  done to confirm the problem. These can include:     Blood tests. These help check for specific proteins in the blood that are present with celiac disease. They also check for anemia and help rule out other problems. The tests are done by taking a blood sample.    Upper endoscopy with biopsy. This is done to see inside the stomach and duodenum (first part of the small bowel). For the test, an endoscope is used. This is a thin, flexible tube with a tiny camera on the end. It s inserted through the mouth and down into the stomach and duodenum. Tools are passed through the endoscope to remove tiny tissue samples (biopsy). The tissue samples are taken to a lab and looked at under a microscope. This is to check the tiny villi for damage. This test must be done while you are still eating food with gluten. This is the only way to see whether the presence of gluten is damaging the villi.    Genetic tests. These check for problems with specific genes linked to celiac disease. They are done by taking blood samples.  Treating celiac disease  To treat celiac disease, you must remove all sources of gluten from your diet. This will allow the villi to heal, so that nutrients can be absorbed normally. It s important to follow a strict, gluten-free diet daily, even if you don t have symptoms. If you don t do this, the small bowel can become permanently damaged, which can lead to serious health problems. These include bone disease, cancer of the small bowel, and various nervous system disorders. You may need to take certain vitamins if your levels are low. Your doctor may want to recheck your intestine with an upper endoscopy or repeat the celiac blood tests to make sure the tissue has healed.   Sources of gluten  Gluten is found in wheat, barley, and rye. The most common foods with gluten are those made with wheat flour. These include bread, pasta, cake, and cereal. Gluten is also often found in beer, gravies, salad dressings, and  most packaged foods. It's even found in some nonfood products, such as certain medicines and cosmetics. Your healthcare provider can refer you to a dietitian to  you about what you should avoid. The resources below will also give you lists of food and products that contain gluten.   Follow-up  You ll meet with your healthcare provider periodically to monitor your health. During these visits, routine blood tests are often done to make sure your condition is under control. Your healthcare provider can also refer you to other healthcare providers or support and advocacy groups to help you cope with your condition.   Learning more about celiac disease  The following resources can help you learn more about celiac disease and how to manage it:     Celiac Disease Foundation , www.celiac.org    National Celiac Association , www.csaceliacs.org    Gluten Intolerance Group , www.gluten.org    National [place] of Diabetes and Digestive and Kidney Diseases , www.niddk.nih.gov  Vivian last reviewed this educational content on 6/1/2019 2000-2021 [employer]. All rights reserved. This information is not intended as a substitute for professional medical care. Always follow your healthcare professional's instructions.

## 2021-11-19 NOTE — H&P
Pre-Endoscopy History and Physical     Dano Kennedy MRN# [id number]   YOB: 1975 Age: 46 year old     Date of Procedure: 11/19/2021  Primary care provider: Alex Nichols  Type of Endoscopy: Gastroscopy with possible biopsy, possible dilation  Reason for Procedure: pain  Type of Anesthesia Anticipated: Conscious Sedation    HPI:    Dano is a 46 year old male who will be undergoing the above procedure.      A history and physical has been performed. The patient's medications and allergies have been reviewed. The risks and benefits of the procedure and the sedation options and risks were discussed with the patient.  All questions were answered and informed consent was obtained.      He denies a personal or family history of anesthesia complications or bleeding disorders.     Patient Active Problem List   Diagnosis     Anxiety     Severe persistent asthma without complication     Seasonal allergic rhinitis     GSE (gluten-sensitive enteropathy)     Hyperlipidemia LDL goal <130     ADHD (attention deficit hyperactivity disorder)     Heartburn     WENDY (obstructive sleep apnea)     Obesity (BMI 35.0-39.9) with comorbidity (H)     History of diverticulitis     Dyslipidemia        Past Medical History:   Diagnosis Date     Allergic rhinitis, cause unspecified      Asthma      Complication of anesthesia     did not respond well to succs     Unspecified asthma(493.90)         Past Surgical History:   Procedure Laterality Date     COLONOSCOPY       DAVINCI XI ASSISTED RESECTION RECTOSIGMOID N/A 11/26/2019    Procedure: ROBOTIC ASSISTED SIGMOID COLECTOMY. MOBILIZATION OF SPLENIC FLESURE;  Surgeon: Travis Brand MD;  Location: [facility]     ENDOSCOPIC POLYPECTOMY NASAL       LAPAROSCOPIC ASSISTED COLECTOMY       LASER HOLMIUM LITHOTRIPSY URETER(S), INSERT STENT, COMBINED Left 1/14/2015    Procedure: COMBINED CYSTOSCOPY, URETEROSCOPY, LASER HOLMIUM LITHOTRIPSY URETER(S), INSERT STENT;  Surgeon: Derrek Cobb,  MD;  Location: [facility]     LITHOTRIPSY       SEPTOPLASTY         Social History     Tobacco Use     Smoking status: Never Smoker     Smokeless tobacco: Never Used   Substance Use Topics     Alcohol use: Yes     Comment: 1-2 per month       Family History   Problem Relation Age of Onset     Glaucoma Father      Glaucoma Maternal Grandfather      Macular Degeneration No family hx of      Hyperlipidemia Mother      No Known Problems Father        Prior to Admission medications    Medication Sig Start Date End Date Taking? Authorizing Provider   Acetaminophen (TYLENOL PO) Take 2 tablets by mouth daily as needed for mild pain or fever   Yes Reported, Patient   albuterol (PROAIR RESPICLICK) 108 (90 Base) MCG/ACT inhaler Inhale 1-2 puffs into the lungs 4 times daily 5/3/21  Yes Santa Marcus MD   albuterol (PROVENTIL) (2.5 MG/3ML) 0.083% neb solution Take 1 vial (2.5 mg) by nebulization every 4 hours as needed for shortness of breath / dyspnea or wheezing 11/6/21  Yes Mitchell Carolina, DO   BREO ELLIPTA 200-25 MCG/INH Inhaler INHALE 1 PUFF BY MOUTH DAILY 10/9/21  Yes Chase Singh MD   EPINEPHrine (EPIPEN 2-DONNY) 0.3 MG/0.3ML injection 2-pack Inject 0.3 mLs (0.3 mg) into the muscle once as needed for anaphylaxis 7/22/20  Yes Santa Marcus MD   fexofenadine-pseudoePHEDrine (ALLEGRA-D 24) 180-240 MG 24 hr tablet Take 1 tablet by mouth daily 6/17/20  Yes Santa Marcus MD   ipratropium - albuterol 0.5 mg/2.5 mg/3 mL (DUONEB) 0.5-2.5 (3) MG/3ML neb solution Take 1 vial (3 mLs) by nebulization every 4 hours as needed for shortness of breath / dyspnea or wheezing 11/6/21  Yes Mitchell Carolina, DO   Multiple Vitamins-Minerals (MULTI-VITAMIN GUMMIES) CHEW Take 2 chew tab by mouth daily as needed   Yes Reported, Patient   omeprazole (PRILOSEC) 20 MG DR capsule Take 1 capsule (20 mg) by mouth 2 times daily for 14 days 11/8/21 11/22/21 Yes Alex Nichols MD   oxyCODONE-acetaminophen (PERCOCET) 5-325 MG  "tablet Take 1 tablet by mouth every 6 hours as needed for moderate to severe pain 11/10/21  Yes Manan Che MD   order for DME Equipment being ordered: nebulizer 10/24/18   Kehr, Kristen M, PA-C   order for DME Nebulizer tubing and mouthpiece 10/24/18   Kehr, Kristen M, PA-C   ORDER FOR DME Respironics REMSTAR 60 Series Auto CPAP 5-18cm H2O, MIrage Fx standard nasal mask 10/10/13   Nathaniel Campuzano NP       Allergies   Allergen Reactions     Bee Pollen Anaphylaxis     Cats Difficulty breathing     Mold      Nkda [No Known Drug Allergies]      Trees         REVIEW OF SYSTEMS:   5 point ROS negative except as noted above in HPI, including Gen., Resp., CV, GI &  system review.    PHYSICAL EXAM:   /88   Pulse 77   Temp 97.1  F (36.2  C) (Temporal)   Resp 18   Ht 1.702 m (5' 7\")   Wt 104.3 kg (230 lb)   SpO2 96%   BMI 36.02 kg/m   Estimated body mass index is 36.02 kg/m  as calculated from the following:    Height as of this encounter: 1.702 m (5' 7\").    Weight as of this encounter: 104.3 kg (230 lb).   GENERAL APPEARANCE: alert, and oriented  MENTAL STATUS: alert  AIRWAY EXAM: Mallampatti Class I (visualization of the soft palate, fauces, uvula, anterior and posterior pillars)  RESP: lungs clear to auscultation - no rales, rhonchi or wheezes  CV: regular rates and rhythm  DIAGNOSTICS:    Not indicated    IMPRESSION   ASA Class 2 - Mild systemic disease    PLAN:   Plan for Gastroscopy with possible biopsy, possible dilation. We discussed the risks, benefits and alternatives and the patient wished to proceed.    The above has been forwarded to the consulting provider.      Signed Electronically by: Santhosh Harmon MD  November 19, 2021          "

## 2021-11-19 NOTE — LETTER
November 15, 2021      Dano Kennedy  [address] [facility] [address]        Dear Dano,     Thank you for choosing [facility]. You are scheduled for the following service(s).   Please be aware that coverage of these services is subject to the terms and limitations of your health insurance plan.  Call member services at your health plan with any benefit or coverage questions.    Date:   11/19/21      Procedure: UPPER ENDOSCOPY-EGD  Doctor: Dr. Harmon     Arrival Time:  8:15a    *Enter and check in at the Main Hospital Entrance  Procedure Time: 9:00a       Location:   [facility]        Endoscopy Department, First Floor *         [address] [place], [place] [address] [phone] or [phone] () to reschedule            PRE-PROCEDURE CHECKLIST    If you have diabetes, ask your regular doctor for diet and medication restrictions.  If you take any antiplatelet or anticoagulant medications (such as Coumadin, Lovenox, Plavix, etc.) and have not already discussed this, please call your primary physician for advice on holding this medication.  If you take Aspirin, you may continue to do so.  If you are or may be pregnant, please discuss the risks and benefits of this procedure with your doctor.  You must arrange for a ride for the day of your exam. If you fail to arrange transportation with a responsible adult, your procedure will need to be cancelled and rescheduled. Taxi, bus and medical transport are not acceptable unless you have a responsible adult that you know & trust with you. Please arrange for this  to be able to pick you up in our department, approximately one hour after your scheduled procedure, if they are not able to stay with you.      Canceling or rescheduling   If you must cancel or reschedule your appointment, please call [phone] as soon as possible.      Upper Endoscopy or Esophagogastroduodenoscopy (EGD) is a  test performed to evaluate symptoms of persistent abdominal pain, nausea, vomiting and difficulty swallowing. It may also be used to treat various conditions of the upper gastrointestinal tract, such as bleeding, narrowing or abnormal growths.     What happens during an upper endoscopy?  On the day of your procedure, plan to spend up to one and a half hour after your arrival at the endoscopy center. The exam itself takes about 5 to 10 minutes.    Before the exam:  - You will change into a gown.   - Your medical history and medication list will be reviewed with you, unless it has already been done over the phone.   - A nurse will insert an intravenous (IV) line into your hand or arm.  - The doctor will talk to you and give you a consent form to sign.    During the exam:  - Medicine will be given through the IV line to help you relax and feel comfortable.   - Your heart rate and oxygen levels will be monitored. If your blood pressure is low, you may be given fluids through the IV line.   - The doctor will insert a flexible, hollow tube, called an endoscope, into your mouth and will advance it slowly through the esophagus, stomach and duodenum (the first part of your small intestine).   - You may have a feeling of pressure or fullness.   - If you have difficulty swallowing, and the doctor finds a narrowing in your esophagus, it may be possible for the area to be expanded-dilated during the exam.   - If abnormal tissue is found, the doctor may remove it through the endoscope (biopsy it) for closer examination. The tissue removal is painless.    After the exam:  - Any tissue samples removed during the exam will be sent to a lab for evaluation. It may take 5 to 7 working days for you to be notified of the results  - The doctor will prepare a full report for the physician who referred you for the upper endoscopy.   - The doctor will talk with you about the initial results of your exam.   - You may feel bloated after the  procedure. That is normal and should not last long.   - Your throat may feel sore for a short time.   - Following the exam, you may resume your normal diet. Avoid alcohol until the next day.   - You may resume your regular activities the day after the procedure.   - Medication given during the exam will prohibit you from driving for the rest of the day.  - A nurse will provide you with complete discharge instructions before you leave the endoscopy center. Be sure to ask the nurse for specific instructions if you take blood thinners such as Aspirin , Coumadin , Lovenox , Plavix , etc.       PREPARATION    To ensure a successful exam, please follow all instructions carefully.      The night before your exam:    STOP eating solid foods at 11:45 pm.     Clear liquids are okay to drink (examples: Gatorade , apple juice, clear broth,coffee or tea without milk or cream, etc.).     DO NOT drink red liquids or alcoholic beverages.    The day of your exam:    STOP drinking clear liquids 4 hours before your exam.     You may take your usual medications with 4 oz. of water, but it needs to be at least 4 hours prior to your procedure.    When you leave for the procedure:    Bring a list of all of your current medications, including any allergy or over-the-counter medications, unless you have already reviewed that with an Endoscopy RN over the phone.     Bring a photo ID as well as up-to-date insurance information, such as your insurance card and any referral forms that might be required by your payer.       DIRECTIONS TO THE ENDOSCOPY DEPARTMENT    From the north ([facility])  Take [address], exit on [address] . Get into the left hand amparo, turn left (east), go one-half mile to [address] and turn left. Go north to the second stoplight, take a right on [address] and follow it to the Main Hospital entrance.  From the south ([facility])  Take 35N to the [address]E split and exit on  [address] . On [address] , turn left (west) to [address]. Turn right (north) on [address]. Go north to the second stoplight, take a right on [address] and follow it to the Main Hospital entrance.  From the east via 35E ([facility])  Take [address]E south to [place] [address] exit. Turn right on [address] . Go west to [address]. Turn right (north) on [address]. Go to the second stoplight, take a right on [address] to the Main Hospital entrance.  From the east via [address] ([facility])  Take [address] to [address]. Turn left (south) on [address] to [address], turn left (east) on [address] and follow it to the Main Hospital entrance.    From the west via [address] ([place])  Take [address] east to [address]. Turn right (south) on [address] to [address], turn left (east) on [address] and follow it to the Main Hospital entrance.

## 2021-11-22 LAB
PATH REPORT.COMMENTS IMP SPEC: NORMAL
PATH REPORT.COMMENTS IMP SPEC: NORMAL
PATH REPORT.FINAL DX SPEC: NORMAL
PATH REPORT.GROSS SPEC: NORMAL
PATH REPORT.MICROSCOPIC SPEC OTHER STN: NORMAL
PATH REPORT.RELEVANT HX SPEC: NORMAL
PHOTO IMAGE: NORMAL

## 2021-11-29 ENCOUNTER — VIRTUAL VISIT (OUTPATIENT)
Dept: FAMILY MEDICINE | Facility: CLINIC | Age: 46
End: 2021-11-29
Payer: COMMERCIAL

## 2021-11-29 DIAGNOSIS — R10.13 DYSPEPSIA: ICD-10-CM

## 2021-11-29 DIAGNOSIS — F41.1 GENERALIZED ANXIETY DISORDER: Primary | ICD-10-CM

## 2021-11-29 PROCEDURE — 99214 OFFICE O/P EST MOD 30 MIN: CPT | Mod: 95 | Performed by: FAMILY MEDICINE

## 2021-11-29 PROCEDURE — 96127 BRIEF EMOTIONAL/BEHAV ASSMT: CPT | Mod: 95 | Performed by: FAMILY MEDICINE

## 2021-11-29 RX ORDER — BUSPIRONE HYDROCHLORIDE 5 MG/1
TABLET ORAL
Qty: 180 TABLET | Refills: 1 | Status: SHIPPED | OUTPATIENT
Start: 2021-11-29 | End: 2022-04-06

## 2021-11-29 ASSESSMENT — ANXIETY QUESTIONNAIRES
6. BECOMING EASILY ANNOYED OR IRRITABLE: NOT AT ALL
GAD7 TOTAL SCORE: 5
1. FEELING NERVOUS, ANXIOUS, OR ON EDGE: SEVERAL DAYS
3. WORRYING TOO MUCH ABOUT DIFFERENT THINGS: NOT AT ALL
2. NOT BEING ABLE TO STOP OR CONTROL WORRYING: SEVERAL DAYS
5. BEING SO RESTLESS THAT IT IS HARD TO SIT STILL: SEVERAL DAYS
7. FEELING AFRAID AS IF SOMETHING AWFUL MIGHT HAPPEN: SEVERAL DAYS
GAD7 TOTAL SCORE: 5
4. TROUBLE RELAXING: SEVERAL DAYS
7. FEELING AFRAID AS IF SOMETHING AWFUL MIGHT HAPPEN: SEVERAL DAYS
GAD7 TOTAL SCORE: 5
8. IF YOU CHECKED OFF ANY PROBLEMS, HOW DIFFICULT HAVE THESE MADE IT FOR YOU TO DO YOUR WORK, TAKE CARE OF THINGS AT HOME, OR GET ALONG WITH OTHER PEOPLE?: NOT DIFFICULT AT ALL

## 2021-11-29 ASSESSMENT — ENCOUNTER SYMPTOMS
FEVER: 0
SLEEP DISTURBANCE: 1
NERVOUS/ANXIOUS: 1
ABDOMINAL PAIN: 1

## 2021-11-29 ASSESSMENT — PATIENT HEALTH QUESTIONNAIRE - PHQ9
SUM OF ALL RESPONSES TO PHQ QUESTIONS 1-9: 5
SUM OF ALL RESPONSES TO PHQ QUESTIONS 1-9: 5
10. IF YOU CHECKED OFF ANY PROBLEMS, HOW DIFFICULT HAVE THESE PROBLEMS MADE IT FOR YOU TO DO YOUR WORK, TAKE CARE OF THINGS AT HOME, OR GET ALONG WITH OTHER PEOPLE: NOT DIFFICULT AT ALL

## 2021-11-29 NOTE — PROGRESS NOTES
Dano is a 46 year old who is being evaluated via a billable video visit.      How would you like to obtain your AVS? MyChart  If the video visit is dropped, the invitation should be resent by: Text to cell phone: [phone]  Will anyone else be joining your video visit? No    Video Start Time: 11:00 AM    Assessment & Plan     Generalized anxiety disorder  Acute on chronic.  Previously did not tolerate SSRI therapy due to sexual dysfunction.  Patient declines to go back on similar medications.  Recommend starting BuSpar, continue outpatient therapy.  Follow-up in a month for recheck.  - busPIRone (BUSPAR) 5 MG tablet  Dispense: 180 tablet; Refill: 1    Dyspepsia  Continue current medical management, consider GI referral in future symptoms asymptomatic. Negative recent EGD.  As he was recently diagnosed with diverticulitis, recommend colonoscopy in 6 weeks pending resolution of symptoms.      Return in about 1 month (around 12/29/2021) for anxiety.    Alex Nichols MD  [facility]   Dano is a 46 year old who presents for the following health issues     History of Present Illness     Asthma:  He presents for follow up of asthma.  He has some cough, some wheezing, and some shortness of breath. He is using a relief medication a few times a week. He does not miss any doses of his controller medication throughout the week.Patient is aware of the following triggers: same as previous visit, exercise or sports, gastric reflux and mold. The patient has had a visit to the Emergency Room, Urgent Care or Hospital due to asthma since the last clinic visit. He has been to the Emergency Room or Urgent Care 1 time.He has had a Hospitalization 0 times.    Mental Health Follow-up:  Patient presents to follow-up on Anxiety.    Patient's anxiety since last visit has been:  Medium  The patient is not having other symptoms associated with anxiety.  Any significant life events: health  concerns  Patient is feeling anxious or having panic attacks.  Patient has no concerns about alcohol or drug use.     Social History  Tobacco Use    Smoking status: Never Smoker    Smokeless tobacco: Never Used  Vaping Use    Vaping Use: Never used  Alcohol use: Yes    Comment: 1-2 per month  Drug use: No      Today's PHQ-9         PHQ-9 Total Score:     (P) 5   PHQ-9 Q9 Thoughts of better off dead/self-harm past 2 weeks :   (P) Not at all   Thoughts of suicide or self harm:      Self-harm Plan:        Self-harm Action:          Safety concerns for self or others:           He eats 0-1 servings of fruits and vegetables daily.He consumes 0 sweetened beverage(s) daily.He exercises with enough effort to increase his heart rate 10 to 19 minutes per day.  He exercises with enough effort to increase his heart rate 3 or less days per week.   He is taking medications regularly.     Patient is a very pleasant 46-year-old male who presents for follow-up for concerns about anxiety.  He currently works as a  for [employer] and states that he has been struggling off and on with anxiety for most of his life.  He was heavily encouraged by his partner, an RN to potentially get medications to help with his anxiousness as she believes it is related to his heartburn.  Notably Dano establish care with me after having a virtual visit with concerns of dyspepsia, since that visit he went to the ER and the urgency center where he had negative evaluation of for cholecystitis. While in the ER, Dano states he had a GI cocktail which significantly helped his symptoms.  He continues Prilosec with good improvement of his symptoms.  At [facility] ER, he did get diagnosed with diverticulitis and placed on antibiotic therapy.  Since his last visit, I did refer him to GI where he had an EGD which he was negative for malignancies, hiatal hernia and H. pylori.      His current symptoms anxiety is excessive worry, difficulty getting to  sleep as he has a lot of racing thoughts.  Is have a history of comorbid WENDY, has been now adherent to his CPAP therapy.  The anxiety is debilitating for him and he request medication for help.  States in the past he was on medication which caused him impotence.  Per chart review was on Effexor which was later augmented with Wellbutrin.  He declines going back on these types of medications.    ZEHRA-7 SCORE 5/4/2016 4/7/2021 11/29/2021   Total Score - - -   Total Score - 0 (minimal anxiety) 5 (mild anxiety)   Total Score 12 0 5       PHQ 5/4/2016 4/7/2021 11/29/2021   PHQ-9 Total Score 8 1 5   Q9: Thoughts of better off dead/self-harm past 2 weeks Not at all Not at all Not at all           Review of Systems   Constitutional: Negative for fever.   Gastrointestinal: Positive for abdominal pain.   Psychiatric/Behavioral: Positive for sleep disturbance. The patient is nervous/anxious.             Objective           Vitals:  No vitals were obtained today due to virtual visit.    Physical Exam   GENERAL: Healthy, alert and no distress  EYES: Eyes grossly normal to inspection.  No discharge or erythema, or obvious scleral/conjunctival abnormalities.  RESP: No audible wheeze, cough, or visible cyanosis.  No visible retractions or increased work of breathing.    SKIN: Visible skin clear. No significant rash, abnormal pigmentation or lesions.  NEURO: Cranial nerves grossly intact.  Mentation and speech appropriate for age.  PSYCH: Mentation appears normal, affect normal/bright, judgement and insight intact, normal speech and appearance well-groomed.          Video-Visit Details    Type of service:  Video Visit    Video End Time: 11:20 AM    Originating Location (pt. Location): Other personal vehicle     Distant Location (provider location):  [facility]     Platform used for Video Visit: Skoodat  Answers for HPI/ROS submitted by the patient on 11/29/2021  If you checked off any problems, how difficult  have these problems made it for you to do your work, take care of things at home, or get along with other people?: Not difficult at all  PHQ9 TOTAL SCORE: 5  ZEHRA 7 TOTAL SCORE: 5

## 2021-11-29 NOTE — PATIENT INSTRUCTIONS
Patient Education     Understanding Generalized Anxiety Disorder (ZEHRA)  Anxiety can fill you with worry and fear. Sometimes anxiety is healthy. It alerts you to a potential threat and gets you to respond and take action. But for some people, anxiety gets so bad it causes problems in daily life. If you find yourself in a constant state of anxiety, you may have an anxiety disorder called generalized anxiety disorder (ZEHRA). Speak with your healthcare provider or mental health professional to learn more. He or she can help.      What is generalized anxiety disorder?  ZEHRA means that you are worrying constantly and can t control the worrying. Healthcare providers diagnose ZEHRA when your worrying happens on most days and for at least 6 months.   With ZEHRA, you might worry about money, your family and friends, work, or the world in general. You might not even be sure what you're anxious about. But whatever it is, you have an intense fear that the worst will happen. These feelings never really go away. In people age 65 and older, ZEHRA is one of the most commonly diagnosed anxiety disorders.  Many times it occurs with depression. This constant worry affects your quality of life and makes it hard to function. ZEHRA can cause physical symptoms, too.   What are common symptoms of generalized anxiety disorder?  People with ZEHRA often think they have a physical illness. The disorder can cause symptoms, such as:     Muscle tension, especially in the neck and shoulders    Nausea and stomach problems    Frequent headaches    Feeling lightheaded    Restlessness, trouble sleeping    Feeling irritable and on edge all the time  How can generalized anxiety disorder be treated?  ZEHRA can be treated with medicine, talk therapy (also called counseling), or both. Medicine helps to reduce symptoms, so you can continue with your daily routine. Therapy helps you understand the cause of your anxiety and learn how to manage it. Both forms of treatment  help you deal with problems that anxiety causes in your life. This helps you to be healthier and happier.   [employer] last reviewed this educational content on 5/1/2020 2000-2021 [employer]. All rights reserved. This information is not intended as a substitute for professional medical care. Always follow your healthcare professional's instructions.

## 2021-11-30 ASSESSMENT — PATIENT HEALTH QUESTIONNAIRE - PHQ9: SUM OF ALL RESPONSES TO PHQ QUESTIONS 1-9: 5

## 2021-11-30 ASSESSMENT — ANXIETY QUESTIONNAIRES: GAD7 TOTAL SCORE: 5

## 2021-12-02 ENCOUNTER — TRANSFERRED RECORDS (OUTPATIENT)
Dept: HEALTH INFORMATION MANAGEMENT | Facility: CLINIC | Age: 46
End: 2021-12-02
Payer: COMMERCIAL

## 2021-12-10 ENCOUNTER — TELEPHONE (OUTPATIENT)
Dept: SLEEP MEDICINE | Facility: CLINIC | Age: 46
End: 2021-12-10
Payer: COMMERCIAL

## 2021-12-10 NOTE — TELEPHONE ENCOUNTER
Called and spoke to patient we are needing a manual download from his CPAP.  Patient states he has Respironics system one for 8 years and that device is broken. Unable to obtain download report.         Sebel Hernandez [facility]

## 2021-12-16 ASSESSMENT — ENCOUNTER SYMPTOMS
BLOATING: 1
WHEEZING: 1

## 2021-12-16 ASSESSMENT — SLEEP AND FATIGUE QUESTIONNAIRES
HOW LIKELY ARE YOU TO NOD OFF OR FALL ASLEEP WHILE SITTING INACTIVE IN A PUBLIC PLACE: MODERATE CHANCE OF DOZING
HOW LIKELY ARE YOU TO NOD OFF OR FALL ASLEEP WHILE LYING DOWN TO REST IN THE AFTERNOON WHEN CIRCUMSTANCES PERMIT: MODERATE CHANCE OF DOZING
HOW LIKELY ARE YOU TO NOD OFF OR FALL ASLEEP IN A CAR, WHILE STOPPED FOR A FEW MINUTES IN TRAFFIC: WOULD NEVER DOZE
HOW LIKELY ARE YOU TO NOD OFF OR FALL ASLEEP WHEN YOU ARE A PASSENGER IN A CAR FOR AN HOUR WITHOUT A BREAK: MODERATE CHANCE OF DOZING
HOW LIKELY ARE YOU TO NOD OFF OR FALL ASLEEP WHILE SITTING QUIETLY AFTER LUNCH WITHOUT ALCOHOL: SLIGHT CHANCE OF DOZING
HOW LIKELY ARE YOU TO NOD OFF OR FALL ASLEEP WHILE WATCHING TV: MODERATE CHANCE OF DOZING
HOW LIKELY ARE YOU TO NOD OFF OR FALL ASLEEP WHILE SITTING AND READING: MODERATE CHANCE OF DOZING
HOW LIKELY ARE YOU TO NOD OFF OR FALL ASLEEP WHILE SITTING AND TALKING TO SOMEONE: WOULD NEVER DOZE

## 2021-12-17 ENCOUNTER — VIRTUAL VISIT (OUTPATIENT)
Dept: SLEEP MEDICINE | Facility: CLINIC | Age: 46
End: 2021-12-17
Payer: COMMERCIAL

## 2021-12-17 VITALS — BODY MASS INDEX: 36.1 KG/M2 | WEIGHT: 230 LBS | HEIGHT: 67 IN

## 2021-12-17 DIAGNOSIS — G47.33 OSA (OBSTRUCTIVE SLEEP APNEA): Primary | ICD-10-CM

## 2021-12-17 DIAGNOSIS — E66.01 MORBID OBESITY (H): ICD-10-CM

## 2021-12-17 PROCEDURE — 99203 OFFICE O/P NEW LOW 30 MIN: CPT | Mod: 95 | Performed by: INTERNAL MEDICINE

## 2021-12-17 ASSESSMENT — MIFFLIN-ST. JEOR: SCORE: 1881.9

## 2021-12-17 NOTE — PATIENT INSTRUCTIONS
Your sleep apnea treatment may be affected by device recall    Our records show that you may have a Adarsh Respironics CPAP for the treatment of sleep apnea. Many of these devices have been recalled* by the  for replacement. [facility] recommends:     1) If you are using a Resmed device, continue using the device.  2) If you have a Adarsh Respironics device, register your device for confirmation of type of device and repair of the device at https://www.[employer]/healthcare/e/sleep/communications/src-update -if you cannot use link, call [phone].  The website will assist you in obtaining the serial number for registration.   3) If you are using a Adarsh Respironics CPAP or Bilevel PAP device and you do not have immediate breathing, driving or cardiovascular risks without the device, consider stopping use of the device after verification that is has been recalled. Discuss this decision with your medical provider if you are uncertain about your medical risks.  4) If you are not using Respironics CPAP but are using a Respironics advanced device for breathing support (AVAPS, ASV, Bilevel PAP), continue using the device and review 5 and 6 below).     5) If you continue the device, do not include ozone generating  connected to PAP devices.  6) Bacterial filters to reduce exposure to particulates are sometimes cumbersome to use and are not currently recommended by the .    ?       You may also choose discuss with your provider alternative approaches to treatment.      *[facility] [employer] is voluntarily recalling the below devices due to two (2) issues related to the polyester-based polyurethane (PE-PUR) sound abatement foam used in Adarsh Continuous and Non-Continuous Ventilators: 1) PE-PUR foam may degrade into particles which may enter the device's the air pathway and be ingested or inhaled by the user, and 2) the PE-PUR foam may off-gas certain chemicals.  The foam degradation may be exacerbated by use of unapproved cleaning methods, such as ozone (see FDA safety communication on use of Ozone ), and off-gassing may occur during initial operation and may possibly continue throughout the device's useful life.   These issues can result in serious injury which can be life-threatening, cause permanent impairment, and/or require medical intervention to preclude permanent impairment. To date, [facility] has received several complaints regarding the presence of black debris/particles within the airpath circuit (extending from the device outlet, humidifier, tubing, and mask). Adarsh also has received reports of headache, upper airway irritation, cough, chest pressure and sinus infection. The potential risks of particulate exposure include: Irritation (skin, eye, and respiratory tract), inflammatory response, headache, asthma, adverse effects to other organs (e.g. kidneys and liver) and toxic carcinogenic affects. The potential risks of chemical exposure due to off-gassing include: headache/dizziness, irritation (eyes, nose, respiratory tract, skin), hypersensitivity, nausea/vomiting, toxic and carcinogenic effects. There have been no reports of death as a result of these issues.    Actions to be taken:  Discontinue the use of your device.  Do not continue to use ozone  with the device.     [place] affected devices on the recall website, www."[employer].com/SRC-update    i. The website provides current information on the status of the recall and how  to receive permanent corrective action to address the two issues.    ii. The website also provides instructions on how to locate an affected device  Serial Number and will guide users through the registration process.    iii. In the [place], call [phone] Service Hotline if you cannot visit the website                 For general sleep health questions:   http://sleepeducation.org    For tips about PAP and  COVID-19:  https://www.thoracic.org/patients/patient-resources/resources/covid-19-and-home-pap-therapy.pdf    For general info about COVID-19 including vaccines:  https://[employer]ox[employer]ter.org/covid19        Continue PAP therapy every night, for all hours that you are sleeping (including naps.)  As always, try to get at least 8 hours of sleep or more each day, keep a regular sleep schedule, and avoid sleep deprivation. Avoid alcohol.    Reasons that you might need a change to your pressure therapy would be weight gain or loss, waking having inadvertently removed your PAP overnight, having previously felt refreshed by sleep with CPAP use and now waking un-refreshed, and return of daytime sleepiness. Also, the development of new medical problems  (such as heart failure, stroke, medications such as narcotics) can sometimes affect breathing at night and change your PAP therapy needs.    Please bring PAP with you if you are hospitalized.  If anticipating surgery be sure to discuss with your surgeon that you have sleep apnea and use PAP therapy.      Maintain your equipment as recommended which includes routine cleaning and replacement of supplies.      Call DME for any questions regarding supplies or maintenance.    [facility], [facility] [phone]      Do not drive on engage in potentially dangerous activities if feeling sleepy.    Please follow up in sleep clinic again in 3-4 months.        Tips for your PAP use-    Mask fitting tips  Mask fitting exercise:    To improve your mask seal and your mobility at night, put mask on and secure in place.  Lie down in bed with full pressure and roll to one side, adjust headgear while in that position to eliminate any leaks. Repeat process rolling to other side.     The mask seal does not have to be perfect:   CPAP machines are designed to make up for small leaks. However, you will not tolerate leaks blowing in your eyes so you will  need to adjust.   Any leak should only be near or at the bottom of the mask.  We expect your mask to leak slightly at night.    Do not over-tighten the headgear straps, tighter IS NOT better, we expect minimal leak.    First try re-positioning the mask or headgear before tightening the headgear straps.  Mask leaks are expected due to changing sleeping positions. Try pulling the mask away from your skin allowing the cushion to re-inflate will minimize the leak.  If you struggle for a good fit, try turning the CPAP off and then readjust the mask by pulling it away from your face and then turning back on the CPAP.        Humidifier tips  Humidifiers can be adjusted to increase or decrease the amount of moisture according to your comfort level. You may need to adjust this frequently at first, but then might only change it with seasonal weather changes.     Try INCREASING the humidity if:  You experience a dry, irritated nasal passage or throat.  You have a runny, drippy nose or sneezing fits after using CPAP.  You experience nasal congestion during or after CPAP use.    Try DECREASING the humidity if:  You have excessive condensation or  rain out  in the tubing or mask.  Otherwise keep the tubing warm during the night by running it underneath the blankets or pillow.      Clinic visit after initial PAP set-up   Bring your equipment with you to your 5-8 week follow up clinic visit.  We will be extracting your data from the machine if not available from the cloud based modem.        Travel  Always take your equipment with you when you travel.  If you fly with your equipment bring it on with you as a carry on.  Medical equipment does not count as a carry on.    If you travel international the machines take 110-240v.  The only adapter needed is the adapter that will fit into the receptacle (outlet).    You may also want to bring an extension cord as many hotel rooms have limited outlets at the bedside.  Do not travel with  water in your humidifier chamber.     Cleaning and Maintenance Guidelines    Equipment Frequency Cleaning Method   Mask First Day    Daily      Weekly Soak mask in hot soapy water for 30 minutes, rinse and air dry.  Wipe nasal cushion with a hot soapy (Ivory, baby shampoo) cloth and rinse.  Baby wipes may also be used.  Do not use anti-bacterial soaps,Fang  liquid soap, rubbing alcohol, bleach or ammonia.  Wash frame in hot soapy water (Ivory, baby shampoo) rinse and let air dry   Headgear Biweekly Wash in hot soapy water, rinse and air dry   Reusable Gray Filter Weekly Wash in hot soapy water, rinse, put in towel squeeze moisture out, let air dry   Disposable White Filter Check Weekly Replace when brown or gray in color; at least every 2 to 3 months   Humidifier Chamber Daily    Weekly Empty distilled water from humidifier and let air dry    Hand wash in hot soapy water, rinse and air dry   Tubing Weekly Wash in hot soapy water, rinse and let air dry   Mask, Tubing and Humidifier Chamber As needed Disinfect: Soak in 1 part distilled white vinegar to 3 parts hot water for 30 minutes, rinse well and air dry  Not the material headgear        MASK AND SUPPLY REORDERING and EQUIPMENT NEEDS through your DME and per your insurance  Reminder: Most insurance companies will allow for a new mask, headgear, tubing, and reusable gray filter every six months.  Disposable white ultra-fine filters are covered monthly.      HOME AND SAFETY INSTRUCTIONS    Do not use frayed or cracked electrical cords, multi plug adaptors, or switched receptacles    Do not immerse electrical equipment into water    Assure that electrical cords do not become a tripping hazard        Your BMI is Body mass index is 36.02 kg/m .  Weight management is a personal decision.  If you are interested in exploring weight loss strategies, the following discussion covers the approaches that may be successful. Body mass index (BMI) is one way to tell whether you  are at a healthy weight, overweight, or obese. It measures your weight in relation to your height.  A BMI of 18.5 to 24.9 is in the healthy range. A person with a BMI of 25 to 29.9 is considered overweight, and someone with a BMI of 30 or greater is considered obese. More than two-thirds of American adults are considered overweight or obese.  Being overweight or obese increases the risk for further weight gain. Excess weight may lead to heart disease and diabetes.  Creating and following plans for healthy eating and physical activity may help you improve your health.  Weight control is part of healthy lifestyle and includes exercise, emotional health, and healthy eating habits. Careful eating habits lifelong are the mainstay of weight control. Though there are significant health benefits from weight loss, long-term weight loss with diet alone may be very difficult to achieve- studies show long-term success with dietary management in less than 10% of people. Attaining a healthy weight may be especially difficult to achieve in those with severe obesity. In some cases, medications, devices and surgical management might be considered.  What can you do?  If you are overweight or obese and are interested in methods for weight loss, you should discuss this with your provider.     Consider reducing daily calorie intake by 500 calories.     Keep a food journal.     Avoiding skipping meals, consider cutting portions instead.    Diet combined with exercise helps maintain muscle while optimizing fat loss. Strength training is particularly important for building and maintaining muscle mass. Exercise helps reduce stress, increase energy, and improves fitness. Increasing exercise without diet control, however, may not burn enough calories to loose weight.       Start walking three days a week 10-20 minutes at a time    Work towards walking thirty minutes five days a week     Eventually, increase the speed of your walking for 1-2  minutes at time      And look into health and wellness programs that may be available through your health insurance provider, employer, local community center, or hank club.    Weight management plan: Patient was referred to their PCP to discuss a diet and exercise plan.

## 2021-12-17 NOTE — LETTER
12/17/2021         RE: Dano Kennedy  [address]  [place] [address]        Dear Colleague,    Thank you for referring your patient, Dano Kennedy, to the [facility]. Please see a copy of my visit note below.    Dano is a 46 year old who is being evaluated via a billable video visit.      How would you like to obtain your AVS? MyChart  If the video visit is dropped, the invitation should be resent by: Send to e-mail at: [email or web address]  Will anyone else be joining your video visit? No      Video Start Time: 2:34 PM  Video-Visit Details    Type of service:  Video Visit    Video End Time:2:52 PM    Originating Location (pt. Location): Home    Distant Location (provider location):  [facility]     Platform used for Video Visit: Gab[employer]ella[employer] Box     Chief complaint: CPAP machine not working.    History of Present Illness: 46-year-old gentleman with history of snoring, observed apneas, reflux, diverticulitis.  He was diagnosed with obstructive sleep apnea in 2013.  He reports that he used CPAP for a while but found it somewhat difficult to use due to difficulty breathing through his nose.  He has had some tumors removed from his sinuses and feels like his nasal breathing is much better.  He is interested in getting back on CPAP.  In fact he when he tried to use his previous machine he found it was no longer working.  He used a friend's machine and had good clinical benefit.  His girlfriend continues to note snoring and observed apneas.  He is recently started using a wedge pillow for reflux and despite this continues to snore.  He has occasional motor restlessness symptoms and once or twice a week.  He is fatigued during the day but has no time for naps.  He does not drink caffeinated beverages because it can aggravate his reflux.  Typical bedtimes around 10 to 11 PM with a rise time around 545 AM.    No history of sleepwalking, sleep talking or  dream enactment behavior.  There has been a couple times where he has kicked at night over the last 5 years.  His weight is similar to the time of his original sleep study.  He is not sure what brand his previous machine is possibly Respironics.  He did not get any notification about the recall.    [place] Star Sleepiness Scale   Sitting and reading: Moderate chance of dozing   Watching TV: Moderate chance of dozing   Sitting, inactive in a public place (e.g. a theatre or a meeting): Moderate chance of dozing   As a passenger in a car for an hour without a break: Moderate chance of dozing   Lying down to rest in the afternoon when circumstances permit: Moderate chance of dozing   Sitting and talking to someone: Would never doze   Sitting quietly after a lunch without alcohol: Slight chance of dozing   In a car, while stopped for a few minutes in traffic: Would never doze   Total score - Union Star: 11   (Less than 10 normal)    Insomnia Severity Scale  NADINE Total Score: 20  Total score categories:  0-7 = No clinically significant insomnia   8-14 = Subthreshold insomnia   15-21 = Clinical insomnia (moderate severity)  22-28 = Clinical insomnia (severe)        Past Medical History:   Diagnosis Date     Allergic rhinitis, cause unspecified      Asthma      Complication of anesthesia     did not respond well to succs     Unspecified asthma(493.90)        Allergies   Allergen Reactions     Bee Pollen Anaphylaxis     Cats Difficulty breathing     Mold      Nkda [No Known Drug Allergies]      Trees        Current Outpatient Medications   Medication     Acetaminophen (TYLENOL PO)     albuterol (PROAIR RESPICLICK) 108 (90 Base) MCG/ACT inhaler     albuterol (PROVENTIL) (2.5 MG/3ML) 0.083% neb solution     BREO ELLIPTA 200-25 MCG/INH Inhaler     busPIRone (BUSPAR) 5 MG tablet     EPINEPHrine (EPIPEN 2-DONNY) 0.3 MG/0.3ML injection 2-pack     fexofenadine-pseudoePHEDrine (ALLEGRA-D 24) 180-240 MG 24 hr tablet     ipratropium - albuterol  0.5 mg/2.5 mg/3 mL (DUONEB) 0.5-2.5 (3) MG/3ML neb solution     Multiple Vitamins-Minerals (MULTI-VITAMIN GUMMIES) CHEW     omeprazole (PRILOSEC) 40 MG DR capsule     order for DME     order for DME     ORDER FOR DME     oxyCODONE-acetaminophen (PERCOCET) 5-325 MG tablet     No current facility-administered medications for this visit.       Social History     Socioeconomic History     Marital status: Patient Declined     Spouse name: Not on file     Number of children: Not on file     Years of education: Not on file     Highest education level: Not on file   Occupational History     Not on file   Tobacco Use     Smoking status: Never Smoker     Smokeless tobacco: Never Used   Vaping Use     Vaping Use: Never used   Substance and Sexual Activity     Alcohol use: Yes     Comment: 1-2 per month     Drug use: No     Sexual activity: Yes     Partners: Female   Other Topics Concern     Parent/sibling w/ CABG, MI or angioplasty before 65F 55M? Not Asked   Social History Narrative     Not on file     Social Determinants of Health     Financial Resource Strain: Not on file   Food Insecurity: Not on file   Transportation Needs: Not on file   Physical Activity: Not on file   Stress: Not on file   Social Connections: Not on file   Intimate Partner Violence: Not on file   Housing Stability: Not on file       Family History   Problem Relation Age of Onset     Glaucoma Father      Glaucoma Maternal Grandfather      Macular Degeneration No family hx of      Hyperlipidemia Mother      No Known Problems Father        Review of Systems:   Answers for HPI/ROS submitted by the patient on 12/16/2021  General Symptoms: No  Skin Symptoms: No  HENT Symptoms: No  EYE SYMPTOMS: No  HEART SYMPTOMS: No  LUNG SYMPTOMS: Yes  INTESTINAL SYMPTOMS: Yes  URINARY SYMPTOMS: No  REPRODUCTIVE SYMPTOMS: No  SKELETAL SYMPTOMS: No  BLOOD SYMPTOMS: No  NERVOUS SYSTEM SYMPTOMS: No  MENTAL HEALTH SYMPTOMS: No  Wheezing: Yes  Bloating: Yes        EXAM:  Ht  "1.702 m (5' 7\")   Wt 104.3 kg (230 lb)   BMI 36.02 kg/m    GENERAL: Alert and no distress, full beard  EYES: Eyes grossly normal to inspection.  No discharge or erythema, or obvious scleral/conjunctival abnormalities.  RESP: No audible wheeze, cough, or visible cyanosis.  No visible retractions or increased work of breathing.    SKIN: Visible skin clear. No significant rash, abnormal pigmentation or lesions.  NEURO: Cranial nerves grossly intact.  Mentation and speech appropriate for age.  PSYCH: Mentation appears normal, affect normal, judgement and insight intact, normal speech and appearance well-groomed.       HSAT  10/10/2013  Weight 230 lbs, BMI 38  AHI 24, supine 40.9 Lowest O2 sat 86%    PAP download NA    ASSESSMENT:  46-year-old gentleman with history of moderately severe supine predominant obstructive sleep apnea, diverticulitis status post multiple resections, gastroesophageal reflux disease not sleeping with a wedge pillow, obesity, status post removal of minus multiple sinus tumors.  Resuming treatment of obstructive sleep apnea is medically indicated.  He is likely to tolerate it better now that he has had the same tumors removed.  Current machine nonfunctional and may be affected by the recall.    PLAN:  Orders generated for replacement CPAP machine.  He should be reenrolled in the sleep therapy management program for close follow-up and pressure adjustment as needed.  Should follow-up with me approximately 7 to 8 weeks later.  He is encouraged to try to use CPAP all night every night.  Before he gets the machine he may want to sleep a little bit more on his sides as his sleep apnea was supine predominant.  Encourage efforts at weight loss.  He is agreeable with this plan.      40 minutes spent on the date of the encounter doing chart review, history and exam, documentation and further activities per the note    Krystyna Ribeiro M.D.  Pulmonary/Critical Care/Sleep Medicine    [facility] " [facility] - [facility]   Floor 1, [address]   [address] [place] [address]   Appointments: [phone]    The above note was dictated using voice recognition software and may include typographical errors. Please contact the author for any clarifications.                Again, thank you for allowing me to participate in the care of your patient.        Sincerely,        Krystyna Ribeiro MD

## 2021-12-17 NOTE — PROGRESS NOTES
Dano is a 46 year old who is being evaluated via a billable video visit.      How would you like to obtain your AVS? MyChart  If the video visit is dropped, the invitation should be resent by: Send to e-mail at: [email or web address]  Will anyone else be joining your video visit? No      Video Start Time: 2:34 PM  Video-Visit Details    Type of service:  Video Visit    Video End Time:2:52 PM    Originating Location (pt. Location): Home    Distant Location (provider location):  [facility]     Platform used for Video Visit: PASSNFLY     Chief complaint: CPAP machine not working.    History of Present Illness: 46-year-old gentleman with history of snoring, observed apneas, reflux, diverticulitis.  He was diagnosed with obstructive sleep apnea in 2013.  He reports that he used CPAP for a while but found it somewhat difficult to use due to difficulty breathing through his nose.  He has had some tumors removed from his sinuses and feels like his nasal breathing is much better.  He is interested in getting back on CPAP.  In fact he when he tried to use his previous machine he found it was no longer working.  He used a friend's machine and had good clinical benefit.  His girlfriend continues to note snoring and observed apneas.  He is recently started using a wedge pillow for reflux and despite this continues to snore.  He has occasional motor restlessness symptoms and once or twice a week.  He is fatigued during the day but has no time for naps.  He does not drink caffeinated beverages because it can aggravate his reflux.  Typical bedtimes around 10 to 11 PM with a rise time around 545 AM.    No history of sleepwalking, sleep talking or dream enactment behavior.  There has been a couple times where he has kicked at night over the last 5 years.  His weight is similar to the time of his original sleep study.  He is not sure what brand his previous machine is possibly Respironics.  He did not get  any notification about the recall.    Duke Sleepiness Scale   Sitting and reading: Moderate chance of dozing   Watching TV: Moderate chance of dozing   Sitting, inactive in a public place (e.g. a theatre or a meeting): Moderate chance of dozing   As a passenger in a car for an hour without a break: Moderate chance of dozing   Lying down to rest in the afternoon when circumstances permit: Moderate chance of dozing   Sitting and talking to someone: Would never doze   Sitting quietly after a lunch without alcohol: Slight chance of dozing   In a car, while stopped for a few minutes in traffic: Would never doze   Total score - Duke: 11   (Less than 10 normal)    Insomnia Severity Scale  NADINE Total Score: 20  Total score categories:  0-7 = No clinically significant insomnia   8-14 = Subthreshold insomnia   15-21 = Clinical insomnia (moderate severity)  22-28 = Clinical insomnia (severe)        Past Medical History:   Diagnosis Date     Allergic rhinitis, cause unspecified      Asthma      Complication of anesthesia     did not respond well to succs     Unspecified asthma(493.90)        Allergies   Allergen Reactions     Bee Pollen Anaphylaxis     Cats Difficulty breathing     Mold      Nkda [No Known Drug Allergies]      Trees        Current Outpatient Medications   Medication     Acetaminophen (TYLENOL PO)     albuterol (PROAIR RESPICLICK) 108 (90 Base) MCG/ACT inhaler     albuterol (PROVENTIL) (2.5 MG/3ML) 0.083% neb solution     BREO ELLIPTA 200-25 MCG/INH Inhaler     busPIRone (BUSPAR) 5 MG tablet     EPINEPHrine (EPIPEN 2-DONNY) 0.3 MG/0.3ML injection 2-pack     fexofenadine-pseudoePHEDrine (ALLEGRA-D 24) 180-240 MG 24 hr tablet     ipratropium - albuterol 0.5 mg/2.5 mg/3 mL (DUONEB) 0.5-2.5 (3) MG/3ML neb solution     Multiple Vitamins-Minerals (MULTI-VITAMIN GUMMIES) CHEW     omeprazole (PRILOSEC) 40 MG DR capsule     order for DME     order for DME     ORDER FOR DME     oxyCODONE-acetaminophen (PERCOCET) 5-325  "MG tablet     No current facility-administered medications for this visit.       Social History     Socioeconomic History     Marital status: Patient Declined     Spouse name: Not on file     Number of children: Not on file     Years of education: Not on file     Highest education level: Not on file   Occupational History     Not on file   Tobacco Use     Smoking status: Never Smoker     Smokeless tobacco: Never Used   Vaping Use     Vaping Use: Never used   Substance and Sexual Activity     Alcohol use: Yes     Comment: 1-2 per month     Drug use: No     Sexual activity: Yes     Partners: Female   Other Topics Concern     Parent/sibling w/ CABG, MI or angioplasty before 65F 55M? Not Asked   Social History Narrative     Not on file     Social Determinants of Health     Financial Resource Strain: Not on file   Food Insecurity: Not on file   Transportation Needs: Not on file   Physical Activity: Not on file   Stress: Not on file   Social Connections: Not on file   Intimate Partner Violence: Not on file   Housing Stability: Not on file       Family History   Problem Relation Age of Onset     Glaucoma Father      Glaucoma Maternal Grandfather      Macular Degeneration No family hx of      Hyperlipidemia Mother      No Known Problems Father        Review of Systems:   Answers for HPI/ROS submitted by the patient on 12/16/2021  General Symptoms: No  Skin Symptoms: No  HENT Symptoms: No  EYE SYMPTOMS: No  HEART SYMPTOMS: No  LUNG SYMPTOMS: Yes  INTESTINAL SYMPTOMS: Yes  URINARY SYMPTOMS: No  REPRODUCTIVE SYMPTOMS: No  SKELETAL SYMPTOMS: No  BLOOD SYMPTOMS: No  NERVOUS SYSTEM SYMPTOMS: No  MENTAL HEALTH SYMPTOMS: No  Wheezing: Yes  Bloating: Yes        EXAM:  Ht 1.702 m (5' 7\")   Wt 104.3 kg (230 lb)   BMI 36.02 kg/m    GENERAL: Alert and no distress, full beard  EYES: Eyes grossly normal to inspection.  No discharge or erythema, or obvious scleral/conjunctival abnormalities.  RESP: No audible wheeze, cough, or visible " cyanosis.  No visible retractions or increased work of breathing.    SKIN: Visible skin clear. No significant rash, abnormal pigmentation or lesions.  NEURO: Cranial nerves grossly intact.  Mentation and speech appropriate for age.  PSYCH: Mentation appears normal, affect normal, judgement and insight intact, normal speech and appearance well-groomed.       HSAT  10/10/2013  Weight 230 lbs, BMI 38  AHI 24, supine 40.9 Lowest O2 sat 86%    PAP download NA    ASSESSMENT:  46-year-old gentleman with history of moderately severe supine predominant obstructive sleep apnea, diverticulitis status post multiple resections, gastroesophageal reflux disease not sleeping with a wedge pillow, obesity, status post removal of minus multiple sinus tumors.  Resuming treatment of obstructive sleep apnea is medically indicated.  He is likely to tolerate it better now that he has had the same tumors removed.  Current machine nonfunctional and may be affected by the recall.    PLAN:  Orders generated for replacement CPAP machine.  He should be reenrolled in the sleep therapy management program for close follow-up and pressure adjustment as needed.  Should follow-up with me approximately 7 to 8 weeks later.  He is encouraged to try to use CPAP all night every night.  Before he gets the machine he may want to sleep a little bit more on his sides as his sleep apnea was supine predominant.  Encourage efforts at weight loss.  He is agreeable with this plan.      40 minutes spent on the date of the encounter doing chart review, history and exam, documentation and further activities per the note    Krystyna Ribeiro M.D.  Pulmonary/Critical Care/Sleep Medicine    [facility]   Floor [address], [address]   [address] [place], [place] [address] [address]   Appointments: [phone]    The above note was dictated using voice recognition software and may include typographical errors. Please contact  the author for any clarifications.

## 2021-12-20 NOTE — NURSING NOTE
CPAP compliance instruction letter sent via [employer]. DME order automatically route to [facility].      MARKO Jesus  [facility] [place]

## 2022-01-04 ENCOUNTER — E-VISIT (OUTPATIENT)
Dept: FAMILY MEDICINE | Facility: CLINIC | Age: 47
End: 2022-01-04
Payer: COMMERCIAL

## 2022-01-04 DIAGNOSIS — K57.32 DIVERTICULITIS OF COLON: Primary | ICD-10-CM

## 2022-01-04 PROCEDURE — 99421 OL DIG E/M SVC 5-10 MIN: CPT | Performed by: FAMILY MEDICINE

## 2022-01-05 NOTE — PATIENT INSTRUCTIONS
Thank you for choosing us for your care. I have placed an order for a prescription so that you can start treatment. View your full visit summary for details by clicking on the link below. Your pharmacist will able to address any questions you may have about the medication.     If you're not feeling better within 5-7 days, please schedule an appointment.  You can schedule an appointment right here in [facility], or call [phone]  If the visit is for the same symptoms as your eVisit, we'll refund the cost of your eVisit if seen within seven days.

## 2022-01-20 ENCOUNTER — MEDICAL CORRESPONDENCE (OUTPATIENT)
Dept: HEALTH INFORMATION MANAGEMENT | Facility: CLINIC | Age: 47
End: 2022-01-20
Payer: COMMERCIAL

## 2022-01-25 ENCOUNTER — VIRTUAL VISIT (OUTPATIENT)
Dept: FAMILY MEDICINE | Facility: CLINIC | Age: 47
End: 2022-01-25
Payer: COMMERCIAL

## 2022-01-25 DIAGNOSIS — K57.32 DIVERTICULITIS OF COLON: Primary | ICD-10-CM

## 2022-01-25 PROCEDURE — 99213 OFFICE O/P EST LOW 20 MIN: CPT | Mod: 95 | Performed by: FAMILY MEDICINE

## 2022-01-25 RX ORDER — HYDROCODONE BITARTRATE AND ACETAMINOPHEN 5; 325 MG/1; MG/1
1-2 TABLET ORAL EVERY 6 HOURS PRN
Qty: 24 TABLET | Refills: 0 | Status: SHIPPED | OUTPATIENT
Start: 2022-01-25 | End: 2022-01-28

## 2022-01-25 ASSESSMENT — ENCOUNTER SYMPTOMS: ABDOMINAL PAIN: 1

## 2022-01-25 NOTE — PROGRESS NOTES
Dano is a 46 year old who is being evaluated via a billable video visit.      How would you like to obtain your AVS? MyChart  If the video visit is dropped, the invitation should be resent by: Text to cell phone: [phone]  Will anyone else be joining your video visit? No      Video Start Time: 2:00 PM    Assessment & Plan     Diverticulitis of colon  Abdominal pain is refractory to conservative measures.  Start Norco for the pain.  Has had few rounds of Augmentin in the past that seemed like a therapeutic benefit, additionally, was not able to tolerate combination of Cipro Flagyl.  Consider potentially getting him set up for outpatient parenteral antibiotics through the infusion center in the future.  - HYDROcodone-acetaminophen (NORCO) 5-325 MG tablet  Dispense: 24 tablet; Refill: 0      Return in about 3 months (around 4/25/2022) for Physical Exam.    Alex Nichols MD  [facility]    Jada Matson is a 46 year old who presents for the following health issues     History of Present Illness       He eats 0-1 servings of fruits and vegetables daily.He consumes 0 sweetened beverage(s) daily.He exercises with enough effort to increase his heart rate 9 or less minutes per day.  He exercises with enough effort to increase his heart rate 3 or less days per week.   He is taking medications regularly.       Concern - Abdominal pain - Diverticulitis  Onset: 2 months ago  Description: lower left abdominal pain  Intensity: severe  Progression of Symptoms:  same  Accompanying Signs & Symptoms: bloating  Previous history of similar problem: yes  Precipitating factors:        Worsened by: eating  Alleviating factors:        Improved by: none  Therapies tried and outcome: antibiotics    Patient has been currently dealing with bouts of diverticulitis, is following with surgery and has a CT scan planned for tomorrow however pain has been excruciating at night and refractory to over-the-counter  "analgesics.  Has not noticed any abdominal distention, blood in the stool.  No fevers.    Review of Systems   Gastrointestinal: Positive for abdominal pain.            Objective    Vitals - Patient Reported  Weight (Patient Reported): 102.1 kg (225 lb)  Height (Patient Reported): 170.2 cm (5' 7\")  BMI (Based on Pt Reported Ht/Wt): 35.24      Vitals:  No vitals were obtained today due to virtual visit.    Physical Exam   GENERAL: Healthy, alert and no distress.  Sitting comfortably in a chair, in work attire with police uniform.  EYES: Eyes grossly normal to inspection.  No discharge or erythema, or obvious scleral/conjunctival abnormalities.  RESP: No audible wheeze, cough, or visible cyanosis.  No visible retractions or increased work of breathing.    SKIN: Visible skin clear. No significant rash, abnormal pigmentation or lesions.  NEURO: Cranial nerves grossly intact.  Mentation and speech appropriate for age.  PSYCH: Mentation appears normal, affect normal/bright, judgement and insight intact, normal speech and appearance well-groomed.        Video-Visit Details    Type of service:  Video Visit    Video End Time: 2:20 PM    Originating Location (pt. Location): Other Work    Distant Location (provider location):  [facility]     Platform used for Video Visit: Rylan"

## 2022-01-26 ENCOUNTER — HOSPITAL ENCOUNTER (OUTPATIENT)
Dept: CT IMAGING | Facility: CLINIC | Age: 47
Discharge: HOME | End: 2022-01-26
Attending: PHYSICIAN ASSISTANT | Admitting: PHYSICIAN ASSISTANT
Payer: COMMERCIAL

## 2022-01-26 DIAGNOSIS — R10.32 ABDOMINAL PAIN, LEFT LOWER QUADRANT: ICD-10-CM

## 2022-01-26 PROCEDURE — 250N000011 HC RX IP 250 OP 636: Performed by: PHYSICIAN ASSISTANT

## 2022-01-26 PROCEDURE — 74177 CT ABD & PELVIS W/CONTRAST: CPT

## 2022-01-26 RX ORDER — IOPAMIDOL 755 MG/ML
100 INJECTION, SOLUTION INTRAVASCULAR ONCE
Status: COMPLETED | OUTPATIENT
Start: 2022-01-26 | End: 2022-01-26

## 2022-01-26 RX ADMIN — IOPAMIDOL 100 ML: 755 INJECTION, SOLUTION INTRAVENOUS at 18:45

## 2022-02-07 ENCOUNTER — TRANSFERRED RECORDS (OUTPATIENT)
Dept: HEALTH INFORMATION MANAGEMENT | Facility: CLINIC | Age: 47
End: 2022-02-07
Payer: COMMERCIAL

## 2022-02-28 ENCOUNTER — TRANSFERRED RECORDS (OUTPATIENT)
Dept: HEALTH INFORMATION MANAGEMENT | Facility: CLINIC | Age: 47
End: 2022-02-28
Payer: COMMERCIAL

## 2022-03-10 PROCEDURE — 88305 TISSUE EXAM BY PATHOLOGIST: CPT | Mod: TC,ORL | Performed by: COLON & RECTAL SURGERY

## 2022-03-11 ENCOUNTER — TRANSFERRED RECORDS (OUTPATIENT)
Dept: HEALTH INFORMATION MANAGEMENT | Facility: CLINIC | Age: 47
End: 2022-03-11
Payer: COMMERCIAL

## 2022-03-11 ENCOUNTER — LAB REQUISITION (OUTPATIENT)
Dept: LAB | Facility: CLINIC | Age: 47
End: 2022-03-11
Payer: COMMERCIAL

## 2022-03-14 PROCEDURE — 88305 TISSUE EXAM BY PATHOLOGIST: CPT | Mod: 26 | Performed by: PATHOLOGY

## 2022-03-21 DIAGNOSIS — R06.02 SHORTNESS OF BREATH: Primary | ICD-10-CM

## 2022-03-22 RX ORDER — ALBUTEROL SULFATE 90 UG/1
AEROSOL, METERED RESPIRATORY (INHALATION)
Qty: 8.5 G | Refills: 3 | Status: SHIPPED | OUTPATIENT
Start: 2022-03-22 | End: 2022-07-14

## 2022-03-22 NOTE — TELEPHONE ENCOUNTER
"Routing refill request to provider for review/approval because:  Asthma control assessment due    Last Written Prescription Date:  11/6/2021  Last Fill Quantity: 75 mL,  # refills: 3   Last office visit provider:  1/25/2022     Requested Prescriptions   Pending Prescriptions Disp Refills     albuterol (PROAIR HFA/PROVENTIL HFA/VENTOLIN HFA) 108 (90 Base) MCG/ACT inhaler [Pharmacy Med Name: ALBUTEROL HFA INH (200 PUFFS)8.5GM] 8.5 g      Sig: INHALE 2 PUFFS BY MOUTH EVERY 4 HOURS AS NEEDED FOR WHEEZING       Asthma Maintenance Inhalers - Anticholinergics Failed - 3/21/2022  7:21 AM        Failed - Asthma control assessment score within normal limits in last 6 months     Please review ACT score.           Passed - Patient is age 12 years or older        Passed - Medication is active on med list        Passed - Recent (6 mo) or future (30 days) visit within the authorizing provider's specialty     Patient had office visit in the last 6 months or has a visit in the next 30 days with authorizing provider or within the authorizing provider's specialty.  See \"Patient Info\" tab in inbasket, or \"Choose Columns\" in Meds & Orders section of the refill encounter.           Short-Acting Beta Agonist Inhalers Protocol  Failed - 3/21/2022  7:21 AM        Failed - Asthma control assessment score within normal limits in last 6 months     Please review ACT score.           Passed - Patient is age 12 or older        Passed - Medication is active on med list        Passed - Recent (6 mo) or future (30 days) visit within the authorizing provider's specialty     Patient had office visit in the last 6 months or has a visit in the next 30 days with authorizing provider or within the authorizing provider's specialty.  See \"Patient Info\" tab in inbasket, or \"Choose Columns\" in Meds & Orders section of the refill encounter.                 Bridgett Gutierrez RN 03/22/22 9:52 AM  "

## 2022-03-24 DIAGNOSIS — J45.50 SEVERE PERSISTENT ASTHMA WITHOUT COMPLICATION (H): ICD-10-CM

## 2022-04-06 ENCOUNTER — HOSPITAL ENCOUNTER (OUTPATIENT)
Dept: RADIOLOGY | Facility: CLINIC | Age: 47
Discharge: HOME | End: 2022-04-06
Attending: OTOLARYNGOLOGY
Payer: COMMERCIAL

## 2022-04-06 ENCOUNTER — OFFICE VISIT (OUTPATIENT)
Dept: FAMILY MEDICINE | Facility: CLINIC | Age: 47
End: 2022-04-06
Payer: COMMERCIAL

## 2022-04-06 ENCOUNTER — HOSPITAL ENCOUNTER (OUTPATIENT)
Dept: SPEECH THERAPY | Facility: CLINIC | Age: 47
Discharge: HOME | End: 2022-04-06
Attending: OTOLARYNGOLOGY
Payer: COMMERCIAL

## 2022-04-06 VITALS
RESPIRATION RATE: 14 BRPM | TEMPERATURE: 97.9 F | DIASTOLIC BLOOD PRESSURE: 81 MMHG | BODY MASS INDEX: 37.04 KG/M2 | SYSTOLIC BLOOD PRESSURE: 126 MMHG | HEIGHT: 67 IN | WEIGHT: 236 LBS | HEART RATE: 86 BPM

## 2022-04-06 DIAGNOSIS — K30 FUNCTIONAL DYSPEPSIA: Primary | ICD-10-CM

## 2022-04-06 DIAGNOSIS — Z13.220 SCREENING FOR HYPERLIPIDEMIA: ICD-10-CM

## 2022-04-06 DIAGNOSIS — R13.10 DYSPHAGIA: ICD-10-CM

## 2022-04-06 DIAGNOSIS — Z12.11 SCREEN FOR COLON CANCER: ICD-10-CM

## 2022-04-06 DIAGNOSIS — J45.50 SEVERE PERSISTENT ASTHMA WITHOUT COMPLICATION (H): ICD-10-CM

## 2022-04-06 PROCEDURE — 74220 X-RAY XM ESOPHAGUS 1CNTRST: CPT

## 2022-04-06 PROCEDURE — 255N000001 HC RX 255: Performed by: OTOLARYNGOLOGY

## 2022-04-06 PROCEDURE — 92611 MOTION FLUOROSCOPY/SWALLOW: CPT | Mod: GN

## 2022-04-06 PROCEDURE — 99213 OFFICE O/P EST LOW 20 MIN: CPT | Performed by: FAMILY MEDICINE

## 2022-04-06 PROCEDURE — 74230 X-RAY XM SWLNG FUNCJ C+: CPT

## 2022-04-06 RX ADMIN — ANTACID/ANTIFLATULENT 4 G: 380; 550; 10; 10 GRANULE, EFFERVESCENT ORAL at 14:33

## 2022-04-06 ASSESSMENT — ASTHMA QUESTIONNAIRES: ACT_TOTALSCORE: 19

## 2022-04-06 ASSESSMENT — ENCOUNTER SYMPTOMS
FEVER: 0
ABDOMINAL PAIN: 0

## 2022-04-06 NOTE — PROGRESS NOTES
"  Assessment & Plan     Functional dyspepsia  New diagnosis, will get endoscopy records.  Start Elavil, continue Prilosec.  Recheck in 6 to 8 weeks  - amitriptyline (ELAVIL) 25 MG tablet  Dispense: 30 tablet; Refill: 3    Screen for colon cancer  Patient recently had a colonoscopy, will get records.    Severe persistent asthma without complication  Controlled, continue current medical management.  - fluticasone-vilanterol (BREO ELLIPTA) 200-25 MCG/INH inhaler  Dispense: 60 each; Refill: 5        Return in about 6 weeks (around 5/18/2022) for functional dyspepsia.    Alex Nichols MD  [facility] LYNDA Matson is a 47 year old who presents for the following health issues     History of Present Illness       Reason for visit:  Follow up and possible new meds  Symptom onset:  More than a month  Symptoms include:  Constantly throat clearing  Symptom intensity:  Moderate  Symptom progression:  Staying the same  Had these symptoms before:  Yes  Has tried/received treatment for these symptoms:  Yes  Previous treatment was successful:  No    He eats 0-1 servings of fruits and vegetables daily.He consumes 0 sweetened beverage(s) daily.He exercises with enough effort to increase his heart rate 10 to 19 minutes per day.  He exercises with enough effort to increase his heart rate 3 or less days per week.   He is taking medications regularly.     Patient recently had endoscopies and swallow studies, was informed that he has functional dyspepsia.  He is on Prilosec and is wondering about starting Elavil after discussion with his wife and his specialist.  No new fevers, unintentional weight loss.          Review of Systems   Constitutional: Negative for fever.   Gastrointestinal: Negative for abdominal pain.            Objective    /81 (BP Location: Right arm, Patient Position: Sitting, Cuff Size: Adult Large)   Pulse 86   Temp 97.9  F (36.6  C) (Oral)   Resp 14   Ht 1.702 m (5' 7\")   Wt " 107 kg (236 lb)   BMI 36.96 kg/m    Body mass index is 36.96 kg/m .  Physical Exam  Vitals reviewed.   Constitutional:       Appearance: He is not ill-appearing.   Cardiovascular:      Rate and Rhythm: Normal rate and regular rhythm.   Pulmonary:      Effort: Pulmonary effort is normal.      Breath sounds: Normal breath sounds.

## 2022-04-07 NOTE — PROGRESS NOTES
Videofluoroscopic Swallow Study with Speech Pathology     04/06/22 1800   General Information   Type Of Visit Initial   Start Of Care Date 04/06/22   Referring Physician Dr. Ritchie  ([facility])   Medical Diagnosis Dysphagia   Onset Of Illness/injury Or Date Of Surgery 03/14/22  (Order date)   General Observations Patient pleasant and cooperative. He arrived to appointment unaccompanied.   General Information Comments Patient was referred for video swallow study and esophagram due to chronic throat clearing. Per Dr. Ritchie's report dated 3/13/22, this is could be due to reflux, may be habitual, or possibly neurogenic. Patient is also noted to have mild to moderate vocal hoarseness. He states that this has improved since he started taking omeprazole (40 mg QD) for his reflux, but has not resolved completely. Patient denies symptoms typically associated with GERD, such as globus sensation or regurgitation of food/liquid. However, he does notice that he sometimes coughs when drinking water. He estimates that this occurs a few times a week. Interestingly, he notices coughing more with warm liquids compared to cold.   VFSS Evaluation   VFSS Additional Documentation Yes   VFSS Eval: Radiology   Radiologist Dr. Blank   Views Taken left lateral   Physical Location of Procedure [facility]; Radiology Dept   VFSS Eval: Thin Liquid Texture Trial   Mode of Presentation, Thin Liquid cup;straw;self-fed   Order of Presentation Trials 1, 2, and 4  (Each trial consisted of 3 sips taken consecutively)   Preparatory Phase WFL   Oral Phase, Thin Liquid WFL   Pharyngeal Phase, Thin Liquid Delayed swallow reflex   Rosenbek's Penetration Aspiration Scale: Thin Liquid Trial Results 2 - contrast enters airway, remains above the vocal cords, no residue remains (penetration)   Diagnostic Statement Swallow response is mildly delayed with thin liquid, resulting in pourover just past the epiglottis and  occasionally to the pyriform sinuses. Patient experienced consistent transient laryngeal penetration with thin liquid. This was moderately deep but cleared spontaneously. No aspiration observed during this study.   VFSS Eval: Puree Solid Texture Trial   Mode of Presentation, Puree spoon;fed by clinician   Order of Presentation Trial 3   Preparatory Phase WFL  (Piecemeal deglutition)   Oral Phase, Puree WFL   Pharyngeal Phase, Puree WFL   Rosenbek's Penetration Aspiration Scale: Puree Food Trial Results 1 - no aspiration, contrast does not enter airway   Diagnostic Statement Swallow function is WNL with puree consistency.   Esophageal Phase of Swallow   Patient reports or presents with symptoms of esophageal dysphagia Yes   Esophageal comments Please refer to Radiologist's dedicated esophagram report for details.   Swallow Eval: Clinical Impressions   Skilled Criteria for Therapy Intervention No problems identified which require skilled intervention   Treatment Diagnosis Mild pharyngeal dysphagia   Diet texture recommendations Thin liquids (level 0);Regular diet   Recommended Feeding/Eating Techniques maintain upright posture during/after eating for 30 mins;small sips/bites   Clinical Impression Comments Videofluoroscopic swallow study completed. Patient presents with no oral dysphagia. Pharyngeal phase of swallow is mildly impaired. Notable for slightly delayed swallow response resulting in consistent transient laryngeal penetration with thin liquid. Penetration is moderately deep but clears spontaneously. No aspiration or deep laryngeal penetration observed during this study. Patient's overall aspiration risk appears low. He is appropriate to continue current diet of Regular textures and thin liquids. He was advised to take small sips of liquid and minimize distractions, especially talking, when eating/drinking. Ongoing Speech Therapy is not indicated at this time, however, if patient's throat clearing and vocal  hoarseness persist over the next several months, consider repeat laryngoscopy and referral to Speech Therapist specializing in voice disorders as appropriate   Total Session Time   SLP Eval: VideoFluoroscopic Swallow function Minutes ([id number]) 17   Total Evaluation Time 17 minutes

## 2022-04-16 ENCOUNTER — HEALTH MAINTENANCE LETTER (OUTPATIENT)
Age: 47
End: 2022-04-16

## 2022-05-24 DIAGNOSIS — R10.13 DYSPEPSIA: ICD-10-CM

## 2022-05-25 RX ORDER — OMEPRAZOLE 40 MG/1
40 CAPSULE, DELAYED RELEASE ORAL DAILY
Qty: 30 CAPSULE | Refills: 2 | Status: SHIPPED | OUTPATIENT
Start: 2022-05-25 | End: 2022-07-06

## 2022-07-06 ENCOUNTER — VIRTUAL VISIT (OUTPATIENT)
Dept: FAMILY MEDICINE | Facility: CLINIC | Age: 47
End: 2022-07-06
Payer: COMMERCIAL

## 2022-07-06 DIAGNOSIS — J45.50 SEVERE PERSISTENT ASTHMA WITHOUT COMPLICATION (H): Primary | ICD-10-CM

## 2022-07-06 DIAGNOSIS — J30.1 CHRONIC SEASONAL ALLERGIC RHINITIS DUE TO POLLEN: ICD-10-CM

## 2022-07-06 DIAGNOSIS — F42.4 SKIN PICKING HABIT: ICD-10-CM

## 2022-07-06 DIAGNOSIS — R10.13 DYSPEPSIA: ICD-10-CM

## 2022-07-06 DIAGNOSIS — J30.89 ALLERGIC RHINITIS DUE TO MOLD: ICD-10-CM

## 2022-07-06 DIAGNOSIS — J30.81 CHRONIC ALLERGIC RHINITIS DUE TO ANIMAL HAIR AND DANDER: ICD-10-CM

## 2022-07-06 PROCEDURE — 99214 OFFICE O/P EST MOD 30 MIN: CPT | Mod: 95 | Performed by: FAMILY MEDICINE

## 2022-07-06 RX ORDER — OMEPRAZOLE 40 MG/1
40 CAPSULE, DELAYED RELEASE ORAL DAILY
Qty: 90 CAPSULE | Refills: 3 | Status: ON HOLD | OUTPATIENT
Start: 2022-07-06 | End: 2023-08-27

## 2022-07-06 RX ORDER — ALBUTEROL SULFATE 90 UG/1
2 AEROSOL, METERED RESPIRATORY (INHALATION) EVERY 4 HOURS PRN
Qty: 18 G | Refills: 11 | Status: SHIPPED | OUTPATIENT
Start: 2022-07-06 | End: 2022-12-12

## 2022-07-06 RX ORDER — FEXOFENADINE HCL AND PSEUDOEPHEDRINE HCL 180; 240 MG/1; MG/1
TABLET, EXTENDED RELEASE ORAL
Qty: 90 TABLET | Refills: 3 | Status: SHIPPED | OUTPATIENT
Start: 2022-07-06 | End: 2023-02-22

## 2022-07-06 ASSESSMENT — ENCOUNTER SYMPTOMS
SHORTNESS OF BREATH: 0
COUGH: 0
DIZZINESS: 0
NERVOUS/ANXIOUS: 0

## 2022-07-06 ASSESSMENT — ANXIETY QUESTIONNAIRES
3. WORRYING TOO MUCH ABOUT DIFFERENT THINGS: SEVERAL DAYS
GAD7 TOTAL SCORE: 5
8. IF YOU CHECKED OFF ANY PROBLEMS, HOW DIFFICULT HAVE THESE MADE IT FOR YOU TO DO YOUR WORK, TAKE CARE OF THINGS AT HOME, OR GET ALONG WITH OTHER PEOPLE?: NOT DIFFICULT AT ALL
7. FEELING AFRAID AS IF SOMETHING AWFUL MIGHT HAPPEN: NOT AT ALL
7. FEELING AFRAID AS IF SOMETHING AWFUL MIGHT HAPPEN: NOT AT ALL
5. BEING SO RESTLESS THAT IT IS HARD TO SIT STILL: SEVERAL DAYS
GAD7 TOTAL SCORE: 5
GAD7 TOTAL SCORE: 5
6. BECOMING EASILY ANNOYED OR IRRITABLE: NOT AT ALL
2. NOT BEING ABLE TO STOP OR CONTROL WORRYING: SEVERAL DAYS
1. FEELING NERVOUS, ANXIOUS, OR ON EDGE: SEVERAL DAYS
4. TROUBLE RELAXING: SEVERAL DAYS

## 2022-07-06 NOTE — PROGRESS NOTES
Dano is a 47 year old who is being evaluated via a billable video visit.      How would you like to obtain your AVS? MyChart  If the video visit is dropped, the invitation should be resent by: Text to cell phone: [phone]  Will anyone else be joining your video visit? No          Assessment & Plan     Severe persistent asthma without complication  Continue current medical management  - fexofenadine-pseudoePHEDrine (ALLEGRA-D ALLERGY & CONGESTION) 180-240 MG 24 hr tablet  Dispense: 90 tablet; Refill: 3  - albuterol (PROAIR HFA/PROVENTIL HFA/VENTOLIN HFA) 108 (90 Base) MCG/ACT inhaler  Dispense: 18 g; Refill: 11  - fluticasone-vilanterol (BREO ELLIPTA) 200-25 MCG/INH inhaler  Dispense: 60 each; Refill: 11    Chronic seasonal allergic rhinitis due to pollen  Continue current medical management  - fexofenadine-pseudoePHEDrine (ALLEGRA-D ALLERGY & CONGESTION) 180-240 MG 24 hr tablet  Dispense: 90 tablet; Refill: 3    Chronic allergic rhinitis due to animal hair and dander  Continue current medical management  - fexofenadine-pseudoePHEDrine (ALLEGRA-D ALLERGY & CONGESTION) 180-240 MG 24 hr tablet  Dispense: 90 tablet; Refill: 3    Allergic rhinitis due to mold  Continue current medical management  - fexofenadine-pseudoePHEDrine (ALLEGRA-D ALLERGY & CONGESTION) 180-240 MG 24 hr tablet  Dispense: 90 tablet; Refill: 3    Dyspepsia  Continue current medical management  - omeprazole (PRILOSEC) 40 MG DR capsule  Dispense: 90 capsule; Refill: 3    Skin picking habit  Patient requesting referral to psychiatry to address skin picking behavior.  - Adult Mental Health  Referral      Return in about 1 year (around 7/6/2023) for Annual Preventative Visit..    Alex Nichols MD  [facility]    Jada Matson is a 47 year old, presenting for the following health issues:  Medication Request      History of Present Illness       He eats 0-1 servings of fruits and vegetables daily.He consumes 1  "sweetened beverage(s) daily.He exercises with enough effort to increase his heart rate 20 to 29 minutes per day.  He exercises with enough effort to increase his heart rate 3 or less days per week.   He is taking medications regularly.     Patient presents for medication check.  Requesting refills, tolerating well.  Has been doing better on Breo, still uses albuterol, goes through an inhaler every month and a half.    He would like a referral to psychiatry for picking the skin around his nails, he is concerned about OCD.    Review of Systems   Respiratory: Negative for cough and shortness of breath.    Neurological: Negative for dizziness.   Psychiatric/Behavioral: The patient is not nervous/anxious.             Objective    Vitals - Patient Reported  Weight (Patient Reported): 99.8 kg (220 lb)  Height (Patient Reported): 170.2 cm (5' 7\")  BMI (Based on Pt Reported Ht/Wt): 34.46        Physical Exam   GENERAL: Healthy, alert and no distress  EYES: Eyes grossly normal to inspection.  No discharge or erythema, or obvious scleral/conjunctival abnormalities.  RESP: No audible wheeze, cough, or visible cyanosis.  No visible retractions or increased work of breathing.    SKIN: Visible skin clear. No significant rash, abnormal pigmentation or lesions.  NEURO: Cranial nerves grossly intact.  Mentation and speech appropriate for age.  PSYCH: Mentation appears normal, affect normal/bright, judgement and insight intact, normal speech and appearance well-groomed.            Video-Visit Details    Video Start Time: 9:00 AM    Type of service:  Video Visit    Video End Time: 9:20 AM    Originating Location (pt. Location): Home    Distant Location (provider location):  [facility]     Platform used for Video Visit: Rylan    .  ..  "

## 2022-07-07 ENCOUNTER — TELEPHONE (OUTPATIENT)
Dept: BEHAVIORAL HEALTH | Facility: CLINIC | Age: 47
End: 2022-07-07

## 2022-07-07 NOTE — TELEPHONE ENCOUNTER
Reach patient and scheduled initial TC Therapy 7/12/2022 at 9:30am with Joaquin Carmona  Transition Clinic Coordinator  Date and Time: 07/07/22 1:34 PM          ----- Message from Josh Hahn sent at 7/7/2022 12:53 PM CDT -----  Transition Clinic Referral   [place] Only   Limited [place] Availability    Type of Referral:    __X__Therapy Only   ____Medication Only: Referral will automatically be declined if no next level of care scheduled.   ____Therapy & Medication:  Referral will automatically be declined if no next level of care scheduled.   ____Diagnostic Assessment         TC Psychiatry cannot see patients who do not have active medical insurance    Referring Provider Name: Josh Hahn    Clinician completing the assessment. Alex Nichols    Referring Provider: [facility] PROVIDER    If known, referring provider contact name: Alex Nichols; Phone Number:   Service Line/Location:     Reason for Transition Clinic Referral: F42.4 (ICD-10-CM) - Skin picking habit    Next Level of Care Patient Will Be Transitioned To:   Provider(s)  Location   Ref was for [facility] which was rachel but client wanted therapy as well.   9/19/2022 Status: Scheduled     Time: 1:30 PM Length: 60    Visit Type: ADULT PSYCHOTHERAPY NEW [[id number]] Copay: $0.00    Provider: Curtis Mitchell LICSW Department: [facility] [place]    Bill Area: Psychology     Encounter #: [id number] Notes: My chart  Printed on:            TC Psychiatry cannot see patients who do not have active medical insurance    What Would Be Helpful from the Transition Clinic: bridge GAP to therapy      Needs: NO    Does Patient Have Access to Technology: yes    Patient E-mail Address: [email or web address]    Current Patient Phone Number: [phone];     Clinician Gender Preference (if applicable): NO    Josh Hahn

## 2022-07-12 ENCOUNTER — TELEPHONE (OUTPATIENT)
Dept: BEHAVIORAL HEALTH | Facility: CLINIC | Age: 47
End: 2022-07-12

## 2022-07-12 NOTE — TELEPHONE ENCOUNTER
Pt scheduled for initial intake with the Transition Clinic today @ 9:30 a.m. Sent patient link to join visit via email. Pt did not present to the session. Called patient and he indicated that he thought he had been rescheduled for 7/14/2022. This provider explained that the appointment on the 14th is with a different service and that the TC can provide him with short-term brief therapy until his appointment with the long-term therapist which is scheduled for September, 19, 2022. The patient stated he would like to reschedule with the TC. Spoke with the patient about assisting him in finding a long-term therapist sooner than September and stated this could be discussed further at his appointment. Pt is scheduled with Xochitl on Friday, 7/15 @ 0930.    Elvie Vargas [facility]  Transition Clinic  [phone]

## 2022-07-14 ENCOUNTER — VIRTUAL VISIT (OUTPATIENT)
Dept: PSYCHIATRY | Facility: CLINIC | Age: 47
End: 2022-07-14
Payer: COMMERCIAL

## 2022-07-14 VITALS — WEIGHT: 220 LBS | BODY MASS INDEX: 34.53 KG/M2 | HEIGHT: 67 IN

## 2022-07-14 DIAGNOSIS — F41.1 GAD (GENERALIZED ANXIETY DISORDER): ICD-10-CM

## 2022-07-14 DIAGNOSIS — F95.9 TIC DISORDER: ICD-10-CM

## 2022-07-14 DIAGNOSIS — F42.4 EXCORIATION (SKIN-PICKING) DISORDER: Primary | ICD-10-CM

## 2022-07-14 PROBLEM — K58.0 IRRITABLE BOWEL SYNDROME WITH DIARRHEA: Status: ACTIVE | Noted: 2022-07-14

## 2022-07-14 PROCEDURE — 99215 OFFICE O/P EST HI 40 MIN: CPT | Mod: GT | Performed by: NURSE PRACTITIONER

## 2022-07-14 RX ORDER — CLONIDINE HYDROCHLORIDE 0.1 MG/1
TABLET ORAL
Qty: 56 TABLET | Refills: 0 | Status: ON HOLD | OUTPATIENT
Start: 2022-07-14 | End: 2023-08-27

## 2022-07-14 RX ORDER — HYDROXYZINE HYDROCHLORIDE 10 MG/1
TABLET, FILM COATED ORAL
Qty: 30 TABLET | Refills: 1 | Status: ON HOLD | OUTPATIENT
Start: 2022-07-14 | End: 2023-08-27

## 2022-07-14 ASSESSMENT — ANXIETY QUESTIONNAIRES
3. WORRYING TOO MUCH ABOUT DIFFERENT THINGS: MORE THAN HALF THE DAYS
4. TROUBLE RELAXING: MORE THAN HALF THE DAYS
GAD7 TOTAL SCORE: 11
6. BECOMING EASILY ANNOYED OR IRRITABLE: SEVERAL DAYS
IF YOU CHECKED OFF ANY PROBLEMS ON THIS QUESTIONNAIRE, HOW DIFFICULT HAVE THESE PROBLEMS MADE IT FOR YOU TO DO YOUR WORK, TAKE CARE OF THINGS AT HOME, OR GET ALONG WITH OTHER PEOPLE: SOMEWHAT DIFFICULT
5. BEING SO RESTLESS THAT IT IS HARD TO SIT STILL: MORE THAN HALF THE DAYS
GAD7 TOTAL SCORE: 11
1. FEELING NERVOUS, ANXIOUS, OR ON EDGE: MORE THAN HALF THE DAYS
7. FEELING AFRAID AS IF SOMETHING AWFUL MIGHT HAPPEN: NOT AT ALL
2. NOT BEING ABLE TO STOP OR CONTROL WORRYING: MORE THAN HALF THE DAYS

## 2022-07-14 ASSESSMENT — PATIENT HEALTH QUESTIONNAIRE - PHQ9: SUM OF ALL RESPONSES TO PHQ QUESTIONS 1-9: 5
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
## 2022-07-14 NOTE — PATIENT INSTRUCTIONS
Start clonidine 0.1 mg tablet as follows:   Week 1:  Take HALF tab by mouth each bedtime.   Week 2:  Increase to HALF tab by mouth in the morning and at bedtime.  Week 3:  Increase to HALF tab by mouth in the morning, afternoon, and at bedtime. Continue this dose until you meet with me next.      Clonidine should be taken regularly.  Monitor for dizziness and lightheadedness, may need to change positions slowly. If severe, contact me so we can adjust dose.     2.  Start hydroxyzine 10-20 mg by mouth as needed for anxiety or sleep.  Hold if too sedating.  Monitor for urinary retention, dry mouth, constipation, dry eyes or vision changes.      3.  We also discussed the supplement N-acetylcysteine (NAC), which is over the counter (online if not in stores).  I recommend starting with 600 mg twice daily.  The goal is for NAC to reduce urges to pick.  It has been shown in studies to be safe and well tolerated in treating skin-picking disorder.      4.  Referral was placed for Habit Reversal Therapy for skin-picking disorder.     5.  Please send me [employer] messages with any non-urgent questions or concerns.     6.  Follow up with me in 1 month via video visit.

## 2022-07-15 ENCOUNTER — DOCUMENTATION ONLY (OUTPATIENT)
Dept: BEHAVIORAL HEALTH | Facility: CLINIC | Age: 47
End: 2022-07-15

## 2022-07-15 NOTE — PROGRESS NOTES
Patient was scheduled for session at 9:30 this am.  Writer attempted to connect via [email or web address] sending a link to his email address at [email or web address].  Provider waited until 9:55 before disconnected the link.  Provider placed a call to the number patient provided and as listed in patient file ([phone]) at 10:03.  Patient did not answer the call. A discreet message was left for patient re:missed appointment and provider left a phone number for patient to call should he wish to reschedule.  Baljinder Townsend, SHESW

## 2022-07-18 NOTE — PROGRESS NOTES
[facility] Post-Appointment Chart -check      Correct pharmacy verified with patient and updated in chart? [x] yes []no    Medications ordered this visit were e-scribed.  Verified by order class [x] yes  [] no    List Medications: cloNIDine (CATAPRES) 0.1 MG tablet; hydrOXYzine (ATARAX) 10 MG tablet    Medication changes or discontinuations were communicated to patient's pharmacy: [] yes  [x] no    UA collected [] yes  [] no  [x] n/a-virtual     Outside referrals / labs, etc support staff to follow up: [x] yes  [] no Psychotherapy     Future appointment was made: [] yes  [x] no  [] n/a  RTC 1 month LM to schedule  Future Appointments 7/18/2022 - 1/14/2023            None          Dictation completed at time of chart check: [x] yes  [] no    I have checked the documentation for today s encounters and the above information has been reviewed and completed.      Adriana Berger on July 18, 2022 at 2:01 PM

## 2022-09-23 ENCOUNTER — TELEPHONE (OUTPATIENT)
Dept: SLEEP MEDICINE | Facility: CLINIC | Age: 47
End: 2022-09-23

## 2022-09-29 ENCOUNTER — TRANSFERRED RECORDS (OUTPATIENT)
Dept: HEALTH INFORMATION MANAGEMENT | Facility: CLINIC | Age: 47
End: 2022-09-29

## 2022-09-29 ENCOUNTER — OFFICE VISIT (OUTPATIENT)
Dept: UROLOGY | Facility: CLINIC | Age: 47
End: 2022-09-29
Payer: COMMERCIAL

## 2022-09-29 VITALS — HEART RATE: 96 BPM | DIASTOLIC BLOOD PRESSURE: 81 MMHG | OXYGEN SATURATION: 95 % | SYSTOLIC BLOOD PRESSURE: 144 MMHG

## 2022-09-29 DIAGNOSIS — N45.1 RIGHT EPIDIDYMITIS: ICD-10-CM

## 2022-09-29 DIAGNOSIS — N41.1 CHRONIC PROSTATITIS: Primary | ICD-10-CM

## 2022-09-29 LAB
ALBUMIN UR-MCNC: NEGATIVE MG/DL
APPEARANCE UR: CLEAR
BACTERIA #/AREA URNS HPF: ABNORMAL /HPF
BILIRUB UR QL STRIP: NEGATIVE
COLOR UR AUTO: YELLOW
GLUCOSE UR STRIP-MCNC: NEGATIVE MG/DL
HGB UR QL STRIP: NEGATIVE
KETONES UR STRIP-MCNC: ABNORMAL MG/DL
LEUKOCYTE ESTERASE UR QL STRIP: NEGATIVE
NITRATE UR QL: NEGATIVE
PH UR STRIP: 6 [PH] (ref 5–7)
RBC #/AREA URNS AUTO: ABNORMAL /HPF
SP GR UR STRIP: 1.02 (ref 1–1.03)
SQUAMOUS #/AREA URNS AUTO: ABNORMAL /LPF
UROBILINOGEN UR STRIP-ACNC: 0.2 E.U./DL
WBC #/AREA URNS AUTO: ABNORMAL /HPF

## 2022-09-29 PROCEDURE — 81001 URINALYSIS AUTO W/SCOPE: CPT | Performed by: STUDENT IN AN ORGANIZED HEALTH CARE EDUCATION/TRAINING PROGRAM

## 2022-09-29 PROCEDURE — 87086 URINE CULTURE/COLONY COUNT: CPT | Performed by: STUDENT IN AN ORGANIZED HEALTH CARE EDUCATION/TRAINING PROGRAM

## 2022-09-29 PROCEDURE — 51798 US URINE CAPACITY MEASURE: CPT | Performed by: STUDENT IN AN ORGANIZED HEALTH CARE EDUCATION/TRAINING PROGRAM

## 2022-09-29 PROCEDURE — 99204 OFFICE O/P NEW MOD 45 MIN: CPT | Mod: 25 | Performed by: STUDENT IN AN ORGANIZED HEALTH CARE EDUCATION/TRAINING PROGRAM

## 2022-09-29 RX ORDER — ONDANSETRON 4 MG/1
4 TABLET, ORALLY DISINTEGRATING ORAL EVERY 8 HOURS PRN
Qty: 5 TABLET | Refills: 0 | Status: SHIPPED | OUTPATIENT
Start: 2022-09-29 | End: 2023-11-06

## 2022-09-29 RX ORDER — SULFAMETHOXAZOLE/TRIMETHOPRIM 800-160 MG
1 TABLET ORAL 2 TIMES DAILY
Qty: 28 TABLET | Refills: 1 | Status: SHIPPED | OUTPATIENT
Start: 2022-09-29 | End: 2022-10-13

## 2022-09-29 NOTE — PROGRESS NOTES
Chief Complaint:   Scrotal pain         History of Present Illness:   Dano Kennedy is a 47 year old male with a history of WENDY, ADHD, asthma, and generalized anxiety disorder who presents for evaluation of scrotal pain.     The patient presents with pain that initially started at the tip of the penis. Over the last few weeks, he noticed more right sided scrotal pain with occasional bilateral scrotal pain. Around the same time, he noticed a weak urinary stream and double voiding. He has tried Aleve for pain, which did not make a difference. He denies dysuria and gross hematuria.          Past Medical History:     Past Medical History:   Diagnosis Date     Allergic rhinitis, cause unspecified      Asthma      Complication of anesthesia     did not respond well to succs     Unspecified asthma(493.90)             Past Surgical History:     Past Surgical History:   Procedure Laterality Date     COLONOSCOPY       DAVINCI XI ASSISTED RESECTION RECTOSIGMOID N/A 11/26/2019    Procedure: ROBOTIC ASSISTED SIGMOID COLECTOMY. MOBILIZATION OF SPLENIC FLESURE;  Surgeon: Travis Brand MD;  Location: [facility]     ENDOSCOPIC POLYPECTOMY NASAL       ESOPHAGOSCOPY, GASTROSCOPY, DUODENOSCOPY (EGD), COMBINED N/A 11/19/2021    Procedure: ESOPHAGOGASTRODUODENOSCOPY, WITH BIOPSIES using cold forceps;  Surgeon: Santhosh Harmon MD;  Location:  GI     LAPAROSCOPIC ASSISTED COLECTOMY       LASER HOLMIUM LITHOTRIPSY URETER(S), INSERT STENT, COMBINED Left 1/14/2015    Procedure: COMBINED CYSTOSCOPY, URETEROSCOPY, LASER HOLMIUM LITHOTRIPSY URETER(S), INSERT STENT;  Surgeon: Derrek Cobb MD;  Location: [facility] OR     LITHOTRIPSY       SEPTOPLASTY              Medications     Current Outpatient Medications   Medication     Acetaminophen (TYLENOL PO)     albuterol (PROAIR HFA/PROVENTIL HFA/VENTOLIN HFA) 108 (90 Base) MCG/ACT inhaler     albuterol (PROVENTIL) (2.5 MG/3ML) 0.083% neb solution     cloNIDine (CATAPRES) 0.1 MG tablet      EPINEPHrine (EPIPEN 2-DONNY) 0.3 MG/0.3ML injection 2-pack     fexofenadine-pseudoePHEDrine (ALLEGRA-D ALLERGY & CONGESTION) 180-240 MG 24 hr tablet     fluticasone-vilanterol (BREO ELLIPTA) 200-25 MCG/INH inhaler     hydrOXYzine (ATARAX) 10 MG tablet     ipratropium - albuterol 0.5 mg/2.5 mg/3 mL (DUONEB) 0.5-2.5 (3) MG/3ML neb solution     omeprazole (PRILOSEC) 40 MG DR capsule     order for DME     order for DME     ORDER FOR DME     No current facility-administered medications for this visit.            Allergies:   Bee pollen, Cats, Mold, Succinylcholine, and Trees         Review of Systems:  From intake questionnaire   Negative 14 system review except as noted on HPI, nurse's note.         Physical Exam:   Patient is a 47 year old  male   Vitals: Blood pressure (!) 144/81, pulse 96, SpO2 95 %.  General Appearance Adult: Alert, no acute distress, oriented.  Lungs: Non-labored breathing.  Heart: No obvious jugular venous distension present.  Neuro: Alert, oriented, speech and mentation normal  : Right epididymis tender to palpation. No concern scrotal masses. Prostate symmetric, anodular, but tender to palpation. Palpation reproduced groin pain.     PVR: 17 mL      Labs and Pathology:    I personally reviewed all applicable laboratory data and went over findings with patient  Significant for:    BMP RESULTS:  Recent Labs   Lab Test 11/09/21  2329 11/06/21  0025 10/06/21  1310 02/18/21  1518 07/13/20  2209 07/13/20  0000 12/15/19  0750    139 141 142 143  --  141   POTASSIUM 4.1 3.2* 4.1 4.4 3.9 3.9 4.0   CHLORIDE 104 106 105 106 108*  --  110*   CO2 24 18* 27 26 25  --  28   ANIONGAP 9 15 9 10 10  --  3    133* 95 81 97 97 105*   BUN 14 17 12 14 17  --  16   CR 0.95 1.02 0.90 0.92 1.00 1.00 0.85   GFRESTIMATED >90 88 >90 >60 >60 >60 >90   GFRESTBLACK  --   --   --  >60 >60 >60 >90   TAY 9.6 9.8 9.7 9.5 9.1  --  9.2       UA RESULTS:   Recent Labs   Lab Test 10/06/21  1426 07/13/20  6586  10/30/18  1453 09/16/15  1141 01/27/15  1050   SG 1.046* 1.020 1.010   < > 1.015   URINEPH 5.5 5.0 6.0   < > 6.5   NITRITE Negative Negative Negative   < > Negative   RBCU 1  --  0-2  --  25-50*   WBCU 1  --  0-5  --  O - 2    < > = values in this interval not displayed.            Assessment and Plan:     Assessment: 47 year old male seen in evaluation for 2-3 weeks of pain at the tip of the penis (resolved), right scrotal pain, weak urinary stream, and double voiding. The right epididymis was tender to palpation. The prostate was also tender to palpation and per patient, reproduced the groin pain he has been experiencing.     We discussed that his symptoms are consistent with prostatitis. The tenderness over the right epididymis was also consistent with epididymitis, though antibiotic coverage is similar. We discussed treatment with ciprofloxacin. The patient has used this medication before and it caused significant stomach upset. He would like to avoid using ciprofloxacin if possible. We will start with a trial of Bactrim instead.     Plan:  1. Bactrim DS BID x 14 days. Refill was provided if symptoms are not significantly improved by two weeks.   2. Zofran prn for nausea secondary to antibiotics.   3. Follow up if symptoms are unchanged after 14 days of antibiotic treatment.     ALEXANDRA DAVENPORT PA-C  Department of Urology

## 2022-09-29 NOTE — NURSING NOTE
"Initial BP (!) 144/81 (BP Location: Left arm, Patient Position: Chair, Cuff Size: Adult Large)   Pulse 96   SpO2 95%  Estimated body mass index is 34.46 kg/m  as calculated from the following:    Height as of 7/14/22: 1.702 m (5' 7\").    Weight as of 7/14/22: 99.8 kg (220 lb). .  Active order to obtain bladder scan? Yes   Name of ordering provider:  Tameka Guevara  Bladder scan preformed post void Yes.  Bladder scan reveled 17ML  Provider notified?  Yes    SUYAPA Mckeon MA on 9/29/2022 at 1:41 PM  "

## 2022-10-01 LAB — BACTERIA UR CULT: NO GROWTH

## 2022-10-14 ENCOUNTER — DOCUMENTATION ONLY (OUTPATIENT)
Dept: SLEEP MEDICINE | Facility: CLINIC | Age: 47
End: 2022-10-14
Payer: COMMERCIAL

## 2022-10-14 DIAGNOSIS — G47.33 OSA (OBSTRUCTIVE SLEEP APNEA): Primary | ICD-10-CM

## 2022-10-14 NOTE — PROGRESS NOTES
Patient was offered choice of vendor and chose [facility].  Patient Dano Kennedy was set up at [place]  on October 14, 2022. Patient received a Resmed Airsense 11 Pressures were set at 8-12 cm H2O.   Patient s ramp is 4 cm H2O for Auto and FLEX/EPR is EPR, 2.  Patient received a [employer] Mask name: Dreamwear Nasal mask size fitpack, heated tubing and heated humidifier.  Patient does not need to meet compliance. Patient has a follow up recommended but not required with Dr. Ribeiro.    Tracy L Fahrenkamp

## 2022-10-18 ENCOUNTER — DOCUMENTATION ONLY (OUTPATIENT)
Dept: SLEEP MEDICINE | Facility: CLINIC | Age: 47
End: 2022-10-18
Payer: COMMERCIAL

## 2022-10-18 NOTE — PROGRESS NOTES
3 day Sleep therapy management telephone visit    Diagnostic AHI:  24 HST    Confirmed with patient at time of call- Yes Patient is still interested in STM service       Subjective measures:  Patient started using CPAP last night and felt it went well. His machine is not reporting use from last night. Will check tomorrow to make sure its connecting.       Order settings:  CPAP MIN CPAP MAX   8 cm H2O 12 cm H2O       Device settings:  CPAP MIN CPAP MAX EPR RESMED SOFT RESPONSE SETTING   8 cm  H20 12 cm  H20 2 OFF       Compliance 0 %    Assessment: No usage but has account in [employer]      Action plan: Patient to have 14 day STM visit. Patient has a follow up visit scheduled:   no and not required by insurance    Replacement device: Yes  STM ordered by provider: Yes     Total time spent on accessing and  interpreting remote patient PAP therapy data  10 minutes    Total time spent counseling, coaching  and reviewing PAP therapy data with patient  2 minutes    [id number] no

## 2022-10-22 ENCOUNTER — HEALTH MAINTENANCE LETTER (OUTPATIENT)
Age: 47
End: 2022-10-22

## 2022-11-01 ENCOUNTER — DOCUMENTATION ONLY (OUTPATIENT)
Dept: SLEEP MEDICINE | Facility: CLINIC | Age: 47
End: 2022-11-01
Payer: COMMERCIAL

## 2022-11-01 NOTE — PROGRESS NOTES
14  DAY STM VISIT    Diagnostic AHI:  24  HST    Subjective measures:   Patient has replacement machine and stated he's doing fine with CPAP and doesn't need STM calls anymore.      Assessment: Pt not meeting objective benchmarks for compliance  Patient meeting subjective benchmarks.     Action plan: Removed from STM      Device type: Auto-CPAP    PAP settings: CPAP min 8.0 cm  H20       CPAP max 12.0 cm  H20      95th% pressure 10.2 cm  H20        RESMED EPR level Setting: TWO    RESMED Soft response setting:  OFF    Mask type:  Nasal Pillows    Objective measures: 14 day rolling measures      Compliance  50 %      Leak  6.33  lpm  last  upload      AHI 0.19   last  upload      Average number of minutes 191      Objective measure goal  Compliance   Goal >70%  Leak   Goal < 24 lpm  AHI  Goal < 5  Usage  Goal >240        Total time spent on accessing and interpreting remote patient PAP therapy data  10 minutes    Total time spent counseling, coaching  and reviewing PAP therapy data with patient  2 minutes    [id number]  [id number]  no (3 day STM)

## 2022-11-15 ENCOUNTER — DOCUMENTATION ONLY (OUTPATIENT)
Dept: SLEEP MEDICINE | Facility: CLINIC | Age: 47
End: 2022-11-15
Payer: COMMERCIAL

## 2022-11-15 NOTE — PROGRESS NOTES
30 DAY STM VISIT    Diagnostic AHI:  24 HST    PAP settings:  CPAP MIN CPAP MAX 95TH % PRESSURE EPR RESMED SOFT RESPONSE SETTING   8.0 cm  H20 12.0 cm  H20 9.9 cm  H20  TWO OFF     Device type: Auto-CPAP  Mask type:  Nasal Pillows    Objective measures: 14 day rolling measures:    COMPLIANCE LEAK AHI AVERAGE USE IN MINUTES   21 % 6 0.43 104   GOAL >70% GOAL < 24 LPM GOAL <5 GOAL >240        Assessment: Pt not meeting objective benchmarks for compliance  Patient failing following subjective benchmarks: congestion  Subjective measures:   Patient reports that CPAP has been going well. He does not get to use it every night because he does not sleep at home every night. He sometimes experiences congestion and is considering getting a FFM.   Action plan: pt to have 6 month STM visit  Patient has not scheduled a follow up visit  Total time spent on accessing and interpreting remote patient PAP therapy data  10 minutes    Total time spent counseling, coaching  and reviewing PAP therapy data with patient  2 minutes     [id number] this call  [id number] no  at 3 or 14 day STM

## 2022-12-12 ENCOUNTER — E-VISIT (OUTPATIENT)
Dept: FAMILY MEDICINE | Facility: CLINIC | Age: 47
End: 2022-12-12
Payer: COMMERCIAL

## 2022-12-12 DIAGNOSIS — F95.9 TIC DISORDER: ICD-10-CM

## 2022-12-12 DIAGNOSIS — R06.2 WHEEZE: ICD-10-CM

## 2022-12-12 DIAGNOSIS — J45.50 SEVERE PERSISTENT ASTHMA WITHOUT COMPLICATION (H): ICD-10-CM

## 2022-12-12 DIAGNOSIS — F42.4 EXCORIATION (SKIN-PICKING) DISORDER: ICD-10-CM

## 2022-12-12 PROCEDURE — 99421 OL DIG E/M SVC 5-10 MIN: CPT | Performed by: FAMILY MEDICINE

## 2022-12-12 RX ORDER — IPRATROPIUM BROMIDE AND ALBUTEROL SULFATE 2.5; .5 MG/3ML; MG/3ML
SOLUTION RESPIRATORY (INHALATION)
Qty: 90 ML | OUTPATIENT
Start: 2022-12-12

## 2022-12-12 RX ORDER — IPRATROPIUM BROMIDE AND ALBUTEROL SULFATE 2.5; .5 MG/3ML; MG/3ML
1 SOLUTION RESPIRATORY (INHALATION) EVERY 4 HOURS PRN
Qty: 270 ML | Refills: 3 | Status: ON HOLD | OUTPATIENT
Start: 2022-12-12 | End: 2023-08-30

## 2022-12-12 RX ORDER — FLUTICASONE FUROATE AND VILANTEROL 200; 25 UG/1; UG/1
1 POWDER RESPIRATORY (INHALATION) DAILY
Qty: 60 EACH | Refills: 11 | Status: SHIPPED | OUTPATIENT
Start: 2022-12-12 | End: 2023-12-12

## 2022-12-12 RX ORDER — ALBUTEROL SULFATE 90 UG/1
2 AEROSOL, METERED RESPIRATORY (INHALATION) EVERY 4 HOURS PRN
Qty: 18 G | Refills: 11 | Status: SHIPPED | OUTPATIENT
Start: 2022-12-12 | End: 2024-10-04

## 2022-12-12 NOTE — PATIENT INSTRUCTIONS
Thank you for choosing us for your care. I have placed an order for a prescription so that you can start treatment. View your full visit summary for details by clicking on the link below. Your pharmacist will able to address any questions you may have about the medication.     If you're not feeling better within 5-7 days, please schedule an appointment.  You can schedule an appointment right here in [facility], or call [phone]  If the visit is for the same symptoms as your eVisit, we'll refund the cost of your eVisit if seen within seven days.

## 2022-12-13 ENCOUNTER — TRANSFERRED RECORDS (OUTPATIENT)
Dept: HEALTH INFORMATION MANAGEMENT | Facility: CLINIC | Age: 47
End: 2022-12-13

## 2022-12-27 ENCOUNTER — TRANSFERRED RECORDS (OUTPATIENT)
Dept: HEALTH INFORMATION MANAGEMENT | Facility: CLINIC | Age: 47
End: 2022-12-27

## 2023-01-13 ENCOUNTER — E-VISIT (OUTPATIENT)
Dept: FAMILY MEDICINE | Facility: CLINIC | Age: 48
End: 2023-01-13
Payer: COMMERCIAL

## 2023-01-13 DIAGNOSIS — Z87.898 HX OF MOTION SICKNESS: Primary | ICD-10-CM

## 2023-01-13 PROCEDURE — 99421 OL DIG E/M SVC 5-10 MIN: CPT | Performed by: FAMILY MEDICINE

## 2023-01-13 RX ORDER — SCOLOPAMINE TRANSDERMAL SYSTEM 1 MG/1
1 PATCH, EXTENDED RELEASE TRANSDERMAL
Qty: 24 PATCH | Refills: 1 | Status: ON HOLD | OUTPATIENT
Start: 2023-01-13 | End: 2023-08-30

## 2023-01-13 RX ORDER — ONDANSETRON 4 MG/1
4 TABLET, FILM COATED ORAL EVERY 8 HOURS PRN
Qty: 15 TABLET | Refills: 0 | Status: SHIPPED | OUTPATIENT
Start: 2023-01-13

## 2023-01-13 NOTE — PATIENT INSTRUCTIONS
Thank you for choosing us for your care. I have placed an order for a prescription so that you can start treatment. View your full visit summary for details by clicking on the link below. Your pharmacist will able to address any questions you may have about the medication.     If you're not feeling better within 5-7 days, please schedule an appointment.  You can schedule an appointment right here in [facility], or call [phone]  If the visit is for the same symptoms as your eVisit, we'll refund the cost of your eVisit if seen within seven days.

## 2023-01-18 NOTE — ADDENDUM NOTE
Encounter addended by: Tameka Beard, PT on: 1/18/2023 10:46 AM   Actions taken: Clinical Note Signed, Episode resolved

## 2023-01-18 NOTE — PROGRESS NOTES
[facility]    Outpatient Physical Therapy Discharge Note  Patient: Dano Kennedy  : 1975    Beginning/End Dates of Reporting Period:  2021    Referring Provider: Yue Marin MD    Therapy Diagnosis: deconditioning     Client Self Report: Pt reports early onset of muscle fatigue and low functional endurance; noticable SOB when climbing his stairs and being last in group for group exercise.     Goals:  Goal Identifier HEP   Goal Description Pt will demonstrate IND with progressive HEP in order to maintain and progress goals in home environement.    Target Date 06/10/21   Date Met      Progress (detail required for progress note):       Goal Identifier 30sec sit<>stand   Goal Description Pt will complete > 18stands during 30sec sit<>stand test to demontrate increasing LE strength with <4/10 on OMNIn scale.    Target Date 06/10/21   Date Met      Progress (detail required for progress note):       Goal Identifier 2min step test   Goal Description Pt will complete >130steps during 2min step test to demonstrate increased muscular and functional endurance as well with <6/10 exertion on OMNI scale.   Target Date 06/10/21   Date Met      Progress (detail required for progress note):       Goal Identifier 6MWT   Goal Description Pt will ambulate >572meters/1876ft in order to achieve/better than the norms for community dwelling elderly, in order to returnt to group exercise and work without restriction with <5/10 exertion on OMNI scale.   Target Date 06/10/21   Date Met      Progress (detail required for progress note):       Plan:  Discharge from therapy.    Discharge:    Reason for Discharge: Patient choosing to discontinue therapy, with no further appointments scheduled. Pt's status is unknown.     Discharge Plan: Patient to continue home program, if applicable.

## 2023-02-01 ENCOUNTER — E-VISIT (OUTPATIENT)
Dept: FAMILY MEDICINE | Facility: CLINIC | Age: 48
End: 2023-02-01
Payer: COMMERCIAL

## 2023-02-01 DIAGNOSIS — J45.21 MILD INTERMITTENT ASTHMA WITH EXACERBATION: Primary | ICD-10-CM

## 2023-02-01 PROCEDURE — 99421 OL DIG E/M SVC 5-10 MIN: CPT | Performed by: FAMILY MEDICINE

## 2023-02-01 RX ORDER — PREDNISONE 50 MG/1
50 TABLET ORAL DAILY
Qty: 5 TABLET | Refills: 0 | Status: ON HOLD | OUTPATIENT
Start: 2023-02-01 | End: 2023-08-30

## 2023-02-07 ENCOUNTER — TELEPHONE (OUTPATIENT)
Dept: FAMILY MEDICINE | Facility: CLINIC | Age: 48
End: 2023-02-07

## 2023-02-07 NOTE — TELEPHONE ENCOUNTER
Summary:    Patient is due/failing the following:   ACT and PHYSICAL    Reviewed:    [] CARE EVERYWHERE  [] LAST OV NOTE   [] FYI TAB  [] MYCHART ACTIVE?  [] LAST PANEL ENCOUNTER  [] FUTURE APPTS  [] IMMUNIZATIONS  [] Media Tab  Action needed:   Patient needs office visit for physical.    Type of outreach:    Sent [employer]are message.                                                                               Bridgett Stanley CMA

## 2023-02-13 DIAGNOSIS — G47.33 OBSTRUCTIVE SLEEP APNEA (ADULT) (PEDIATRIC): Primary | ICD-10-CM

## 2023-02-16 ENCOUNTER — DOCUMENTATION ONLY (OUTPATIENT)
Dept: SLEEP MEDICINE | Facility: CLINIC | Age: 48
End: 2023-02-16
Payer: COMMERCIAL

## 2023-03-12 RX ORDER — CLONIDINE HYDROCHLORIDE 0.1 MG/1
TABLET ORAL
Qty: 56 TABLET | Refills: 0 | OUTPATIENT
Start: 2023-03-12

## 2023-06-01 ENCOUNTER — HEALTH MAINTENANCE LETTER (OUTPATIENT)
Age: 48
End: 2023-06-01

## 2023-07-05 ENCOUNTER — E-VISIT (OUTPATIENT)
Dept: FAMILY MEDICINE | Facility: CLINIC | Age: 48
End: 2023-07-05
Payer: COMMERCIAL

## 2023-07-05 DIAGNOSIS — R19.5 CHANGE IN CONSISTENCY OF STOOL: Primary | ICD-10-CM

## 2023-07-05 PROCEDURE — 99421 OL DIG E/M SVC 5-10 MIN: CPT | Performed by: FAMILY MEDICINE

## 2023-07-06 NOTE — TELEPHONE ENCOUNTER
REFERRAL INFORMATION:    Referring Provider:  Dr. Alex Nichols    Referring Clinic:  [facility]    Reason for Visit/Diagnosis: consistency of stool     FUTURE VISIT INFORMATION:    Appointment Date: 07-11-23    Appointment Time: @ 2:20pm     NOTES STATUS DETAILS   OFFICE NOTE from Referring Provider Internal 07-05-23, 01-25-22 Dr. Alex Nichols   [facility] DISCHARGE SUMMARY/  ED VISITS Internal/Care everywhere  10-06-21 Dr. Paolo King  11-09-21 Dr. Manan Che   11-05-21 Dr. Bill Bradley  11-13-21 Dr. Flako Chatterjee   12-15-19 Dr. Jennifer Conway   *additional in epic*   MEDICATION LIST Internal         PERTINENT LABS Internal/Care everywhere  CBC/diff:   02-27-22, 11-13-21, 11-09-21, 11-06-21, 10-06-21  CMP:   11-13-21, 11-09-21, 11-06-21, 07-13-20  BMP:   02-27-22, 10-06-21, 09-01-21, 07-30-21   PATHOLOGY REPORTS (RELATED) Internal  03-10-22, 11-19-21   IMAGING (CT, MRI, EGD, MRCP, Small Bowel Follow Through/SBT, MR/CT Enterography) Internal/Care everywhere / Received EGD: 11-19-21  CT abd/pelvis: 01-26-22, 11-09-21, 10-06-21, 09-01-21 (Hayden Paulson), 07-13-20  US abd: 11-13-21 (urgency Room), 12-15-19  *additional in epic*      Action    Action Taken 07-06-23 faxed request for images listed above. Heidi@1:28pm   [facility]# [phone]  [facility]# [phone]  [facility]# [phone]     Action 07/07/23 8:29 AM   Action Taken  * Images received from [facility] and attached to the patient in PACs.  Images received from [facility] and attached to the patient in PACs. - Nancy

## 2023-07-11 ENCOUNTER — OFFICE VISIT (OUTPATIENT)
Dept: GASTROENTEROLOGY | Facility: CLINIC | Age: 48
End: 2023-07-11
Payer: COMMERCIAL

## 2023-07-11 ENCOUNTER — PRE VISIT (OUTPATIENT)
Dept: GASTROENTEROLOGY | Facility: CLINIC | Age: 48
End: 2023-07-11

## 2023-07-11 VITALS
DIASTOLIC BLOOD PRESSURE: 82 MMHG | OXYGEN SATURATION: 96 % | HEIGHT: 67 IN | WEIGHT: 235.6 LBS | HEART RATE: 100 BPM | SYSTOLIC BLOOD PRESSURE: 135 MMHG | BODY MASS INDEX: 36.98 KG/M2

## 2023-07-11 DIAGNOSIS — R10.32 ABDOMINAL PAIN, LEFT LOWER QUADRANT: Primary | ICD-10-CM

## 2023-07-11 DIAGNOSIS — R19.5 CHANGE IN CONSISTENCY OF STOOL: ICD-10-CM

## 2023-07-11 DIAGNOSIS — K21.00 GASTROESOPHAGEAL REFLUX DISEASE WITH ESOPHAGITIS WITHOUT HEMORRHAGE: ICD-10-CM

## 2023-07-11 DIAGNOSIS — K21.9 LPRD (LARYNGOPHARYNGEAL REFLUX DISEASE): ICD-10-CM

## 2023-07-11 PROCEDURE — 99205 OFFICE O/P NEW HI 60 MIN: CPT | Mod: GC | Performed by: STUDENT IN AN ORGANIZED HEALTH CARE EDUCATION/TRAINING PROGRAM

## 2023-07-11 ASSESSMENT — PAIN SCALES - GENERAL: PAINLEVEL: MILD PAIN (2)

## 2023-07-11 NOTE — NURSING NOTE
"Chief Complaint   Patient presents with     New Patient       Vitals:    07/11/23 1350   BP: 135/82   Pulse: 100   SpO2: 96%   Weight: 106.9 kg (235 lb 9.6 oz)   Height: 1.702 m (5' 7\")       Body mass index is 36.9 kg/m .    Lelo Jaquez MA    "

## 2023-07-11 NOTE — PATIENT INSTRUCTIONS
Dano,     Here is our plan.     Continue IBGard as you have been, can help with spasms  Start methycellulose, one teaspoon daily in sufficient fluid (12 oz)  Use miralax as needed for straining days  Always be hydrated  Omeprazole 20 mg twice daily, empty stomach, 30-60 minutes before meal  We will plan for an EGD and a colonoscopy with upgraded sedation in 1-3 months  Send us a travelfoxt update in a few weeks with how you're doing    Best,   Dr. Pickett and Dr. Spencer    =========================  GI CONTACT INFO    It was a pleasure seeing you in clinic today - please be in touch if there are any further questions that arise following today's visit.  During business hours, you may reach my Clinic Coordinator at [phone].  For urgent/emergent questions after business hours, you may reach the on-call GI Fellow by contacting the [facility]  at [phone].    Any benign/non-urgent test results are usually communicated via letter or [employer] message within 1-2 weeks after completion.  Urgent results (those that require a change in the previously-discussed care plan) are usually communicated via a phone call once available from our clinic staff to discuss the results and the next steps in your evaluation.    I recommend signing up for [facility] access if you have not already done so and are comfortable with using a computer.  This allows for online access to your lab results and also helps you communicate efficiently with my clinic should any questions arise in your care.

## 2023-07-11 NOTE — LETTER
7/11/2023         RE: Dano Kennedy  [address]  [facility] [address]        Dear Colleague,    Thank you for referring your patient, aDno Kennedy, to the [facility] [place]. Please see a copy of my visit note below.    [facility], formerly [facility]:     New Consult: abnormal stools, abdominal pain, BRBPR, hx of diverticulitis    Date of visit: 7/11/2023    Referring provider: Alex Nichols    ASSESSMENT AND PLAN:    #. Hx of recurrent diverticulitis  #. S/p sigmoid resection (11/2019)  No evidence of recurrence at this time. Per patient, lower endoscopy at colorectal surgery clinic with evidence of residual diverticulosis.   -- continue to monitor  -- maintain high fiber diet, hydration    #. Abnormal stool pattern  #. Chronic abdominal bloating/pain  #. Bright red blood per rectum  Clinical pattern and symptoms consistent with IBS-M or IBS-D. Has been going on for 10+ years. In the last 1-2 months he has had progressive LLQ pain, bloating, and extreme variability in stool pattern (thin, firm, large, loose). Passes mucus alone as well. Bleeding has clinical profile of outlet bleeding though will be fully investigated at time of endoscopy. Unlikely to be malignancy or other sinister cause given reportedly normal lower endoscopy in 2021.   -- titrate balance of miralax and gentle soluble fiber  -- continue IBGard PRN  -- colonoscopy for new pain pattern and BRBPR  -- if endoscopic work up is negative will work to manage DGBI symptoms with diet, fiber, and possible pharmacologic augmentation    #. Anal/perineal pain  Rare, will come in short,sharp, waves. Suspicious for proctalgia fugax.   -- will examine at colonoscopy  -- continue to monitor      #. GERD  #. Question of Bailey's esophagus  Longstanding GERD 10 + years (on and off PPI). EGD in 2021 with images suggestive of a single tongue of BE.   -- PPI once daily (educated to PPI etiquette), dosage  and frequency to be determined by results of endoscopy   -- EGD  -- lifestyle measures for reflux    #. Hepatosteatosis   Redemonstrated on numerous imaging modalities in last few years. Last liver chemistries with mild elevation of AST/ALT in 11/2021.  Patient with elevated BMI (36.9).    -- educated to weight loss (targeting 5-10% of BW in next 12 months)  -- minimize EtOH  -- control metabolic risk factors    #. Average risk colorectal cancer screening/surveillance  Colonoscopy as above. Repeat interval pending that exam.     Return to Clinic: 4-6 months    Lopez Pickett MD  GI fellow     Patient discussed and seen with Gastroenterology staff Dr. Spencer, who is in agreement with the above.     ======================================================================================================  HPI:     Mr. Kennedy presents today as a new consult to the GI clinic for a number of GI issues.     Primarily, he is dealing with LLQ abdominal pain and bloating in tandem with almost cyclical stool pattern changes.    Pain is deep, ache like and is somewhat relieved by the passage of stool. His stools range from loose too firm, with narrow pencil thin stools not uncommon. He has also been passing mucus by itself. Cycle of stool changes occurs every 4-5 days. Color and consistency highly variable.     IBS and associated symptoms have been present in his his life for at least 10 years. Has not sought formal management until now. While he says some of his recent issues are reminiscent of his IBS, the current pain is new/worse and the quality is different. He thinks it feels more like mild diverticulitis at times. Has been using IBGard for about a week and finds this helpful.     Has been also experiencing a new sensation near his anus and his perineum. Not associated with passage of BMs. Sharp, shock like cramp. Pain starts around his anus and will travel in a wave through his perineum to his scrotum. Thankfully  rare occurrence.    Has also seen some blood intermittently, bright red. Occurs perhaps every 4 days.     In addition to the above he also has chronic reflux with symptoms perhaps 1-3x per week. Has used PPIs in the past intermittently. Primary symptoms are globus, throat clearing, and pyrosis. Denies regurgitation, dysphagia, odynophagia.     Denies hematochezia, fevers, chills, hematemesis, coffee ground emesis, nausea, vomiting, weight loss.    No personal historyf GI malignancy, IBD, liver diease.     Family history notable for diverticulitis and gastric cancer (maternal grandfather).    Social:  - EtOH: scant  - Tobacco: none  - Recreational: none   - Works:     All questions answered, plan of care understood from a GI standpoint.     Shared decision making employed, patient elects to move forward with endoscopy.      Targeted GI review of systems complete and is otherwise negative.     Past Medical History:   Diagnosis Date    Allergic rhinitis, cause unspecified     Asthma     Complication of anesthesia     did not respond well to succs    Unspecified asthma(493.90)        Past Surgical History:   Procedure Laterality Date    COLONOSCOPY      DAVINCI XI ASSISTED RESECTION RECTOSIGMOID N/A 11/26/2019    Procedure: ROBOTIC ASSISTED SIGMOID COLECTOMY. MOBILIZATION OF SPLENIC FLESURE;  Surgeon: Travis Brand MD;  Location:  OR    ENDOSCOPIC POLYPECTOMY NASAL      ESOPHAGOSCOPY, GASTROSCOPY, DUODENOSCOPY (EGD), COMBINED N/A 11/19/2021    Procedure: ESOPHAGOGASTRODUODENOSCOPY, WITH BIOPSIES using cold forceps;  Surgeon: Santhosh Harmon MD;  Location: [facility]    LAPAROSCOPIC ASSISTED COLECTOMY      LASER HOLMIUM LITHOTRIPSY URETER(S), INSERT STENT, COMBINED Left 1/14/2015    Procedure: COMBINED CYSTOSCOPY, URETEROSCOPY, LASER HOLMIUM LITHOTRIPSY URETER(S), INSERT STENT;  Surgeon: Derrek Cobb MD;  Location:  OR    LITHOTRIPSY      SEPTOPLASTY         Family History   Problem Relation Age of  "Onset    Hyperlipidemia Mother     Sleep Apnea Mother     Glaucoma Father     Glaucoma Maternal Grandfather     Macular Degeneration No family hx of        Social History     Tobacco Use    Smoking status: Never    Smokeless tobacco: Never   Substance Use Topics    Alcohol use: Yes     Comment: 1-2 per month       Physical exam:     Vitals:/82   Pulse 100   Ht 1.702 m (5' 7\")   Wt 106.9 kg (235 lb 9.6 oz)   SpO2 96%   BMI 36.90 kg/m     BMI: Body mass index is 36.9 kg/m .      GEN: NAD, A&Ox3, appropriate  HEENT: EOMI, PERRLA, MMM, hearing grossly intact  Neck: full ROM  Cardiopulmonary: non labored, comfortable on RA, nondiaphoretic, no LE edema  Abdominal: tender in krista-umbilical and LLQ, some intentional guarding, nondistended, no HSM, no rebound, , normoactive BS  Skin: no jaundice, no gross lesions on visible skin  Neuro: A&Ox3, grossly intact motor/sensation, gait intact  MSK: no gross deformity, moves arms/legs equally  Psych: normal affect, congruent with mood      Labs:     Lab Results   Component Value Date    WBC 15.0 (H) 11/09/2021    HGB 14.8 11/09/2021    HCT 44.0 11/09/2021     11/09/2021     11/09/2021    POTASSIUM 4.1 11/09/2021    CHLORIDE 104 11/09/2021    CO2 24 11/09/2021    BUN 14 11/09/2021    CR 0.95 11/09/2021     11/09/2021    AST 27 11/09/2021    ALT 92 (H) 11/09/2021    ALKPHOS 110 11/09/2021    BILITOTAL 0.8 11/09/2021       Relevant imaging:     Esophagram: 4/6/22    INDICATION:  Dysphagia  COMPARISON: None.  TECHNIQUE: Routine.     FINDINGS:  FLUOROSCOPIC TIME: 1.2 minutes  NUMBER OF IMAGES: No spot radiographs. 8 saved fluoroscopic loops.     ESOPHAGUS: Normal. Normal peristalsis. No strictures, masses, or inflammatory changes. No gastroesophageal reflux in the recumbent position. A 13 mm barium tablet passes rapidly into the stomach without significant impediment.                                                                      IMPRESSION:  1.  " Normal esophagram.    CTAP: 1/2022    EXAM: CT ABDOMEN PELVIS W CONTRAST  LOCATION: [facility]  DATE/TIME: 1/26/2022 6:40 PM     INDICATION:  Abdominal pain, left lower quadrant  COMPARISON: None.  TECHNIQUE: CT scan of the abdomen and pelvis was performed following injection of IV contrast. Multiplanar reformats were obtained. Dose reduction techniques were used.  CONTRAST: Isovue 370 100mL     FINDINGS:   LOWER CHEST: Normal.     HEPATOBILIARY: Normal.     PANCREAS: Normal.     SPLEEN: Normal.     ADRENAL GLANDS: Normal.     KIDNEYS/BLADDER: Unchanged punctate 1 mm nonobstructing right renal stone. No renal mass or hydronephrosis. Urinary bladder unremarkable..     BOWEL: There is a colocolic anastomosis distally. Mild sigmoid diverticulosis without evidence for diverticulitis. Normal appendix. No free air, free fluid, or inflammatory change. Tiny fat-containing umbilical hernia.     LYMPH NODES: Normal.     VASCULATURE: Unremarkable.     PELVIC ORGANS: Small fat-containing inguinal hernias bilaterally..     MUSCULOSKELETAL: Normal.                                                                      IMPRESSION:   1.  No acute abnormality in the abdomen or pelvis.    U/S: 11/13/2021    IMPRESSION:   1.  Hepatic steatosis.    Endoscopy:    Performed at some point in 2022 at colorectal surgery office.     EGD: 11/2021    Findings:        The esophagus was normal.        The entire examined stomach was normal. Biopsies were taken with a cold        forceps for histology. Verification of patient identification for the        specimen was done. Estimated blood loss was minimal.        The examined duodenum was normal. Biopsies for histology were taken with        a cold forceps for evaluation of celiac disease. Verification of patient        identification for the specimen was done. Estimated blood loss was        minimal.                                                                                      Impression:               - Normal esophagus.                             - Normal stomach. Biopsied.                             - Normal examined duodenum. Biopsied.   Recommendation:           - Await pathology results.                                                                                        Attestation signed by Jose Spencer MD at 7/19/2023  5:37 PM:  ATTENDING ATTESTATION:     DATE SEEN: 7/11/23    Patient was discussed, seen, and examined by me, Jose Spencer. The plan of care and pertinent data/imaging were reviewed with Dr. Pickett. I agree with the assessment and plan as delineated above with the following additions:     Proceed with EGD/colonoscopy. Supportive cares as outlined.     Thank you for this consultation.  It was a pleasure to participate in the care of this patient; please contact us with any further questions.  I spent a total of 60 minutes during the day of encounter performed chart review, meeting with patient, patient counseling, care coordination, and documentation.      Please contact me with any further questions.          Again, thank you for allowing me to participate in the care of your patient.      Sincerely,    Lopez Pickett MD

## 2023-07-14 NOTE — PROGRESS NOTES
[facility]astro[facility]:     New Consult: abnormal stools, abdominal pain, BRBPR, hx of diverticulitis    Date of visit: 7/11/2023    Referring provider: Alex Nichols    ASSESSMENT AND PLAN:    #. Hx of recurrent diverticulitis  #. S/p sigmoid resection (11/2019)  No evidence of recurrence at this time. Per patient, lower endoscopy at colorectal surgery clinic with evidence of residual diverticulosis.   -- continue to monitor  -- maintain high fiber diet, hydration    #. Abnormal stool pattern  #. Chronic abdominal bloating/pain  #. Bright red blood per rectum  Clinical pattern and symptoms consistent with IBS-M or IBS-D. Has been going on for 10+ years. In the last 1-2 months he has had progressive LLQ pain, bloating, and extreme variability in stool pattern (thin, firm, large, loose). Passes mucus alone as well. Bleeding has clinical profile of outlet bleeding though will be fully investigated at time of endoscopy. Unlikely to be malignancy or other sinister cause given reportedly normal lower endoscopy in 2021.   -- titrate balance of miralax and gentle soluble fiber  -- continue IBGard PRN  -- colonoscopy for new pain pattern and BRBPR  -- if endoscopic work up is negative will work to manage DGBI symptoms with diet, fiber, and possible pharmacologic augmentation    #. Anal/perineal pain  Rare, will come in short,sharp, waves. Suspicious for proctalgia fugax.   -- will examine at colonoscopy  -- continue to monitor      #. GERD  #. Question of Bailey's esophagus  Longstanding GERD 10 + years (on and off PPI). EGD in 2021 with images suggestive of a single tongue of BE.   -- PPI once daily (educated to PPI etiquette), dosage and frequency to be determined by results of endoscopy   -- EGD  -- lifestyle measures for reflux    #. Hepatosteatosis   Redemonstrated on numerous imaging modalities in last few years. Last liver chemistries with mild elevation of AST/ALT in 11/2021.  Patient with elevated  BMI (36.9).    -- educated to weight loss (targeting 5-10% of BW in next 12 months)  -- minimize EtOH  -- control metabolic risk factors    #. Average risk colorectal cancer screening/surveillance  Colonoscopy as above. Repeat interval pending that exam.     Return to Clinic: 4-6 months    Lopez Pickett MD  GI fellow     Patient discussed and seen with Gastroenterology staff Dr. Spencer, who is in agreement with the above.     ======================================================================================================  HPI:     Mr. Kennedy presents today as a new consult to the GI clinic for a number of GI issues.     Primarily, he is dealing with LLQ abdominal pain and bloating in tandem with almost cyclical stool pattern changes.    Pain is deep, ache like and is somewhat relieved by the passage of stool. His stools range from loose too firm, with narrow pencil thin stools not uncommon. He has also been passing mucus by itself. Cycle of stool changes occurs every 4-5 days. Color and consistency highly variable.     IBS and associated symptoms have been present in his his life for at least 10 years. Has not sought formal management until now. While he says some of his recent issues are reminiscent of his IBS, the current pain is new/worse and the quality is different. He thinks it feels more like mild diverticulitis at times. Has been using IBGard for about a week and finds this helpful.     Has been also experiencing a new sensation near his anus and his perineum. Not associated with passage of BMs. Sharp, shock like cramp. Pain starts around his anus and will travel in a wave through his perineum to his scrotum. Thankfully rare occurrence.    Has also seen some blood intermittently, bright red. Occurs perhaps every 4 days.     In addition to the above he also has chronic reflux with symptoms perhaps 1-3x per week. Has used PPIs in the past intermittently. Primary symptoms are globus, throat  clearing, and pyrosis. Denies regurgitation, dysphagia, odynophagia.     Denies hematochezia, fevers, chills, hematemesis, coffee ground emesis, nausea, vomiting, weight loss.    No personal historyf GI malignancy, IBD, liver diease.     Family history notable for diverticulitis and gastric cancer (maternal grandfather).    Social:  - EtOH: scant  - Tobacco: none  - Recreational: none   - Works:     All questions answered, plan of care understood from a GI standpoint.     Shared decision making employed, patient elects to move forward with endoscopy.      Targeted GI review of systems complete and is otherwise negative.     Past Medical History:   Diagnosis Date     Allergic rhinitis, cause unspecified      Asthma      Complication of anesthesia     did not respond well to succs     Unspecified asthma(493.90)        Past Surgical History:   Procedure Laterality Date     COLONOSCOPY       DAVINCI XI ASSISTED RESECTION RECTOSIGMOID N/A 11/26/2019    Procedure: ROBOTIC ASSISTED SIGMOID COLECTOMY. MOBILIZATION OF SPLENIC FLESURE;  Surgeon: Travis Brand MD;  Location:  OR     ENDOSCOPIC POLYPECTOMY NASAL       ESOPHAGOSCOPY, GASTROSCOPY, DUODENOSCOPY (EGD), COMBINED N/A 11/19/2021    Procedure: ESOPHAGOGASTRODUODENOSCOPY, WITH BIOPSIES using cold forceps;  Surgeon: Santhosh Harmon MD;  Location:  GI     LAPAROSCOPIC ASSISTED COLECTOMY       LASER HOLMIUM LITHOTRIPSY URETER(S), INSERT STENT, COMBINED Left 1/14/2015    Procedure: COMBINED CYSTOSCOPY, URETEROSCOPY, LASER HOLMIUM LITHOTRIPSY URETER(S), INSERT STENT;  Surgeon: Derrek Cobb MD;  Location: [facility] OR     LITHOTRIPSY       SEPTOPLASTY         Family History   Problem Relation Age of Onset     Hyperlipidemia Mother      Sleep Apnea Mother      Glaucoma Father      Glaucoma Maternal Grandfather      Macular Degeneration No family hx of        Social History     Tobacco Use     Smoking status: Never     Smokeless tobacco: Never   Substance Use  "Topics     Alcohol use: Yes     Comment: 1-2 per month       Physical exam:     Vitals:/82   Pulse 100   Ht 1.702 m (5' 7\")   Wt 106.9 kg (235 lb 9.6 oz)   SpO2 96%   BMI 36.90 kg/m     BMI: Body mass index is 36.9 kg/m .      GEN: NAD, A&Ox3, appropriate  HEENT: EOMI, PERRLA, MMM, hearing grossly intact  Neck: full ROM  Cardiopulmonary: non labored, comfortable on RA, nondiaphoretic, no LE edema  Abdominal: tender in krista-umbilical and LLQ, some intentional guarding, nondistended, no HSM, no rebound, , normoactive BS  Skin: no jaundice, no gross lesions on visible skin  Neuro: A&Ox3, grossly intact motor/sensation, gait intact  MSK: no gross deformity, moves arms/legs equally  Psych: normal affect, congruent with mood      Labs:     Lab Results   Component Value Date    WBC 15.0 (H) 11/09/2021    HGB 14.8 11/09/2021    HCT 44.0 11/09/2021     11/09/2021     11/09/2021    POTASSIUM 4.1 11/09/2021    CHLORIDE 104 11/09/2021    CO2 24 11/09/2021    BUN 14 11/09/2021    CR 0.95 11/09/2021     11/09/2021    AST 27 11/09/2021    ALT 92 (H) 11/09/2021    ALKPHOS 110 11/09/2021    BILITOTAL 0.8 11/09/2021       Relevant imaging:     Esophagram: 4/6/22    INDICATION:  Dysphagia  COMPARISON: None.  TECHNIQUE: Routine.     FINDINGS:  FLUOROSCOPIC TIME: 1.2 minutes  NUMBER OF IMAGES: No spot radiographs. 8 saved fluoroscopic loops.     ESOPHAGUS: Normal. Normal peristalsis. No strictures, masses, or inflammatory changes. No gastroesophageal reflux in the recumbent position. A 13 mm barium tablet passes rapidly into the stomach without significant impediment.                                                                      IMPRESSION:  1.  Normal esophagram.    CTAP: 1/2022    EXAM: CT ABDOMEN PELVIS W CONTRAST  LOCATION: [facility]  DATE/TIME: 1/26/2022 6:40 PM     INDICATION:  Abdominal pain, left lower quadrant  COMPARISON: None.  TECHNIQUE: CT scan of the abdomen " and pelvis was performed following injection of IV contrast. Multiplanar reformats were obtained. Dose reduction techniques were used.  CONTRAST: Isovue 370 100mL     FINDINGS:   LOWER CHEST: Normal.     HEPATOBILIARY: Normal.     PANCREAS: Normal.     SPLEEN: Normal.     ADRENAL GLANDS: Normal.     KIDNEYS/BLADDER: Unchanged punctate 1 mm nonobstructing right renal stone. No renal mass or hydronephrosis. Urinary bladder unremarkable..     BOWEL: There is a colocolic anastomosis distally. Mild sigmoid diverticulosis without evidence for diverticulitis. Normal appendix. No free air, free fluid, or inflammatory change. Tiny fat-containing umbilical hernia.     LYMPH NODES: Normal.     VASCULATURE: Unremarkable.     PELVIC ORGANS: Small fat-containing inguinal hernias bilaterally..     MUSCULOSKELETAL: Normal.                                                                      IMPRESSION:   1.  No acute abnormality in the abdomen or pelvis.    U/S: 11/13/2021    IMPRESSION:   1.  Hepatic steatosis.    Endoscopy:    Performed at some point in 2022 at colorectal surgery office.     EGD: 11/2021    Findings:        The esophagus was normal.        The entire examined stomach was normal. Biopsies were taken with a cold        forceps for histology. Verification of patient identification for the        specimen was done. Estimated blood loss was minimal.        The examined duodenum was normal. Biopsies for histology were taken with        a cold forceps for evaluation of celiac disease. Verification of patient        identification for the specimen was done. Estimated blood loss was        minimal.                                                                                     Impression:               - Normal esophagus.                             - Normal stomach. Biopsied.                             - Normal examined duodenum. Biopsied.   Recommendation:           - Await pathology results.

## 2023-07-19 ENCOUNTER — TELEPHONE (OUTPATIENT)
Dept: GASTROENTEROLOGY | Facility: CLINIC | Age: 48
End: 2023-07-19
Payer: COMMERCIAL

## 2023-07-19 NOTE — TELEPHONE ENCOUNTER
Spoke with patient and scheduled 4-6mo follow-up appointment with Dr. Pickett for 11/28/23. CC number provided for patient to call and schedule procedure.

## 2023-08-27 ENCOUNTER — APPOINTMENT (OUTPATIENT)
Dept: CT IMAGING | Facility: CLINIC | Age: 48
DRG: 177 | End: 2023-08-27
Attending: EMERGENCY MEDICINE
Payer: COMMERCIAL

## 2023-08-27 ENCOUNTER — HOSPITAL ENCOUNTER (INPATIENT)
Facility: CLINIC | Age: 48
LOS: 3 days | Discharge: HOME | DRG: 177 | End: 2023-08-30
Attending: EMERGENCY MEDICINE | Admitting: HOSPITALIST
Payer: COMMERCIAL

## 2023-08-27 DIAGNOSIS — A48.1: ICD-10-CM

## 2023-08-27 DIAGNOSIS — R06.2 WHEEZE: ICD-10-CM

## 2023-08-27 DIAGNOSIS — J45.50 SEVERE PERSISTENT ASTHMA WITHOUT COMPLICATION (H): ICD-10-CM

## 2023-08-27 DIAGNOSIS — J45.51 SEVERE PERSISTENT ASTHMA WITH EXACERBATION (H): Primary | ICD-10-CM

## 2023-08-27 LAB
ALBUMIN SERPL BCG-MCNC: 3.9 G/DL (ref 3.5–5.2)
ALBUMIN UR-MCNC: NEGATIVE MG/DL
ALP SERPL-CCNC: 195 U/L (ref 40–129)
ALT SERPL W P-5'-P-CCNC: 136 U/L (ref 0–70)
ANION GAP SERPL CALCULATED.3IONS-SCNC: 15 MMOL/L (ref 7–15)
APPEARANCE UR: CLEAR
AST SERPL W P-5'-P-CCNC: 55 U/L (ref 0–45)
BASOPHILS # BLD MANUAL: 0 10E3/UL (ref 0–0.2)
BASOPHILS NFR BLD MANUAL: 0 %
BILIRUB SERPL-MCNC: 0.3 MG/DL
BILIRUB UR QL STRIP: NEGATIVE
BUN SERPL-MCNC: 20.7 MG/DL (ref 6–20)
CALCIUM SERPL-MCNC: 9.2 MG/DL (ref 8.6–10)
CHLORIDE SERPL-SCNC: 101 MMOL/L (ref 98–107)
COLOR UR AUTO: YELLOW
CREAT SERPL-MCNC: 0.92 MG/DL (ref 0.67–1.17)
DEPRECATED HCO3 PLAS-SCNC: 23 MMOL/L (ref 22–29)
EOSINOPHIL # BLD MANUAL: 0 10E3/UL (ref 0–0.7)
EOSINOPHIL NFR BLD MANUAL: 0 %
ERYTHROCYTE [DISTWIDTH] IN BLOOD BY AUTOMATED COUNT: 13.2 % (ref 10–15)
FLUAV RNA SPEC QL NAA+PROBE: NEGATIVE
FLUBV RNA RESP QL NAA+PROBE: NEGATIVE
GFR SERPL CREATININE-BSD FRML MDRD: >90 ML/MIN/1.73M2
GLUCOSE SERPL-MCNC: 113 MG/DL (ref 70–99)
GLUCOSE UR STRIP-MCNC: NEGATIVE MG/DL
HCT VFR BLD AUTO: 39.2 % (ref 40–53)
HGB BLD-MCNC: 12.8 G/DL (ref 13.3–17.7)
HGB UR QL STRIP: NEGATIVE
HOLD SPECIMEN: NORMAL
KETONES UR STRIP-MCNC: NEGATIVE MG/DL
L PNEUMO1 AG UR QL IA: NEGATIVE
LACTATE SERPL-SCNC: 2 MMOL/L (ref 0.7–2)
LEUKOCYTE ESTERASE UR QL STRIP: NEGATIVE
LYMPHOCYTES # BLD MANUAL: 2.5 10E3/UL (ref 0.8–5.3)
LYMPHOCYTES NFR BLD MANUAL: 10 %
MCH RBC QN AUTO: 27.9 PG (ref 26.5–33)
MCHC RBC AUTO-ENTMCNC: 32.7 G/DL (ref 31.5–36.5)
MCV RBC AUTO: 85 FL (ref 78–100)
METAMYELOCYTES # BLD MANUAL: 0.3 10E3/UL
METAMYELOCYTES NFR BLD MANUAL: 1 %
MONOCYTES # BLD MANUAL: 0.5 10E3/UL (ref 0–1.3)
MONOCYTES NFR BLD MANUAL: 2 %
NEUTROPHILS # BLD MANUAL: 21.8 10E3/UL (ref 1.6–8.3)
NEUTROPHILS NFR BLD MANUAL: 87 %
NITRATE UR QL: NEGATIVE
PH UR STRIP: 6 [PH] (ref 5–7)
PLAT MORPH BLD: ABNORMAL
PLATELET # BLD AUTO: 284 10E3/UL (ref 150–450)
POTASSIUM SERPL-SCNC: 3.7 MMOL/L (ref 3.4–5.3)
PROT SERPL-MCNC: 7.3 G/DL (ref 6.4–8.3)
RBC # BLD AUTO: 4.59 10E6/UL (ref 4.4–5.9)
RBC MORPH BLD: ABNORMAL
RSV RNA SPEC NAA+PROBE: NEGATIVE
S PNEUM AG SPEC QL: NEGATIVE
SARS-COV-2 RNA RESP QL NAA+PROBE: NEGATIVE
SODIUM SERPL-SCNC: 139 MMOL/L (ref 136–145)
SP GR UR STRIP: 1.03 (ref 1–1.03)
TROPONIN T SERPL HS-MCNC: 11 NG/L
UROBILINOGEN UR STRIP-MCNC: 2 MG/DL
WBC # BLD AUTO: 25 10E3/UL (ref 4–11)

## 2023-08-27 PROCEDURE — 85027 COMPLETE CBC AUTOMATED: CPT | Performed by: EMERGENCY MEDICINE

## 2023-08-27 PROCEDURE — 87899 AGENT NOS ASSAY W/OPTIC: CPT | Performed by: HOSPITALIST

## 2023-08-27 PROCEDURE — 36415 COLL VENOUS BLD VENIPUNCTURE: CPT | Performed by: EMERGENCY MEDICINE

## 2023-08-27 PROCEDURE — 94640 AIRWAY INHALATION TREATMENT: CPT | Mod: 76

## 2023-08-27 PROCEDURE — 99285 EMERGENCY DEPT VISIT HI MDM: CPT | Mod: 25

## 2023-08-27 PROCEDURE — 250N000011 HC RX IP 250 OP 636: Performed by: EMERGENCY MEDICINE

## 2023-08-27 PROCEDURE — 87637 SARSCOV2&INF A&B&RSV AMP PRB: CPT | Performed by: EMERGENCY MEDICINE

## 2023-08-27 PROCEDURE — 85007 BL SMEAR W/DIFF WBC COUNT: CPT | Performed by: EMERGENCY MEDICINE

## 2023-08-27 PROCEDURE — 83605 ASSAY OF LACTIC ACID: CPT | Performed by: EMERGENCY MEDICINE

## 2023-08-27 PROCEDURE — 250N000009 HC RX 250: Performed by: HOSPITALIST

## 2023-08-27 PROCEDURE — 250N000009 HC RX 250: Performed by: EMERGENCY MEDICINE

## 2023-08-27 PROCEDURE — 120N000001 HC R&B MED SURG/OB

## 2023-08-27 PROCEDURE — 80053 COMPREHEN METABOLIC PANEL: CPT | Performed by: EMERGENCY MEDICINE

## 2023-08-27 PROCEDURE — 250N000013 HC RX MED GY IP 250 OP 250 PS 637: Performed by: INTERNAL MEDICINE

## 2023-08-27 PROCEDURE — 81003 URINALYSIS AUTO W/O SCOPE: CPT | Performed by: EMERGENCY MEDICINE

## 2023-08-27 PROCEDURE — 84484 ASSAY OF TROPONIN QUANT: CPT | Performed by: EMERGENCY MEDICINE

## 2023-08-27 PROCEDURE — 250N000011 HC RX IP 250 OP 636: Performed by: HOSPITALIST

## 2023-08-27 PROCEDURE — 258N000003 HC RX IP 258 OP 636: Performed by: INTERNAL MEDICINE

## 2023-08-27 PROCEDURE — 71275 CT ANGIOGRAPHY CHEST: CPT

## 2023-08-27 PROCEDURE — 94640 AIRWAY INHALATION TREATMENT: CPT

## 2023-08-27 PROCEDURE — 250N000013 HC RX MED GY IP 250 OP 250 PS 637: Performed by: HOSPITALIST

## 2023-08-27 PROCEDURE — 999N000157 HC STATISTIC RCP TIME EA 10 MIN

## 2023-08-27 PROCEDURE — 99207 PR APP CREDIT; MD BILLING SHARED VISIT: CPT | Performed by: INTERNAL MEDICINE

## 2023-08-27 PROCEDURE — 99223 1ST HOSP IP/OBS HIGH 75: CPT | Performed by: HOSPITALIST

## 2023-08-27 RX ORDER — AZITHROMYCIN 500 MG/1
500 INJECTION, POWDER, LYOPHILIZED, FOR SOLUTION INTRAVENOUS ONCE
Status: COMPLETED | OUTPATIENT
Start: 2023-08-27 | End: 2023-08-27

## 2023-08-27 RX ORDER — ENOXAPARIN SODIUM 100 MG/ML
40 INJECTION SUBCUTANEOUS EVERY 24 HOURS
Status: DISCONTINUED | OUTPATIENT
Start: 2023-08-27 | End: 2023-08-27

## 2023-08-27 RX ORDER — IOPAMIDOL 755 MG/ML
79 INJECTION, SOLUTION INTRAVASCULAR ONCE
Status: COMPLETED | OUTPATIENT
Start: 2023-08-27 | End: 2023-08-27

## 2023-08-27 RX ORDER — LEVOFLOXACIN 750 MG/1
750 TABLET, FILM COATED ORAL DAILY
Status: DISCONTINUED | OUTPATIENT
Start: 2023-08-28 | End: 2023-08-30 | Stop reason: HOSPADM

## 2023-08-27 RX ORDER — HYDROMORPHONE HCL IN WATER/PF 6 MG/30 ML
0.2 PATIENT CONTROLLED ANALGESIA SYRINGE INTRAVENOUS
Status: DISCONTINUED | OUTPATIENT
Start: 2023-08-27 | End: 2023-08-30 | Stop reason: HOSPADM

## 2023-08-27 RX ORDER — ACETAMINOPHEN 500 MG
1000 TABLET ORAL EVERY 6 HOURS PRN
COMMUNITY

## 2023-08-27 RX ORDER — FLUTICASONE FUROATE AND VILANTEROL 200; 25 UG/1; UG/1
1 POWDER RESPIRATORY (INHALATION) DAILY
Status: DISCONTINUED | OUTPATIENT
Start: 2023-08-27 | End: 2023-08-28

## 2023-08-27 RX ORDER — LEVOFLOXACIN 5 MG/ML
750 INJECTION, SOLUTION INTRAVENOUS EVERY 24 HOURS
Status: DISCONTINUED | OUTPATIENT
Start: 2023-08-28 | End: 2023-08-27

## 2023-08-27 RX ORDER — LEVOFLOXACIN 5 MG/ML
750 INJECTION, SOLUTION INTRAVENOUS ONCE
Status: COMPLETED | OUTPATIENT
Start: 2023-08-27 | End: 2023-08-27

## 2023-08-27 RX ORDER — PANTOPRAZOLE SODIUM 40 MG/1
40 TABLET, DELAYED RELEASE ORAL
Status: DISCONTINUED | OUTPATIENT
Start: 2023-08-27 | End: 2023-08-30 | Stop reason: HOSPADM

## 2023-08-27 RX ORDER — NALOXONE HYDROCHLORIDE 0.4 MG/ML
0.2 INJECTION, SOLUTION INTRAMUSCULAR; INTRAVENOUS; SUBCUTANEOUS
Status: DISCONTINUED | OUTPATIENT
Start: 2023-08-27 | End: 2023-08-30 | Stop reason: HOSPADM

## 2023-08-27 RX ORDER — ACETAMINOPHEN 650 MG/1
650 SUPPOSITORY RECTAL EVERY 6 HOURS PRN
Status: DISCONTINUED | OUTPATIENT
Start: 2023-08-27 | End: 2023-08-30 | Stop reason: HOSPADM

## 2023-08-27 RX ORDER — LANOLIN ALCOHOL/MO/W.PET/CERES
3 CREAM (GRAM) TOPICAL
Status: DISCONTINUED | OUTPATIENT
Start: 2023-08-27 | End: 2023-08-30 | Stop reason: HOSPADM

## 2023-08-27 RX ORDER — NALOXONE HYDROCHLORIDE 0.4 MG/ML
0.4 INJECTION, SOLUTION INTRAMUSCULAR; INTRAVENOUS; SUBCUTANEOUS
Status: DISCONTINUED | OUTPATIENT
Start: 2023-08-27 | End: 2023-08-30 | Stop reason: HOSPADM

## 2023-08-27 RX ORDER — LIDOCAINE 40 MG/G
CREAM TOPICAL
Status: DISCONTINUED | OUTPATIENT
Start: 2023-08-27 | End: 2023-08-30 | Stop reason: HOSPADM

## 2023-08-27 RX ORDER — AMOXICILLIN 250 MG
1 CAPSULE ORAL 2 TIMES DAILY PRN
Status: DISCONTINUED | OUTPATIENT
Start: 2023-08-27 | End: 2023-08-30 | Stop reason: HOSPADM

## 2023-08-27 RX ORDER — SODIUM CHLORIDE, SODIUM LACTATE, POTASSIUM CHLORIDE, CALCIUM CHLORIDE 600; 310; 30; 20 MG/100ML; MG/100ML; MG/100ML; MG/100ML
INJECTION, SOLUTION INTRAVENOUS CONTINUOUS
Status: DISCONTINUED | OUTPATIENT
Start: 2023-08-27 | End: 2023-08-28

## 2023-08-27 RX ORDER — AZITHROMYCIN 500 MG/1
500 INJECTION, POWDER, LYOPHILIZED, FOR SOLUTION INTRAVENOUS EVERY 24 HOURS
Status: DISCONTINUED | OUTPATIENT
Start: 2023-08-28 | End: 2023-08-27

## 2023-08-27 RX ORDER — ONDANSETRON 4 MG/1
4 TABLET, ORALLY DISINTEGRATING ORAL EVERY 6 HOURS PRN
Status: DISCONTINUED | OUTPATIENT
Start: 2023-08-27 | End: 2023-08-30 | Stop reason: HOSPADM

## 2023-08-27 RX ORDER — HYDROMORPHONE HCL IN WATER/PF 6 MG/30 ML
0.4 PATIENT CONTROLLED ANALGESIA SYRINGE INTRAVENOUS
Status: DISCONTINUED | OUTPATIENT
Start: 2023-08-27 | End: 2023-08-30 | Stop reason: HOSPADM

## 2023-08-27 RX ORDER — ONDANSETRON 2 MG/ML
4 INJECTION INTRAMUSCULAR; INTRAVENOUS EVERY 6 HOURS PRN
Status: DISCONTINUED | OUTPATIENT
Start: 2023-08-27 | End: 2023-08-30 | Stop reason: HOSPADM

## 2023-08-27 RX ORDER — AMOXICILLIN 250 MG
2 CAPSULE ORAL 2 TIMES DAILY PRN
Status: DISCONTINUED | OUTPATIENT
Start: 2023-08-27 | End: 2023-08-30 | Stop reason: HOSPADM

## 2023-08-27 RX ORDER — ACETAMINOPHEN 325 MG/1
650 TABLET ORAL EVERY 6 HOURS PRN
Status: DISCONTINUED | OUTPATIENT
Start: 2023-08-27 | End: 2023-08-30 | Stop reason: HOSPADM

## 2023-08-27 RX ORDER — IPRATROPIUM BROMIDE AND ALBUTEROL SULFATE 2.5; .5 MG/3ML; MG/3ML
3 SOLUTION RESPIRATORY (INHALATION) ONCE
Status: COMPLETED | OUTPATIENT
Start: 2023-08-27 | End: 2023-08-27

## 2023-08-27 RX ORDER — IPRATROPIUM BROMIDE AND ALBUTEROL SULFATE 2.5; .5 MG/3ML; MG/3ML
3 SOLUTION RESPIRATORY (INHALATION) EVERY 4 HOURS PRN
Status: DISCONTINUED | OUTPATIENT
Start: 2023-08-27 | End: 2023-08-30 | Stop reason: HOSPADM

## 2023-08-27 RX ORDER — IBUPROFEN 200 MG
600 TABLET ORAL EVERY 6 HOURS PRN
COMMUNITY

## 2023-08-27 RX ADMIN — ONDANSETRON 4 MG: 4 TABLET, ORALLY DISINTEGRATING ORAL at 08:24

## 2023-08-27 RX ADMIN — LEVOFLOXACIN 750 MG: 5 INJECTION, SOLUTION INTRAVENOUS at 05:36

## 2023-08-27 RX ADMIN — SODIUM CHLORIDE, POTASSIUM CHLORIDE, SODIUM LACTATE AND CALCIUM CHLORIDE: 600; 310; 30; 20 INJECTION, SOLUTION INTRAVENOUS at 22:17

## 2023-08-27 RX ADMIN — SODIUM CHLORIDE, POTASSIUM CHLORIDE, SODIUM LACTATE AND CALCIUM CHLORIDE: 600; 310; 30; 20 INJECTION, SOLUTION INTRAVENOUS at 12:15

## 2023-08-27 RX ADMIN — IPRATROPIUM BROMIDE AND ALBUTEROL SULFATE 3 ML: .5; 3 SOLUTION RESPIRATORY (INHALATION) at 08:22

## 2023-08-27 RX ADMIN — IPRATROPIUM BROMIDE AND ALBUTEROL SULFATE 3 ML: .5; 3 SOLUTION RESPIRATORY (INHALATION) at 01:17

## 2023-08-27 RX ADMIN — AZITHROMYCIN MONOHYDRATE 500 MG: 500 INJECTION, POWDER, LYOPHILIZED, FOR SOLUTION INTRAVENOUS at 07:19

## 2023-08-27 RX ADMIN — FLUTICASONE FUROATE AND VILANTEROL TRIFENATATE 1 PUFF: 200; 25 POWDER RESPIRATORY (INHALATION) at 15:54

## 2023-08-27 RX ADMIN — IPRATROPIUM BROMIDE AND ALBUTEROL SULFATE 3 ML: .5; 3 SOLUTION RESPIRATORY (INHALATION) at 13:44

## 2023-08-27 RX ADMIN — IOPAMIDOL 79 ML: 755 INJECTION, SOLUTION INTRAVENOUS at 02:42

## 2023-08-27 RX ADMIN — PANTOPRAZOLE SODIUM 40 MG: 40 TABLET, DELAYED RELEASE ORAL at 17:53

## 2023-08-27 RX ADMIN — SODIUM CHLORIDE 100 ML: 9 INJECTION, SOLUTION INTRAVENOUS at 02:42

## 2023-08-27 RX ADMIN — ACETAMINOPHEN 650 MG: 325 TABLET, FILM COATED ORAL at 14:12

## 2023-08-27 ASSESSMENT — ACTIVITIES OF DAILY LIVING (ADL)
DRESSING/BATHING_DIFFICULTY: NO
CHANGE_IN_FUNCTIONAL_STATUS_SINCE_ONSET_OF_CURRENT_ILLNESS/INJURY: NO
ADLS_ACUITY_SCORE: 18
CONCENTRATING,_REMEMBERING_OR_MAKING_DECISIONS_DIFFICULTY: NO
ADLS_ACUITY_SCORE: 35
DIFFICULTY_COMMUNICATING: NO
DIFFICULTY_EATING/SWALLOWING: NO
ADLS_ACUITY_SCORE: 35
TOILETING_ISSUES: NO
ADLS_ACUITY_SCORE: 18
ADLS_ACUITY_SCORE: 18
FALL_HISTORY_WITHIN_LAST_SIX_MONTHS: NO
ADLS_ACUITY_SCORE: 18
ADLS_ACUITY_SCORE: 35
ADLS_ACUITY_SCORE: 18
DOING_ERRANDS_INDEPENDENTLY_DIFFICULTY: NO
WEAR_GLASSES_OR_BLIND: NO
ADLS_ACUITY_SCORE: 18
WALKING_OR_CLIMBING_STAIRS_DIFFICULTY: NO

## 2023-08-27 NOTE — ED NOTES
[facility]  ED Nurse Handoff Report    ED Chief complaint: Shortness of Breath      ED Diagnosis:   Final diagnoses:   Legionnaire's disease (H)       Code Status: Full Code    Allergies:   Allergies   Allergen Reactions    Bee Pollen Anaphylaxis    Cats Difficulty breathing    Mold     Succinylcholine Other (See Comments)     Convulsions, muscle pain    Trees        Patient Story:   Pt flies in from [place] with increasing shortness of breath and diagnosis of Legionnaires disease.    Focused Assessment:    Neuro: Alert, oriented x 4  Respiratory:Clear lung sounds, on room air   Cardiology:  SR   Gastrointestinal: soft, non tender, non distended   Genitourinary/Renal:  Independent  Musculoskeletal: moves all extremities   Skin: Intact skin   Lines: PIV R AC    Labs Ordered and Resulted from Time of ED Arrival to Time of ED Departure   COMPREHENSIVE METABOLIC PANEL - Abnormal       Result Value    Sodium 139      Potassium 3.7      Chloride 101      Carbon Dioxide (CO2) 23      Anion Gap 15      Urea Nitrogen 20.7 (*)     Creatinine 0.92      Calcium 9.2      Glucose 113 (*)     Alkaline Phosphatase 195 (*)     AST 55 (*)      (*)     Protein Total 7.3      Albumin 3.9      Bilirubin Total 0.3      GFR Estimate >90     CBC WITH PLATELETS AND DIFFERENTIAL - Abnormal    WBC Count 25.0 (*)     RBC Count 4.59      Hemoglobin 12.8 (*)     Hematocrit 39.2 (*)     MCV 85      MCH 27.9      MCHC 32.7      RDW 13.2      Platelet Count 284     DIFFERENTIAL - Abnormal    % Neutrophils 87      % Lymphocytes 10      % Monocytes 2      % Eosinophils 0      % Basophils 0      % Metamyelocytes 1      Absolute Neutrophils 21.8 (*)     Absolute Lymphocytes 2.5      Absolute Monocytes 0.5      Absolute Eosinophils 0.0      Absolute Basophils 0.0      Absolute Metamyelocytes 0.3 (*)     RBC Morphology Confirmed RBC Indices      Platelet Assessment        Value: Automated Count Confirmed. Platelet morphology  is normal.   LACTIC ACID WHOLE BLOOD - Normal    Lactic Acid 2.0     TROPONIN T, HIGH SENSITIVITY - Normal    Troponin T, High Sensitivity 11     INFLUENZA A/B, RSV, & SARS-COV2 PCR - Normal    Influenza A PCR Negative      Influenza B PCR Negative      RSV PCR Negative      SARS CoV2 PCR Negative     UA MACROSCOPIC WITH REFLEX TO MICRO AND CULTURE - Normal    Color Urine Yellow      Appearance Urine Clear      Glucose Urine Negative      Bilirubin Urine Negative      Ketones Urine Negative      Specific Gravity Urine 1.028      Blood Urine Negative      pH Urine 6.0      Protein Albumin Urine Negative      Urobilinogen Urine 2.0      Nitrite Urine Negative      Leukocyte Esterase Urine Negative          CT Chest Pulmonary Embolism w Contrast   Final Result   IMPRESSION:   1.  Negative for pulmonary embolism.      2.  Bilateral pulmonary infiltrates as detailed above greatest in the lung bases compatible with bilateral pneumonitis.            Treatments and/or interventions provided:      Medications   levofloxacin (LEVAQUIN) infusion 750 mg (750 mg Intravenous $New Bag 8/27/23 0536)   azithromycin (ZITHROMAX) 500 mg vial to attach to  mL bag (has no administration in time range)   ipratropium - albuterol 0.5 mg/2.5 mg/3 mL (DUONEB) neb solution 3 mL (3 mLs Nebulization $Given 8/27/23 0117)   iopamidol (ISOVUE-370) solution 79 mL (79 mLs Intravenous $Given 8/27/23 0242)   Saline Flush (100 mLs Intravenous $Given 8/27/23 0242)        Patient's response to treatments and/or interventions:   Resting comfortably    To be done/followed up on inpatient unit:    See any in-patient orders    Does this patient have any cognitive concerns?:  None    Activity level - Baseline/Home:    Independent    Activity Level - Current:     Independent    Patient's Preferred language: English     Needed?: No    Isolation: None  Infection: Not Applicable  Patient tested for COVID 19 prior to admission: NO    Bariatric?:  No    Vital Signs:   Vitals:    08/27/23 0338 08/27/23 0410 08/27/23 0500 08/27/23 0539   BP:  (!) 148/86  131/83   Pulse:  85 80 82   Resp:  20 26 20   Temp:       TempSrc:       SpO2: 97% 94% 96% 94%   Weight:       Height:           Cardiac Rhythm:     Was the PSS-3 completed:   Yes  What interventions are required if any?               Family Comments: Significant other   OBS brochure/video discussed/provided to patient/family: N/A              Name of person given brochure if not patient:              Relationship to patient:    For the majority of the shift this patient's behavior was Green.   Behavioral interventions performed were none    ED NURSE PHONE NUMBER: [phone]

## 2023-08-27 NOTE — PROGRESS NOTES
RECEIVING UNIT ED HANDOFF REVIEW    ED Nurse Handoff Report was reviewed by: Ge Franco RN on August 27, 2023 at 6:15 AM

## 2023-08-27 NOTE — CONSULTS
Note INFECTIOUS DISEASES CONSULTATION NOTE  Covering for [facility]     Date 2023     Name /  Dano Kennedy   1975     MRN [id number]     Thank you for asking us to see Dano Kennedy for infectious diseases evaluation  REQUESTING PROVIDER Dr. Huber  REASON FOR CONSULT Legionella pneumonia      CHIEF COMPLAINT    pneumonia    HPI    47 y/o M , with Hx of asthma and fatty liver.    8 - was in a hot tub with his fihalley and 4 friends  8. - was on a cruise ship -> started to not feel well, body aches, fever, dry cough  8 - generalized malaise, fatigue, dyspnea, cough and fevers with Tm 103 F -> took prednisone and inhaler for asthma with no improvement   ->crew ship MD did CXR and told he had LLL pneumonia -> urine legionella Ag positive -> given IV azithromycin then switched to po -> also started on IV levofloxacin eventually  -> got out of [place] and went to the beach but only stayed 1 hour as was not feeling well    23 - ship stopped in [place] -> went to hospital and admitted for 4 days -> chest CT done -> continued on azithromycin and levofloxacin -> completed 4 doses -> fever resolved on day of discharge    23 - flew back to [place] due to persistent symptoms   23 - went to ED -> LABS: WBC 25, Hgb 12.8, platelet 284, crea 0.92, , AST 55, , lactate 2.0, UA negative -> COVID-19/influenza/RSV PCR negative -> Chest CT PE: negative PE, Bilateral pulmonary infiltrates   -> continued on azithromycin and levofloxacin    ROS    Rest of 15 pt ROS negative      Infection Risk Factors    x Animal / bird exposure      / Travel / residence location   Cruise ship from - and traveled to [place], [place]   / Environmental exposure   Hot tub with refugio and friends     x Bites (mosquito/tick)      / Ill contacts   Fiancee had mild symptoms, tested for legionella and was negative. One friend  developed pneumonia confirmed by CT and treated with oral antibiotics   x TB/exposure / + PPD history      x HIV risks        PFSH  Past Medical History:   Diagnosis Date    Allergic rhinitis, cause unspecified     Asthma     Complication of anesthesia     did not respond well to succs    Unspecified asthma(493.90)      Past Surgical History:   Procedure Laterality Date    COLONOSCOPY      DAVINCI XI ASSISTED RESECTION RECTOSIGMOID N/A 11/26/2019    Procedure: ROBOTIC ASSISTED SIGMOID COLECTOMY. MOBILIZATION OF SPLENIC FLESURE;  Surgeon: Travis Brand MD;  Location: [facility] OR    ENDOSCOPIC POLYPECTOMY NASAL      ESOPHAGOSCOPY, GASTROSCOPY, DUODENOSCOPY (EGD), COMBINED N/A 11/19/2021    Procedure: ESOPHAGOGASTRODUODENOSCOPY, WITH BIOPSIES using cold forceps;  Surgeon: Santhosh Harmon MD;  Location:  GI    LAPAROSCOPIC ASSISTED COLECTOMY      LASER HOLMIUM LITHOTRIPSY URETER(S), INSERT STENT, COMBINED Left 1/14/2015    Procedure: COMBINED CYSTOSCOPY, URETEROSCOPY, LASER HOLMIUM LITHOTRIPSY URETER(S), INSERT STENT;  Surgeon: Derrek Cobb MD;  Location: [facility] OR    LITHOTRIPSY      SEPTOPLASTY        Problem (# of Occurrences) Relation (Name,Age of Onset)    Glaucoma (2) Father (estranged), Maternal Grandfather    Hyperlipidemia (1) Mother    Sleep Apnea (1) Mother           Negative family history of: Macular Degeneration          Family History   Problem Relation Age of Onset    Hyperlipidemia Mother     Sleep Apnea Mother     Glaucoma Father     Glaucoma Maternal Grandfather     Macular Degeneration No family hx of      Social History     Socioeconomic History    Marital status: Patient Declined   Tobacco Use    Smoking status: Never    Smokeless tobacco: Never   Vaping Use    Vaping Use: Never used   Substance and Sexual Activity    Alcohol use: Yes     Comment: 1-2 per month    Drug use: No    Sexual activity: Yes     Partners: Female     Allergies   Allergen Reactions    Bee Pollen Anaphylaxis    Cats  "Difficulty breathing    Mold     Succinylcholine Other (See Comments)     Convulsions, muscle pain    Trees      Immunization History   Administered Date(s) Administered    COVID-19 Monovalent 18+ (Moderna) 01/13/2021, 02/10/2021    Influenza Vaccine >6 months (Alfuria,Fluzone) 10/01/2019    Influenza,INJ,MDCK,PF,Quad >6mo(Flucelvax) 11/15/2018    Pneumococcal 23 valent 10/30/2012    TDAP Vaccine (Adacel) 10/06/2011     EXAM  BP (!) 141/85 (BP Location: Left arm)   Pulse 86   Temp 98.1  F (36.7  C) (Oral)   Resp 20   Ht 1.702 m (5' 7\")   Wt 104.3 kg (230 lb)   SpO2 94%   BMI 36.02 kg/m    general     Awake, alert, not in distress     resp     Symmetrical chest expansion, no retractions, decreased breath sounds bases, no crackles/wheezes   CV     S1 and S2, RRR, no murmur, no rub   GI     NABS, soft, non-tender, no hepatosplenomegaly.   Mus-Skel     No edema, no effusion.   neuro     AAO x 3, no focal deficits   psyche     Appropriate mood and affect   skin     Dry, no rash or lesions.   HEENT     Non-hyperemic posterior pharyngeal wall, no exudates, no thrush, no ulcers. No sinus tenderness. Good dentition.   Lymph nodes   No cervical lymphadenopathy         DATA  Images  8/27/23 CT chest PE:  FINDINGS:  ANGIOGRAM CHEST: Pulmonary arteries are normal caliber and negative for pulmonary emboli. Thoracic aorta is negative for dissection. No CT evidence of right heart strain.     LUNGS AND PLEURA: Prominent patchy infiltrates and subpleural consolidation in both lung bases. There is some additional patchy somewhat nodular groundglass opacities in the mid and upper lungs. Findings compatible with bilateral acute pneumonitis.   Central airways are clear. No pleural effusions.     MEDIASTINUM/AXILLAE: Scattered nonspecific mediastinal lymph nodes likely reactive in nature. Normal heart size. No pericardial effusion. Normal caliber thoracic aorta. Esophagus is grossly negative.     CORONARY ARTERY CALCIFICATION: " None.     UPPER ABDOMEN: Normal.     MUSCULOSKELETAL: Mild degenerative and hypertrophic changes thoracic spine.                                                                      IMPRESSION:  1.  Negative for pulmonary embolism.     2.  Bilateral pulmonary infiltrates as detailed above greatest in the lung bases compatible with bilateral pneumonitis.    Microbiology test results    Microbiology data    COVID-19/RSV/influenza PCR   8/27 negative              LABS  Recent Labs   Lab Test 08/27/23  0128 11/09/21  2329   WBC 25.0* 15.0*   AST 55* 27   * 92*   BILITOTAL 0.3 0.8   ALKPHOS 195* 110          ASSESSMENT   MED DECISION MAKING  Patient Active Problem List   Diagnosis Code    Anxiety F41.9    Severe persistent asthma without complication J45.50    Seasonal allergic rhinitis J30.2    GSE (gluten-sensitive enteropathy) K90.41    ADHD (attention deficit hyperactivity disorder) F90.9    Heartburn R12    WENDY (obstructive sleep apnea) G47.33    Obesity (BMI 35.0-39.9) with comorbidity (H) E66.01    History of diverticulitis Z87.19    Irritable bowel syndrome with diarrhea K58.0    Legionnaire's disease (H) A48.1     Legionaires' Disease  --exposure: community acquired (hot tub), refugio and 4 friends in hot tub with him also had respiratory symptoms but milder symptoms  --reported positive urine legionella Ag  --CT Chest PE: Prominent patchy infiltrates and subpleural consolidation in both lung bases. There is some additional patchy somewhat nodular groundglass opacities in the mid and upper lungs.       Leukocytosis  Abnormal LFTs  Hx of asthma      PLANS:  Agree with sputum for culture with GS and legionella DFA with culture. Unfortunately, he is not able to expectorate any sputum.   Repeat urine legionella Ag.  Blood cultures x 2 sets if with fevers.    On levofloxacin 750 mg/IV daily. Can switch to po tomorrow as good bioavailability.  No need to add double coverage with azithromycin, so discontinue  azithromycin.  Total duration of treatment will be 10 days.  Will need repeat imaging in 4-6 weeks to ensure resolution of infiltrates.    Discussed in details with patient including course of infection, treatment and adverse effects of antibiotics.        Angel Marcelino MD, FACP  Covering for [facility] [employer] [facility]  Infectious Disease service    Current Meds Reviewed  Current Facility-Administered Medications   Medication    acetaminophen (TYLENOL) tablet 650 mg    Or    acetaminophen (TYLENOL) Suppository 650 mg    [START ON 8/28/2023] azithromycin (ZITHROMAX) 500 mg vial to attach to  mL bag    enoxaparin ANTICOAGULANT (LOVENOX) injection 40 mg    HYDROmorphone (DILAUDID) injection 0.2 mg    HYDROmorphone (DILAUDID) injection 0.4 mg    ipratropium - albuterol 0.5 mg/2.5 mg/3 mL (DUONEB) neb solution 3 mL    [START ON 8/28/2023] levofloxacin (LEVAQUIN) infusion 750 mg    lidocaine (LMX4) cream    lidocaine 1 % 0.1-1 mL    melatonin tablet 3 mg    naloxone (NARCAN) injection 0.2 mg    Or    naloxone (NARCAN) injection 0.4 mg    Or    naloxone (NARCAN) injection 0.2 mg    Or    naloxone (NARCAN) injection 0.4 mg    ondansetron (ZOFRAN ODT) ODT tab 4 mg    Or    ondansetron (ZOFRAN) injection 4 mg    senna-docusate (SENOKOT-S/PERICOLACE) 8.6-50 MG per tablet 1 tablet    Or    senna-docusate (SENOKOT-S/PERICOLACE) 8.6-50 MG per tablet 2 tablet    sodium chloride (PF) 0.9% PF flush 3 mL    sodium chloride (PF) 0.9% PF flush 3 mL

## 2023-08-27 NOTE — PROGRESS NOTES
[facility]    Medicine Progress Note - Hospitalist Service    Date of Admission:  8/27/2023    Assessment & Plan     Dano Kennedy is a pleasant 48-year-old  with history of asthma who was recently diagnosed with Legionella pneumonia and now presented to hospital on 8/27/2023 with ongoing shortness of breath.    He was in the hospital with his fiancé and friends on 8/17.  He went on a cruise ship on 8/21 and started developing symptoms of fever up to 103, myalgias, cough, shortness of breath.  On 8/22 was diagnosed with left lower lobe pneumonia on cruise ship.  Tested positive for Legionella urine antigen.  He was treated with azithromycin and then switched to levofloxacin.  He was hospitalized at hospital in [place] from 8/23 to 8/26 and treated with levofloxacin.  Fever resolved on 8/26.  He flew back to [place] on 8/26 and presented to ER during the night with ongoing shortness of breath and feeling unwell.  He did start taking prednisone while he was on a cruise ship and received Solu-Medrol in [place].    Community-acquired bilateral bacterial pneumonia due to Legionella  -Source of exposure-hot tub.  Other people who were in hot tub were also affected though with milder symptoms  -Has been on levofloxacin for 4 days.  Fever has resolved.  Has leukocytosis of 25 and elevated LFTs  -COVID-19/influenza/RSV negative  -CT PE on 8/27 was negative for PE but showed bilateral pulmonary infiltrates greatest in the lung bases compatible with bilateral pneumonitis  -Urine Legionella and streptococcal antigen pending.  Sputum culture pending  -Infectious disease consulted.  Recommended continuing levofloxacin.  Duration of treatment 10 days  -Start on IV fluids-LR at 100 mm/h  -Leukocytosis is really multifactorial-due to pneumonia and recent prednisone use    Severe persistent asthma  -No wheezing but has been experiencing increased shortness of breath with  "ambulation.  Not hypoxic at rest.  No wheezing on exam  -Recently treated with p.o. prednisone and Solu-Medrol  -Continue usual inhalers, continue DuoNebs    Elevated LFTs-likely mild hepatitis due to Legionella  Alkaline phosphatase 195, , AST 55, total bilirubin 1.3  -Monitor    S/p hemicolectomy for recurrent diverticulitis  -No current issues       Diet: Combination Diet Regular Diet Adult    DVT Prophylaxis: Low Risk/Ambulatory with no VTE prophylaxis indicated  Burrows Catheter: Not present  Lines: None     Cardiac Monitoring: None  Code Status: Full Code      Clinically Significant Risk Factors Present on Admission                       # Obesity: Estimated body mass index is 36.02 kg/m  as calculated from the following:    Height as of this encounter: 1.702 m (5' 7\").    Weight as of this encounter: 104.3 kg (230 lb).       # Asthma: noted on problem list        Disposition Plan      Expected Discharge Date: 08/29/2023                  Naty Hill MD  Hospitalist Service  [facility]  Securely message with [employer] (more info)  Text page via [employer] Paging/Directory   ______________________________________________________________________    Interval History   Patient reports he feels better than yesterday.  He is able to ambulate to the bathroom.  Not hypoxic on room air.  Feels chest tightness and shortness of breath with ambulation.  Has been afebrile.    Physical Exam   Vital Signs: Temp: 98.1  F (36.7  C) Temp src: Oral BP: (!) 141/85 Pulse: 86   Resp: 20 SpO2: 94 % O2 Device: None (Room air)    Weight: 230 lbs 0 oz    Constitutional - awake and alert, resting in bed, in no acute distress  Cardiovascular - regular rate and rhythm, no murmurs, no edema  Pulmonary - lungs are clear to auscultation bilaterally, no wheezing or rhonchi  GI - abdomen is soft, nontender, nondistended, no hepatosplenomegaly or masses  Integumentary - skin is warm and dry, no rashes or " ulcers  Neurological - awake, alert and oriented x3.  Moving all 4 extremities, normal speech, no focal deficits    Medical Decision Making       ------------------ MEDICAL DECISION MAKING ------------------------------------------------------------------------------------------------------  Medical complexity over the past 24 hours:  -------------------------- MODERATE RISK FOR MORBIDITY --------------------------------------------------      Data     I have personally reviewed the following data over the past 24 hrs:    25.0 (H)  \   12.8 (L)   / 284     139 101 20.7 (H) /  113 (H)   3.7 23 0.92 \     ALT: 136 (H) AST: 55 (H) AP: 195 (H) TBILI: 0.3   ALB: 3.9 TOT PROTEIN: 7.3 LIPASE: N/A     Trop: 11 BNP: N/A     Procal: N/A CRP: N/A Lactic Acid: 2.0         Imaging results reviewed over the past 24 hrs:   Recent Results (from the past 24 hour(s))   CT Chest Pulmonary Embolism w Contrast    Narrative    EXAM: CT CHEST PULMONARY EMBOLISM W CONTRAST  LOCATION: [facility]  DATE: 8/27/2023    INDICATION: SOB, known legionnaires.  COMPARISON: None.  TECHNIQUE: CT chest pulmonary angiogram during arterial phase injection of IV contrast. Multiplanar reformats and MIP reconstructions were performed. Dose reduction techniques were used.   CONTRAST: 79 ml Isovue 370.    FINDINGS:  ANGIOGRAM CHEST: Pulmonary arteries are normal caliber and negative for pulmonary emboli. Thoracic aorta is negative for dissection. No CT evidence of right heart strain.    LUNGS AND PLEURA: Prominent patchy infiltrates and subpleural consolidation in both lung bases. There is some additional patchy somewhat nodular groundglass opacities in the mid and upper lungs. Findings compatible with bilateral acute pneumonitis.   Central airways are clear. No pleural effusions.    MEDIASTINUM/AXILLAE: Scattered nonspecific mediastinal lymph nodes likely reactive in nature. Normal heart size. No pericardial effusion. Normal caliber  thoracic aorta. Esophagus is grossly negative.    CORONARY ARTERY CALCIFICATION: None.    UPPER ABDOMEN: Normal.    MUSCULOSKELETAL: Mild degenerative and hypertrophic changes thoracic spine.      Impression    IMPRESSION:  1.  Negative for pulmonary embolism.    2.  Bilateral pulmonary infiltrates as detailed above greatest in the lung bases compatible with bilateral pneumonitis.

## 2023-08-27 NOTE — H&P
"[facility]    History and Physical  Hospitalist     Date of Admission:  8/27/2023    Assessment & Plan   Dano Kennedy is a 48 year old male with a past medical history of asthma presenting to hospital with Legionella pneumonia.     Legionella pneumonia  Patient presenting with with legionelle pneumonia diagnosed on a cruise ship. He believes that he was exposed on August 18 when in a hottub with some friends. He became sick 4 days later. He had fevers, cough, sob, generalized malaise. He reports his friends also developed milder symptoms. He was on a cruise ship when his symptoms started and was diagnosed with pna via a cxr. He tested positive for legionella antigen in his urine. He was started on azithromycin and levofloxacin   S/p 4 days of azithromycin and levofloxacin on the [place] and [place].   His fever broke on Saturday and he hasn't required any tylenol or ibuprofen since.   CT on arrival with significant bilateral pulmonary infiltrates.   Will continue azithromycin and levaquin and request ID consult for management.   -Continue with azithromycin and Levaquin IV  -Follow-up ID consult  -Follow-up sputum culture, Gram stain, Legionella antigen, Legionella PCR    Asthma  No wheezing noted on exam.  -DuoNebs as needed  -Resume PTA inhalers once med rec is complete    Hx of recurrent diverticulitis  Status post hemicolectomy.      DVT ppx: Lovenox subcutaneous  Expected length of stay greater than 2 days  Code Status: Full code      Clinically Significant Risk Factors Present on Admission                  # Hypertension: Home medication list includes antihypertensive(s)      # Obesity: Estimated body mass index is 36.02 kg/m  as calculated from the following:    Height as of this encounter: 1.702 m (5' 7\").    Weight as of this encounter: 104.3 kg (230 lb).       # Asthma: noted on problem list            Primary Care Physician   Alex Nichols    Chief Complaint   Shortness " of Breath    History obtained from the patient    History of Present Illness   Dano Kennedy is a 48 year old male with a past medical history of asthma presents to hospital with Legionella pneumonia.  The patient reports that on Friday, August 18 he and some friends enjoyed a night and a hot tub.  Approximately 4 days later the patient was on a cruise ship when he started feeling generalized malaise, fatigue, shortness of breath, cough, fevers.  He had a Tmax of 103  F.  He went to the crew ship doctor who performed an x-ray and determined that the patient had pneumonia.  The patient tested positive for Legionella antigen in his urine.  He was started on azithromycin IV followed by oral.  Additionally the patient was started on Levaquin.  Dr. Magaly coughlin stopped in [place] where the patient embarked and went to the hospital.  He was continued on azithromycin and Levaquin.  In total he completed 4 doses of both azithromycin and Levaquin but was not happy with the care he was receiving in [place] and decided to fly back to [place].  The patient flew back on Saturday which was also the first day that his fever broke.  Previously he had been alternating Tylenol and ibuprofen.  He came to the hospital immediately after leaving the airport. The patient denies any any other complaints.       Past Medical History    I have reviewed this patient's medical history and updated it with pertinent information if needed.   Past Medical History:   Diagnosis Date    Allergic rhinitis, cause unspecified     Asthma     Complication of anesthesia     did not respond well to succs    Unspecified asthma(493.90)        Past Surgical History   I have reviewed this patient's surgical history and updated it with pertinent information if needed.  Past Surgical History:   Procedure Laterality Date    COLONOSCOPY      DAVINCI XI ASSISTED RESECTION RECTOSIGMOID N/A 11/26/2019    Procedure: ROBOTIC ASSISTED SIGMOID COLECTOMY.  MOBILIZATION OF SPLENIC FLESURE;  Surgeon: Travis Brand MD;  Location: [facility] OR    ENDOSCOPIC POLYPECTOMY NASAL      ESOPHAGOSCOPY, GASTROSCOPY, DUODENOSCOPY (EGD), COMBINED N/A 11/19/2021    Procedure: ESOPHAGOGASTRODUODENOSCOPY, WITH BIOPSIES using cold forceps;  Surgeon: Santhosh Harmon MD;  Location:  GI    LAPAROSCOPIC ASSISTED COLECTOMY      LASER HOLMIUM LITHOTRIPSY URETER(S), INSERT STENT, COMBINED Left 1/14/2015    Procedure: COMBINED CYSTOSCOPY, URETEROSCOPY, LASER HOLMIUM LITHOTRIPSY URETER(S), INSERT STENT;  Surgeon: eDrrek Cobb MD;  Location: [facility] OR    LITHOTRIPSY      SEPTOPLASTY         Allergies   Allergies   Allergen Reactions    Bee Pollen Anaphylaxis    Cats Difficulty breathing    Mold     Succinylcholine Other (See Comments)     Convulsions, muscle pain    Trees        Social History   I have reviewed this patient's social history and updated it with pertinent information if needed. Dano Kennedy  reports that he has never smoked. He has never used smokeless tobacco. He reports current alcohol use. He reports that he does not use drugs.    Family History   I have reviewed this patient's family history and updated it with pertinent information if needed.   Family History   Problem Relation Age of Onset    Hyperlipidemia Mother     Sleep Apnea Mother     Glaucoma Father     Glaucoma Maternal Grandfather     Macular Degeneration No family hx of        Review of Systems   The 10 point Review of Systems is negative other than noted in the HPI or here.     Physical Exam   Temp: 99  F (37.2  C) Temp src: Oral BP: (!) 148/86 Pulse: 80   Resp: 26 SpO2: 96 % O2 Device: None (Room air)    Vital Signs with Ranges  Temp:  [99  F (37.2  C)] 99  F (37.2  C)  Pulse:  [76-86] 80  Resp:  [13-30] 26  BP: (138-148)/(85-90) 148/86  SpO2:  [92 %-97 %] 96 %  230 lbs 0 oz  Physical Exam  Vitals reviewed.   Constitutional:       Appearance: Normal appearance. He is diaphoretic.      Comments: Pleasant man  seen resting in bed comfortably in no apparent distress.    HENT:      Head: Normocephalic and atraumatic.      Mouth/Throat:      Mouth: Mucous membranes are moist.      Pharynx: Oropharynx is clear.   Eyes:      Extraocular Movements: Extraocular movements intact.      Conjunctiva/sclera: Conjunctivae normal.      Pupils: Pupils are equal, round, and reactive to light.   Cardiovascular:      Rate and Rhythm: Normal rate and regular rhythm.      Pulses: Normal pulses.      Heart sounds: Normal heart sounds. No murmur heard.  Pulmonary:      Effort: Pulmonary effort is normal.      Breath sounds: Normal breath sounds. No wheezing, rhonchi or rales.   Abdominal:      General: Abdomen is flat. Bowel sounds are normal. There is no distension.      Palpations: Abdomen is soft. There is no mass.      Tenderness: There is no abdominal tenderness.   Musculoskeletal:         General: No swelling. Normal range of motion.      Cervical back: Normal range of motion and neck supple.   Skin:     General: Skin is warm.   Neurological:      General: No focal deficit present.      Mental Status: He is alert and oriented to person, place, and time. Mental status is at baseline.      Cranial Nerves: No cranial nerve deficit.           Medical Decision Making       75 MINUTES SPENT BY ME on the date of service doing chart review, history, exam, documentation & further activities per the note.

## 2023-08-27 NOTE — ED PROVIDER NOTES
History     Chief Complaint:  Shortness of Breath       The history is provided by the patient.      Dano Kennedy is a 48 year old male with history of asthma who presents to the ED for evaluation of shortness of breath. He reports that he was on a Cruise for the past 3 days and was feeling sick due to being in a hot tub with his friends prior to the trip. His friends were experiencing fever, shortness of breath and body aches. He didn't experience symptoms until his 2nd day into his trip. He went to see the onboard medical team.  There he was diagnosed with bilateral pneumonia and a urine antigen for Legionella was performed which was positive.  He states that he was at a friend's hot tub and that actually several people there also became ill.  He was treated with azithromycin and Levaquin and was told that he should medevac back to [place].  He did not have medevac insurance and instead disembark in [place].  He states he is not comfortable with the medical care he was receiving there and left the hospital AMA and flew directly back.  He presents here immediately upon returning.      Independent Historian:   None - Patient Only    Review of External Notes:   Reviewed extensive cruise ship records    Medications:    albuterol (PROAIR HFA/PROVENTIL HFA/VENTOLIN HFA) 108 (90 Base) MCG/ACT inhaler  albuterol (PROVENTIL) (2.5 MG/3ML) 0.083% neb solution  cloNIDine (CATAPRES) 0.1 MG tablet  fexofenadine-pseudoePHEDrine (ALLEGRA-D ALLERGY & CONGESTION) 180-240 MG 24 hr tablet  fluticasone-vilanterol (BREO ELLIPTA) 200-25 MCG/ACT inhaler  hydrOXYzine (ATARAX) 10 MG tablet  ipratropium - albuterol 0.5 mg/2.5 mg/3 mL (DUONEB) 0.5-2.5 (3) MG/3ML neb solution  omeprazole (PRILOSEC) 20 MG DR capsule  omeprazole (PRILOSEC) 40 MG DR capsule  ondansetron (ZOFRAN ODT) 4 MG ODT tab  ondansetron (ZOFRAN) 4 MG tablet  predniSONE (DELTASONE) 50 MG tablet  Epipen     Past Medical History:    Allergic  "rhinitis  Asthma  Complication of anesthesia  Anxiety  GSE (gluten-sensitive enteropathy)  ADHD   WENDY (obstructive sleep apnea)   Obesity  Diverticulitis   Kidney stone  Nasal sinus tumor   Hyperlipidemia   Chronic sinusitis   Heartburn    Past Surgical History:    Endoscopic polypectomy nasal   Laser holmium lithotripsy ureter(s), insert stent combined   DaVinci Xi Assisted Resection Rectosigmoid   Laparoscopic assisted colectomy   Septoplasty  Colonoscopy  EGD combined   Left kidney stone surgery     Physical Exam   Patient Vitals for the past 24 hrs:   BP Temp Temp src Pulse Resp SpO2 Height Weight   08/27/23 0500 -- -- -- 80 26 96 % -- --   08/27/23 0410 (!) 148/86 -- -- 85 20 94 % -- --   08/27/23 0338 -- -- -- -- -- 97 % -- --   08/27/23 0238 -- -- -- 76 15 94 % -- --   08/27/23 0214 138/85 -- -- 86 30 -- -- --   08/27/23 0141 -- -- -- 81 13 95 % -- --   08/27/23 0133 -- -- -- -- -- 96 % -- --   08/27/23 0107 (!) 145/90 99  F (37.2  C) Oral 81 28 92 % 1.702 m (5' 7\") 104.3 kg (230 lb)      Physical Exam  General: Resting on the gurney, appears  uncomfortable  Head:  The scalp, face, and head appear normal  Mouth/Throat: Mucus membranes are moist  CV:  Regular rate    Normal S1 and S2  No pathological murmur   Resp:  Non-labored, no retractions or accessory muscle use    Wheezing and coarseness in the lower lungs bilaterally   GI:  Abdomen is soft, no rigidity    No tenderness to palpation  MS:  Normal motor assessment of all extremities.    Good capillary refill noted.  Skin:   No rash or lesions noted.  Neuro:   Speech is normal and fluent. No apparent deficit.  Psych: Awake. Alert.  Normal affect.      Appropriate interactions.    Emergency Department Course     Imaging:  CT Chest Pulmonary Embolism w Contrast   Final Result   IMPRESSION:   1.  Negative for pulmonary embolism.      2.  Bilateral pulmonary infiltrates as detailed above greatest in the lung bases compatible with bilateral pneumonitis.       "   Report per radiology    Laboratory:  Labs Ordered and Resulted from Time of ED Arrival to Time of ED Departure   COMPREHENSIVE METABOLIC PANEL - Abnormal       Result Value    Sodium 139      Potassium 3.7      Chloride 101      Carbon Dioxide (CO2) 23      Anion Gap 15      Urea Nitrogen 20.7 (*)     Creatinine 0.92      Calcium 9.2      Glucose 113 (*)     Alkaline Phosphatase 195 (*)     AST 55 (*)      (*)     Protein Total 7.3      Albumin 3.9      Bilirubin Total 0.3      GFR Estimate >90     CBC WITH PLATELETS AND DIFFERENTIAL - Abnormal    WBC Count 25.0 (*)     RBC Count 4.59      Hemoglobin 12.8 (*)     Hematocrit 39.2 (*)     MCV 85      MCH 27.9      MCHC 32.7      RDW 13.2      Platelet Count 284     DIFFERENTIAL - Abnormal    % Neutrophils 87      % Lymphocytes 10      % Monocytes 2      % Eosinophils 0      % Basophils 0      % Metamyelocytes 1      Absolute Neutrophils 21.8 (*)     Absolute Lymphocytes 2.5      Absolute Monocytes 0.5      Absolute Eosinophils 0.0      Absolute Basophils 0.0      Absolute Metamyelocytes 0.3 (*)     RBC Morphology Confirmed RBC Indices      Platelet Assessment        Value: Automated Count Confirmed. Platelet morphology is normal.   LACTIC ACID WHOLE BLOOD - Normal    Lactic Acid 2.0     TROPONIN T, HIGH SENSITIVITY - Normal    Troponin T, High Sensitivity 11     INFLUENZA A/B, RSV, & SARS-COV2 PCR - Normal    Influenza A PCR Negative      Influenza B PCR Negative      RSV PCR Negative      SARS CoV2 PCR Negative     UA MACROSCOPIC WITH REFLEX TO MICRO AND CULTURE - Normal    Color Urine Yellow      Appearance Urine Clear      Glucose Urine Negative      Bilirubin Urine Negative      Ketones Urine Negative      Specific Gravity Urine 1.028      Blood Urine Negative      pH Urine 6.0      Protein Albumin Urine Negative      Urobilinogen Urine 2.0      Nitrite Urine Negative      Leukocyte Esterase Urine Negative        Procedures   None    Emergency Department  Course & Assessments:       Interventions:  Medications   levofloxacin (LEVAQUIN) infusion 750 mg (has no administration in time range)   azithromycin (ZITHROMAX) 500 mg vial to attach to  mL bag (has no administration in time range)   ipratropium - albuterol 0.5 mg/2.5 mg/3 mL (DUONEB) neb solution 3 mL (3 mLs Nebulization $Given 8/27/23 0117)   iopamidol (ISOVUE-370) solution 79 mL (79 mLs Intravenous $Given 8/27/23 0242)   Saline Flush (100 mLs Intravenous $Given 8/27/23 0242)      Independent Interpretation (X-rays, CTs, rhythm strip):  None    Assessments/Consultations/Discussion of Management or Tests:    Past medical records, nursing notes, and vitals reviewed.  0159: I performed an exam of the patient and obtained history, as documented above.  0513: I spoke with Dr. Huber, hospitalist, who agreed to admit the patient.   0520: I have updated and rechecked the patient.    Social Determinants of Health affecting care:   None    Disposition:  The patient was admitted to the hospital under the care of Dr. Huber.     Impression & Plan      Medical Decision Making:  Dano Kennedy is a 48 year old male who presents to the emergency department complaining of cough and shortness of breath.  The patient was diagnosed with legionnaires disease with a bilateral pneumonia and positive Legionella antigen while on a cruise ship.  He has had 3 to 4 days of azithromycin and Levaquin but still does not feel improved.  He presents to the emergency department immediately upon returning and leaving the hospital in [place] AGAINST MEDICAL ADVICE.  Here he had wheezing and coarseness which improved with a nebulizer.  He was given antibiotics and will be admitted to the hospitalist for further treatment as well as likely infectious disease consultation.      Diagnosis:    ICD-10-CM    1. Legionnaire's disease (H)  A48.1              Scribe Disclosure:  Catie SAWYER, am serving as a scribe at 2:13 AM on 8/27/2023  to document services personally performed by Ely Garcia MD based on my observations and the provider's statements to me.   8/27/2023   Ely Garcia MD Taxman, Karah M, MD  08/27/23 0853

## 2023-08-27 NOTE — PROGRESS NOTES
Admission    Patient arrives to room 612 via cart from ED.  Care plan note: VSS on RA.  Pt c/o chest tightness..  Neb administered.  Up ind in room.  A&OX4.  Dry cough noted.      Inpatient nursing criteria listed below were met:    Did you put disposition on whiteboard and in sticky note: Yes  Full skin assessment done (add LDA if skin issue present). Initials of 2nd RN :Yes  Isolation education started/completed NA  Patient allergies verified with patient: Yes  Fall Risk? (Care plan updated, Education given and documented) NA  Primary Care Plan initiated: Yes  Home medications documented in belongings flowsheet: Yes  Patient belongings documented in belongings flowsheet: Yes  Reminder note (belongings/ medications) placed in discharge instructions:NA  Admission profile/ required documentation complete: Yes  If patient is a 72 hour hold/Commitment are belongings removed from room and locked up? NA

## 2023-08-27 NOTE — ED TRIAGE NOTES
Pt came straight from the airport. Recently diagnosed with pneumonia, went on a cruise ship and had to be hospitalized in [place] for the past 3 days on IV abx.     Triage Assessment       Row Name 08/27/23 0108       Triage Assessment (Adult)    Airway WDL WDL       Respiratory WDL    Respiratory WDL X;rhythm/pattern;cough    Rhythm/Pattern, Respiratory shallow;gasping;labored;tachypneic     Cough Frequency frequent    Cough Type vaughn cough       Skin Circulation/Temperature WDL    Skin Circulation/Temperature WDL WDL       Cardiac WDL    Cardiac WDL WDL       Peripheral/Neurovascular WDL    Peripheral Neurovascular WDL WDL       Cognitive/Neuro/Behavioral WDL    Cognitive/Neuro/Behavioral WDL WDL

## 2023-08-27 NOTE — PLAN OF CARE
Goal Outcome Evaluation:  DATE & TIME: 8/27 2599-1756    Cognitive Concerns/ Orientation : A&OX4   BEHAVIOR & AGGRESSION TOOL COLOR: Green  ABNL VS/O2: VSS on RA  MOBILITY: Ind  PAIN MANAGMENT: Denies  DIET: Regular  BOWEL/BLADDER: Continent  ABNL LAB/BG: WBC 25.0, , AST 55, Alk Phos 195  DRAIN/DEVICES: PIV infusing LR 100ml. Intermittent antibiotics  TELEMETRY RHYTHM: NA  SKIN: Flushed.  Intact  TESTS/PROCEDURES: NA  D/C DAY/GOALS/PLACE: Home when medically stable  OTHER IMPORTANT INFO: Pt admitted from ED this shift.  C/O chest tightness.  Relieved with neb.  LS dim.  Dry, nonproductive cough noted.            Plan of Care Reviewed With: patient    Overall Patient Progress: no changeOverall Patient Progress: no change

## 2023-08-27 NOTE — PHARMACY-ADMISSION MEDICATION HISTORY
Pharmacy Intern Admission Medication History    Admission medication history is complete. The information provided in this note is only as accurate as the sources available at the time of the update.    Medication reconciliation/reorder completed by provider prior to medication history? Yes    Information Source(s): Patient and CareEverywhere/SureScripts via in-person    Pertinent Information: None    Changes made to PTA medication list:  Added: Ibuprofen, acetaminophen  Deleted: Hydroxyzine, omeprazole 40 mg, clonidine  Changed: None    Medication Affordability:  Not including over the counter (OTC) medications, was there a time in the past 3 months when you did not take your medications as prescribed because of cost?: No    Allergies reviewed with patient and updates made in EHR: yes    Medication History Completed By: Teodora Isbell 8/27/2023 10:54 AM    Prior to Admission medications    Medication Sig Last Dose Taking? Auth Provider Long Term End Date   acetaminophen (TYLENOL) 500 MG tablet Take 1,000 mg by mouth every 6 hours as needed for mild pain Unknown Yes Unknown, Entered By History     albuterol (PROAIR HFA/PROVENTIL HFA/VENTOLIN HFA) 108 (90 Base) MCG/ACT inhaler Inhale 2 puffs into the lungs every 4 hours as needed for shortness of breath / dyspnea 8/26/2023 at PM Yes Alex Nichols MD Yes    albuterol (PROVENTIL) (2.5 MG/3ML) 0.083% neb solution Take 1 vial (2.5 mg) by nebulization every 4 hours as needed for shortness of breath / dyspnea or wheezing Past Week Yes Mitchell Carolina, DO Yes    fexofenadine-pseudoePHEDrine (ALLEGRA-D ALLERGY & CONGESTION) 180-240 MG 24 hr tablet TAKE 1 TABLET BY MOUTH EVERY DAY AS NEEDED Past Week Yes Alex Nichols MD     fluticasone-vilanterol (BREO ELLIPTA) 200-25 MCG/ACT inhaler Inhale 1 puff into the lungs daily Past Week Yes Alex Nichols MD     ibuprofen (ADVIL/MOTRIN) 200 MG tablet Take 600 mg by mouth every 6 hours as needed for pain Unknown Yes Unknown,  Entered By History     ipratropium - albuterol 0.5 mg/2.5 mg/3 mL (DUONEB) 0.5-2.5 (3) MG/3ML neb solution Take 1 vial (3 mLs) by nebulization every 4 hours as needed for shortness of breath / dyspnea or wheezing Past Week Yes Alex Nichols MD Yes    omeprazole (PRILOSEC) 20 MG DR capsule Take 1 capsule (20 mg) by mouth 2 times daily for 90 days Past Week Yes Jose Spencer MD  10/9/23   ondansetron (ZOFRAN ODT) 4 MG ODT tab Take 1 tablet (4 mg) by mouth every 8 hours as needed for nausea Past Week at PRN Yes Tameka Guevara PA-C     ondansetron (ZOFRAN) 4 MG tablet Take 1 tablet (4 mg) by mouth every 8 hours as needed for nausea or vomiting Past Week at PRN Yes Alex Nichols MD     predniSONE (DELTASONE) 50 MG tablet Take 1 tablet (50 mg) by mouth daily Past Week Yes Alex Nichols MD No    scopolamine (TRANSDERM) 1 MG/3DAYS 72 hr patch Place 1 patch onto the skin every 72 hours  Patient not taking: Reported on 8/27/2023 Not Taking  Alex Nichols MD

## 2023-08-28 LAB
ALBUMIN SERPL BCG-MCNC: 3.3 G/DL (ref 3.5–5.2)
ALP SERPL-CCNC: 160 U/L (ref 40–129)
ALT SERPL W P-5'-P-CCNC: 160 U/L (ref 0–70)
ANION GAP SERPL CALCULATED.3IONS-SCNC: 13 MMOL/L (ref 7–15)
AST SERPL W P-5'-P-CCNC: 43 U/L (ref 0–45)
BILIRUB SERPL-MCNC: 0.5 MG/DL
BUN SERPL-MCNC: 12.2 MG/DL (ref 6–20)
CALCIUM SERPL-MCNC: 8.8 MG/DL (ref 8.6–10)
CHLORIDE SERPL-SCNC: 101 MMOL/L (ref 98–107)
CREAT SERPL-MCNC: 0.82 MG/DL (ref 0.67–1.17)
DEPRECATED HCO3 PLAS-SCNC: 24 MMOL/L (ref 22–29)
ERYTHROCYTE [DISTWIDTH] IN BLOOD BY AUTOMATED COUNT: 12.9 % (ref 10–15)
GFR SERPL CREATININE-BSD FRML MDRD: >90 ML/MIN/1.73M2
GLUCOSE SERPL-MCNC: 107 MG/DL (ref 70–99)
HCT VFR BLD AUTO: 36.3 % (ref 40–53)
HGB BLD-MCNC: 12 G/DL (ref 13.3–17.7)
MCH RBC QN AUTO: 27.6 PG (ref 26.5–33)
MCHC RBC AUTO-ENTMCNC: 33.1 G/DL (ref 31.5–36.5)
MCV RBC AUTO: 84 FL (ref 78–100)
PLATELET # BLD AUTO: 230 10E3/UL (ref 150–450)
POTASSIUM SERPL-SCNC: 4 MMOL/L (ref 3.4–5.3)
PROT SERPL-MCNC: 6.4 G/DL (ref 6.4–8.3)
RBC # BLD AUTO: 4.34 10E6/UL (ref 4.4–5.9)
SODIUM SERPL-SCNC: 138 MMOL/L (ref 136–145)
WBC # BLD AUTO: 16.2 10E3/UL (ref 4–11)

## 2023-08-28 PROCEDURE — 99232 SBSQ HOSP IP/OBS MODERATE 35: CPT | Performed by: INTERNAL MEDICINE

## 2023-08-28 PROCEDURE — 999N000157 HC STATISTIC RCP TIME EA 10 MIN

## 2023-08-28 PROCEDURE — 250N000011 HC RX IP 250 OP 636: Performed by: INTERNAL MEDICINE

## 2023-08-28 PROCEDURE — 87205 SMEAR GRAM STAIN: CPT | Performed by: HOSPITALIST

## 2023-08-28 PROCEDURE — 94640 AIRWAY INHALATION TREATMENT: CPT

## 2023-08-28 PROCEDURE — 85027 COMPLETE CBC AUTOMATED: CPT | Performed by: HOSPITALIST

## 2023-08-28 PROCEDURE — 258N000003 HC RX IP 258 OP 636: Performed by: INTERNAL MEDICINE

## 2023-08-28 PROCEDURE — 250N000013 HC RX MED GY IP 250 OP 250 PS 637: Performed by: INTERNAL MEDICINE

## 2023-08-28 PROCEDURE — 80053 COMPREHEN METABOLIC PANEL: CPT | Performed by: HOSPITALIST

## 2023-08-28 PROCEDURE — 94640 AIRWAY INHALATION TREATMENT: CPT | Mod: 76

## 2023-08-28 PROCEDURE — 87541 LEGION PNEUMO DNA AMP PROB: CPT | Performed by: HOSPITALIST

## 2023-08-28 PROCEDURE — 250N000009 HC RX 250: Performed by: INTERNAL MEDICINE

## 2023-08-28 PROCEDURE — 36415 COLL VENOUS BLD VENIPUNCTURE: CPT | Performed by: HOSPITALIST

## 2023-08-28 PROCEDURE — 87798 DETECT AGENT NOS DNA AMP: CPT | Performed by: HOSPITALIST

## 2023-08-28 PROCEDURE — 120N000001 HC R&B MED SURG/OB

## 2023-08-28 PROCEDURE — 250N000009 HC RX 250: Performed by: HOSPITALIST

## 2023-08-28 RX ORDER — METHYLPREDNISOLONE SODIUM SUCCINATE 40 MG/ML
40 INJECTION, POWDER, LYOPHILIZED, FOR SOLUTION INTRAMUSCULAR; INTRAVENOUS DAILY
Status: DISCONTINUED | OUTPATIENT
Start: 2023-08-28 | End: 2023-08-30 | Stop reason: HOSPADM

## 2023-08-28 RX ORDER — BUDESONIDE 0.5 MG/2ML
0.5 INHALANT ORAL 2 TIMES DAILY
Status: DISCONTINUED | OUTPATIENT
Start: 2023-08-28 | End: 2023-08-30 | Stop reason: HOSPADM

## 2023-08-28 RX ORDER — IPRATROPIUM BROMIDE AND ALBUTEROL SULFATE 2.5; .5 MG/3ML; MG/3ML
3 SOLUTION RESPIRATORY (INHALATION)
Status: DISCONTINUED | OUTPATIENT
Start: 2023-08-28 | End: 2023-08-30 | Stop reason: HOSPADM

## 2023-08-28 RX ADMIN — IPRATROPIUM BROMIDE AND ALBUTEROL SULFATE 3 ML: .5; 3 SOLUTION RESPIRATORY (INHALATION) at 08:16

## 2023-08-28 RX ADMIN — LEVOFLOXACIN 750 MG: 750 TABLET, FILM COATED ORAL at 09:23

## 2023-08-28 RX ADMIN — IPRATROPIUM BROMIDE AND ALBUTEROL SULFATE 3 ML: .5; 3 SOLUTION RESPIRATORY (INHALATION) at 18:52

## 2023-08-28 RX ADMIN — FLUTICASONE FUROATE AND VILANTEROL TRIFENATATE 1 PUFF: 200; 25 POWDER RESPIRATORY (INHALATION) at 11:27

## 2023-08-28 RX ADMIN — IPRATROPIUM BROMIDE AND ALBUTEROL SULFATE 3 ML: .5; 3 SOLUTION RESPIRATORY (INHALATION) at 15:23

## 2023-08-28 RX ADMIN — BUDESONIDE 0.5 MG: 0.5 INHALANT RESPIRATORY (INHALATION) at 16:47

## 2023-08-28 RX ADMIN — PANTOPRAZOLE SODIUM 40 MG: 40 TABLET, DELAYED RELEASE ORAL at 16:45

## 2023-08-28 RX ADMIN — IPRATROPIUM BROMIDE AND ALBUTEROL SULFATE 3 ML: .5; 3 SOLUTION RESPIRATORY (INHALATION) at 01:11

## 2023-08-28 RX ADMIN — IPRATROPIUM BROMIDE AND ALBUTEROL SULFATE 3 ML: .5; 3 SOLUTION RESPIRATORY (INHALATION) at 12:17

## 2023-08-28 RX ADMIN — METHYLPREDNISOLONE SODIUM SUCCINATE 40 MG: 40 INJECTION, POWDER, FOR SOLUTION INTRAMUSCULAR; INTRAVENOUS at 16:45

## 2023-08-28 RX ADMIN — BUDESONIDE 0.5 MG: 0.5 INHALANT RESPIRATORY (INHALATION) at 18:52

## 2023-08-28 RX ADMIN — SODIUM CHLORIDE, POTASSIUM CHLORIDE, SODIUM LACTATE AND CALCIUM CHLORIDE: 600; 310; 30; 20 INJECTION, SOLUTION INTRAVENOUS at 08:09

## 2023-08-28 RX ADMIN — PANTOPRAZOLE SODIUM 40 MG: 40 TABLET, DELAYED RELEASE ORAL at 06:34

## 2023-08-28 ASSESSMENT — ACTIVITIES OF DAILY LIVING (ADL)
ADLS_ACUITY_SCORE: 18

## 2023-08-28 NOTE — CONSULTS
"PULMONOLOGY CONSULTATION  Date of service: 8/28/2023    [facility]  _____________________________________________________    Dano Kennedy  48 year old male  [id number]  [address]   [facility] [address]    Primary Care Provider:  Alex Nichols  Admission Date: 8/27/2023  Hospital Attending Physician:  Laura Huber,*  ________________________________________    CHIEF COMPLAINT : I was asked to see this patient by Laura Huber,* for evaluation of legionella pneumonia.     Informant: EHR and patient    HISTORY OF PRESENT ILLNESS     Per admission H&P:    \"Dano Kennedy is a 48 year old male with a past medical history of asthma presents to hospital with Legionella pneumonia.  The patient reports that on Friday, August 18 he and some friends enjoyed a night and a hot tub.  Approximately 4 days later the patient was on a cruise ship when he started feeling generalized malaise, fatigue, shortness of breath, cough, fevers.  He had a Tmax of 103  F.  He went to the crew ship doctor who performed an x-ray and determined that the patient had pneumonia.  The patient tested positive for Legionella antigen in his urine.  He was started on azithromycin IV followed by oral.  Additionally the patient was started on Levaquin.  Dr. Magaly coughlin stopped in [place] where the patient embarked and went to the hospital.  He was continued on azithromycin and Levaquin.  In total he completed 4 doses of both azithromycin and Levaquin but was not happy with the care he was receiving in [place] and decided to fly back to [place].  The patient flew back on Saturday which was also the first day that his fever broke.  Previously he had been alternating Tylenol and ibuprofen.  He came to the hospital immediately after leaving the airport. The patient denies any any other complaints.  \"      HOME MEDICATIONS     Medications Prior to Admission   Medication Sig Dispense Refill Last Dose    " acetaminophen (TYLENOL) 500 MG tablet Take 1,000 mg by mouth every 6 hours as needed for mild pain   Unknown    albuterol (PROAIR HFA/PROVENTIL HFA/VENTOLIN HFA) 108 (90 Base) MCG/ACT inhaler Inhale 2 puffs into the lungs every 4 hours as needed for shortness of breath / dyspnea 18 g 11 8/26/2023 at PM    albuterol (PROVENTIL) (2.5 MG/3ML) 0.083% neb solution Take 1 vial (2.5 mg) by nebulization every 4 hours as needed for shortness of breath / dyspnea or wheezing 75 mL 3 Past Week    fexofenadine-pseudoePHEDrine (ALLEGRA-D ALLERGY & CONGESTION) 180-240 MG 24 hr tablet TAKE 1 TABLET BY MOUTH EVERY DAY AS NEEDED 90 tablet 3 Past Week    fluticasone-vilanterol (BREO ELLIPTA) 200-25 MCG/ACT inhaler Inhale 1 puff into the lungs daily 60 each 11 Past Week    ibuprofen (ADVIL/MOTRIN) 200 MG tablet Take 600 mg by mouth every 6 hours as needed for pain   Unknown    ipratropium - albuterol 0.5 mg/2.5 mg/3 mL (DUONEB) 0.5-2.5 (3) MG/3ML neb solution Take 1 vial (3 mLs) by nebulization every 4 hours as needed for shortness of breath / dyspnea or wheezing 270 mL 3 Past Week    omeprazole (PRILOSEC) 20 MG DR capsule Take 1 capsule (20 mg) by mouth 2 times daily for 90 days 102 capsule 0 Past Week    ondansetron (ZOFRAN ODT) 4 MG ODT tab Take 1 tablet (4 mg) by mouth every 8 hours as needed for nausea 5 tablet 0 Past Week at PRN    ondansetron (ZOFRAN) 4 MG tablet Take 1 tablet (4 mg) by mouth every 8 hours as needed for nausea or vomiting 15 tablet 0 Past Week at PRN    predniSONE (DELTASONE) 50 MG tablet Take 1 tablet (50 mg) by mouth daily 5 tablet 0 Past Week    scopolamine (TRANSDERM) 1 MG/3DAYS 72 hr patch Place 1 patch onto the skin every 72 hours (Patient not taking: Reported on 8/27/2023) 24 patch 1 Not Taking       PAST MEDICAL HISTORY      Past Medical History:   Diagnosis Date    Allergic rhinitis, cause unspecified     Asthma     Complication of anesthesia     did not respond well to succs    Unspecified  asthma(493.90)        PAST SURGICAL HISTORY      Past Surgical History:   Procedure Laterality Date    COLONOSCOPY      DAVINCI XI ASSISTED RESECTION RECTOSIGMOID N/A 11/26/2019    Procedure: ROBOTIC ASSISTED SIGMOID COLECTOMY. MOBILIZATION OF SPLENIC FLESURE;  Surgeon: Travis Brand MD;  Location: [facility] OR    ENDOSCOPIC POLYPECTOMY NASAL      ESOPHAGOSCOPY, GASTROSCOPY, DUODENOSCOPY (EGD), COMBINED N/A 11/19/2021    Procedure: ESOPHAGOGASTRODUODENOSCOPY, WITH BIOPSIES using cold forceps;  Surgeon: Santhosh Harmon MD;  Location:  GI    LAPAROSCOPIC ASSISTED COLECTOMY      LASER HOLMIUM LITHOTRIPSY URETER(S), INSERT STENT, COMBINED Left 1/14/2015    Procedure: COMBINED CYSTOSCOPY, URETEROSCOPY, LASER HOLMIUM LITHOTRIPSY URETER(S), INSERT STENT;  Surgeon: Derrek Cobb MD;  Location: [facility] OR    LITHOTRIPSY      SEPTOPLASTY         ALLERGIES     Allergies   Allergen Reactions    Bee Pollen Anaphylaxis    Cats Difficulty breathing    Mold     Succinylcholine Other (See Comments)     Convulsions, muscle pain    Trees        SOCIAL / SUBSTANCE HISTORY     Social History     Socioeconomic History    Marital status: Patient Declined     Spouse name: Not on file    Number of children: Not on file    Years of education: Not on file    Highest education level: Not on file   Occupational History    Not on file   Tobacco Use    Smoking status: Never    Smokeless tobacco: Never   Vaping Use    Vaping Use: Never used   Substance and Sexual Activity    Alcohol use: Yes     Comment: 1-2 per month    Drug use: No    Sexual activity: Yes     Partners: Female   Other Topics Concern    Parent/sibling w/ CABG, MI or angioplasty before 65F 55M? Not Asked   Social History Narrative    Not on file     Social Determinants of Health     Financial Resource Strain: Not on file   Food Insecurity: Not on file   Transportation Needs: Not on file   Physical Activity: Not on file   Stress: Not on file   Social Connections: Not on file  "  Intimate Partner Violence: Not on file   Housing Stability: Not on file       FAMILY HISTORY     Family History   Problem Relation Age of Onset    Hyperlipidemia Mother     Sleep Apnea Mother     Glaucoma Father     Glaucoma Maternal Grandfather     Macular Degeneration No family hx of        REVIEW OF SYSTEMS   A comprehensive review of systems was negative except for items noted in HPI/Subjective.    PHYSICAL EXAMINATION   Vital signs  Temp (24hrs), Av.2  F (36.8  C), Min:98.1  F (36.7  C), Max:98.3  F (36.8  C)    Temp: 98.1  F (36.7  C) Temp src: Oral BP: 129/82 Pulse: 102   Resp: 18 SpO2: 95 % O2 Device: None (Room air) Oxygen Delivery: 2 LPM Height: 170.2 cm (5' 7\") Weight: 103.5 kg (228 lb 1.6 oz)  Estimated body mass index is 35.73 kg/m  as calculated from the following:    Height as of this encounter: 1.702 m (5' 7\").    Weight as of this encounter: 103.5 kg (228 lb 1.6 oz).  I/O last 3 completed shifts:  In: 2253 [P.O.:620; I.V.:1633]  Out: 2950 [Urine:2950]    CONSTITUTIONAL/GENERAL: Alert male. No apparent distress.  EARS,NOSE,THROAT,MOUTH: Trachea midline.   RESPIRATORY: Faint Right basilar crackles, otherwise clear, no wheezes   CARDIOVASCULAR: RRR, S1, S2.     LABORATORY ASSESSMENT    Arterial Blood GasNo lab results found in last 7 days.  CBC  Recent Labs   Lab 23  0721 23  0128   WBC 16.2* 25.0*   RBC 4.34* 4.59   HGB 12.0* 12.8*   HCT 36.3* 39.2*   MCV 84 85   MCH 27.6 27.9   MCHC 33.1 32.7   RDW 12.9 13.2    284     BMP  Recent Labs   Lab 23  0721 23  0128    139   POTASSIUM 4.0 3.7   CHLORIDE 101 101   TAY 8.8 9.2   CO2 24 23   BUN 12.2 20.7*   CR 0.82 0.92   * 113*     INRNo lab results found in last 7 days.   BNPNo lab results found in last 7 days.  VENOUS BLOOD GASESNo lab results found in last 7 days.    IMAGING, ECHO, PFT, SLEEP STUDY, RHC, OTHER TESTING         ASSESSMENT / PLAN      Pulmonary diagnoses (alphabetical):  Abnormal Chest " Imaging R91.8  Asthma w/ Exacerbation J45.901  Cough R05  Hypoxemia R09.02  Shortness of Breath R06.02  Legionella PNA.    ASSESSMENT:  48-year-old male never smoker with asthma presenting with Legionella PNA.    Used hot tub 8/17 with 5 other people.  On 8/21 started to feel ill with fever to 103, body aches, and dry cough while on a cruise ship.  Used prednisone and inhaler without improvement.  Cruise ship physician checked CXR which reportedly showed LLL opacity and urine Legionella which was reportedly positive.  Patient received azithromycin and Levaquin.  Seen in hospital in [place] 8/23; completed 4 doses of azithromycin and Levaquin, and received 4-5 doses of steroids.  Fever resolved on day of discharge.  Returned to [place] 8/26.      Seen in ER 8/27 with WBC 25k and elevated LFTs.  Urine Legionella negative here.  Sputum culture 4+ GPC. CT chest showed bibasilar patchy consolidations with GGO in the mid and upper lobes.    Currently requiring 2 L O2 and receiving Levaquin.  Continues to have chest tightness which improves with DuoNeb.  Not producing mucus. Has not received additional steroids. No wheezing on exam.       PLAN:   Stop Breo. Start budesonide neb 0.5 mg twice daily. Ordered.  Continue DuoNeb 4 times daily.   Start trial of Solumedrol 40mg daily. Ordered.   Follow up sputum culture.   Antibiotics per ID.  Aerobika after nebs.  Incentive spirometry 10 times/hour while awake.  HOB >30 .  Encourage OOB as appropriate.        Maycol Tee MD    [facility]  Office: [phone]  Pager: [phone]

## 2023-08-28 NOTE — PLAN OF CARE
DATE & TIME: 8/28/23 3pm-7pm  Cognitive Concerns/ Orientation : A&Ox4   BEHAVIOR & AGGRESSION TOOL COLOR: Green  ABNL VS/O2: VSS, room air   MOBILITY: Independent  PAIN MANAGMENT: Denies  DIET: Regular  BOWEL/BLADDER: Continent  DRAIN/DEVICES: R PIV  D/C DAY/GOALS/PLACE: pending to home when medically cleared

## 2023-08-28 NOTE — PROGRESS NOTES
[facility]    Medicine Progress Note - Hospitalist Service    Date of Admission:  8/27/2023    Assessment & Plan     Dano Kennedy is a pleasant 48-year-old  with history of asthma who was recently diagnosed with Legionella pneumonia and now presented to hospital on 8/27/2023 with ongoing shortness of breath.    He was in a hot tub with friends on 8/17.  He went on a cruise ship on 8/21 and started developing symptoms of fever up to 103, myalgias, cough, shortness of breath.  On 8/22 was diagnosed with left lower lobe pneumonia on cruise ship.  Tested positive for Legionella urine antigen.  He was treated with azithromycin and then switched to levofloxacin.  He was hospitalized at [place] from 8/23 to 8/26 and treated with levofloxacin.  Fever resolved on 8/26.  He flew back to [place] on 8/26 and presented to ER during the night with ongoing shortness of breath and feeling unwell.  He did start taking prednisone while he was on a cruise ship and received Solu-Medrol in [place].    Community-acquired bilateral bacterial pneumonia due to Legionella  - Source of exposure-hot tub.  Other people who were in hot tub were also affected though with milder symptoms  - Has been on levofloxacin for 4 days PTA. fever has resolved.  Has leukocytosis of 25 and elevated LFTs.    - COVID-19/influenza/RSV negative  - CT PE on 8/27 was negative for PE but showed bilateral pulmonary infiltrates greatest in the lung bases compatible with bilateral pneumonitis  - Urine Legionella and streptococcal antigen now negative.  Sputum culture pending  - Infectious disease consulted.  Recommended continuing levofloxacin.  Duration of treatment 10 days  - Continue LR at 100 ml/h, discontinue later today  - Leukocytosis is really multifactorial-due to pneumonia and recent prednisone use and is improving    Severe persistent asthma  - No wheezing but has been experiencing increased shortness of  "breath with ambulation.  Feels better after DuoNeb  - Recently treated with p.o. prednisone and Solu-Medrol, not resumed on this admission  - Continue Breo and continue DuoNebs  - Patient is requesting pulmonary consultation    Acute hypoxic respiratory failure-secondary to pneumonia  -Now requiring 2 L of supplemental oxygen, continue.  Wean as able    Elevated LFTs-likely mild hepatitis due to Legionella  Alkaline phosphatase 195, , AST 55, total bilirubin 1.3  -Stable.  Monitor    S/p hemicolectomy for recurrent diverticulitis  -No current issues       Diet: Combination Diet Regular Diet Adult    DVT Prophylaxis: Low Risk/Ambulatory with no VTE prophylaxis indicated  Burrows Catheter: Not present  Lines: None     Cardiac Monitoring: None  Code Status: Full Code      Clinically Significant Risk Factors              # Hypoalbuminemia: Lowest albumin = 3.3 g/dL at 8/28/2023  7:21 AM, will monitor as appropriate            # Obesity: Estimated body mass index is 35.73 kg/m  as calculated from the following:    Height as of this encounter: 1.702 m (5' 7\").    Weight as of this encounter: 103.5 kg (228 lb 1.6 oz).  , PRESENT ON ADMISSION     # Asthma: noted on problem list          Disposition Plan      Expected Discharge Date: 08/31/2023                  Naty Hill MD  Hospitalist Service  [facility]  Securely message with [employer] (more info)  Text page via [employer] Paging/Directory   ______________________________________________________________________    Interval History   Patient reports he had a tough night.  Was short of breath.  Was placed on oxygen.  Is requesting pulmonary consultation.  Afebrile.  Leukocytosis is improving.        Physical Exam   Vital Signs: Temp: 98.3  F (36.8  C) Temp src: Oral BP: 128/61 Pulse: 75   Resp: 20 SpO2: 94 % O2 Device: None (Room air) Oxygen Delivery: 2 LPM  Weight: 228 lbs 1.6 oz    Constitutional - awake and alert, resting in bed, in no acute " distress  Cardiovascular - regular rate and rhythm, no murmurs, no edema  Pulmonary - lungs are clear to auscultation bilaterally, no wheezing or rhonchi  GI - abdomen is soft, nontender, nondistended, no hepatosplenomegaly or masses  Integumentary - skin is warm and dry, no rashes or ulcers  Neurological - awake, alert and oriented x3.  Moving all 4 extremities, normal speech, no focal deficits    Medical Decision Making       ------------------ MEDICAL DECISION MAKING ------------------------------------------------------------------------------------------------------  Medical complexity over the past 24 hours:  -------------------------- MODERATE RISK FOR MORBIDITY --------------------------------------------------      Data     I have personally reviewed the following data over the past 24 hrs:    16.2 (H)  \   12.0 (L)   / 230     138 101 12.2 /  107 (H)   4.0 24 0.82 \     ALT: 160 (H) AST: 43 AP: 160 (H) TBILI: 0.5   ALB: 3.3 (L) TOT PROTEIN: 6.4 LIPASE: N/A       Imaging results reviewed over the past 24 hrs:   No results found for this or any previous visit (from the past 24 hour(s)).

## 2023-08-28 NOTE — PLAN OF CARE
Summary:  Legionella Pneumonia    DATE & TIME: 8/28/23 [id number]  Cognitive Concerns/ Orientation : A&OX4   BEHAVIOR & AGGRESSION TOOL COLOR: Green  ABNL VS/O2: VSS RA, O2 sats low 90s; DUTTON and SOB reported;  pt requested nasal cannula while sleeping, sats 95% on 2L NC.  MOBILITY: Ind  PAIN MANAGMENT: Denies  DIET: Regular  BOWEL/BLADDER: Continent  ABNL LAB/BG: None new  DRAIN/DEVICES: PIV infusing LR @ 100 ml/hr  TELEMETRY RHYTHM: NA  SKIN: Flushed.  Intact  TESTS/PROCEDURES: NA  D/C DAY/GOALS/PLACE: Home when medically stable  OTHER IMPORTANT INFO: Chest tightness/ SOB improved with neb treatment.  LS dim.  Dry, nonproductive cough.

## 2023-08-28 NOTE — PLAN OF CARE
Stable today continue with spironolactone and amlodipine  Summary: [id number] 8/27/23 Legionnaire's pneumonia   Orientation: A/Ox4   Activity Level: ind  Fall Risk: no  Behavior & Aggression Tool Color: green  Pain Management: denies  ABNL VS/O2: VSS on RA  ABNL Lab/BG: n/a  Diet: reg  Bowel/Bladder: continent  Drains/Devices: PIV infusing LR at 100 mL/hr  Tests/Procedures for next shift: n/a  Anticipated DC date: when medically stable   Other Important Info:

## 2023-08-28 NOTE — PROGRESS NOTES
PULMONARY NOTE    I am aware of the request for a Pulmonary consult on this patient. Chart reviewed. Legionella PNA with asthma exacerbation and dyspnea. I will be by later today or tomorrow morning to complete the consultation. Please call or page with any urgent questions or concerns.    Thank you.      Maycol Tee MD    [facility] / [facility]  Office: [phone]  Pager: [phone]

## 2023-08-28 NOTE — PROGRESS NOTES
[facility]    Infectious Disease Progress Note    Date of Service (when I saw the patient): 08/28/2023     Assessment & Plan   Dano Kennedy is a 48 year old who was admitted on 8/27/2023.     ASSESSMENT   MED DECISION MAKING       Patient Active Problem List   Diagnosis Code    Anxiety F41.9    Severe persistent asthma without complication J45.50    Seasonal allergic rhinitis J30.2    GSE (gluten-sensitive enteropathy) K90.41    ADHD (attention deficit hyperactivity disorder) F90.9    Heartburn R12    WENDY (obstructive sleep apnea) G47.33    Obesity (BMI 35.0-39.9) with comorbidity (H) E66.01    History of diverticulitis Z87.19    Irritable bowel syndrome with diarrhea K58.0    Legionnaire's disease (H) A48.1      Legionaires' Disease  --exposure: community acquired (hot tub), [place]arde and 4 friends in hot tub with him also had respiratory symptoms but milder symptoms  --reported positive urine legionella Ag  --CT Chest PE: Prominent patchy infiltrates and subpleural consolidation in both lung bases. There is some additional patchy somewhat nodular groundglass opacities in the mid and upper lungs.        Leukocytosis  Abnormal LFTs  Hx of asthma        PLANS:  Pending sputum cultures, prelim GS with GPC in pairs and chains  Repeat Legionella antigen is negative   Continue on Levaquin PO, good bioavailability total duration 21 days, day 6/21 today   Repeat imaging in 4- 6 weeks      Discussed with pulm     Erik Chavez MD    Interval History   Tolerating antibiotics ok   No new rashes or issues with antibiotics   Labs reviewed   No changes to past medical, social or family history   Feels slightly better but still feels tight in the chest   A 10 point ROS was done and is negative other than noted in the interval history above     Physical Exam   Temp: 98.1  F (36.7  C) Temp src: Oral BP: 135/81 Pulse: 75   Resp: 21 SpO2: 94 % O2 Device: Nasal cannula Oxygen Delivery: 2 LPM  Vitals:     08/27/23 0107 08/28/23 0621   Weight: 104.3 kg (230 lb) 103.5 kg (228 lb 1.6 oz)     Vital Signs with Ranges  Temp:  [98.1  F (36.7  C)] 98.1  F (36.7  C)  Pulse:  [74-98] 75  Resp:  [20-21] 21  BP: (119-135)/(68-81) 135/81  SpO2:  [91 %-96 %] 94 %    Constitutional: Awake, alert, cooperative, no apparent distress  Lungs: Clear to auscultation bilaterally, no crackles or wheezing  Cardiovascular: Regular rate and rhythm, normal S1 and S2, and no murmur noted  Abdomen: Normal bowel sounds, soft, non-distended, non-tender  Skin: No rashes, no cyanosis, no edema  Other:    Medications    lactated ringers 100 mL/hr at 08/28/23 0809      fluticasone-vilanterol  1 puff Inhalation Daily    levofloxacin  750 mg Oral Daily    pantoprazole  40 mg Oral BID AC    sodium chloride (PF)  3 mL Intracatheter Q8H       Data   All microbiology laboratory data reviewed.  Recent Labs   Lab Test 08/28/23  0721 08/27/23  0128 11/09/21  2329   WBC 16.2* 25.0* 15.0*   HGB 12.0* 12.8* 14.8   HCT 36.3* 39.2* 44.0   MCV 84 85 85    284 284     Recent Labs   Lab Test 08/28/23  0721 08/27/23  0128 11/09/21  2329   CR 0.82 0.92 0.95     No lab results found.  No lab results found.    Invalid input(s): UC    All cultures:  No results for input(s): CULTURE in the last 168 hours.   Blood culture:  Results for orders placed or performed during the hospital encounter of 11/05/21   Blood Culture Peripheral Blood    Specimen: Peripheral Blood   Result Value Ref Range    Culture No Growth       Urine culture:  Results for orders placed or performed in visit on 09/29/22   Urine Culture Aerobic Bacterial [[id number]]    Specimen: Urine, Midstream   Result Value Ref Range    Culture No Growth

## 2023-08-28 NOTE — PLAN OF CARE
Goal Outcome Evaluation: reviewed with patient   DATE & TIME: 8/28/2023 days     Cognitive Concerns/ Orientation :  alert and oriented x 4   BEHAVIOR & AGGRESSION TOOL COLOR:  green   CIWA SCORE:  na    ABNL VS/O2:  VSS on 02 at 2 liters for comfort   MOBILITY:  up independently   PAIN MANAGMENT:  denies   DIET:  Regular ate well.   BOWEL/BLADDER:  continent had a BM this shift voiding well.   ABNL LAB/BG:  Albumin 3.3, alk phos 160,hgb 12.0,  WBC 16.2  DRAIN/DEVICES:  IV infusing   TELEMETRY RHYTHM:  na   SKIN:  intact   TESTS/PROCEDURES:  will have Pulmonary consult   D/C DATE:  1-2 days   Discharge Barriers:  Pt complains of shortness of breath   OTHER IMPORTANT INFO:  Pt up in his room, states chest feels tight. Asked for pulmonary consult. Asked for 02 at 2 liters for comfort. Pt is having his own CPAP brought in for use tonight. Ate well. Voiding well. Lungs decreased

## 2023-08-29 LAB
ERYTHROCYTE [DISTWIDTH] IN BLOOD BY AUTOMATED COUNT: 13 % (ref 10–15)
HCT VFR BLD AUTO: 40.3 % (ref 40–53)
HGB BLD-MCNC: 13 G/DL (ref 13.3–17.7)
MCH RBC QN AUTO: 27.6 PG (ref 26.5–33)
MCHC RBC AUTO-ENTMCNC: 32.3 G/DL (ref 31.5–36.5)
MCV RBC AUTO: 86 FL (ref 78–100)
PLATELET # BLD AUTO: 282 10E3/UL (ref 150–450)
RBC # BLD AUTO: 4.71 10E6/UL (ref 4.4–5.9)
WBC # BLD AUTO: 21.9 10E3/UL (ref 4–11)

## 2023-08-29 PROCEDURE — 250N000009 HC RX 250: Performed by: INTERNAL MEDICINE

## 2023-08-29 PROCEDURE — 94640 AIRWAY INHALATION TREATMENT: CPT

## 2023-08-29 PROCEDURE — 999N000157 HC STATISTIC RCP TIME EA 10 MIN

## 2023-08-29 PROCEDURE — 99232 SBSQ HOSP IP/OBS MODERATE 35: CPT | Performed by: INTERNAL MEDICINE

## 2023-08-29 PROCEDURE — 94640 AIRWAY INHALATION TREATMENT: CPT | Mod: 76

## 2023-08-29 PROCEDURE — 120N000001 HC R&B MED SURG/OB

## 2023-08-29 PROCEDURE — 250N000009 HC RX 250: Performed by: HOSPITALIST

## 2023-08-29 PROCEDURE — 250N000013 HC RX MED GY IP 250 OP 250 PS 637: Performed by: INTERNAL MEDICINE

## 2023-08-29 PROCEDURE — 85027 COMPLETE CBC AUTOMATED: CPT | Performed by: INTERNAL MEDICINE

## 2023-08-29 PROCEDURE — 36415 COLL VENOUS BLD VENIPUNCTURE: CPT | Performed by: INTERNAL MEDICINE

## 2023-08-29 PROCEDURE — 250N000011 HC RX IP 250 OP 636: Performed by: INTERNAL MEDICINE

## 2023-08-29 RX ADMIN — IPRATROPIUM BROMIDE AND ALBUTEROL SULFATE 3 ML: .5; 3 SOLUTION RESPIRATORY (INHALATION) at 02:12

## 2023-08-29 RX ADMIN — IPRATROPIUM BROMIDE AND ALBUTEROL SULFATE 3 ML: .5; 3 SOLUTION RESPIRATORY (INHALATION) at 15:22

## 2023-08-29 RX ADMIN — PANTOPRAZOLE SODIUM 40 MG: 40 TABLET, DELAYED RELEASE ORAL at 16:06

## 2023-08-29 RX ADMIN — IPRATROPIUM BROMIDE AND ALBUTEROL SULFATE 3 ML: .5; 3 SOLUTION RESPIRATORY (INHALATION) at 12:04

## 2023-08-29 RX ADMIN — PANTOPRAZOLE SODIUM 40 MG: 40 TABLET, DELAYED RELEASE ORAL at 06:31

## 2023-08-29 RX ADMIN — IPRATROPIUM BROMIDE AND ALBUTEROL SULFATE 3 ML: .5; 3 SOLUTION RESPIRATORY (INHALATION) at 07:51

## 2023-08-29 RX ADMIN — METHYLPREDNISOLONE SODIUM SUCCINATE 40 MG: 40 INJECTION, POWDER, FOR SOLUTION INTRAMUSCULAR; INTRAVENOUS at 09:12

## 2023-08-29 RX ADMIN — IPRATROPIUM BROMIDE AND ALBUTEROL SULFATE 3 ML: .5; 3 SOLUTION RESPIRATORY (INHALATION) at 19:48

## 2023-08-29 RX ADMIN — BUDESONIDE 0.5 MG: 0.5 INHALANT RESPIRATORY (INHALATION) at 07:51

## 2023-08-29 RX ADMIN — BUDESONIDE 0.5 MG: 0.5 INHALANT RESPIRATORY (INHALATION) at 19:48

## 2023-08-29 RX ADMIN — LEVOFLOXACIN 750 MG: 750 TABLET, FILM COATED ORAL at 09:12

## 2023-08-29 ASSESSMENT — ACTIVITIES OF DAILY LIVING (ADL)
ADLS_ACUITY_SCORE: 18

## 2023-08-29 NOTE — PROGRESS NOTES
[facility]    Medicine Progress Note - Hospitalist Service    Date of Admission:  8/27/2023    Assessment & Plan     Dano Kennedy is a pleasant 48-year-old  with history of asthma who was recently diagnosed with Legionella pneumonia and now presented to hospital on 8/27/2023 with ongoing shortness of breath.    He was in a hot tub with friends on 8/17.  He went on a cruise ship on 8/21 and started developing symptoms of fever up to 103, myalgias, cough, shortness of breath.  On 8/22 was diagnosed with left lower lobe pneumonia on cruise ship.  Tested positive for Legionella urine antigen.  He was treated with azithromycin and then switched to levofloxacin.  He was hospitalized at [place] from 8/23 to 8/26 and treated with levofloxacin.  Fever resolved on 8/26.  He flew back to [place] on 8/26 and presented to ER during the night with ongoing shortness of breath and feeling unwell.  He did start taking prednisone while he was on a cruise ship and received Solu-Medrol in [place].    Community-acquired bilateral bacterial pneumonia due to Legionella  - Source of exposure-hot tub.  Other people who were in hot tub were also affected though with milder symptoms  - Has been on levofloxacin for 4 days PTA. fever has resolved.  Has leukocytosis of 25 and elevated LFTs.    - COVID-19/influenza/RSV negative  - CT PE on 8/27 was negative for PE but showed bilateral pulmonary infiltrates greatest in the lung bases compatible with bilateral pneumonitis  - Urine Legionella and streptococcal antigen now negative.  Sputum culture pending.  Sputum Gram stain showed 4+ gram-positive cocci in pairs and chains  - Infectious disease following.  Continue levofloxacin.  Duration of treatment 10 days  - Leukocytosis is multifactorial-due to pneumonia and systemic steroids.      Severe persistent asthma with acute exacerbation  - No wheezing but has been experiencing increased shortness  "of breath with ambulation.  Feels better after DuoNeb  - Recently treated with p.o. prednisone and Solu-Medrol.  - Pulmonary consulted.  Started on Solu-Medrol 40 mg daily  - Continue Pulmicort nebs twice daily  - Continue DuoNebs 4 times daily  - Aerobika after nebs  - Prednisone taper at discharge  -I ncentive spirometry  - Should follow-up with pulmonary as outpatient for PFTs and repeat imaging      Acute hypoxic respiratory failure-secondary to pneumonia and asthma exacerbation  - Was requiring 2 L of supplemental oxygen.  Weaned off on 8/29.  Now on room air at rest    Elevated LFTs-likely mild hepatitis due to Legionella  Alkaline phosphatase 195, , AST 55, total bilirubin 1.3  -Stable.  Monitor    S/p hemicolectomy for recurrent diverticulitis  -No current issues    Obstructive sleep apnea  -Uses CPAP infrequently due to poor mask fit.  Continue CPAP as able       Diet: Combination Diet Regular Diet Adult    DVT Prophylaxis: Low Risk/Ambulatory with no VTE prophylaxis indicated  Burrows Catheter: Not present  Lines: None     Cardiac Monitoring: None  Code Status: Full Code      Clinically Significant Risk Factors              # Hypoalbuminemia: Lowest albumin = 3.3 g/dL at 8/28/2023  7:21 AM, will monitor as appropriate            # Obesity: Estimated body mass index is 34.71 kg/m  as calculated from the following:    Height as of this encounter: 1.702 m (5' 7\").    Weight as of this encounter: 100.5 kg (221 lb 9.6 oz).  , PRESENT ON ADMISSION     # Asthma: noted on problem list          Disposition Plan      Expected Discharge Date: 08/31/2023                  Naty Hill MD  Hospitalist Service  [facility]  Securely message with Tayo (more info)  Text page via [facility] Paging/Directory   ______________________________________________________________________    Interval History   Patient reports he had a much better night yesterday.  Shortness of breath has improved.  Still " gets short of breath with minimal ambulation.  Not on oxygen this morning while at rest.  Used CPAP last night         Physical Exam   Vital Signs: Temp: 98.1  F (36.7  C) Temp src: Oral BP: 129/79 Pulse: 90   Resp: 20 SpO2: 91 % O2 Device: None (Room air) Oxygen Delivery: 2 LPM  Weight: 221 lbs 9.6 oz    Constitutional - awake and alert, resting in bed, in no acute distress  Cardiovascular - regular rate and rhythm, no murmurs, no edema  Pulmonary - lungs are clear to auscultation bilaterally, no wheezing or rhonchi  GI - abdomen is soft, nontender, nondistended, no hepatosplenomegaly or masses  Integumentary - skin is warm and dry, no rashes or ulcers  Neurological - awake, alert and oriented x3.  Moving all 4 extremities, normal speech, no focal deficits    Medical Decision Making       ------------------ MEDICAL DECISION MAKING ------------------------------------------------------------------------------------------------------  Medical complexity over the past 24 hours:  -------------------------- MODERATE RISK FOR MORBIDITY --------------------------------------------------      Data     I have personally reviewed the following data over the past 24 hrs:    21.9 (H)  \   13.0 (L)   / 282     N/A N/A N/A /  N/A   N/A N/A N/A \       Imaging results reviewed over the past 24 hrs:   No results found for this or any previous visit (from the past 24 hour(s)).

## 2023-08-29 NOTE — PLAN OF CARE
Goal Outcome Evaluation:    Summary: Legionnaire's disease  DATE & TIME: 8/28/23-8/29/23 1428-3912  Cognitive Concerns/ Orientation : A&Ox4   BEHAVIOR & AGGRESSION TOOL COLOR: Green  ABNL VS/O2: VSS, room air - did not use CPAP wife brought d/t not functioning properly - refused use of CPAP overnight - on 2 L NC sat at 95%. Duoneb given PRN x1 for SOB - effective.  MOBILITY: Independent  PAIN MANAGMENT: Denies  DIET: Regular  BOWEL/BLADDER: Continent  ABNL LAB/BG: , WBC 16.2 (25)  DRAIN/DEVICES: R PIV SL  TELEMETRY RHYTHM: NA  SKIN: Intact  TESTS/PROCEDURES: NA  D/C DAY/GOALS/PLACE: Home when medically stable  OTHER IMPORTANT INFO: Chest tightness/ SOB improved with neb treatments. Developed a productive cough overnight - cup given to track sputum appearance.

## 2023-08-29 NOTE — PLAN OF CARE
Goal Outcome Evaluation:    Summary: Legionnaire's disease  DATE & TIME: 8/29/23 2102-5899  Cognitive Concerns/ Orientation : A&Ox4   BEHAVIOR & AGGRESSION TOOL COLOR: Green  ABNL VS/O2: VSS, room air - did not use CPAP wife brought d/t not functioning properly - refuses use of CPAP overnight and uses 2 L NC O2 at night.  On scheduled neb tx. Has PRN Duoneb available for SOB   MOBILITY: Independent  PAIN MANAGMENT: Denies  DIET: Regular  BOWEL/BLADDER: Continent.  Using bathroom  ABNL LAB/BG: , WBC 16.2, WBC-21.9, Hgb-13.0  DRAIN/DEVICES: R PIV SL  TELEMETRY RHYTHM: NA  SKIN: Intact  TESTS/PROCEDURES: NA  D/C DAY/GOALS/PLACE: Home when medically stable  OTHER IMPORTANT INFO: Reports Chest tightness/ SOB improvement with neb treatments. Has productive cough. LS-diminished/clear.  On scheduled Solu-Medrol. Gets IS up to 2000 ml.

## 2023-08-29 NOTE — PROGRESS NOTES
[facility]    Infectious Disease Progress Note    Date of Service (when I saw the patient): 08/29/2023     Assessment & Plan   Dano Kennedy is a 48 year old who was admitted on 8/27/2023.     ASSESSMENT   MED DECISION MAKING       Patient Active Problem List   Diagnosis Code    Anxiety F41.9    Severe persistent asthma without complication J45.50    Seasonal allergic rhinitis J30.2    GSE (gluten-sensitive enteropathy) K90.41    ADHD (attention deficit hyperactivity disorder) F90.9    Heartburn R12    WENDY (obstructive sleep apnea) G47.33    Obesity (BMI 35.0-39.9) with comorbidity (H) E66.01    History of diverticulitis Z87.19    Irritable bowel syndrome with diarrhea K58.0    Legionnaire's disease (H) A48.1      Legionaires' Disease  --exposure: community acquired (hot tub), filogane and 4 friends in hot tub with him also had respiratory symptoms but milder symptoms  --reported positive urine legionella Ag  --CT Chest PE: Prominent patchy infiltrates and subpleural consolidation in both lung bases. There is some additional patchy somewhat nodular groundglass opacities in the mid and upper lungs.        Leukocytosis  Abnormal LFTs  Hx of asthma        PLANS:  Still pending ID on the sputum cultures, prelim GS with GPC in pairs and chains  Repeat Legionella antigen is negative   Continue on Levaquin PO, good bioavailability total duration 21 days, day 7/21 today   Repeat imaging in 4- 6 weeks    Leucocytosis on steroids     Discussed with pulm     Erik Chavez MD    Interval History   Tolerating antibiotics ok   No new rashes or issues with antibiotics   Labs reviewed   Afebrile   Chest tightness is better   No changes to past medical, social or family history   A 10 point ROS was done and is negative other than noted in the interval history above     Physical Exam   Temp: 98.1  F (36.7  C) Temp src: Oral BP: 129/79 Pulse: 90   Resp: 20 SpO2: 91 % O2 Device: None (Room air) Oxygen  Delivery: 2 LPM  Vitals:    08/27/23 0107 08/28/23 0621 08/29/23 0600   Weight: 104.3 kg (230 lb) 103.5 kg (228 lb 1.6 oz) 100.5 kg (221 lb 9.6 oz)     Vital Signs with Ranges  Temp:  [98.1  F (36.7  C)-98.7  F (37.1  C)] 98.1  F (36.7  C)  Pulse:  [] 90  Resp:  [18-20] 20  BP: (121-140)/(61-88) 129/79  SpO2:  [91 %-96 %] 91 %    Constitutional: Awake, alert, cooperative, no apparent distress  Lungs: Clear to auscultation bilaterally, no crackles or wheezing  Cardiovascular: Regular rate and rhythm, normal S1 and S2, and no murmur noted  Abdomen: Normal bowel sounds, soft, non-distended, non-tender  Skin: No rashes, no cyanosis, no edema  Other:    Medications        budesonide  0.5 mg Nebulization BID    ipratropium - albuterol 0.5 mg/2.5 mg/3 mL  3 mL Nebulization 4x daily    levofloxacin  750 mg Oral Daily    methylPREDNISolone  40 mg Intravenous Daily    pantoprazole  40 mg Oral BID AC    sodium chloride (PF)  3 mL Intracatheter Q8H       Data   All microbiology laboratory data reviewed.  Recent Labs   Lab Test 08/29/23  0844 08/28/23  0721 08/27/23  0128   WBC 21.9* 16.2* 25.0*   HGB 13.0* 12.0* 12.8*   HCT 40.3 36.3* 39.2*   MCV 86 84 85    230 284       Recent Labs   Lab Test 08/28/23  0721 08/27/23  0128 11/09/21  2329   CR 0.82 0.92 0.95       No lab results found.  No lab results found.    Invalid input(s):     All cultures:  Recent Labs   Lab 08/28/23  0930   CULTURE Culture in progress      Blood culture:  Results for orders placed or performed during the hospital encounter of 11/05/21   Blood Culture Peripheral Blood    Specimen: Peripheral Blood   Result Value Ref Range    Culture No Growth       Urine culture:  Results for orders placed or performed in visit on 09/29/22   Urine Culture Aerobic Bacterial [[id number]]    Specimen: Urine, Midstream   Result Value Ref Range    Culture No Growth

## 2023-08-30 VITALS
TEMPERATURE: 97.7 F | OXYGEN SATURATION: 93 % | RESPIRATION RATE: 18 BRPM | SYSTOLIC BLOOD PRESSURE: 131 MMHG | DIASTOLIC BLOOD PRESSURE: 85 MMHG | HEART RATE: 94 BPM | BODY MASS INDEX: 34.78 KG/M2 | HEIGHT: 67 IN | WEIGHT: 221.6 LBS

## 2023-08-30 LAB
ALBUMIN SERPL BCG-MCNC: 3.8 G/DL (ref 3.5–5.2)
ALP SERPL-CCNC: 160 U/L (ref 40–129)
ALT SERPL W P-5'-P-CCNC: 127 U/L (ref 0–70)
ANION GAP SERPL CALCULATED.3IONS-SCNC: 14 MMOL/L (ref 7–15)
AST SERPL W P-5'-P-CCNC: 29 U/L (ref 0–45)
BACTERIA SPT CULT: ABNORMAL
BILIRUB SERPL-MCNC: 0.4 MG/DL
BUN SERPL-MCNC: 19.3 MG/DL (ref 6–20)
CALCIUM SERPL-MCNC: 9.3 MG/DL (ref 8.6–10)
CHLORIDE SERPL-SCNC: 101 MMOL/L (ref 98–107)
CREAT SERPL-MCNC: 0.97 MG/DL (ref 0.67–1.17)
DEPRECATED HCO3 PLAS-SCNC: 22 MMOL/L (ref 22–29)
ERYTHROCYTE [DISTWIDTH] IN BLOOD BY AUTOMATED COUNT: 13.3 % (ref 10–15)
GFR SERPL CREATININE-BSD FRML MDRD: >90 ML/MIN/1.73M2
GLUCOSE SERPL-MCNC: 130 MG/DL (ref 70–99)
GRAM STAIN RESULT: ABNORMAL
HCT VFR BLD AUTO: 41.9 % (ref 40–53)
HGB BLD-MCNC: 13.6 G/DL (ref 13.3–17.7)
MCH RBC QN AUTO: 27.6 PG (ref 26.5–33)
MCHC RBC AUTO-ENTMCNC: 32.5 G/DL (ref 31.5–36.5)
MCV RBC AUTO: 85 FL (ref 78–100)
PLATELET # BLD AUTO: 297 10E3/UL (ref 150–450)
POTASSIUM SERPL-SCNC: 4 MMOL/L (ref 3.4–5.3)
PROT SERPL-MCNC: 7.5 G/DL (ref 6.4–8.3)
RBC # BLD AUTO: 4.93 10E6/UL (ref 4.4–5.9)
SODIUM SERPL-SCNC: 137 MMOL/L (ref 136–145)
WBC # BLD AUTO: 24.4 10E3/UL (ref 4–11)

## 2023-08-30 PROCEDURE — 94640 AIRWAY INHALATION TREATMENT: CPT

## 2023-08-30 PROCEDURE — 250N000009 HC RX 250: Performed by: INTERNAL MEDICINE

## 2023-08-30 PROCEDURE — 250N000013 HC RX MED GY IP 250 OP 250 PS 637: Performed by: INTERNAL MEDICINE

## 2023-08-30 PROCEDURE — 36415 COLL VENOUS BLD VENIPUNCTURE: CPT | Performed by: INTERNAL MEDICINE

## 2023-08-30 PROCEDURE — 250N000011 HC RX IP 250 OP 636: Performed by: INTERNAL MEDICINE

## 2023-08-30 PROCEDURE — 80053 COMPREHEN METABOLIC PANEL: CPT | Performed by: INTERNAL MEDICINE

## 2023-08-30 PROCEDURE — 999N000157 HC STATISTIC RCP TIME EA 10 MIN

## 2023-08-30 PROCEDURE — 85027 COMPLETE CBC AUTOMATED: CPT | Performed by: INTERNAL MEDICINE

## 2023-08-30 PROCEDURE — 250N000009 HC RX 250: Performed by: HOSPITALIST

## 2023-08-30 PROCEDURE — 94640 AIRWAY INHALATION TREATMENT: CPT | Mod: 76

## 2023-08-30 PROCEDURE — 99239 HOSP IP/OBS DSCHRG MGMT >30: CPT | Performed by: INTERNAL MEDICINE

## 2023-08-30 RX ORDER — IPRATROPIUM BROMIDE AND ALBUTEROL SULFATE 2.5; .5 MG/3ML; MG/3ML
1 SOLUTION RESPIRATORY (INHALATION) EVERY 4 HOURS PRN
Qty: 270 ML | Refills: 0 | Status: SHIPPED | OUTPATIENT
Start: 2023-08-30 | End: 2024-04-29

## 2023-08-30 RX ORDER — ALBUTEROL SULFATE 0.83 MG/ML
2.5 SOLUTION RESPIRATORY (INHALATION) EVERY 4 HOURS PRN
Qty: 120 ML | Refills: 0 | Status: SHIPPED | OUTPATIENT
Start: 2023-08-30 | End: 2023-10-06

## 2023-08-30 RX ORDER — LEVOFLOXACIN 750 MG/1
750 TABLET, FILM COATED ORAL DAILY
Qty: 14 TABLET | Refills: 0 | Status: SHIPPED | OUTPATIENT
Start: 2023-08-31 | End: 2023-09-14

## 2023-08-30 RX ORDER — PREDNISONE 10 MG/1
TABLET ORAL
Qty: 38 TABLET | Refills: 0 | Status: SHIPPED | OUTPATIENT
Start: 2023-08-30 | End: 2023-09-19

## 2023-08-30 RX ADMIN — IPRATROPIUM BROMIDE AND ALBUTEROL SULFATE 3 ML: .5; 3 SOLUTION RESPIRATORY (INHALATION) at 11:10

## 2023-08-30 RX ADMIN — PANTOPRAZOLE SODIUM 40 MG: 40 TABLET, DELAYED RELEASE ORAL at 06:36

## 2023-08-30 RX ADMIN — METHYLPREDNISOLONE SODIUM SUCCINATE 40 MG: 40 INJECTION, POWDER, FOR SOLUTION INTRAMUSCULAR; INTRAVENOUS at 08:09

## 2023-08-30 RX ADMIN — IPRATROPIUM BROMIDE AND ALBUTEROL SULFATE 3 ML: .5; 3 SOLUTION RESPIRATORY (INHALATION) at 02:47

## 2023-08-30 RX ADMIN — IPRATROPIUM BROMIDE AND ALBUTEROL SULFATE 3 ML: .5; 3 SOLUTION RESPIRATORY (INHALATION) at 07:36

## 2023-08-30 RX ADMIN — LEVOFLOXACIN 750 MG: 750 TABLET, FILM COATED ORAL at 08:09

## 2023-08-30 RX ADMIN — BUDESONIDE 0.5 MG: 0.5 INHALANT RESPIRATORY (INHALATION) at 07:37

## 2023-08-30 ASSESSMENT — ACTIVITIES OF DAILY LIVING (ADL)
ADLS_ACUITY_SCORE: 18

## 2023-08-30 NOTE — PLAN OF CARE
08/29 1900 - 0700  Pt here with Leigionnaire's disease. A&Ox4. VSS RA, uses 2L oxygen at night. Regular diet, thin liquids. Takes pills whole. Up Independently. Denies pain. PRN nebulizer given x1 this shift. Pt scoring green on the Aggression Stop Light Tool. Discharge pending.

## 2023-08-30 NOTE — PROGRESS NOTES
Normal oral elsa on the sputum   Continue on levaquin 21 days total   Day 8/ 21 today   OK to discharge if ready other stafford.     Erik Chavez MD  Infectious Disease

## 2023-08-30 NOTE — DISCHARGE SUMMARY
[facility]  Hospitalist Discharge Summary      Date of Admission:  8/27/2023  Date of Discharge:  8/30/2023  Discharging Provider: Naty Hill MD  Discharge Service: Hospitalist Service    Discharge Diagnoses   Hospital Course     Dano Kennedy is a pleasant 48-year-old  with history of asthma who was recently diagnosed with Legionella pneumonia and now presented to hospital on 8/27/2023 with ongoing shortness of breath.     He was in a hot tub with friends on 8/17.  He went on a cruise ship on 8/21 and started developing symptoms of fever up to 103, myalgias, cough, shortness of breath.  On 8/22 was diagnosed with left lower lobe pneumonia on cruise ship.  Tested positive for Legionella urine antigen.  He was treated with azithromycin and then switched to levofloxacin.  He was hospitalized at [place] from 8/23 to 8/26 and treated with levofloxacin.  Fever resolved on 8/26.  He flew back to [place] on 8/26 and presented to ER during the night with ongoing shortness of breath and feeling unwell.  He did start taking prednisone while he was on a cruise ship and received Solu-Medrol in [place].     Community-acquired bilateral bacterial pneumonia due to Legionella  Legionnaires disease  - Source of exposure-hot tub.  Other people who were in hot tub were also affected though with milder symptoms  - Has been on levofloxacin for 4 days PTA. fever has resolved.  Has leukocytosis of 25 and elevated LFTs.    - COVID-19/influenza/RSV negative  - CT PE on 8/27 was negative for PE but showed bilateral pulmonary infiltrates greatest in the lung bases compatible with bilateral pneumonitis  - Urine Legionella and streptococcal antigen now negative.  Sputum culture showed normal elsa  - Infectious disease was consulted.  Recommended 21 days of levofloxacin.  Has completed 7 days in hospital.  14-day prescription of levofloxacin at discharge   - Leukocytosis is  "multifactorial-due to pneumonia and systemic steroids.       Severe persistent asthma with acute exacerbation  - No wheezing but has been experiencing increased shortness of breath with ambulation.  Feels better after DuoNeb  - Recently treated with p.o. prednisone and Solu-Medrol.  - Pulmonary consulted.  Started on Solu-Medrol 40 mg daily.  Also received Pulmicort nebs and DuoNebs  - At discharge switch to prednisone taper-40 mg x 5 days, 20 mg x 5 days, 10 mg x 5 days, 5 mg x 5 days  - Continue DuoNebs, albuterol nebs   - Resume Breo   - Should follow-up with pulmonary as outpatient for PFTs and repeat imaging        Acute hypoxic respiratory failure-secondary to pneumonia and asthma exacerbation  - Was requiring 2 L of supplemental oxygen.  Weaned off on 8/29.  Now on room air doing well     Elevated LFTs-likely mild hepatitis due to Legionella  Alkaline phosphatase 195, , AST 55, total bilirubin 1.3  -Stable.  Alk phos 116, , AST 29, total bilirubin 0.4 at discharge     S/p hemicolectomy for recurrent diverticulitis  -No current issues     Obstructive sleep apnea  -Uses CPAP infrequently due to poor mask fit.  Continue CPAP as able             Clinically Significant Risk Factors     # Obesity: Estimated body mass index is 34.71 kg/m  as calculated from the following:    Height as of this encounter: 1.702 m (5' 7\").    Weight as of this encounter: 100.5 kg (221 lb 9.6 oz).       Follow-ups Needed After Discharge   Follow-up Appointments     Follow Up ([facility])      After discharge, call to schedule an appointment with [facility] ([phone]) for lung function tests and to be re-evaluated after   your pneumonia.        Follow-up and recommended labs and tests       Follow up with primary care provider, Alex Nichols, within 7 days for   hospital follow- up.  No follow up labs or test are needed.        {Additional follow-up instructions/to-do's for PCP    :    Unresulted Labs Ordered " in the Past 30 Days of this Admission       Date and Time Order Name Status Description    8/27/2023  8:02 AM Legionella species PCR In process             Discharge Disposition   Discharged to home  Condition at discharge: Stable    Hospital Course   See above    Consultations This Hospital Stay   INFECTIOUS DISEASES IP CONSULT  PULMONARY IP CONSULT    Code Status   Full Code    Time Spent on this Encounter   I, Naty Hill MD, personally saw the patient today and spent greater than 30 minutes discharging this patient.       Naty Hill MD  [place] [address] [facility]  [address] [place] [address]  Phone: [phone]  ______________________________________________________________________    Physical Exam   Vital Signs: Temp: 97.7  F (36.5  C) Temp src: Oral BP: 131/85 Pulse: 94   Resp: 18 SpO2: 93 % O2 Device: None (Room air)    Weight: 221 lbs 9.6 oz    Constitutional - awake and alert, resting in bed, in no acute distress  Cardiovascular - regular rate and rhythm, no murmurs, no edema  Pulmonary - lungs are clear to auscultation bilaterally, no wheezing or rhonchi  GI - abdomen is soft, nontender, nondistended, no hepatosplenomegaly or masses  Integumentary - skin is warm and dry, no rashes or ulcers  Neurological - awake, alert and oriented x3.  Moving all 4 extremities, normal speech, no focal deficits         Primary Care Physician   Alex Nichols    Discharge Orders      Follow Up ([facility])    After discharge, call to schedule an appointment with [facility] ([phone]) for lung function tests and to be re-evaluated after your pneumonia.     Reason for your hospital stay    Pneumonia, asthma exacerbation     Follow-up and recommended labs and tests     Follow up with primary care provider, Alex Nichols, within 7 days for hospital follow- up.  No follow up labs or test are needed.     Activity    Your activity upon discharge: activity as tolerated      Diet    Follow this diet upon discharge: Regular       Significant Results and Procedures   Results for orders placed or performed during the hospital encounter of 08/27/23   CT Chest Pulmonary Embolism w Contrast    Narrative    EXAM: CT CHEST PULMONARY EMBOLISM W CONTRAST  LOCATION: [facility]  DATE: 8/27/2023    INDICATION: SOB, known legionnaires.  COMPARISON: None.  TECHNIQUE: CT chest pulmonary angiogram during arterial phase injection of IV contrast. Multiplanar reformats and MIP reconstructions were performed. Dose reduction techniques were used.   CONTRAST: 79 ml Isovue 370.    FINDINGS:  ANGIOGRAM CHEST: Pulmonary arteries are normal caliber and negative for pulmonary emboli. Thoracic aorta is negative for dissection. No CT evidence of right heart strain.    LUNGS AND PLEURA: Prominent patchy infiltrates and subpleural consolidation in both lung bases. There is some additional patchy somewhat nodular groundglass opacities in the mid and upper lungs. Findings compatible with bilateral acute pneumonitis.   Central airways are clear. No pleural effusions.    MEDIASTINUM/AXILLAE: Scattered nonspecific mediastinal lymph nodes likely reactive in nature. Normal heart size. No pericardial effusion. Normal caliber thoracic aorta. Esophagus is grossly negative.    CORONARY ARTERY CALCIFICATION: None.    UPPER ABDOMEN: Normal.    MUSCULOSKELETAL: Mild degenerative and hypertrophic changes thoracic spine.      Impression    IMPRESSION:  1.  Negative for pulmonary embolism.    2.  Bilateral pulmonary infiltrates as detailed above greatest in the lung bases compatible with bilateral pneumonitis.       Discharge Medications   Current Discharge Medication List        START taking these medications    Details   levofloxacin (LEVAQUIN) 750 MG tablet Take 1 tablet (750 mg) by mouth daily for 14 days  Qty: 14 tablet, Refills: 0    Associated Diagnoses: Legionnaire's disease (H)           CONTINUE  these medications which have CHANGED    Details   predniSONE (DELTASONE) 10 MG tablet Take 4 tablets (40 mg) by mouth daily for 5 days, THEN 2 tablets (20 mg) daily for 5 days, THEN 1 tablet (10 mg) daily for 5 days, THEN 0.5 tablets (5 mg) daily for 5 days.  Qty: 38 tablet, Refills: 0    Associated Diagnoses: Severe persistent asthma with exacerbation           CONTINUE these medications which have NOT CHANGED    Details   acetaminophen (TYLENOL) 500 MG tablet Take 1,000 mg by mouth every 6 hours as needed for mild pain      albuterol (PROAIR HFA/PROVENTIL HFA/VENTOLIN HFA) 108 (90 Base) MCG/ACT inhaler Inhale 2 puffs into the lungs every 4 hours as needed for shortness of breath / dyspnea  Qty: 18 g, Refills: 11    Comments: Pharmacy may dispense brand covered by insurance (Proair, or proventil or ventolin or generic albuterol inhaler)  Associated Diagnoses: Severe persistent asthma without complication      albuterol (PROVENTIL) (2.5 MG/3ML) 0.083% neb solution Take 1 vial (2.5 mg) by nebulization every 4 hours as needed for shortness of breath / dyspnea or wheezing  Qty: 75 mL, Refills: 3    Associated Diagnoses: Severe persistent asthma without complication; Wheeze      fexofenadine-pseudoePHEDrine (ALLEGRA-D ALLERGY & CONGESTION) 180-240 MG 24 hr tablet TAKE 1 TABLET BY MOUTH EVERY DAY AS NEEDED  Qty: 90 tablet, Refills: 3    Associated Diagnoses: Severe persistent asthma without complication; Chronic seasonal allergic rhinitis due to pollen; Chronic allergic rhinitis due to animal hair and dander; Allergic rhinitis due to mold      fluticasone-vilanterol (BREO ELLIPTA) 200-25 MCG/ACT inhaler Inhale 1 puff into the lungs daily  Qty: 60 each, Refills: 11    Associated Diagnoses: Severe persistent asthma without complication      ibuprofen (ADVIL/MOTRIN) 200 MG tablet Take 600 mg by mouth every 6 hours as needed for pain      ipratropium - albuterol 0.5 mg/2.5 mg/3 mL (DUONEB) 0.5-2.5 (3) MG/3ML neb solution  Take 1 vial (3 mLs) by nebulization every 4 hours as needed for shortness of breath / dyspnea or wheezing  Qty: 270 mL, Refills: 3    Associated Diagnoses: Severe persistent asthma without complication; Wheeze      omeprazole (PRILOSEC) 20 MG DR capsule Take 1 capsule (20 mg) by mouth 2 times daily for 90 days  Qty: 102 capsule, Refills: 0    Associated Diagnoses: LPRD (laryngopharyngeal reflux disease); Gastroesophageal reflux disease with esophagitis without hemorrhage      ondansetron (ZOFRAN ODT) 4 MG ODT tab Take 1 tablet (4 mg) by mouth every 8 hours as needed for nausea  Qty: 5 tablet, Refills: 0    Associated Diagnoses: Chronic prostatitis      ondansetron (ZOFRAN) 4 MG tablet Take 1 tablet (4 mg) by mouth every 8 hours as needed for nausea or vomiting  Qty: 15 tablet, Refills: 0    Associated Diagnoses: Hx of motion sickness           STOP taking these medications       scopolamine (TRANSDERM) 1 MG/3DAYS 72 hr patch Comments:   Reason for Stopping:             Allergies   Allergies   Allergen Reactions    Bee Pollen Anaphylaxis    Cats Difficulty breathing    Mold     Succinylcholine Other (See Comments)     Convulsions, muscle pain    Trees

## 2023-08-30 NOTE — PLAN OF CARE
Discharge    Patient discharged to home via private transportation with his spouse.    Care plan note: LSC but diminished with infrequent productive cough. O2sats 90's on RA. Patient denies pain/N/V/SOB. VSS. No new complaints. Discharge instructions and prescriptions were provided. Patient verbalized understanding of all information received.    Listed belongings gathered and given to patient (including from security/pharmacy). Yes  Care Plan and Patient education resolved: Yes  Prescriptions if needed, hard copies sent with patient  Yes  Medication Bin checked and emptied on discharge Yes  SW/care coordinator/charge RN aware of discharge: Yes

## 2023-08-31 ENCOUNTER — TELEPHONE (OUTPATIENT)
Dept: GASTROENTEROLOGY | Facility: CLINIC | Age: 48
End: 2023-08-31
Payer: COMMERCIAL

## 2023-08-31 NOTE — TELEPHONE ENCOUNTER
"Endoscopy Scheduling Screen    Have you had a positive Covid test in the last 14 days?  No    Are you active on MyChart?   Yes    What insurance is in the chart?  Other:  [employer]    Ordering/Referring Provider: Tj   (If ordering provider performs procedure, schedule with ordering provider unless otherwise instructed. )    BMI: Estimated body mass index is 34.71 kg/m  as calculated from the following:    Height as of 8/27/23: 1.702 m (5' 7\").    Weight as of 8/29/23: 100.5 kg (221 lb 9.6 oz).     Sedation Ordered  MAC/deep sedation.   BMI<= 45 45 < BMI <= 48 48 < BMI < = 50  BMI > 50   No Restrictions No [facility]  No [facility]  [facility] with exceptions Hospital Only OR Only       Are you taking any prescription medications for pain 3 or more times per week?   No    Do you have a history of malignant hyperthermia or adverse reaction to anesthesia?  No    (Females) Are you currently pregnant?   No     Have you been diagnosed or told you have pulmonary hypertension?   No    Do you have an LVAD?  No    Have you been told you have moderate to severe sleep apnea?  Yes (RN Review required for scheduling unless scheduling in Hospital.)cpap used    Have you been told you have COPD, asthma, or any other lung disease?  Yes     What breathing problems do you have?  Asthma     Do you use home oxygen?  No    Have your breathing problems required an ED visit or hospitalization in the last year?  Yes (RN Review required for scheduling unless scheduling in Hospital.) just hospitalized for legionnaire's disease and pneumonia-1 week ago    Do you have any heart conditions?  No     Have you ever had an organ transplant?   No    Have you ever had or are you awaiting a heart or lung transplant?   No    Have you had a stroke or transient ischemic attack (TIA aka \"mini stroke\" in the last 6 months?   No    Have you been diagnosed with or been told you have cirrhosis of the liver?   No    Are you currently on dialysis?   No    Do you need " "assistance transferring?   No    BMI: Estimated body mass index is 34.71 kg/m  as calculated from the following:    Height as of 8/27/23: 1.702 m (5' 7\").    Weight as of 8/29/23: 100.5 kg (221 lb 9.6 oz).     Is patients BMI > 40 and scheduling location UP?  No    Do you take an injectable medication for weight loss or diabetes (excluding insulin)?  No    Do you take the medication Naltrexone?  No    Do you take blood thinners?  No       Prep   Are you currently on dialysis or do you have chronic kidney disease?  No    Do you have a diagnosis of diabetes?  No    Do you have a diagnosis of cystic fibrosis (CF)?  No    On a regular basis do you go 3 -5 days between bowel movements?  No    BMI > 40?  No    Preferred Pharmacy:    [id number] - [place], [place] - [facility] [facility] AT [facility] & [place]  [address] [facility] [address]  Phone: [phone] Fax: [phone]      Final Scheduling Details   Colonoscopy prep sent?  N/A    Procedure scheduled  Upper endoscopy (EGD)-order was for double procedure, but patient has declined colonoscopy at this time stating that symptoms have improved    Surgeon:  Tj     Date of procedure:  11/8/23     Pre-OP / PAC:   Yes - Patient informed of pre-op requirement.    Location  [facility] - Per exclusion criteria.    Sedation   MAC/Deep Sedation - Per order.      Patient Reminders:   You will receive a call from a Nurse to review instructions and health history.  This assessment must be completed prior to your procedure.  Failure to complete the Nurse assessment may result in the procedure being cancelled.      On the day of your procedure, please designate an adult(s) who can drive you home stay with you for the next 24 hours. The medicines used in the exam will make you sleepy. You will not be able to drive.      You cannot take public transportation, ride share services, or non-medical taxi service without a responsible caregiver.  " Medical transport services are allowed with the requirement that a responsible caregiver will receive you at your destination.  We require that drivers and caregivers are confirmed prior to your procedure.

## 2023-09-02 LAB
L PNEUMO DNA SPEC QL NAA+PROBE: ABNORMAL
LEGIONELLA DNA SPEC NAA+PROBE: ABNORMAL

## 2023-09-08 ENCOUNTER — E-VISIT (OUTPATIENT)
Dept: FAMILY MEDICINE | Facility: CLINIC | Age: 48
End: 2023-09-08
Payer: COMMERCIAL

## 2023-09-08 DIAGNOSIS — F41.9 ANXIETY: Primary | ICD-10-CM

## 2023-09-08 DIAGNOSIS — N48.1 BALANITIS: ICD-10-CM

## 2023-09-08 PROCEDURE — 99421 OL DIG E/M SVC 5-10 MIN: CPT | Performed by: FAMILY MEDICINE

## 2023-09-08 RX ORDER — NYSTATIN 100000 U/G
CREAM TOPICAL 2 TIMES DAILY
Qty: 30 G | Refills: 0 | Status: SHIPPED | OUTPATIENT
Start: 2023-09-08 | End: 2023-09-15

## 2023-09-08 RX ORDER — HYDROXYZINE HYDROCHLORIDE 25 MG/1
25 TABLET, FILM COATED ORAL 3 TIMES DAILY PRN
Qty: 30 TABLET | Refills: 0 | Status: SHIPPED | OUTPATIENT
Start: 2023-09-08 | End: 2023-12-12

## 2023-09-08 NOTE — PATIENT INSTRUCTIONS
Thank you for choosing us for your care. I have placed an order for a prescription so that you can start treatment. View your full visit summary for details by clicking on the link below. Your pharmacist will able to address any questions you may have about the medication.     If you're not feeling better within 5-7 days, please schedule an appointment.  You can schedule an appointment right here in [facility], or call [phone]  If the visit is for the same symptoms as your eVisit, we'll refund the cost of your eVisit if seen within seven days.

## 2023-09-17 ENCOUNTER — MYC MEDICAL ADVICE (OUTPATIENT)
Dept: GASTROENTEROLOGY | Facility: CLINIC | Age: 48
End: 2023-09-17
Payer: COMMERCIAL

## 2023-09-17 DIAGNOSIS — K62.89 ANAL OR RECTAL PAIN: Primary | ICD-10-CM

## 2023-09-18 NOTE — TELEPHONE ENCOUNTER
Jose Spencer MD  You1 hour ago (10:07 AM)     VIRI hudson,    I think we had wanted a colonoscopy too with the EGD. So good to add that on (colonoscopy).    I think given the location of his painful lump (anus) the best thing would be for him to see colorectal for an exam.    Can you let him know we want him to be seen by our colorectal colleagues for an exam and help get him in?    Thanks  J       Referral that was ordered on 7/11/23 includes colonoscopy. Message sent to endoscopy scheduling to determine if colonoscopy can be added onto current time slot.    Colorectal  referral ordered; inbasket message sent to colorectal RNCCs.

## 2023-09-19 ENCOUNTER — OFFICE VISIT (OUTPATIENT)
Dept: SURGERY | Facility: CLINIC | Age: 48
End: 2023-09-19
Payer: COMMERCIAL

## 2023-09-19 ENCOUNTER — TELEPHONE (OUTPATIENT)
Dept: SURGERY | Facility: CLINIC | Age: 48
End: 2023-09-19

## 2023-09-19 VITALS
TEMPERATURE: 98.4 F | HEART RATE: 102 BPM | HEIGHT: 67 IN | DIASTOLIC BLOOD PRESSURE: 98 MMHG | BODY MASS INDEX: 35.69 KG/M2 | OXYGEN SATURATION: 98 % | SYSTOLIC BLOOD PRESSURE: 153 MMHG | WEIGHT: 227.4 LBS

## 2023-09-19 DIAGNOSIS — K64.5 THROMBOSED EXTERNAL HEMORRHOIDS: Primary | ICD-10-CM

## 2023-09-19 ASSESSMENT — ENCOUNTER SYMPTOMS
FEVER: 1
JAUNDICE: 0
VOMITING: 0
INCREASED ENERGY: 1
POSTURAL DYSPNEA: 0
BLOATING: 0
ABDOMINAL PAIN: 0
COUGH DISTURBING SLEEP: 1
DECREASED APPETITE: 0
FATIGUE: 1
WEIGHT LOSS: 0
POLYDIPSIA: 0
BOWEL INCONTINENCE: 0
WHEEZING: 1
SHORTNESS OF BREATH: 1
BLOOD IN STOOL: 0
SNORES LOUDLY: 1
WEIGHT GAIN: 0
CHILLS: 1
NAUSEA: 0
HEARTBURN: 0
NIGHT SWEATS: 1
DIARRHEA: 0
HEMOPTYSIS: 0
POLYPHAGIA: 0
ALTERED TEMPERATURE REGULATION: 1
CONSTIPATION: 0
DYSPNEA ON EXERTION: 1
HALLUCINATIONS: 0
COUGH: 1
RECTAL PAIN: 1
SPUTUM PRODUCTION: 0

## 2023-09-19 ASSESSMENT — PAIN SCALES - GENERAL: PAINLEVEL: NO PAIN (0)

## 2023-09-19 NOTE — TELEPHONE ENCOUNTER
Referred by Dr Spencer for perianal lump. Per Dano, lump was noticed a week ago, it is internal and about the size of a pea. He states it is hard and painful intermittently. No drainage or bleeding. No fevers or chills. He will see Lacie Dale NP today for assessment. Reminded patient to be NPO. Denies further questions or concerns.

## 2023-09-19 NOTE — PROGRESS NOTES
"Colon and Rectal Surgery Consult Clinic Note    Date: 2023     Referring provider:  No referring provider defined for this encounter.     RE: Dano Kennedy  : 1975  DELANEY: 2023    Dano Kennedy is a very pleasant 48 year old male here with concern for perianal abscess.    HPI:  Dano developed a painful bump about a week ago. This is fairly hard. No bleeding or drainage. Some itching. Pain can be pretty severe at times but is slightly better today. He has never had anything like this in the past. No fevers or chills.  Saw GI in July for abnormal stool patterns, thin stools, and mucous from rectum. He started a fiber supplement and symptoms improved. He unfortunately developed Legionnaire's pneumonia while in [place] and after hospitalization for this his bowel movements have been irregular again.  Sigmoid resection in  for diverticulitis. Colonoscopy  was reportedly normal. He is scheduled for colonoscopy and EGD in November.    Physical Examination: Exam was chaperoned by Ge Miles, EMT-P   BP (!) 153/98 (BP Location: Left arm, Patient Position: Sitting, Cuff Size: Adult Regular)   Pulse 102   Temp 98.4  F (36.9  C) (Oral)   Ht 5' 7\"   Wt 227 lb 6.4 oz   SpO2 98%   BMI 35.62 kg/m    General: alert, oriented, in no acute distress, sitting comfortably  HEENT: mucous membranes moist    Perianal external examination:  Small thrombosed external hemorrhoid X2 in the left posterior position with purple discoloration. No necrosis or bleeding.    Digital rectal examination: Was performed.   Sphincter tone: Good.  Palpable lesions: No.  Prostate: Normal.  Other: None.    Anoscopy: Was deferred    Assessment/Plan: 48 year old male with resolving thrombosed external hemorrhoid. No necrosis and symptoms are starting to improve. Recommended conservative management but return if symptoms worsen. Advised increasing his dietary fiber and colonoscopy in November as planned. He can follow up " with any worsening symptoms, questions, or concerns. Patient's questions were answered to his stated satisfaction and he is in agreement with this plan.     Medical history:  Past Medical History:   Diagnosis Date    Allergic rhinitis, cause unspecified     Asthma     Complication of anesthesia     did not respond well to succs    Unspecified asthma(493.90)        Surgical history:  Past Surgical History:   Procedure Laterality Date    COLONOSCOPY      DAVINCI XI ASSISTED RESECTION RECTOSIGMOID N/A 11/26/2019    Procedure: ROBOTIC ASSISTED SIGMOID COLECTOMY. MOBILIZATION OF SPLENIC FLESURE;  Surgeon: Travis Brand MD;  Location:  OR    ENDOSCOPIC POLYPECTOMY NASAL      ESOPHAGOSCOPY, GASTROSCOPY, DUODENOSCOPY (EGD), COMBINED N/A 11/19/2021    Procedure: ESOPHAGOGASTRODUODENOSCOPY, WITH BIOPSIES using cold forceps;  Surgeon: Santhosh Harmon MD;  Location: [facility]    LAPAROSCOPIC ASSISTED COLECTOMY      LASER HOLMIUM LITHOTRIPSY URETER(S), INSERT STENT, COMBINED Left 1/14/2015    Procedure: COMBINED CYSTOSCOPY, URETEROSCOPY, LASER HOLMIUM LITHOTRIPSY URETER(S), INSERT STENT;  Surgeon: Derrek Cobb MD;  Location:  OR    LITHOTRIPSY      SEPTOPLASTY         Problem list:    Patient Active Problem List    Diagnosis Date Noted    Legionnaire's disease (H) 08/27/2023     Priority: Medium    Irritable bowel syndrome with diarrhea 07/14/2022     Priority: Medium    History of diverticulitis 11/26/2019     Priority: Medium    Obesity (BMI 35.0-39.9) with comorbidity (H) 11/13/2019     Priority: Medium    WENDY (obstructive sleep apnea) 10/10/2013     Priority: Medium     HST 10/13 (230#)- AHI estimated to be 24 (supine AHI 40.9). Oxygen Favian was 86%      Heartburn 06/03/2013     Priority: Medium    ADHD (attention deficit hyperactivity disorder) 10/22/2012     Priority: Medium    GSE (gluten-sensitive enteropathy) 10/06/2011     Priority: Medium     Free of symptoms with gluten free diet       Anxiety 03/30/2011      Priority: Medium    Severe persistent asthma without complication 03/30/2011     Priority: Medium    Seasonal allergic rhinitis 03/30/2011     Priority: Medium       Medications:  Current Outpatient Medications   Medication Sig Dispense Refill    acetaminophen (TYLENOL) 500 MG tablet Take 1,000 mg by mouth every 6 hours as needed for mild pain      albuterol (PROAIR HFA/PROVENTIL HFA/VENTOLIN HFA) 108 (90 Base) MCG/ACT inhaler Inhale 2 puffs into the lungs every 4 hours as needed for shortness of breath / dyspnea 18 g 11    albuterol (PROVENTIL) (2.5 MG/3ML) 0.083% neb solution Take 1 vial (2.5 mg) by nebulization every 4 hours as needed for shortness of breath or wheezing 120 mL 0    fexofenadine-pseudoePHEDrine (ALLEGRA-D ALLERGY & CONGESTION) 180-240 MG 24 hr tablet TAKE 1 TABLET BY MOUTH EVERY DAY AS NEEDED 90 tablet 3    fluticasone-vilanterol (BREO ELLIPTA) 200-25 MCG/ACT inhaler Inhale 1 puff into the lungs daily 60 each 11    hydrOXYzine (ATARAX) 25 MG tablet Take 1 tablet (25 mg) by mouth 3 times daily as needed for anxiety 30 tablet 0    ibuprofen (ADVIL/MOTRIN) 200 MG tablet Take 600 mg by mouth every 6 hours as needed for pain      ipratropium - albuterol 0.5 mg/2.5 mg/3 mL (DUONEB) 0.5-2.5 (3) MG/3ML neb solution Take 1 vial (3 mLs) by nebulization every 4 hours as needed for shortness of breath or wheezing 270 mL 0    omeprazole (PRILOSEC) 20 MG DR capsule Take 1 capsule (20 mg) by mouth 2 times daily for 90 days 102 capsule 0    ondansetron (ZOFRAN ODT) 4 MG ODT tab Take 1 tablet (4 mg) by mouth every 8 hours as needed for nausea 5 tablet 0    ondansetron (ZOFRAN) 4 MG tablet Take 1 tablet (4 mg) by mouth every 8 hours as needed for nausea or vomiting 15 tablet 0    predniSONE (DELTASONE) 10 MG tablet Take 4 tablets (40 mg) by mouth daily for 5 days, THEN 2 tablets (20 mg) daily for 5 days, THEN 1 tablet (10 mg) daily for 5 days, THEN 0.5 tablets (5 mg) daily for 5 days. 38 tablet 0  "      Allergies:  Allergies   Allergen Reactions    Bee Pollen Anaphylaxis    Cats Difficulty breathing    Mold     Succinylcholine Other (See Comments)     Convulsions, muscle pain    Trees        Family history:  Family History   Problem Relation Age of Onset    Hyperlipidemia Mother     Sleep Apnea Mother     Glaucoma Father     Glaucoma Maternal Grandfather     Macular Degeneration No family hx of        Social history:  Social History     Tobacco Use    Smoking status: Never    Smokeless tobacco: Never   Substance Use Topics    Alcohol use: Yes     Comment: 1-2 per month    Marital status: patient declined.    Nursing Notes:   Ge Miles, EMT  9/19/2023 10:46 AM  Signed  Chief Complaint   Patient presents with    New Patient     abscess       Vitals:    09/19/23 1040   BP: (!) 153/98   BP Location: Left arm   Patient Position: Sitting   Cuff Size: Adult Regular   Pulse: 102   SpO2: 98%   Weight: 227 lb 6.4 oz   Height: 5' 7\"       Body mass index is 35.62 kg/m .     Ge Miles, EMT- P       20 minutes spent on the date of encounter performing chart review, history and exam, documentation and further activities as noted above    RAMON Das, NP-C  Colon and Rectal Surgery   [facility]    This note was created using speech recognition software and may contain unintended word substitutions.    "

## 2023-09-19 NOTE — NURSING NOTE
"Chief Complaint   Patient presents with    New Patient     abscess       Vitals:    09/19/23 1040   BP: (!) 153/98   BP Location: Left arm   Patient Position: Sitting   Cuff Size: Adult Regular   Pulse: 102   SpO2: 98%   Weight: 227 lb 6.4 oz   Height: 5' 7\"       Body mass index is 35.62 kg/m .     Ge Miles, EMT- P    "

## 2023-09-19 NOTE — LETTER
"2023       RE: Dano Kennedy  [address]  [facility] [place] [address]       Dear Colleague,    Thank you for referring your patient, Dano Kennedy, to the [facility] COLON AND RECTAL SURGERY CLINIC [place] at [facility]. Please see a copy of my visit note below.    Colon and Rectal Surgery Consult Clinic Note    Date: 2023     Referring provider:  No referring provider defined for this encounter.     RE: Dano Kennedy  : 1975  DELANEY: 2023    Dano Kennedy is a very pleasant 48 year old male here with concern for perianal abscess.    HPI:  Dano developed a painful bump about a week ago. This is fairly hard. No bleeding or drainage. Some itching. Pain can be pretty severe at times but is slightly better today. He has never had anything like this in the past. No fevers or chills.  Saw GI in July for abnormal stool patterns, thin stools, and mucous from rectum. He started a fiber supplement and symptoms improved. He unfortunately developed Legionnaire's pneumonia while in [place] and after hospitalization for this his bowel movements have been irregular again.  Sigmoid resection in  for diverticulitis. Colonoscopy  was reportedly normal. He is scheduled for colonoscopy and EGD in November.    Physical Examination: Exam was chaperoned by Ge Miles, EMT-P   BP (!) 153/98 (BP Location: Left arm, Patient Position: Sitting, Cuff Size: Adult Regular)   Pulse 102   Temp 98.4  F (36.9  C) (Oral)   Ht 5' 7\"   Wt 227 lb 6.4 oz   SpO2 98%   BMI 35.62 kg/m    General: alert, oriented, in no acute distress, sitting comfortably  HEENT: mucous membranes moist    Perianal external examination:  Small thrombosed external hemorrhoid X2 in the left posterior position with purple discoloration. No necrosis or bleeding.    Digital rectal examination: Was performed.   Sphincter tone: Good.  Palpable lesions: No.  Prostate: " Normal.  Other: None.    Anoscopy: Was deferred    Assessment/Plan: 48 year old male with resolving thrombosed external hemorrhoid. No necrosis and symptoms are starting to improve. Recommended conservative management but return if symptoms worsen. Advised increasing his dietary fiber and colonoscopy in November as planned. He can follow up with any worsening symptoms, questions, or concerns. Patient's questions were answered to his stated satisfaction and he is in agreement with this plan.     Medical history:  Past Medical History:   Diagnosis Date    Allergic rhinitis, cause unspecified     Asthma     Complication of anesthesia     did not respond well to succs    Unspecified asthma(493.90)        Surgical history:  Past Surgical History:   Procedure Laterality Date    COLONOSCOPY      DAVINCI XI ASSISTED RESECTION RECTOSIGMOID N/A 11/26/2019    Procedure: ROBOTIC ASSISTED SIGMOID COLECTOMY. MOBILIZATION OF SPLENIC FLESURE;  Surgeon: Travis Brand MD;  Location:  OR    ENDOSCOPIC POLYPECTOMY NASAL      ESOPHAGOSCOPY, GASTROSCOPY, DUODENOSCOPY (EGD), COMBINED N/A 11/19/2021    Procedure: ESOPHAGOGASTRODUODENOSCOPY, WITH BIOPSIES using cold forceps;  Surgeon: Santhosh Harmon MD;  Location:  GI    LAPAROSCOPIC ASSISTED COLECTOMY      LASER HOLMIUM LITHOTRIPSY URETER(S), INSERT STENT, COMBINED Left 1/14/2015    Procedure: COMBINED CYSTOSCOPY, URETEROSCOPY, LASER HOLMIUM LITHOTRIPSY URETER(S), INSERT STENT;  Surgeon: Derrek Cobb MD;  Location: [facility]    LITHOTRIPSY      SEPTOPLASTY         Problem list:    Patient Active Problem List    Diagnosis Date Noted    Legionnaire's disease (H) 08/27/2023     Priority: Medium    Irritable bowel syndrome with diarrhea 07/14/2022     Priority: Medium    History of diverticulitis 11/26/2019     Priority: Medium    Obesity (BMI 35.0-39.9) with comorbidity (H) 11/13/2019     Priority: Medium    WENDY (obstructive sleep apnea) 10/10/2013     Priority: Medium     HST  10/13 (230#)- AHI estimated to be 24 (supine AHI 40.9). Oxygen Favian was 86%      Heartburn 06/03/2013     Priority: Medium    ADHD (attention deficit hyperactivity disorder) 10/22/2012     Priority: Medium    GSE (gluten-sensitive enteropathy) 10/06/2011     Priority: Medium     Free of symptoms with gluten free diet       Anxiety 03/30/2011     Priority: Medium    Severe persistent asthma without complication 03/30/2011     Priority: Medium    Seasonal allergic rhinitis 03/30/2011     Priority: Medium       Medications:  Current Outpatient Medications   Medication Sig Dispense Refill    acetaminophen (TYLENOL) 500 MG tablet Take 1,000 mg by mouth every 6 hours as needed for mild pain      albuterol (PROAIR HFA/PROVENTIL HFA/VENTOLIN HFA) 108 (90 Base) MCG/ACT inhaler Inhale 2 puffs into the lungs every 4 hours as needed for shortness of breath / dyspnea 18 g 11    albuterol (PROVENTIL) (2.5 MG/3ML) 0.083% neb solution Take 1 vial (2.5 mg) by nebulization every 4 hours as needed for shortness of breath or wheezing 120 mL 0    fexofenadine-pseudoePHEDrine (ALLEGRA-D ALLERGY & CONGESTION) 180-240 MG 24 hr tablet TAKE 1 TABLET BY MOUTH EVERY DAY AS NEEDED 90 tablet 3    fluticasone-vilanterol (BREO ELLIPTA) 200-25 MCG/ACT inhaler Inhale 1 puff into the lungs daily 60 each 11    hydrOXYzine (ATARAX) 25 MG tablet Take 1 tablet (25 mg) by mouth 3 times daily as needed for anxiety 30 tablet 0    ibuprofen (ADVIL/MOTRIN) 200 MG tablet Take 600 mg by mouth every 6 hours as needed for pain      ipratropium - albuterol 0.5 mg/2.5 mg/3 mL (DUONEB) 0.5-2.5 (3) MG/3ML neb solution Take 1 vial (3 mLs) by nebulization every 4 hours as needed for shortness of breath or wheezing 270 mL 0    omeprazole (PRILOSEC) 20 MG DR capsule Take 1 capsule (20 mg) by mouth 2 times daily for 90 days 102 capsule 0    ondansetron (ZOFRAN ODT) 4 MG ODT tab Take 1 tablet (4 mg) by mouth every 8 hours as needed for nausea 5 tablet 0    ondansetron  "(ZOFRAN) 4 MG tablet Take 1 tablet (4 mg) by mouth every 8 hours as needed for nausea or vomiting 15 tablet 0    predniSONE (DELTASONE) 10 MG tablet Take 4 tablets (40 mg) by mouth daily for 5 days, THEN 2 tablets (20 mg) daily for 5 days, THEN 1 tablet (10 mg) daily for 5 days, THEN 0.5 tablets (5 mg) daily for 5 days. 38 tablet 0       Allergies:  Allergies   Allergen Reactions    Bee Pollen Anaphylaxis    Cats Difficulty breathing    Mold     Succinylcholine Other (See Comments)     Convulsions, muscle pain    Trees        Family history:  Family History   Problem Relation Age of Onset    Hyperlipidemia Mother     Sleep Apnea Mother     Glaucoma Father     Glaucoma Maternal Grandfather     Macular Degeneration No family hx of        Social history:  Social History     Tobacco Use    Smoking status: Never    Smokeless tobacco: Never   Substance Use Topics    Alcohol use: Yes     Comment: 1-2 per month    Marital status: patient declined.    Nursing Notes:   Ge Miles, EMT  9/19/2023 10:46 AM  Signed  Chief Complaint   Patient presents with    New Patient     abscess       Vitals:    09/19/23 1040   BP: (!) 153/98   BP Location: Left arm   Patient Position: Sitting   Cuff Size: Adult Regular   Pulse: 102   SpO2: 98%   Weight: 227 lb 6.4 oz   Height: 5' 7\"       Body mass index is 35.62 kg/m .     Ge Miles, EMT- P     20 minutes spent on the date of encounter performing chart review, history and exam, documentation and further activities as noted above      This note was created using speech recognition software and may contain unintended word substitutions.      Again, thank you for allowing me to participate in the care of your patient.      Sincerely,    RAMON Oviedo CNP    "

## 2023-10-06 ENCOUNTER — E-VISIT (OUTPATIENT)
Dept: URGENT CARE | Facility: URGENT CARE | Age: 48
End: 2023-10-06
Payer: COMMERCIAL

## 2023-10-06 DIAGNOSIS — J45.50 SEVERE PERSISTENT ASTHMA WITHOUT COMPLICATION (H): ICD-10-CM

## 2023-10-06 DIAGNOSIS — R06.2 WHEEZE: ICD-10-CM

## 2023-10-06 PROCEDURE — 99207 PR NON-BILLABLE SERV PER CHARTING: CPT | Performed by: FAMILY MEDICINE

## 2023-10-06 RX ORDER — ALBUTEROL SULFATE 0.83 MG/ML
2.5 SOLUTION RESPIRATORY (INHALATION) EVERY 4 HOURS PRN
Qty: 120 ML | Refills: 1 | Status: SHIPPED | OUTPATIENT
Start: 2023-10-06 | End: 2023-11-15

## 2023-10-06 NOTE — PATIENT INSTRUCTIONS
Thank you for choosing us for your care. I have placed an order for a prescription so that you can start treatment. View your full visit summary for details by clicking on the link below. Your pharmacist will able to address any questions you may have about the medication.     If you're not feeling better within 5-7 days, please schedule an appointment.  You can schedule an appointment right here in [facility], or call [phone]  If the visit is for the same symptoms as your eVisit, we'll refund the cost of your eVisit if seen within seven days.

## 2023-10-25 ENCOUNTER — TELEPHONE (OUTPATIENT)
Dept: GASTROENTEROLOGY | Facility: CLINIC | Age: 48
End: 2023-10-25
Payer: COMMERCIAL

## 2023-10-25 NOTE — TELEPHONE ENCOUNTER
Pre visit planning completed.    Upon chart review, patient declined the colonoscopy reporting symptoms have resolved, only wants to proceed with the EGD.    Procedure details:    Patient scheduled for Upper endoscopy (EGD) on 11/8/23.     Arrival time: 1330. Procedure time 1430    Pre op exam needed? Yes.  Patient needs to complete a pre-operative exam prior to procedure.  Informed patient pre-op is needed via [employer] message    Facility location: [facility]; [address] [place] [address]    Sedation type: MAC    Indication for procedure: Chronic GERD (10+ years), previous questionable history of Bailey's, LPRD (laryngopharyngeal reflux disease), Gastroesophageal reflux disease with esophagitis without hemorrhage      Chart review:     Electronic implanted devices? No    Recent diagnosis of diverticulitis within the last 6 weeks?  N/A    Diabetic? No    Diabetic medication HOLDING recommendations: (if applicable)  Oral diabetic medications: N/A  Diabetic injectables: N/A  Insulin: N/A      Medication review:    Anticoagulants? No    NSAIDS? Yes.  Ibuprofen (Advil, Motrin).  Holding interval of 1 day before procedure.    Other medication HOLDING recommendations:  N/A      Prep for procedure:     Bowel prep recommendation: N/A     Prep instructions sent via [employer]         Vale Parham RN  Endoscopy Procedure Pre Assessment RN  [phone] option 4

## 2023-10-26 NOTE — TELEPHONE ENCOUNTER
Pre assessment completed for upcoming procedure.   (Please see previous telephone encounter notes for complete details)      Procedure details:    Arrival time and facility location reviewed.    Pre op exam needed? Yes. Pre op exam scheduled for 11/6/23 with Nathaniel Lockhart PA-C. Patient is aware that a pre op is required.    Designated  policy reviewed. Instructed to have someone stay 24 hours post procedure.     COVID policy reviewed.      Medication review:    Medications reviewed. Please see supporting documentation below. Holding recommendations discussed (if applicable).   NSAID medication(s): Ibuprofen (Advil, Motrin): HOLD 1 day before procedure.  Naproxen (Aleve, Naprosyn): HOLD 4 days before procedure.       Prep for procedure:     Procedure prep instructions reviewed.        Additional information needed?  N/A      Patient  verbalized understanding and had no questions or concerns at this time.      Vale Parham RN  Endoscopy Procedure Pre Assessment RN  [phone] option 4

## 2023-11-06 ENCOUNTER — OFFICE VISIT (OUTPATIENT)
Dept: FAMILY MEDICINE | Facility: CLINIC | Age: 48
End: 2023-11-06
Payer: COMMERCIAL

## 2023-11-06 VITALS
WEIGHT: 228.9 LBS | TEMPERATURE: 98.1 F | HEART RATE: 92 BPM | SYSTOLIC BLOOD PRESSURE: 133 MMHG | RESPIRATION RATE: 16 BRPM | OXYGEN SATURATION: 97 % | HEIGHT: 67 IN | DIASTOLIC BLOOD PRESSURE: 90 MMHG | BODY MASS INDEX: 35.93 KG/M2

## 2023-11-06 DIAGNOSIS — K44.9 HIATAL HERNIA: ICD-10-CM

## 2023-11-06 DIAGNOSIS — Z01.818 PREOP GENERAL PHYSICAL EXAM: Primary | ICD-10-CM

## 2023-11-06 PROCEDURE — 99213 OFFICE O/P EST LOW 20 MIN: CPT | Performed by: NURSE PRACTITIONER

## 2023-11-06 ASSESSMENT — PAIN SCALES - GENERAL: PAINLEVEL: NO PAIN (0)

## 2023-11-06 NOTE — PATIENT INSTRUCTIONS
Preparing for Your Surgery  Getting started  A nurse will call you to review your health history and instructions. They will give you an arrival time based on your scheduled surgery time. Please be ready to share:  Your doctor's clinic name and phone number  Your medical, surgical, and anesthesia history  A list of allergies and sensitivities  A list of medicines, including herbal treatments and over-the-counter drugs  Whether the patient has a legal guardian (ask how to send us the papers in advance)  Please tell us if you're pregnant--or if there's any chance you might be pregnant. Some surgeries may injure a fetus (unborn baby), so they require a pregnancy test. Surgeries that are safe for a fetus don't always need a test, and you can choose whether to have one.   If you have a child who's having surgery, please ask for a copy of Preparing for Your Child's Surgery.    Preparing for surgery  Within 10 to 30 days of surgery: Have a pre-op exam (sometimes called an H&P, or History and Physical). This can be done at a clinic or pre-operative center.  If you're having a , you may not need this exam. Talk to your care team.  At your pre-op exam, talk to your care team about all medicines you take. If you need to stop any medicines before surgery, ask when to start taking them again.  We do this for your safety. Many medicines can make you bleed too much during surgery. Some change how well surgery (anesthesia) drugs work.  Call your insurance company to let them know you're having surgery. (If you don't have insurance, call [phone].)  Call your clinic if there's any change in your health. This includes signs of a cold or flu (sore throat, runny nose, cough, rash, fever). It also includes a scrape or scratch near the surgery site.  If you have questions on the day of surgery, call your hospital or surgery center.  Eating and drinking guidelines  For your safety: Unless your surgeon tells you otherwise,  follow the guidelines below.  Eat and drink as usual until 8 hours before you arrive for surgery. After that, no food or milk.  Drink clear liquids until 2 hours before you arrive. These are liquids you can see through, like water, Gatorade, and Propel Water. They also include plain black coffee and tea (no cream or milk), candy, and breath mints. You can spit out gum when you arrive.  If you drink alcohol: Stop drinking it the night before surgery.  If your care team tells you to take medicine on the morning of surgery, it's okay to take it with a sip of water.  Preventing infection  Shower or bathe the night before and morning of your surgery. Follow the instructions your clinic gave you. (If no instructions, use regular soap.)  Don't shave or clip hair near your surgery site. We'll remove the hair if needed.  Don't smoke or vape the morning of surgery. You may chew nicotine gum up to 2 hours before surgery. A nicotine patch is okay.  Note: Some surgeries require you to completely quit smoking and nicotine. Check with your surgeon.  Your care team will make every effort to keep you safe from infection. We will:  Clean our hands often with soap and water (or an alcohol-based hand rub).  Clean the skin at your surgery site with a special soap that kills germs.  Give you a special gown to keep you warm. (Cold raises the risk of infection.)  Wear special hair covers, masks, gowns and gloves during surgery.  Give antibiotic medicine, if prescribed. Not all surgeries need antibiotics.  What to bring on the day of surgery  Photo ID and insurance card  Copy of your health care directive, if you have one  Glasses and hearing aids (bring cases)  You can't wear contacts during surgery  Inhaler and eye drops, if you use them (tell us about these when you arrive)  CPAP machine or breathing device, if you use them  A few personal items, if spending the night  If you have . . .  A pacemaker, ICD (cardiac defibrillator) or other  implant: Bring the ID card.  An implanted stimulator: Bring the remote control.  A legal guardian: Bring a copy of the certified (court-stamped) guardianship papers.  Please remove any jewelry, including body piercings. Leave jewelry and other valuables at home.  If you're going home the day of surgery  You must have a responsible adult drive you home. They should stay with you overnight as well.  If you don't have someone to stay with you, and you aren't safe to go home alone, we may keep you overnight. Insurance often won't pay for this.  After surgery  If it's hard to control your pain or you need more pain medicine, please call your surgeon's office.  Questions?   If you have any questions for your care team, list them here: _________________________________________________________________________________________________________________________________________________________________________ ____________________________________ ____________________________________ ____________________________________  For informational purposes only. Not to replace the advice of your health care provider. Copyright   2003, 2019 [facility] [employer]. All rights reserved. Clinically reviewed by Suly Pruitt MD. [employer]ARTworks 107370 - REV 12/22.    How to Take Your Medication Before Surgery  Hold all as needed meds morning of procedure

## 2023-11-06 NOTE — PROGRESS NOTES
[address], [address]  [facility], [place] [address]  Phone: [phone]  Primary Provider: Alex Nichols  Pre-op Performing Provider: ENRRIQUE NOEL      PREOPERATIVE EVALUATION:  Today's date: 11/6/2023    Dano is a 48 year old male who presents for a preoperative evaluation.      Surgical Information:  Surgery/Procedure: Esophagoscopy, gastroscopy, duodenoscopy (EGD), combined   Surgery Location:  GI  Surgeon: Jose Spencer MD   Surgery Date: 11/08/23  Time of Surgery: 2:30PM   Where patient plans to recover: At home with family  Fax number for surgical facility: Note does not need to be faxed, will be available electronically in Epic.    Assessment & Plan     The proposed surgical procedure is considered LOW risk.    (Z01.818) Preop general physical exam  (primary encounter diagnosis)  Comment: ok for procedure. He did have pretty severe legionnaires a couple months ago and has improved greatly. Has has baseline Mod/severe asthma but thinks he is in a pretty good spot currently.   Plan:     (K44.9) Hiatal hernia  Comment:   Plan:             - No identified additional risk factors other than previously addressed    Antiplatelet or Anticoagulation Medication Instructions:   - Patient is on no antiplatelet or anticoagulation medications.    Additional Medication Instructions:  Patient is on no additional chronic medications    RECOMMENDATION:  APPROVAL GIVEN to proceed with proposed procedure, without further diagnostic evaluation.      Subjective       HPI related to upcoming procedure: Going for EGD d/t hiatal hernia  Had a recent dx of Asa Aug 2023. It was a pretty severe where he was hospitalized   Had labs with pulm about 1 month ago that were normal           11/6/2023     9:05 AM   Preop Questions   1. Have you ever had a heart attack or stroke? No   2. Have you ever had surgery on your heart or blood vessels, such as a stent placement, a  coronary artery bypass, or surgery on an artery in your head, neck, heart, or legs? No   3. Do you have chest pain with activity? No   4. Do you have a history of  heart failure? No   5. Do you currently have a cold, bronchitis or symptoms of other infection? No   6. Do you have a cough, shortness of breath, or wheezing? YES - legionaires Aug 2023 and still has minor symptoms.    7. Do you or anyone in your family have previous history of blood clots? No   8. Do you or does anyone in your family have a serious bleeding problem such as prolonged bleeding following surgeries or cuts? No   9. Have you ever had problems with anemia or been told to take iron pills? No   10. Have you had any abnormal blood loss such as black, tarry or bloody stools? No   11. Have you ever had a blood transfusion? No   12. Are you willing to have a blood transfusion if it is medically needed before, during, or after your surgery? Yes   13. Have you or any of your relatives ever had problems with anesthesia? YES - personal side effects with succ    14. Do you have sleep apnea, excessive snoring or daytime drowsiness? YES - Occasional use   14a. Do you have a CPAP machine? Yes   15. Do you have any artifical heart valves or other implanted medical devices like a pacemaker, defibrillator, or continuous glucose monitor? No   16. Do you have artificial joints? No   17. Are you allergic to latex? No       Health Care Directive:  Patient does not have a Health Care Directive or Living Will:     Preoperative Review of :   reviewed - no record of controlled substances prescribed.      Status of Chronic Conditions:  See problem list for active medical problems.  Problems all longstanding and stable, except as noted/documented.  See ROS for pertinent symptoms related to these conditions.    Review of Systems  Constitutional, neuro, ENT, endocrine, pulmonary, cardiac, gastrointestinal, genitourinary, musculoskeletal, integument and psychiatric  systems are negative, except as otherwise noted.    Patient Active Problem List    Diagnosis Date Noted    Legionnaire's disease (H) 08/27/2023     Priority: Medium    Irritable bowel syndrome with diarrhea 07/14/2022     Priority: Medium    History of diverticulitis 11/26/2019     Priority: Medium    Obesity (BMI 35.0-39.9) with comorbidity (H) 11/13/2019     Priority: Medium    WENDY (obstructive sleep apnea) 10/10/2013     Priority: Medium     HST 10/13 (230#)- AHI estimated to be 24 (supine AHI 40.9). Oxygen Favian was 86%      Heartburn 06/03/2013     Priority: Medium    ADHD (attention deficit hyperactivity disorder) 10/22/2012     Priority: Medium    GSE (gluten-sensitive enteropathy) 10/06/2011     Priority: Medium     Free of symptoms with gluten free diet       Anxiety 03/30/2011     Priority: Medium    Severe persistent asthma without complication (H28) 03/30/2011     Priority: Medium    Seasonal allergic rhinitis 03/30/2011     Priority: Medium      Past Medical History:   Diagnosis Date    Allergic rhinitis, cause unspecified     Asthma     Complication of anesthesia     did not respond well to succs    Unspecified asthma(493.90)      Past Surgical History:   Procedure Laterality Date    COLONOSCOPY      DAVINCI XI ASSISTED RESECTION RECTOSIGMOID N/A 11/26/2019    Procedure: ROBOTIC ASSISTED SIGMOID COLECTOMY. MOBILIZATION OF SPLENIC FLESURE;  Surgeon: Travis Brand MD;  Location:  OR    ENDOSCOPIC POLYPECTOMY NASAL      ESOPHAGOSCOPY, GASTROSCOPY, DUODENOSCOPY (EGD), COMBINED N/A 11/19/2021    Procedure: ESOPHAGOGASTRODUODENOSCOPY, WITH BIOPSIES using cold forceps;  Surgeon: Santhosh Harmon MD;  Location:  GI    LAPAROSCOPIC ASSISTED COLECTOMY      LASER HOLMIUM LITHOTRIPSY URETER(S), INSERT STENT, COMBINED Left 1/14/2015    Procedure: COMBINED CYSTOSCOPY, URETEROSCOPY, LASER HOLMIUM LITHOTRIPSY URETER(S), INSERT STENT;  Surgeon: Derrek Cobb MD;  Location: [facility]    LITHOTRIPSY       SEPTOPLASTY       Current Outpatient Medications   Medication Sig Dispense Refill    acetaminophen (TYLENOL) 500 MG tablet Take 1,000 mg by mouth every 6 hours as needed for mild pain      albuterol (PROAIR HFA/PROVENTIL HFA/VENTOLIN HFA) 108 (90 Base) MCG/ACT inhaler Inhale 2 puffs into the lungs every 4 hours as needed for shortness of breath / dyspnea 18 g 11    albuterol (PROVENTIL) (2.5 MG/3ML) 0.083% neb solution Take 1 vial (2.5 mg) by nebulization every 4 hours as needed for shortness of breath or wheezing 120 mL 1    fexofenadine-pseudoePHEDrine (ALLEGRA-D ALLERGY & CONGESTION) 180-240 MG 24 hr tablet TAKE 1 TABLET BY MOUTH EVERY DAY AS NEEDED 90 tablet 3    fluticasone-vilanterol (BREO ELLIPTA) 200-25 MCG/ACT inhaler Inhale 1 puff into the lungs daily 60 each 11    hydrOXYzine (ATARAX) 25 MG tablet Take 1 tablet (25 mg) by mouth 3 times daily as needed for anxiety 30 tablet 0    ibuprofen (ADVIL/MOTRIN) 200 MG tablet Take 600 mg by mouth every 6 hours as needed for pain      ipratropium - albuterol 0.5 mg/2.5 mg/3 mL (DUONEB) 0.5-2.5 (3) MG/3ML neb solution Take 1 vial (3 mLs) by nebulization every 4 hours as needed for shortness of breath or wheezing 270 mL 0    ondansetron (ZOFRAN ODT) 4 MG ODT tab Take 1 tablet (4 mg) by mouth every 8 hours as needed for nausea 5 tablet 0    ondansetron (ZOFRAN) 4 MG tablet Take 1 tablet (4 mg) by mouth every 8 hours as needed for nausea or vomiting 15 tablet 0       Allergies   Allergen Reactions    Bee Pollen Anaphylaxis    Cats Difficulty breathing    Mold     Succinylcholine Other (See Comments)     Convulsions, muscle pain    Trees         Social History     Tobacco Use    Smoking status: Never    Smokeless tobacco: Never   Substance Use Topics    Alcohol use: Yes     Comment: 1-2 per month     Family History   Problem Relation Age of Onset    Hyperlipidemia Mother     Sleep Apnea Mother     Glaucoma Father     Glaucoma Maternal Grandfather     Macular Degeneration  "No family hx of      History   Drug Use No         Objective     BP (!) 133/90 (BP Location: Right arm, Patient Position: Sitting, Cuff Size: Adult Large)   Pulse 92   Temp 98.1  F (36.7  C) (Oral)   Resp 16   Ht 1.702 m (5' 7\")   Wt 103.8 kg (228 lb 14.4 oz)   SpO2 97%   BMI 35.85 kg/m      Physical Exam    GENERAL APPEARANCE: healthy, alert and no distress     EYES: EOMI,       RESP: lungs clear to auscultation - no rales, rhonchi or wheezes. Occasional cough     CV: regular rates and rhythm, normal S1 S2, no S3 or S4 and no murmur, click or rub     MS: extremities normal- no gross deformities noted, no evidence of inflammation in joints, FROM in all extremities.     SKIN: no suspicious lesions or rashes     NEURO: Normal strength and tone, sensory exam grossly normal, mentation intact and speech normal     PSYCH: mentation appears normal. and affect normal/bright    Recent Labs   Lab Test 08/30/23  0815 08/29/23  0844 08/28/23  0721   HGB 13.6 13.0* 12.0*    282 230     --  138   POTASSIUM 4.0  --  4.0   CR 0.97  --  0.82        Diagnostics:  No labs were ordered during this visit.   No EKG required for low risk surgery (cataract, skin procedure, breast biopsy, etc).    Revised Cardiac Risk Index (RCRI):  The patient has the following serious cardiovascular risks for perioperative complications:   - No serious cardiac risks = 0 points     RCRI Interpretation: 0 points: Class I (very low risk - 0.4% complication rate)         Signed Electronically by: RAMON Chanel CNP  Copy of this evaluation report is provided to requesting physician.      "

## 2023-11-08 ENCOUNTER — ANESTHESIA (OUTPATIENT)
Dept: GASTROENTEROLOGY | Facility: CLINIC | Age: 48
End: 2023-11-08
Payer: COMMERCIAL

## 2023-11-08 ENCOUNTER — ANESTHESIA EVENT (OUTPATIENT)
Dept: GASTROENTEROLOGY | Facility: CLINIC | Age: 48
End: 2023-11-08
Payer: COMMERCIAL

## 2023-11-08 ENCOUNTER — HOSPITAL ENCOUNTER (OUTPATIENT)
Facility: CLINIC | Age: 48
Discharge: HOME | End: 2023-11-08
Attending: INTERNAL MEDICINE | Admitting: INTERNAL MEDICINE
Payer: COMMERCIAL

## 2023-11-08 VITALS
RESPIRATION RATE: 31 BRPM | OXYGEN SATURATION: 96 % | DIASTOLIC BLOOD PRESSURE: 71 MMHG | HEART RATE: 72 BPM | SYSTOLIC BLOOD PRESSURE: 118 MMHG

## 2023-11-08 LAB — UPPER GI ENDOSCOPY: NORMAL

## 2023-11-08 PROCEDURE — 250N000009 HC RX 250: Performed by: NURSE ANESTHETIST, CERTIFIED REGISTERED

## 2023-11-08 PROCEDURE — 88305 TISSUE EXAM BY PATHOLOGIST: CPT | Mod: TC | Performed by: INTERNAL MEDICINE

## 2023-11-08 PROCEDURE — 250N000011 HC RX IP 250 OP 636: Mod: JZ | Performed by: INTERNAL MEDICINE

## 2023-11-08 PROCEDURE — 43239 EGD BIOPSY SINGLE/MULTIPLE: CPT | Performed by: INTERNAL MEDICINE

## 2023-11-08 PROCEDURE — 88305 TISSUE EXAM BY PATHOLOGIST: CPT | Mod: 26 | Performed by: PATHOLOGY

## 2023-11-08 PROCEDURE — 258N000003 HC RX IP 258 OP 636: Performed by: NURSE ANESTHETIST, CERTIFIED REGISTERED

## 2023-11-08 PROCEDURE — 250N000011 HC RX IP 250 OP 636: Performed by: NURSE ANESTHETIST, CERTIFIED REGISTERED

## 2023-11-08 PROCEDURE — 370N000017 HC ANESTHESIA TECHNICAL FEE, PER MIN: Performed by: INTERNAL MEDICINE

## 2023-11-08 PROCEDURE — 999N000010 HC STATISTIC ANES STAT CODE-CRNA PER MINUTE: Performed by: INTERNAL MEDICINE

## 2023-11-08 RX ORDER — PROCHLORPERAZINE MALEATE 10 MG
10 TABLET ORAL EVERY 6 HOURS PRN
Status: CANCELLED | OUTPATIENT
Start: 2023-11-08

## 2023-11-08 RX ORDER — NALOXONE HYDROCHLORIDE 0.4 MG/ML
0.2 INJECTION, SOLUTION INTRAMUSCULAR; INTRAVENOUS; SUBCUTANEOUS
Status: CANCELLED | OUTPATIENT
Start: 2023-11-08

## 2023-11-08 RX ORDER — FLUMAZENIL 0.1 MG/ML
0.2 INJECTION, SOLUTION INTRAVENOUS
Status: CANCELLED | OUTPATIENT
Start: 2023-11-08 | End: 2023-11-09

## 2023-11-08 RX ORDER — DEXMEDETOMIDINE HYDROCHLORIDE 4 UG/ML
INJECTION, SOLUTION INTRAVENOUS PRN
Status: DISCONTINUED | OUTPATIENT
Start: 2023-11-08 | End: 2023-11-08

## 2023-11-08 RX ORDER — NALOXONE HYDROCHLORIDE 0.4 MG/ML
0.4 INJECTION, SOLUTION INTRAMUSCULAR; INTRAVENOUS; SUBCUTANEOUS
Status: CANCELLED | OUTPATIENT
Start: 2023-11-08

## 2023-11-08 RX ORDER — ONDANSETRON 4 MG/1
4 TABLET, ORALLY DISINTEGRATING ORAL EVERY 6 HOURS PRN
Status: CANCELLED | OUTPATIENT
Start: 2023-11-08

## 2023-11-08 RX ORDER — PROPOFOL 10 MG/ML
INJECTION, EMULSION INTRAVENOUS PRN
Status: DISCONTINUED | OUTPATIENT
Start: 2023-11-08 | End: 2023-11-08

## 2023-11-08 RX ORDER — LIDOCAINE HYDROCHLORIDE 20 MG/ML
INJECTION, SOLUTION INFILTRATION; PERINEURAL PRN
Status: DISCONTINUED | OUTPATIENT
Start: 2023-11-08 | End: 2023-11-08

## 2023-11-08 RX ORDER — PREDNISONE 10 MG/1
10 TABLET ORAL DAILY
COMMUNITY
Start: 2023-10-24 | End: 2024-04-23

## 2023-11-08 RX ORDER — SODIUM CHLORIDE, SODIUM LACTATE, POTASSIUM CHLORIDE, CALCIUM CHLORIDE 600; 310; 30; 20 MG/100ML; MG/100ML; MG/100ML; MG/100ML
INJECTION, SOLUTION INTRAVENOUS CONTINUOUS PRN
Status: DISCONTINUED | OUTPATIENT
Start: 2023-11-08 | End: 2023-11-08

## 2023-11-08 RX ORDER — ONDANSETRON 2 MG/ML
4 INJECTION INTRAMUSCULAR; INTRAVENOUS
Status: COMPLETED | OUTPATIENT
Start: 2023-11-08 | End: 2023-11-08

## 2023-11-08 RX ORDER — ONDANSETRON 2 MG/ML
4 INJECTION INTRAMUSCULAR; INTRAVENOUS EVERY 6 HOURS PRN
Status: CANCELLED | OUTPATIENT
Start: 2023-11-08

## 2023-11-08 RX ORDER — LIDOCAINE 40 MG/G
CREAM TOPICAL
Status: DISCONTINUED | OUTPATIENT
Start: 2023-11-08 | End: 2023-11-08 | Stop reason: HOSPADM

## 2023-11-08 RX ORDER — PROPOFOL 10 MG/ML
INJECTION, EMULSION INTRAVENOUS CONTINUOUS PRN
Status: DISCONTINUED | OUTPATIENT
Start: 2023-11-08 | End: 2023-11-08

## 2023-11-08 RX ADMIN — PROPOFOL 20 MG: 10 INJECTION, EMULSION INTRAVENOUS at 15:20

## 2023-11-08 RX ADMIN — PROPOFOL 30 MG: 10 INJECTION, EMULSION INTRAVENOUS at 15:19

## 2023-11-08 RX ADMIN — ONDANSETRON 4 MG: 2 INJECTION INTRAMUSCULAR; INTRAVENOUS at 15:15

## 2023-11-08 RX ADMIN — PROPOFOL 30 MG: 10 INJECTION, EMULSION INTRAVENOUS at 15:17

## 2023-11-08 RX ADMIN — DEXMEDETOMIDINE HYDROCHLORIDE 20 MCG: 200 INJECTION INTRAVENOUS at 15:15

## 2023-11-08 RX ADMIN — PROPOFOL 150 MCG/KG/MIN: 10 INJECTION, EMULSION INTRAVENOUS at 15:16

## 2023-11-08 RX ADMIN — PROPOFOL 20 MG: 10 INJECTION, EMULSION INTRAVENOUS at 15:16

## 2023-11-08 RX ADMIN — PROPOFOL 40 MG: 10 INJECTION, EMULSION INTRAVENOUS at 15:22

## 2023-11-08 RX ADMIN — SODIUM CHLORIDE, POTASSIUM CHLORIDE, SODIUM LACTATE AND CALCIUM CHLORIDE: 600; 310; 30; 20 INJECTION, SOLUTION INTRAVENOUS at 15:16

## 2023-11-08 RX ADMIN — LIDOCAINE HYDROCHLORIDE 50 MG: 20 INJECTION, SOLUTION INFILTRATION; PERINEURAL at 15:17

## 2023-11-08 ASSESSMENT — ACTIVITIES OF DAILY LIVING (ADL): ADLS_ACUITY_SCORE: 35

## 2023-11-08 NOTE — ANESTHESIA CARE TRANSFER NOTE
Patient: Dano Kennedy    Procedure: Procedure(s):  Esophagoscopy, gastroscopy, duodenoscopy (EGD), combined       Diagnosis: LPRD (laryngopharyngeal reflux disease) [K21.9]  Gastroesophageal reflux disease with esophagitis without hemorrhage [K21.00]  Diagnosis Additional Information: No value filed.    Anesthesia Type:   MAC     Note:    Oropharynx: oropharynx clear of all foreign objects and spontaneously breathing  Level of Consciousness: awake  Oxygen Supplementation: room air    Independent Airway: airway patency satisfactory and stable  Dentition: dentition unchanged  Vital Signs Stable: post-procedure vital signs reviewed and stable  Report to RN Given: handoff report given  Patient transferred to: PACU  Comments: At end of procedure, spontaneous respirations, patient alert to voice, able to follow commands. Patient breathing room air at room air to PACU. Oxygen tubing connected to wall O2 in PACU, SpO2, NiBP, and EKG monitors and alarms on and functioning, Jonathan Hugger warmer connected to patient gown, report on patient's clinical status given to PACU RN, RN questions answered.      Handoff Report: Identifed the Patient, Identified the Reponsible Provider, Reviewed the pertinent medical history, Discussed the surgical course, Reviewed Intra-OP anesthesia mangement and issues during anesthesia, Set expectations for post-procedure period and Allowed opportunity for questions and acknowledgement of understanding      Vitals:  Vitals Value Taken Time   BP     Temp     Pulse 88 11/08/23 1537   Resp 12 11/08/23 1537   SpO2 94 % 11/08/23 1537   Vitals shown include unfiled device data.    Electronically Signed By: RAMON Alvarez CRNA  November 8, 2023  3:37 PM

## 2023-11-08 NOTE — ANESTHESIA PREPROCEDURE EVALUATION
Anesthesia Pre-Procedure Evaluation    Patient: Dano Kennedy   MRN: [id number] : 1975        Procedure : Procedure(s):  Esophagoscopy, gastroscopy, duodenoscopy (EGD), combined          Past Medical History:   Diagnosis Date    Allergic rhinitis, cause unspecified     Asthma     Complication of anesthesia     did not respond well to succs    Unspecified asthma(493.90)       Past Surgical History:   Procedure Laterality Date    COLONOSCOPY      DAVINCI XI ASSISTED RESECTION RECTOSIGMOID N/A 2019    Procedure: ROBOTIC ASSISTED SIGMOID COLECTOMY. MOBILIZATION OF SPLENIC FLESURE;  Surgeon: Travis Brand MD;  Location: [facility] OR    ENDOSCOPIC POLYPECTOMY NASAL      ESOPHAGOSCOPY, GASTROSCOPY, DUODENOSCOPY (EGD), COMBINED N/A 2021    Procedure: ESOPHAGOGASTRODUODENOSCOPY, WITH BIOPSIES using cold forceps;  Surgeon: Santhosh Harmon MD;  Location:  GI    LAPAROSCOPIC ASSISTED COLECTOMY      LASER HOLMIUM LITHOTRIPSY URETER(S), INSERT STENT, COMBINED Left 2015    Procedure: COMBINED CYSTOSCOPY, URETEROSCOPY, LASER HOLMIUM LITHOTRIPSY URETER(S), INSERT STENT;  Surgeon: Derrek Cobb MD;  Location: [facility] OR    LITHOTRIPSY      SEPTOPLASTY        Allergies   Allergen Reactions    Bee Pollen Anaphylaxis    Cats Difficulty breathing    Mold     Succinylcholine Other (See Comments)     Convulsions, muscle pain    Trees       Social History     Tobacco Use    Smoking status: Never    Smokeless tobacco: Never   Substance Use Topics    Alcohol use: Yes     Comment: 1-2 per month      Wt Readings from Last 1 Encounters:   23 103.8 kg (228 lb 14.4 oz)        Anesthesia Evaluation   Pt has had prior anesthetic.     History of anesthetic complications       ROS/MED HX  ENT/Pulmonary:     (+) sleep apnea,                    asthma                  Neurologic:       Cardiovascular:       METS/Exercise Tolerance:     Hematologic:       Musculoskeletal:       GI/Hepatic:     (+) GERD, Symptomatic,      "             Renal/Genitourinary:       Endo:     (+)               Obesity,       Psychiatric/Substance Use:     (+)  1       Infectious Disease:       Malignancy:       Other:            Physical Exam    Airway        Mallampati: I    Neck ROM: full     Respiratory Devices and Support         Dental       (+) Modest Abnormalities - crowns, retainers, 1 or 2 missing teeth      Cardiovascular   cardiovascular exam normal          Pulmonary   pulmonary exam normal                OUTSIDE LABS:  CBC:   Lab Results   Component Value Date    WBC 24.4 (H) 08/30/2023    WBC 21.9 (H) 08/29/2023    HGB 13.6 08/30/2023    HGB 13.0 (L) 08/29/2023    HCT 41.9 08/30/2023    HCT 40.3 08/29/2023     08/30/2023     08/29/2023     BMP:   Lab Results   Component Value Date     08/30/2023     08/28/2023    POTASSIUM 4.0 08/30/2023    POTASSIUM 4.0 08/28/2023    CHLORIDE 101 08/30/2023    CHLORIDE 101 08/28/2023    CO2 22 08/30/2023    CO2 24 08/28/2023    BUN 19.3 08/30/2023    BUN 12.2 08/28/2023    CR 0.97 08/30/2023    CR 0.82 08/28/2023     (H) 08/30/2023     (H) 08/28/2023     COAGS: No results found for: \"PTT\", \"INR\", \"FIBR\"  POC:   Lab Results   Component Value Date    BGM 93 11/28/2019     HEPATIC:   Lab Results   Component Value Date    ALBUMIN 3.8 08/30/2023    PROTTOTAL 7.5 08/30/2023     (H) 08/30/2023    AST 29 08/30/2023    ALKPHOS 160 (H) 08/30/2023    BILITOTAL 0.4 08/30/2023     OTHER:   Lab Results   Component Value Date    LACT 2.0 08/27/2023    A1C 5.2 01/15/2014    TAY 9.3 08/30/2023    PHOS 2.4 (L) 11/27/2019    MAG 1.7 11/27/2019    LIPASE 20 11/09/2021    TSH 5.11 (H) 11/06/2021    T4 0.95 11/06/2021    CRP <0.1 11/06/2021       Anesthesia Plan    ASA Status:  3    NPO Status:  NPO Appropriate    Anesthesia Type: MAC.     - Reason for MAC: immobility needed, straight local not clinically adequate              Consents    Anesthesia Plan(s) and associated risks, " benefits, and realistic alternatives discussed. Questions answered and patient/representative(s) expressed understanding.     - Discussed:     - Discussed with:  Patient            Postoperative Care    Pain management: IV analgesics.   PONV prophylaxis: Ondansetron (or other 5HT-3)     Comments:                David Bradshaw MD

## 2023-11-08 NOTE — H&P
Dano BRAXTON [place]  [id number]  male  48 year old      Reason for procedure/surgery: GERD, atypical chest pain    Patient Active Problem List   Diagnosis    Anxiety    Severe persistent asthma without complication (H28)    Seasonal allergic rhinitis    GSE (gluten-sensitive enteropathy)    ADHD (attention deficit hyperactivity disorder)    Heartburn    WENDY (obstructive sleep apnea)    Obesity (BMI 35.0-39.9) with comorbidity (H)    History of diverticulitis    Irritable bowel syndrome with diarrhea    Legionnaire's disease (H)       Past Surgical History:    Past Surgical History:   Procedure Laterality Date    COLONOSCOPY      DAVINCI XI ASSISTED RESECTION RECTOSIGMOID N/A 11/26/2019    Procedure: ROBOTIC ASSISTED SIGMOID COLECTOMY. MOBILIZATION OF SPLENIC FLESURE;  Surgeon: Travis Brand MD;  Location: [facility]    ENDOSCOPIC POLYPECTOMY NASAL      ESOPHAGOSCOPY, GASTROSCOPY, DUODENOSCOPY (EGD), COMBINED N/A 11/19/2021    Procedure: ESOPHAGOGASTRODUODENOSCOPY, WITH BIOPSIES using cold forceps;  Surgeon: Santhosh Harmon MD;  Location:  GI    LAPAROSCOPIC ASSISTED COLECTOMY      LASER HOLMIUM LITHOTRIPSY URETER(S), INSERT STENT, COMBINED Left 1/14/2015    Procedure: COMBINED CYSTOSCOPY, URETEROSCOPY, LASER HOLMIUM LITHOTRIPSY URETER(S), INSERT STENT;  Surgeon: Derrek Cobb MD;  Location: [facility] OR    LITHOTRIPSY      SEPTOPLASTY         Past Medical History:   Past Medical History:   Diagnosis Date    Allergic rhinitis, cause unspecified     Asthma     Complication of anesthesia     did not respond well to succs    Unspecified asthma(493.90)        Social History:   Social History     Tobacco Use    Smoking status: Never    Smokeless tobacco: Never   Substance Use Topics    Alcohol use: Yes     Comment: 1-2 per month       Family History:   Family History   Problem Relation Age of Onset    Hyperlipidemia Mother     Sleep Apnea Mother     Glaucoma Father     Glaucoma Maternal Grandfather     Macular Degeneration  No family hx of        Allergies:   Allergies   Allergen Reactions    Bee Pollen Anaphylaxis    Cats Difficulty breathing    Mold     Succinylcholine Other (See Comments)     Convulsions, muscle pain    Trees        Active Medications:   No current outpatient medications on file.       Systemic Review:   CONSTITUTIONAL: NEGATIVE for fever, chills, change in weight  ENT/MOUTH: NEGATIVE for ear, mouth and throat problems  RESP: NEGATIVE for significant cough or SOB  CV: NEGATIVE for chest pain, palpitations or peripheral edema    Physical Examination:   Vital Signs: /85 (Cuff Size: Adult Regular)   Pulse 89   Resp 16   GENERAL: healthy, alert and no distress  NECK: no adenopathy, no asymmetry, masses, or scars  RESP: lungs clear to auscultation - no rales, rhonchi or wheezes  CV: regular rate and rhythm, normal S1 S2, no S3 or S4, no murmur, click or rub, no peripheral edema and peripheral pulses strong  ABDOMEN: soft, nontender, no hepatosplenomegaly, no masses and bowel sounds normal  MS: no gross musculoskeletal defects noted, no edema    Plan: Appropriate to proceed as scheduled.      Jose Spencer MD  11/8/2023    PCP:  Alex Nichols

## 2023-11-10 ENCOUNTER — MYC MEDICAL ADVICE (OUTPATIENT)
Dept: GASTROENTEROLOGY | Facility: CLINIC | Age: 48
End: 2023-11-10
Payer: COMMERCIAL

## 2023-11-15 ENCOUNTER — MYC MEDICAL ADVICE (OUTPATIENT)
Dept: FAMILY MEDICINE | Facility: CLINIC | Age: 48
End: 2023-11-15
Payer: COMMERCIAL

## 2023-11-15 DIAGNOSIS — J30.81 CHRONIC ALLERGIC RHINITIS DUE TO ANIMAL HAIR AND DANDER: ICD-10-CM

## 2023-11-15 DIAGNOSIS — R06.2 WHEEZE: ICD-10-CM

## 2023-11-15 DIAGNOSIS — J30.89 ALLERGIC RHINITIS DUE TO MOLD: ICD-10-CM

## 2023-11-15 DIAGNOSIS — J45.50 SEVERE PERSISTENT ASTHMA WITHOUT COMPLICATION (H): ICD-10-CM

## 2023-11-15 DIAGNOSIS — J30.1 CHRONIC SEASONAL ALLERGIC RHINITIS DUE TO POLLEN: ICD-10-CM

## 2023-11-15 RX ORDER — FEXOFENADINE HCL AND PSEUDOEPHEDRINE HCL 180; 240 MG/1; MG/1
TABLET, EXTENDED RELEASE ORAL
Qty: 90 TABLET | Refills: 1 | Status: SHIPPED | OUTPATIENT
Start: 2023-11-15 | End: 2024-06-14

## 2023-11-15 RX ORDER — ALBUTEROL SULFATE 0.83 MG/ML
2.5 SOLUTION RESPIRATORY (INHALATION) EVERY 4 HOURS PRN
Qty: 120 ML | Refills: 1 | Status: SHIPPED | OUTPATIENT
Start: 2023-11-15 | End: 2024-02-14

## 2023-11-15 NOTE — TELEPHONE ENCOUNTER
Patient sends my chart message. Needs refills of Albuterol Nebulizer and Allegra D.   Called patient's pharmacy for clarification as he should have refills available.  Patient has picked up all refills available for Neb needs new order  Pharmacy has Allegra D marked as closed, they state orders for Allegra D are only good for 6 months.  Will pend orders and send to PCP for review

## 2023-11-21 NOTE — TELEPHONE ENCOUNTER
Called to remind patient of their upcoming appointment with our GI clinic, on Tuesday, Nov 28th at 1:40pm with Dr. Lopez Pickett. This appointment is scheduled as an in-person appt. Please arrive 15 minutes early to check in for your appointment. , if your appointment is virtual (video or telephone) you need to be in [place] for the visit. To reschedule or cancel patient to call [phone].    Sophia Mendez

## 2023-12-12 DIAGNOSIS — J45.50 SEVERE PERSISTENT ASTHMA WITHOUT COMPLICATION (H): ICD-10-CM

## 2023-12-12 DIAGNOSIS — F41.9 ANXIETY: ICD-10-CM

## 2023-12-12 RX ORDER — HYDROXYZINE HYDROCHLORIDE 25 MG/1
25 TABLET, FILM COATED ORAL 3 TIMES DAILY PRN
Qty: 30 TABLET | Refills: 0 | Status: SHIPPED | OUTPATIENT
Start: 2023-12-12 | End: 2024-04-30

## 2023-12-13 RX ORDER — FLUTICASONE FUROATE AND VILANTEROL 200; 25 UG/1; UG/1
1 POWDER RESPIRATORY (INHALATION) DAILY
Qty: 60 EACH | Refills: 11 | Status: SHIPPED | OUTPATIENT
Start: 2023-12-13

## 2023-12-28 ENCOUNTER — E-VISIT (OUTPATIENT)
Dept: FAMILY MEDICINE | Facility: CLINIC | Age: 48
End: 2023-12-28
Payer: COMMERCIAL

## 2023-12-28 DIAGNOSIS — K52.9 GASTROENTERITIS: Primary | ICD-10-CM

## 2023-12-28 PROCEDURE — 99421 OL DIG E/M SVC 5-10 MIN: CPT | Performed by: FAMILY MEDICINE

## 2023-12-28 RX ORDER — ONDANSETRON 4 MG/1
4 TABLET, FILM COATED ORAL EVERY 8 HOURS PRN
Qty: 12 TABLET | Refills: 0 | Status: SHIPPED | OUTPATIENT
Start: 2023-12-28

## 2023-12-28 NOTE — PATIENT INSTRUCTIONS
Thank you for choosing us for your care. I have placed an order for a prescription so that you can start treatment. View your full visit summary for details by clicking on the link below. Your pharmacist will able to address any questions you may have about the medication.     If you're not feeling better within 5-7 days, please schedule an appointment.  You can schedule an appointment right here in [facility], or call [phone]  If the visit is for the same symptoms as your eVisit, we'll refund the cost of your eVisit if seen within seven days.

## 2024-01-26 ENCOUNTER — TELEPHONE (OUTPATIENT)
Dept: FAMILY MEDICINE | Facility: CLINIC | Age: 49
End: 2024-01-26
Payer: COMMERCIAL

## 2024-01-26 ENCOUNTER — MYC MEDICAL ADVICE (OUTPATIENT)
Dept: FAMILY MEDICINE | Facility: CLINIC | Age: 49
End: 2024-01-26
Payer: COMMERCIAL

## 2024-01-26 NOTE — TELEPHONE ENCOUNTER
Summary:    Patient is due/failing the following:   ACT and PHYSICAL    Reviewed:    [] CARE EVERYWHERE  [] LAST OV NOTE   [] FYI TAB  [] MYCHART ACTIVE?  [] LAST PANEL ENCOUNTER  [] FUTURE APPTS  [] IMMUNIZATIONS  [] Media Tab  Action needed:   Patient needs office visit for phys, ACT.    Type of outreach:    Sent [employer] message.                                                                               Bridgett Stanley CMA

## 2024-01-31 ENCOUNTER — E-VISIT (OUTPATIENT)
Dept: FAMILY MEDICINE | Facility: CLINIC | Age: 49
End: 2024-01-31
Payer: COMMERCIAL

## 2024-01-31 DIAGNOSIS — Z87.09 HISTORY OF ASTHMA: ICD-10-CM

## 2024-01-31 DIAGNOSIS — Z87.898 HX OF MOTION SICKNESS: Primary | ICD-10-CM

## 2024-01-31 PROCEDURE — 99421 OL DIG E/M SVC 5-10 MIN: CPT | Performed by: FAMILY MEDICINE

## 2024-02-01 RX ORDER — PREDNISONE 20 MG/1
40 TABLET ORAL DAILY
Qty: 10 TABLET | Refills: 0 | Status: SHIPPED | OUTPATIENT
Start: 2024-02-01 | End: 2024-02-06

## 2024-02-01 RX ORDER — SCOLOPAMINE TRANSDERMAL SYSTEM 1 MG/1
1 PATCH, EXTENDED RELEASE TRANSDERMAL
Qty: 24 PATCH | Refills: 0 | Status: SHIPPED | OUTPATIENT
Start: 2024-02-01

## 2024-02-01 NOTE — PATIENT INSTRUCTIONS
Thank you for choosing us for your care. I have placed an order for a prescription so that you can start treatment. View your full visit summary for details by clicking on the link below. Your pharmacist will able to address any questions you may have about the medication.     If you're not feeling better within 5-7 days, please schedule an appointment.  You can schedule an appointment right here in [facility], or call [phone]  If the visit is for the same symptoms as your eVisit, we'll refund the cost of your eVisit if seen within seven days.

## 2024-02-02 ENCOUNTER — OFFICE VISIT (OUTPATIENT)
Dept: URGENT CARE | Facility: URGENT CARE | Age: 49
End: 2024-02-02
Payer: COMMERCIAL

## 2024-02-02 VITALS
HEART RATE: 95 BPM | TEMPERATURE: 97.9 F | OXYGEN SATURATION: 94 % | RESPIRATION RATE: 14 BRPM | DIASTOLIC BLOOD PRESSURE: 91 MMHG | SYSTOLIC BLOOD PRESSURE: 136 MMHG

## 2024-02-02 DIAGNOSIS — J45.50 SEVERE PERSISTENT ASTHMA WITHOUT COMPLICATION (H): ICD-10-CM

## 2024-02-02 DIAGNOSIS — B96.89 BACTERIAL URI: Primary | ICD-10-CM

## 2024-02-02 DIAGNOSIS — R07.0 THROAT PAIN: ICD-10-CM

## 2024-02-02 DIAGNOSIS — Z86.19 HISTORY OF LEGIONNAIRE'S DISEASE: ICD-10-CM

## 2024-02-02 DIAGNOSIS — J06.9 BACTERIAL URI: Primary | ICD-10-CM

## 2024-02-02 LAB
DEPRECATED S PYO AG THROAT QL EIA: NEGATIVE
GROUP A STREP BY PCR: NOT DETECTED

## 2024-02-02 PROCEDURE — 87651 STREP A DNA AMP PROBE: CPT | Performed by: NURSE PRACTITIONER

## 2024-02-02 PROCEDURE — 99213 OFFICE O/P EST LOW 20 MIN: CPT | Performed by: NURSE PRACTITIONER

## 2024-02-02 RX ORDER — AZITHROMYCIN 250 MG/1
TABLET, FILM COATED ORAL
Qty: 6 TABLET | Refills: 0 | Status: SHIPPED | OUTPATIENT
Start: 2024-02-02 | End: 2024-02-07

## 2024-02-02 NOTE — PROGRESS NOTES
Assessment & Plan     1. Bacterial URI  Recommend treatment due to patient history and presentation.  Patient states he will continue home conservative cares.  If symptoms not improving in 1 to 2 days or continue to worsen may start oral antibiotic.  We discussed red flags that would warrant emergent evaluation pending strep culture  - azithromycin (ZITHROMAX) 250 MG tablet; Take 2 tablets (500 mg) by mouth daily for 1 day, THEN 1 tablet (250 mg) daily for 4 days.  Dispense: 6 tablet; Refill: 0    2. Throat pain    - Streptococcus A Rapid Screen w/Reflex to PCR - Clinic Collect  - Group A Streptococcus PCR Throat Swab    3. Severe persistent asthma without complication (H28)      4. History of Legionnaire's disease      [facility] KELLY Matson is a 48 year old male who presents to clinic today for the following health issues:  Chief Complaint   Patient presents with    Pharyngitis     X1 wk.    Ear Problem     Pt reports pain in bilateral ears X1 wk.     HPI    Patient presents clinic states that he had onset of symptoms approximately 8 days ago with upper respiratory cough sinus congestion and sore throat.  He does have a history of asthma he has been needing to use his nebulizer and inhaler more frequently.  Patient also has a history of legionnaires disease he had approximately 1 year ago.  He denies fever shortness of breath states cough has been moist nonproductive.      Review of Systems  Constitutional, HEENT, cardiovascular, pulmonary, gi and gu systems are negative, except as otherwise noted.      Objective    BP (!) 136/91   Pulse 95   Temp 97.9  F (36.6  C) (Tympanic)   Resp 14   SpO2 94%   Physical Exam   GENERAL: alert, no distress, and fatigued  EYES: Eyes grossly normal to inspection, PERRL and conjunctivae and sclerae normal  HENT: normal cephalic/atraumatic, ear canals and TM's normal, nose and mouth without ulcers or lesions,  nasal mucosa edematous , rhinorrhea clear, oropharynx clear, and oral mucous membranes moist  NECK: bilateral anterior cervical adenopathy, no asymmetry, masses, or scars, and thyroid normal to palpation  RESP: decreased breath sounds bibasilar  CV: regular rate and rhythm, normal S1 S2, no S3 or S4, no murmur, click or rub, no peripheral edema  ABDOMEN: soft, nontender, no hepatosplenomegaly, no masses and bowel sounds normal  MS: no gross musculoskeletal defects noted, no edema  SKIN: no suspicious lesions or rashes    Results for orders placed or performed in visit on 02/02/24   Streptococcus A Rapid Screen w/Reflex to PCR - Clinic Collect     Status: Normal    Specimen: Throat; Swab   Result Value Ref Range    Group A Strep antigen Negative Negative

## 2024-02-03 NOTE — RESULT ENCOUNTER NOTE
Dano  Your results were normal. Please contact me if you have any questions through my-chart Lynnette NAVARRO-RANJIT

## 2024-02-07 ENCOUNTER — APPOINTMENT (OUTPATIENT)
Dept: GENERAL RADIOLOGY | Facility: CLINIC | Age: 49
End: 2024-02-07
Attending: EMERGENCY MEDICINE
Payer: COMMERCIAL

## 2024-02-07 LAB
BASOPHILS # BLD AUTO: 0 10E3/UL (ref 0–0.2)
BASOPHILS NFR BLD AUTO: 0 %
EOSINOPHIL # BLD AUTO: 0.1 10E3/UL (ref 0–0.7)
EOSINOPHIL NFR BLD AUTO: 1 %
ERYTHROCYTE [DISTWIDTH] IN BLOOD BY AUTOMATED COUNT: 13.1 % (ref 10–15)
HCT VFR BLD AUTO: 42.1 % (ref 40–53)
HGB BLD-MCNC: 14.2 G/DL (ref 13.3–17.7)
IMM GRANULOCYTES # BLD: 0.1 10E3/UL
IMM GRANULOCYTES NFR BLD: 0 %
LYMPHOCYTES # BLD AUTO: 2.5 10E3/UL (ref 0.8–5.3)
LYMPHOCYTES NFR BLD AUTO: 21 %
MCH RBC QN AUTO: 28.2 PG (ref 26.5–33)
MCHC RBC AUTO-ENTMCNC: 33.7 G/DL (ref 31.5–36.5)
MCV RBC AUTO: 84 FL (ref 78–100)
MONOCYTES # BLD AUTO: 0.8 10E3/UL (ref 0–1.3)
MONOCYTES NFR BLD AUTO: 7 %
NEUTROPHILS # BLD AUTO: 8.3 10E3/UL (ref 1.6–8.3)
NEUTROPHILS NFR BLD AUTO: 71 %
NRBC # BLD AUTO: 0 10E3/UL
NRBC BLD AUTO-RTO: 0 /100
PLATELET # BLD AUTO: 239 10E3/UL (ref 150–450)
RBC # BLD AUTO: 5.04 10E6/UL (ref 4.4–5.9)
WBC # BLD AUTO: 11.9 10E3/UL (ref 4–11)

## 2024-02-07 PROCEDURE — 93005 ELECTROCARDIOGRAM TRACING: CPT

## 2024-02-07 PROCEDURE — 36415 COLL VENOUS BLD VENIPUNCTURE: CPT | Performed by: EMERGENCY MEDICINE

## 2024-02-07 PROCEDURE — 80048 BASIC METABOLIC PNL TOTAL CA: CPT | Performed by: EMERGENCY MEDICINE

## 2024-02-07 PROCEDURE — 84484 ASSAY OF TROPONIN QUANT: CPT | Performed by: EMERGENCY MEDICINE

## 2024-02-07 PROCEDURE — 85379 FIBRIN DEGRADATION QUANT: CPT | Performed by: EMERGENCY MEDICINE

## 2024-02-07 PROCEDURE — 83880 ASSAY OF NATRIURETIC PEPTIDE: CPT | Performed by: EMERGENCY MEDICINE

## 2024-02-07 PROCEDURE — 99285 EMERGENCY DEPT VISIT HI MDM: CPT | Mod: 25

## 2024-02-07 PROCEDURE — 87637 SARSCOV2&INF A&B&RSV AMP PRB: CPT | Performed by: EMERGENCY MEDICINE

## 2024-02-07 PROCEDURE — 85025 COMPLETE CBC W/AUTO DIFF WBC: CPT | Performed by: EMERGENCY MEDICINE

## 2024-02-07 PROCEDURE — 71046 X-RAY EXAM CHEST 2 VIEWS: CPT

## 2024-02-08 ENCOUNTER — HOSPITAL ENCOUNTER (EMERGENCY)
Facility: CLINIC | Age: 49
Discharge: HOME | End: 2024-02-08
Attending: EMERGENCY MEDICINE | Admitting: EMERGENCY MEDICINE
Payer: COMMERCIAL

## 2024-02-08 ENCOUNTER — DOCUMENTATION ONLY (OUTPATIENT)
Dept: SLEEP MEDICINE | Facility: CLINIC | Age: 49
End: 2024-02-08
Payer: COMMERCIAL

## 2024-02-08 VITALS
TEMPERATURE: 97.5 F | SYSTOLIC BLOOD PRESSURE: 128 MMHG | RESPIRATION RATE: 16 BRPM | OXYGEN SATURATION: 96 % | HEART RATE: 83 BPM | DIASTOLIC BLOOD PRESSURE: 87 MMHG

## 2024-02-08 DIAGNOSIS — G47.33 OBSTRUCTIVE SLEEP APNEA (ADULT) (PEDIATRIC): Primary | ICD-10-CM

## 2024-02-08 DIAGNOSIS — R06.02 SHORTNESS OF BREATH: ICD-10-CM

## 2024-02-08 DIAGNOSIS — J45.901 EXACERBATION OF ASTHMA, UNSPECIFIED ASTHMA SEVERITY, UNSPECIFIED WHETHER PERSISTENT: ICD-10-CM

## 2024-02-08 LAB
ANION GAP SERPL CALCULATED.3IONS-SCNC: 11 MMOL/L (ref 7–15)
ATRIAL RATE - MUSE: 99 BPM
BUN SERPL-MCNC: 14.1 MG/DL (ref 6–20)
CALCIUM SERPL-MCNC: 9.4 MG/DL (ref 8.6–10)
CHLORIDE SERPL-SCNC: 103 MMOL/L (ref 98–107)
CREAT SERPL-MCNC: 0.93 MG/DL (ref 0.67–1.17)
D DIMER PPP FEU-MCNC: <0.27 UG/ML FEU (ref 0–0.5)
DEPRECATED HCO3 PLAS-SCNC: 26 MMOL/L (ref 22–29)
DIASTOLIC BLOOD PRESSURE - MUSE: NORMAL MMHG
EGFRCR SERPLBLD CKD-EPI 2021: >90 ML/MIN/1.73M2
FLUAV RNA SPEC QL NAA+PROBE: NEGATIVE
FLUBV RNA RESP QL NAA+PROBE: NEGATIVE
GLUCOSE SERPL-MCNC: 99 MG/DL (ref 70–99)
HOLD SPECIMEN: NORMAL
HOLD SPECIMEN: NORMAL
INTERPRETATION ECG - MUSE: NORMAL
NT-PROBNP SERPL-MCNC: <36 PG/ML (ref 0–450)
P AXIS - MUSE: 52 DEGREES
POTASSIUM SERPL-SCNC: 3.8 MMOL/L (ref 3.4–5.3)
PR INTERVAL - MUSE: 138 MS
QRS DURATION - MUSE: 94 MS
QT - MUSE: 350 MS
QTC - MUSE: 449 MS
R AXIS - MUSE: 70 DEGREES
RSV RNA SPEC NAA+PROBE: NEGATIVE
SARS-COV-2 RNA RESP QL NAA+PROBE: NEGATIVE
SODIUM SERPL-SCNC: 140 MMOL/L (ref 135–145)
SYSTOLIC BLOOD PRESSURE - MUSE: NORMAL MMHG
T AXIS - MUSE: 23 DEGREES
TROPONIN T SERPL HS-MCNC: 8 NG/L
VENTRICULAR RATE- MUSE: 99 BPM

## 2024-02-08 PROCEDURE — 250N000012 HC RX MED GY IP 250 OP 636 PS 637: Performed by: EMERGENCY MEDICINE

## 2024-02-08 PROCEDURE — 94640 AIRWAY INHALATION TREATMENT: CPT

## 2024-02-08 PROCEDURE — 250N000009 HC RX 250: Performed by: EMERGENCY MEDICINE

## 2024-02-08 RX ORDER — PREDNISONE 20 MG/1
TABLET ORAL
Qty: 8 TABLET | Refills: 0 | Status: SHIPPED | OUTPATIENT
Start: 2024-02-09 | End: 2024-04-23

## 2024-02-08 RX ORDER — IPRATROPIUM BROMIDE AND ALBUTEROL SULFATE 2.5; .5 MG/3ML; MG/3ML
3 SOLUTION RESPIRATORY (INHALATION) ONCE
Status: COMPLETED | OUTPATIENT
Start: 2024-02-08 | End: 2024-02-08

## 2024-02-08 RX ORDER — PREDNISONE 20 MG/1
40 TABLET ORAL ONCE
Status: COMPLETED | OUTPATIENT
Start: 2024-02-08 | End: 2024-02-08

## 2024-02-08 RX ADMIN — IPRATROPIUM BROMIDE AND ALBUTEROL SULFATE 3 ML: .5; 3 SOLUTION RESPIRATORY (INHALATION) at 04:17

## 2024-02-08 RX ADMIN — PREDNISONE 40 MG: 20 TABLET ORAL at 03:48

## 2024-02-08 ASSESSMENT — ACTIVITIES OF DAILY LIVING (ADL)
ADLS_ACUITY_SCORE: 35
ADLS_ACUITY_SCORE: 35

## 2024-02-08 NOTE — ED TRIAGE NOTES
Pt reports SOB and chest pressure due to dry cough for several days. Pt has Hx of asthma with no relief from inhaler.      Triage Assessment (Adult)       Row Name 02/07/24 2338          Triage Assessment    Airway WDL WDL        Respiratory WDL    Respiratory WDL cough     Rhythm/Pattern, Respiratory shortness of breath  Regular rate, pattern, depth     Cough Frequency infrequent     Cough Type dry        Cardiac WDL    Cardiac WDL chest pain        Chest Pain Assessment    Chest Pain Location midsternal     Character pressure     Duration 1 day

## 2024-02-08 NOTE — DISCHARGE INSTRUCTIONS
Next dose of prednisone on Friday morning and take till gone  Consider follow-up with pulmonology given now two episodes in fairly short period of time where you have needed oral steroids  Duoneb as needed for shortness of breath

## 2024-02-08 NOTE — ED PROVIDER NOTES
History     Chief Complaint:  Shortness of Breath       HPI   Dano Kennedy is a 48 year old male who presented to the emergency department with shortness of breath. Patient reports shortness of breath over the last few days.  He has had recent illness including a sore throat that is improving.  He denies fever.  Reports chest tightness and cough.    Independent Historian:   None - Patient Only    Review of External Notes:   Reviewed the urgent care note from February 2 where he was seen for concerns for upper respiratory illness.  He was prescribed azithromycin but did not start this medication    Medications:    [START ON 2/9/2024] predniSONE (DELTASONE) 20 MG tablet  acetaminophen (TYLENOL) 500 MG tablet  albuterol (PROAIR HFA/PROVENTIL HFA/VENTOLIN HFA) 108 (90 Base) MCG/ACT inhaler  albuterol (PROVENTIL) (2.5 MG/3ML) 0.083% neb solution  fexofenadine-pseudoePHEDrine (ALLEGRA-D ALLERGY & CONGESTION) 180-240 MG 24 hr tablet  fluticasone-vilanterol (BREO ELLIPTA) 200-25 MCG/ACT inhaler  hydrOXYzine HCl (ATARAX) 25 MG tablet  ibuprofen (ADVIL/MOTRIN) 200 MG tablet  ipratropium - albuterol 0.5 mg/2.5 mg/3 mL (DUONEB) 0.5-2.5 (3) MG/3ML neb solution  ondansetron (ZOFRAN) 4 MG tablet  ondansetron (ZOFRAN) 4 MG tablet  predniSONE (DELTASONE) 10 MG tablet  scopolamine (TRANSDERM) 1 MG/3DAYS 72 hr patch        Past Medical History:    Past Medical History:   Diagnosis Date    Allergic rhinitis, cause unspecified     Asthma     Complication of anesthesia     Unspecified asthma(493.90)        Past Surgical History:    Past Surgical History:   Procedure Laterality Date    COLONOSCOPY      DAVINCI XI ASSISTED RESECTION RECTOSIGMOID N/A 11/26/2019    Procedure: ROBOTIC ASSISTED SIGMOID COLECTOMY. MOBILIZATION OF SPLENIC FLESURE;  Surgeon: Travis Brand MD;  Location: [facility] OR    ENDOSCOPIC POLYPECTOMY NASAL      ESOPHAGOSCOPY, GASTROSCOPY, DUODENOSCOPY (EGD), COMBINED N/A 11/19/2021    Procedure:  ESOPHAGOGASTRODUODENOSCOPY, WITH BIOPSIES using cold forceps;  Surgeon: Santhosh Harmon MD;  Location:  GI    ESOPHAGOSCOPY, GASTROSCOPY, DUODENOSCOPY (EGD), COMBINED N/A 11/8/2023    Procedure: Esophagoscopy, gastroscopy, duodenoscopy (EGD), combined;  Surgeon: Jose Spencer MD;  Location:  GI    LAPAROSCOPIC ASSISTED COLECTOMY      LASER HOLMIUM LITHOTRIPSY URETER(S), INSERT STENT, COMBINED Left 1/14/2015    Procedure: COMBINED CYSTOSCOPY, URETEROSCOPY, LASER HOLMIUM LITHOTRIPSY URETER(S), INSERT STENT;  Surgeon: Derrek Cobb MD;  Location: [facility] OR    LITHOTRIPSY      SEPTOPLASTY          Physical Exam   Patient Vitals for the past 24 hrs:   BP Temp Temp src Pulse Resp SpO2   02/08/24 0400 -- -- -- -- -- 96 %   02/08/24 0317 -- -- -- -- -- 97 %   02/08/24 0300 128/87 -- -- 83 16 93 %   02/08/24 0200 (!) 151/89 -- -- 80 -- 93 %   02/07/24 2336 (!) 181/90 97.5  F (36.4  C) Temporal 95 22 97 %        Physical Exam  General: Sitting up in bed  Eyes:  The pupils are equal and round    Conjunctivae and sclerae are normal  ENT:    Atraumatic face  Neck:  Normal range of motion  CV:  Regular rate, regular rhythm     Skin warm and well perfused   Resp:  Non labored breathing on room air    No tachypnea    No cough heard    Lungs clear bilaterally  MS:  Normal muscular tone  Skin:  No rash or acute skin lesions noted  Neuro:   Awake, alert.      Speech is normal and fluent.    Face is symmetric.     Moves all extremities equally  Psych: Normal affect.  Appropriate interactions.    Emergency Department Course   ECG    ECG results from 02/08/24   EKG 12 lead     Value    Systolic Blood Pressure     Diastolic Blood Pressure     Ventricular Rate 99    Atrial Rate 99    MN Interval 138    QRS Duration 94        QTc 449    P Axis 52    R AXIS 70    T Axis 23    Interpretation ECG      Sinus rhythm  Normal ECG  When compared with ECG of 09-NOV-2021 21:08,  No significant change was found        Imaging:  Chest XR,  PA & LAT   Final Result   IMPRESSION: Negative chest.         Laboratory:  Labs Ordered and Resulted from Time of ED Arrival to Time of ED Departure   CBC WITH PLATELETS AND DIFFERENTIAL - Abnormal       Result Value    WBC Count 11.9 (*)     RBC Count 5.04      Hemoglobin 14.2      Hematocrit 42.1      MCV 84      MCH 28.2      MCHC 33.7      RDW 13.1      Platelet Count 239      % Neutrophils 71      % Lymphocytes 21      % Monocytes 7      % Eosinophils 1      % Basophils 0      % Immature Granulocytes 0      NRBCs per 100 WBC 0      Absolute Neutrophils 8.3      Absolute Lymphocytes 2.5      Absolute Monocytes 0.8      Absolute Eosinophils 0.1      Absolute Basophils 0.0      Absolute Immature Granulocytes 0.1      Absolute NRBCs 0.0     INFLUENZA A/B, RSV, & SARS-COV2 PCR - Normal    Influenza A PCR Negative      Influenza B PCR Negative      RSV PCR Negative      SARS CoV2 PCR Negative     BASIC METABOLIC PANEL - Normal    Sodium 140      Potassium 3.8      Chloride 103      Carbon Dioxide (CO2) 26      Anion Gap 11      Urea Nitrogen 14.1      Creatinine 0.93      GFR Estimate >90      Calcium 9.4      Glucose 99     TROPONIN T, HIGH SENSITIVITY - Normal    Troponin T, High Sensitivity 8     D DIMER QUANTITATIVE - Normal    D-Dimer Quantitative <0.27     NT PROBNP INPATIENT - Normal    N terminal Pro BNP Inpatient <36          Emergency Department Course & Assessments:    Interventions:  Medications   predniSONE (DELTASONE) tablet 40 mg (40 mg Oral $Given 2/8/24 0837)   ipratropium - albuterol 0.5 mg/2.5 mg/3 mL (DUONEB) neb solution 3 mL (3 mLs Nebulization $Given 2/8/24 4779)      Independent Interpretation (X-rays, CTs, rhythm strip):  I independently reviewed chest xray - no pneumonia seen    Disposition:  Home    Impression & Plan      Medical Decision Making:  Dano Kennedy is a 48-year-old male who presented to the emergency department shortness of breath.  Patient looks well  with no wheezing on exam and no hypoxia.  Does not appear short of breath at this time.  He does have a history of asthma and has had recent URI.  COVID and influenza testing was negative.  Chest x-ray with no evidence of pneumonia.  D-dimer was normal and doubt PE and do not think further workup for this is indicated.  Doubt ACS and troponin is undetectable.  Doubt pericardial effusion or CHF.  Suspect asthma exacerbated by recent URI.  Given no evidence of pneumonia on x-ray, do not think antibiotics are indicated.  Did prescribe short burst of prednisone.  Did recommend follow-up with pulmonology given that he has had prednisone twice since December.     Diagnosis:    ICD-10-CM    1. Shortness of breath  R06.02       2. Exacerbation of asthma, unspecified asthma severity, unspecified whether persistent  J45.901          2/8/2024   Fay Kerns MD, MD  02/08/24 0659

## 2024-02-10 ENCOUNTER — HOSPITAL ENCOUNTER (EMERGENCY)
Facility: CLINIC | Age: 49
Discharge: HOME | End: 2024-02-10
Attending: EMERGENCY MEDICINE | Admitting: EMERGENCY MEDICINE
Payer: COMMERCIAL

## 2024-02-10 ENCOUNTER — APPOINTMENT (OUTPATIENT)
Dept: CT IMAGING | Facility: CLINIC | Age: 49
End: 2024-02-10
Attending: EMERGENCY MEDICINE
Payer: COMMERCIAL

## 2024-02-10 VITALS
HEART RATE: 86 BPM | OXYGEN SATURATION: 96 % | TEMPERATURE: 97.5 F | SYSTOLIC BLOOD PRESSURE: 129 MMHG | DIASTOLIC BLOOD PRESSURE: 85 MMHG | RESPIRATION RATE: 20 BRPM

## 2024-02-10 DIAGNOSIS — R10.9 ABDOMINAL PAIN OF UNKNOWN ETIOLOGY: ICD-10-CM

## 2024-02-10 LAB
ALBUMIN SERPL BCG-MCNC: 4.8 G/DL (ref 3.5–5.2)
ALBUMIN UR-MCNC: NEGATIVE MG/DL
ALP SERPL-CCNC: 112 U/L (ref 40–150)
ALT SERPL W P-5'-P-CCNC: 49 U/L (ref 0–70)
ANION GAP SERPL CALCULATED.3IONS-SCNC: 13 MMOL/L (ref 7–15)
APPEARANCE UR: CLEAR
AST SERPL W P-5'-P-CCNC: 25 U/L (ref 0–45)
BASOPHILS # BLD AUTO: 0 10E3/UL (ref 0–0.2)
BASOPHILS NFR BLD AUTO: 0 %
BILIRUB SERPL-MCNC: 0.7 MG/DL
BILIRUB UR QL STRIP: NEGATIVE
BUN SERPL-MCNC: 15.9 MG/DL (ref 6–20)
CALCIUM SERPL-MCNC: 9.5 MG/DL (ref 8.6–10)
CHLORIDE SERPL-SCNC: 103 MMOL/L (ref 98–107)
COLOR UR AUTO: ABNORMAL
CREAT SERPL-MCNC: 0.98 MG/DL (ref 0.67–1.17)
DEPRECATED HCO3 PLAS-SCNC: 24 MMOL/L (ref 22–29)
EGFRCR SERPLBLD CKD-EPI 2021: >90 ML/MIN/1.73M2
EOSINOPHIL # BLD AUTO: 0 10E3/UL (ref 0–0.7)
EOSINOPHIL NFR BLD AUTO: 0 %
ERYTHROCYTE [DISTWIDTH] IN BLOOD BY AUTOMATED COUNT: 13.2 % (ref 10–15)
GLUCOSE SERPL-MCNC: 99 MG/DL (ref 70–99)
GLUCOSE UR STRIP-MCNC: NEGATIVE MG/DL
HCT VFR BLD AUTO: 42.8 % (ref 40–53)
HGB BLD-MCNC: 14.3 G/DL (ref 13.3–17.7)
HGB UR QL STRIP: NEGATIVE
HOLD SPECIMEN: NORMAL
HOLD SPECIMEN: NORMAL
IMM GRANULOCYTES # BLD: 0.1 10E3/UL
IMM GRANULOCYTES NFR BLD: 1 %
KETONES UR STRIP-MCNC: 20 MG/DL
LEUKOCYTE ESTERASE UR QL STRIP: NEGATIVE
LIPASE SERPL-CCNC: 24 U/L (ref 13–60)
LYMPHOCYTES # BLD AUTO: 2.1 10E3/UL (ref 0.8–5.3)
LYMPHOCYTES NFR BLD AUTO: 16 %
MCH RBC QN AUTO: 27.7 PG (ref 26.5–33)
MCHC RBC AUTO-ENTMCNC: 33.4 G/DL (ref 31.5–36.5)
MCV RBC AUTO: 83 FL (ref 78–100)
MONOCYTES # BLD AUTO: 0.7 10E3/UL (ref 0–1.3)
MONOCYTES NFR BLD AUTO: 5 %
MUCOUS THREADS #/AREA URNS LPF: PRESENT /LPF
NEUTROPHILS # BLD AUTO: 10.6 10E3/UL (ref 1.6–8.3)
NEUTROPHILS NFR BLD AUTO: 78 %
NITRATE UR QL: NEGATIVE
NRBC # BLD AUTO: 0 10E3/UL
NRBC BLD AUTO-RTO: 0 /100
PH UR STRIP: 6 [PH] (ref 5–7)
PLATELET # BLD AUTO: 251 10E3/UL (ref 150–450)
POTASSIUM SERPL-SCNC: 3.8 MMOL/L (ref 3.4–5.3)
PROT SERPL-MCNC: 7.7 G/DL (ref 6.4–8.3)
RBC # BLD AUTO: 5.17 10E6/UL (ref 4.4–5.9)
RBC URINE: 1 /HPF
SODIUM SERPL-SCNC: 140 MMOL/L (ref 135–145)
SP GR UR STRIP: 1.01 (ref 1–1.03)
UROBILINOGEN UR STRIP-MCNC: NORMAL MG/DL
WBC # BLD AUTO: 13.6 10E3/UL (ref 4–11)
WBC URINE: 1 /HPF

## 2024-02-10 PROCEDURE — 85025 COMPLETE CBC W/AUTO DIFF WBC: CPT | Performed by: EMERGENCY MEDICINE

## 2024-02-10 PROCEDURE — 250N000011 HC RX IP 250 OP 636: Performed by: EMERGENCY MEDICINE

## 2024-02-10 PROCEDURE — 83690 ASSAY OF LIPASE: CPT | Performed by: EMERGENCY MEDICINE

## 2024-02-10 PROCEDURE — 96374 THER/PROPH/DIAG INJ IV PUSH: CPT | Mod: 59

## 2024-02-10 PROCEDURE — 96361 HYDRATE IV INFUSION ADD-ON: CPT

## 2024-02-10 PROCEDURE — 82040 ASSAY OF SERUM ALBUMIN: CPT | Performed by: EMERGENCY MEDICINE

## 2024-02-10 PROCEDURE — 99285 EMERGENCY DEPT VISIT HI MDM: CPT | Mod: 25

## 2024-02-10 PROCEDURE — 81001 URINALYSIS AUTO W/SCOPE: CPT | Performed by: EMERGENCY MEDICINE

## 2024-02-10 PROCEDURE — 36415 COLL VENOUS BLD VENIPUNCTURE: CPT | Performed by: EMERGENCY MEDICINE

## 2024-02-10 PROCEDURE — 250N000009 HC RX 250: Performed by: EMERGENCY MEDICINE

## 2024-02-10 PROCEDURE — 96375 TX/PRO/DX INJ NEW DRUG ADDON: CPT

## 2024-02-10 PROCEDURE — 258N000003 HC RX IP 258 OP 636: Performed by: EMERGENCY MEDICINE

## 2024-02-10 PROCEDURE — 74177 CT ABD & PELVIS W/CONTRAST: CPT

## 2024-02-10 RX ORDER — IOPAMIDOL 755 MG/ML
114 INJECTION, SOLUTION INTRAVASCULAR ONCE
Status: COMPLETED | OUTPATIENT
Start: 2024-02-10 | End: 2024-02-10

## 2024-02-10 RX ORDER — KETOROLAC TROMETHAMINE 15 MG/ML
15 INJECTION, SOLUTION INTRAMUSCULAR; INTRAVENOUS ONCE
Status: COMPLETED | OUTPATIENT
Start: 2024-02-10 | End: 2024-02-10

## 2024-02-10 RX ORDER — HYDROMORPHONE HYDROCHLORIDE 1 MG/ML
0.5 INJECTION, SOLUTION INTRAMUSCULAR; INTRAVENOUS; SUBCUTANEOUS
Status: COMPLETED | OUTPATIENT
Start: 2024-02-10 | End: 2024-02-10

## 2024-02-10 RX ADMIN — SODIUM CHLORIDE 1000 ML: 9 INJECTION, SOLUTION INTRAVENOUS at 18:26

## 2024-02-10 RX ADMIN — IOPAMIDOL 114 ML: 755 INJECTION, SOLUTION INTRAVENOUS at 18:37

## 2024-02-10 RX ADMIN — SODIUM CHLORIDE 73 ML: 9 INJECTION, SOLUTION INTRAVENOUS at 18:37

## 2024-02-10 RX ADMIN — HYDROMORPHONE HYDROCHLORIDE 0.5 MG: 1 INJECTION, SOLUTION INTRAMUSCULAR; INTRAVENOUS; SUBCUTANEOUS at 18:25

## 2024-02-10 RX ADMIN — KETOROLAC TROMETHAMINE 15 MG: 15 INJECTION, SOLUTION INTRAMUSCULAR; INTRAVENOUS at 18:26

## 2024-02-10 ASSESSMENT — ACTIVITIES OF DAILY LIVING (ADL): ADLS_ACUITY_SCORE: 35

## 2024-02-11 NOTE — ED TRIAGE NOTES
Patient presents to the ER for complaints of left lower abdominal pain. Per patient report, has hx of diverticulitis and experiencing similar symptoms as previous episodes. Reports that the pain has been ongoing for a couple of days but has worsened today. 9/10 pain causing him intermittent SOB.      Triage Assessment (Adult)       Row Name 02/10/24 4511          Triage Assessment    Airway WDL WDL        Respiratory WDL    Respiratory WDL X;rhythm/pattern     Rhythm/Pattern, Respiratory shortness of breath        Skin Circulation/Temperature WDL    Skin Circulation/Temperature WDL WDL        Cardiac WDL    Cardiac WDL WDL        Peripheral/Neurovascular WDL    Peripheral Neurovascular WDL WDL        Cognitive/Neuro/Behavioral WDL    Cognitive/Neuro/Behavioral WDL WDL

## 2024-02-11 NOTE — ED PROVIDER NOTES
History     Chief Complaint:  Abdominal Pain       HPI   Dano Kennedy is a 48 year old male presenting with left-sided abdominal pain.  He has a history of diverticulitis and states that this feels similar.  He has no back pain, no referred pain into the testicles.  He denies any hematuria.  He relays nausea but no vomiting.  He denies any fevers.  Of note he was in the emergency department 2 days ago for shortness of breath and had a workup at that time that returned is largely unremarkable.  There are no further aggravating or alleviating factors, no further associated symptoms.      Independent Historian:    None    Review of External Notes:  Emergency department visit from February 8 was reviewed, urgent care visit from February 2 was reviewed    Medications:    acetaminophen (TYLENOL) 500 MG tablet  albuterol (PROAIR HFA/PROVENTIL HFA/VENTOLIN HFA) 108 (90 Base) MCG/ACT inhaler  albuterol (PROVENTIL) (2.5 MG/3ML) 0.083% neb solution  fexofenadine-pseudoePHEDrine (ALLEGRA-D ALLERGY & CONGESTION) 180-240 MG 24 hr tablet  fluticasone-vilanterol (BREO ELLIPTA) 200-25 MCG/ACT inhaler  hydrOXYzine HCl (ATARAX) 25 MG tablet  ibuprofen (ADVIL/MOTRIN) 200 MG tablet  ipratropium - albuterol 0.5 mg/2.5 mg/3 mL (DUONEB) 0.5-2.5 (3) MG/3ML neb solution  ondansetron (ZOFRAN) 4 MG tablet  ondansetron (ZOFRAN) 4 MG tablet  predniSONE (DELTASONE) 10 MG tablet  predniSONE (DELTASONE) 20 MG tablet  scopolamine (TRANSDERM) 1 MG/3DAYS 72 hr patch        Past Medical History:    Past Medical History:   Diagnosis Date    Allergic rhinitis, cause unspecified     Asthma     Complication of anesthesia     Unspecified asthma(493.90)        Past Surgical History:    Past Surgical History:   Procedure Laterality Date    COLONOSCOPY      DAVINCI XI ASSISTED RESECTION RECTOSIGMOID N/A 11/26/2019    Procedure: ROBOTIC ASSISTED SIGMOID COLECTOMY. MOBILIZATION OF SPLENIC FLESURE;  Surgeon: Travis Brand MD;  Location:  OR     ENDOSCOPIC POLYPECTOMY NASAL      ESOPHAGOSCOPY, GASTROSCOPY, DUODENOSCOPY (EGD), COMBINED N/A 11/19/2021    Procedure: ESOPHAGOGASTRODUODENOSCOPY, WITH BIOPSIES using cold forceps;  Surgeon: Santhosh Harmon MD;  Location: [facility]    ESOPHAGOSCOPY, GASTROSCOPY, DUODENOSCOPY (EGD), COMBINED N/A 11/8/2023    Procedure: Esophagoscopy, gastroscopy, duodenoscopy (EGD), combined;  Surgeon: Jose Spencer MD;  Location: [facility]    LAPAROSCOPIC ASSISTED COLECTOMY      LASER HOLMIUM LITHOTRIPSY URETER(S), INSERT STENT, COMBINED Left 1/14/2015    Procedure: COMBINED CYSTOSCOPY, URETEROSCOPY, LASER HOLMIUM LITHOTRIPSY URETER(S), INSERT STENT;  Surgeon: Derrek Cobb MD;  Location: [facility] OR    LITHOTRIPSY      SEPTOPLASTY            Physical Exam   Patient Vitals for the past 24 hrs:   BP Temp Temp src Pulse Resp SpO2   02/10/24 1802 (!) 152/97 97.5  F (36.4  C) Temporal 120 20 98 %        Physical Exam  General: Alert, interactive in mild to moderate distress  Head:  Scalp is atraumatic  Eyes:  The pupils are equal, round, and reactive to light    EOM's intact    No scleral icterus  ENT:      Nose:  The external nose is normal  Ears:  External ears are normal      Neck:  Normal range of motion.      There is no rigidity.    Trachea is in the midline         CV:  Tachycardia  Resp:  Breath sounds are clear bilaterally    Non-labored, no retractions or accessory muscle use      GI:  Abdomen is soft, no distension, left upper and left lower quadrant tenderness.       MS:  Normal strength in all 4 extremities  Skin:  Warm and dry, No rash or lesions noted.  Neuro:   Strength 5/5 x4.    Psych: Awake. Alert.  Normal affect.      Appropriate interactions.  Emergency Department Course   Imaging:  CT Abdomen Pelvis w Contrast   Final Result   IMPRESSION:    1.  Surgical anastomosis sigmoid colon and a few sigmoid diverticula. No evidence for acute diverticulitis.   2.  No findings to account for the patient's left lower  quadrant pain.   3.  Tiny nonobstructing stones both kidneys.   4.  Fatty liver.        Report per radiology    Laboratory:  Labs Ordered and Resulted from Time of ED Arrival to Time of ED Departure   ROUTINE UA WITH MICROSCOPIC REFLEX TO CULTURE - Abnormal       Result Value    Color Urine Light Yellow      Appearance Urine Clear      Glucose Urine Negative      Bilirubin Urine Negative      Ketones Urine 20 (*)     Specific Gravity Urine 1.010      Blood Urine Negative      pH Urine 6.0      Protein Albumin Urine Negative      Urobilinogen Urine Normal      Nitrite Urine Negative      Leukocyte Esterase Urine Negative      Mucus Urine Present (*)     RBC Urine 1      WBC Urine 1     CBC WITH PLATELETS AND DIFFERENTIAL - Abnormal    WBC Count 13.6 (*)     RBC Count 5.17      Hemoglobin 14.3      Hematocrit 42.8      MCV 83      MCH 27.7      MCHC 33.4      RDW 13.2      Platelet Count 251      % Neutrophils 78      % Lymphocytes 16      % Monocytes 5      % Eosinophils 0      % Basophils 0      % Immature Granulocytes 1      NRBCs per 100 WBC 0      Absolute Neutrophils 10.6 (*)     Absolute Lymphocytes 2.1      Absolute Monocytes 0.7      Absolute Eosinophils 0.0      Absolute Basophils 0.0      Absolute Immature Granulocytes 0.1      Absolute NRBCs 0.0     COMPREHENSIVE METABOLIC PANEL - Normal    Sodium 140      Potassium 3.8      Carbon Dioxide (CO2) 24      Anion Gap 13      Urea Nitrogen 15.9      Creatinine 0.98      GFR Estimate >90      Calcium 9.5      Chloride 103      Glucose 99      Alkaline Phosphatase 112      AST 25      ALT 49      Protein Total 7.7      Albumin 4.8      Bilirubin Total 0.7     LIPASE - Normal    Lipase 24          Procedures   None    Emergency Department Course & Assessments:  Interventions:  Medications   sodium chloride 0.9% BOLUS 1,000 mL (0 mLs Intravenous Stopped 2/10/24 2014)   ketorolac (TORADOL) injection 15 mg (15 mg Intravenous $Given 2/10/24 1826)   HYDROmorphone (PF)  (DILAUDID) injection 0.5 mg (0.5 mg Intravenous $Given 2/10/24 1825)   Saline (73 mLs As instructed $Given 2/10/24 1837)   iopamidol (ISOVUE-370) solution 114 mL (114 mLs Intravenous $Given 2/10/24 1837)        Assessments:  Patient was evaluated at the bedside    Independent Interpretation (X-rays, CTs, rhythm strip):  None    Consultations/Discussion of Management or Tests:  None       Social Determinants of Health affecting care:  None     Disposition:  The patient was discharged to home.     Impression & Plan      Medical Decision Making:  Follow presentation history physical examination were performed, the above workup was undertaken.  CT imaging returns largely unremarkable.  Urinalysis is unremarkable, he does have a mild leukocytosis otherwise no acute lab abnormalities.  He had significant improvement of his pain with the medications above.  His heart rate trended down from the 120s to normal after the interventions above.  At this point I do not have a clear-cut etiology for his symptoms but I believe he can safely be discharged home.  Of note given the left upper quadrant nature of his pain I did consider pulmonary embolism, he was in the emergency department 2 days ago and had a negative D-dimer at that time, I doubt PE.  He will follow-up with his primary care provider and return to the emergency department if new symptoms develop.        Diagnosis:    ICD-10-CM    1. Abdominal pain of unknown etiology  R10.9              Cameron Terry MD  2/10/2024   Cameron Terry,*              Cameron Terry MD  02/10/24 2029

## 2024-02-12 ENCOUNTER — TELEPHONE (OUTPATIENT)
Dept: SLEEP MEDICINE | Facility: CLINIC | Age: 49
End: 2024-02-12
Payer: COMMERCIAL

## 2024-02-12 NOTE — TELEPHONE ENCOUNTER
[facility] Orders have been received from [email or web address] and given to Dr. Ribeiro to review and sign.    MARKO Summers

## 2024-02-14 ENCOUNTER — MYC REFILL (OUTPATIENT)
Dept: FAMILY MEDICINE | Facility: CLINIC | Age: 49
End: 2024-02-14
Payer: COMMERCIAL

## 2024-02-14 DIAGNOSIS — J45.50 SEVERE PERSISTENT ASTHMA WITHOUT COMPLICATION (H): ICD-10-CM

## 2024-02-14 DIAGNOSIS — R06.2 WHEEZE: ICD-10-CM

## 2024-02-14 RX ORDER — ALBUTEROL SULFATE 0.83 MG/ML
2.5 SOLUTION RESPIRATORY (INHALATION) EVERY 4 HOURS PRN
Qty: 120 ML | Refills: 1 | Status: SHIPPED | OUTPATIENT
Start: 2024-02-14 | End: 2024-04-29

## 2024-03-19 ENCOUNTER — OFFICE VISIT (OUTPATIENT)
Dept: UROLOGY | Facility: CLINIC | Age: 49
End: 2024-03-19
Payer: COMMERCIAL

## 2024-03-19 VITALS — SYSTOLIC BLOOD PRESSURE: 122 MMHG | OXYGEN SATURATION: 95 % | DIASTOLIC BLOOD PRESSURE: 75 MMHG | HEART RATE: 78 BPM

## 2024-03-19 DIAGNOSIS — N20.0 CALCULUS OF KIDNEY: Primary | ICD-10-CM

## 2024-03-19 PROCEDURE — 99214 OFFICE O/P EST MOD 30 MIN: CPT | Performed by: UROLOGY

## 2024-03-19 NOTE — PROGRESS NOTES
Name: Dano Kennedy   MRN: [id number]  YOB: 1975    Assessment and Plan:  49 year old male with recent stone passage and bilateral small renal stones interested in metabolic stone evaluation.     1. Calculus of kidney, continuing issue    Discussed that we can pursue 24-hour urine study to assess his metabolic risk factors for stones and try admits to dietary medical management to reduce the frequency of stone events and stone growth in the future.  Will have him follow-up in clinic after.    Ebenezer Braden MD  March 19, 2024         Chief Complaint: Renal stones and right ureteral stone    History of Present Illness:  Dano Kennedy is a 49 year old male seen in consultation for discussion of urinary tract stones.      Patient has a history of nephrolithiasis status post left ureteroscopy in 2015 for calcium nephrolithiasis.  He has not had many issues since that time.    On February 10, 2024, patient severe left flank pain and presented to the ER.  There is some left ureterectasis however no stone seen on CT on February 10, 2024.  This with contrast and no significant other stones are seen on the right side of the left side.    Patient then represented to [facility] ER February 13 for right flank pain and was found to have a 2 mm right ureterovesical junction stone.  Pain subsided quickly within the ER and has been pain-free since.  CT report was reviewed but images not available .    Currently, they are experiencing no flank pain, no nausea, no vomiting, no hematuria, or no dysuria.      Patient is interested in metabolic stone prevention (or issues with the stone going forward.    Pertinent stone history:  + Personal stone history  + Prior stone procedure (left ureteroscopy)  + Prior stone analysis (90% COM, 10% COD 1/14/2015)  -- Prior metabolic evaluation  -- Family stone history    Pertinent stone medical history  + Obesity   -- Diabetes  -- Neurologic disease limiting mobility   --  Osteoporosis  -- Gout  -- Gastric bypass surgery  -- Sarcoidosis  -- Inflammatory bowel disease  -- History of non-stone  surgery     Pertinent medications:  -- Antiplatelet  -- Anticoagulant  -- Topiramate   -- Thiazide  -- Potassium supplement      I reviewed internal labs, of which pertinent ones include:   Hemoglobin   Date Value Ref Range Status   02/10/2024 14.3 13.3 - 17.7 g/dL Final   12/15/2019 13.4 13.3 - 17.7 g/dL Final     Potassium   Date Value Ref Range Status   02/10/2024 3.8 3.4 - 5.3 mmol/L Final   11/09/2021 4.1 3.5 - 5.0 mmol/L Final   07/13/2020 3.9 3.5 - 5.0 mmol/L Final     Creatinine   Date Value Ref Range Status   02/10/2024 0.98 0.67 - 1.17 mg/dL Final   07/13/2020 1.00 0.70 - 1.30 mg/dL Final     pH Urine   Date Value Ref Range Status   02/10/2024 6.0 5.0 - 7.0 Final   09/16/2015 5.5 5.0 - 7.0 pH Final       Calcium   Date Value Ref Range Status   02/10/2024 9.5 8.6 - 10.0 mg/dL Final   12/15/2019 9.2 8.5 - 10.1 mg/dL Final         I reviewed internal records which in summarized above.         Past Medical History:  Past Medical History:   Diagnosis Date    Allergic rhinitis, cause unspecified     Asthma     Complication of anesthesia     did not respond well to succs    Prostate infection     Unspecified asthma(493.90)             Past Surgical History:  Past Surgical History:   Procedure Laterality Date    COLONOSCOPY      DAVINCI XI ASSISTED RESECTION RECTOSIGMOID N/A 11/26/2019    Procedure: ROBOTIC ASSISTED SIGMOID COLECTOMY. MOBILIZATION OF SPLENIC FLESURE;  Surgeon: Travis Brand MD;  Location:  OR    ENDOSCOPIC POLYPECTOMY NASAL      ESOPHAGOSCOPY, GASTROSCOPY, DUODENOSCOPY (EGD), COMBINED N/A 11/19/2021    Procedure: ESOPHAGOGASTRODUODENOSCOPY, WITH BIOPSIES using cold forceps;  Surgeon: Santhosh Harmon MD;  Location:  GI    ESOPHAGOSCOPY, GASTROSCOPY, DUODENOSCOPY (EGD), COMBINED N/A 11/08/2023    Procedure: Esophagoscopy, gastroscopy, duodenoscopy (EGD), combined;   Surgeon: Jose Spencer MD;  Location:  GI    LAPAROSCOPIC ASSISTED COLECTOMY      LASER HOLMIUM LITHOTRIPSY URETER(S), INSERT STENT, COMBINED Left 01/14/2015    Procedure: COMBINED CYSTOSCOPY, URETEROSCOPY, LASER HOLMIUM LITHOTRIPSY URETER(S), INSERT STENT;  Surgeon: Derrek Cobb MD;  Location: [facility] OR    LITHOTRIPSY      SEPTOPLASTY              Social History:  Social History     Tobacco Use    Smoking status: Never    Smokeless tobacco: Never   Vaping Use    Vaping Use: Never used   Substance Use Topics    Alcohol use: Yes     Comment: 1-2 per month    Drug use: No            Family History:  Family History   Problem Relation Age of Onset    Hyperlipidemia Mother     Sleep Apnea Mother     Glaucoma Father     Glaucoma Maternal Grandfather     Macular Degeneration No family hx of             Allergies:  Allergies   Allergen Reactions    Bee Pollen Anaphylaxis    Cats Difficulty breathing    Mold     Succinylcholine Other (See Comments)     Convulsions, muscle pain    Trees             Medications:  Current Outpatient Medications   Medication Sig    acetaminophen (TYLENOL) 500 MG tablet Take 1,000 mg by mouth every 6 hours as needed for mild pain    albuterol (PROAIR HFA/PROVENTIL HFA/VENTOLIN HFA) 108 (90 Base) MCG/ACT inhaler Inhale 2 puffs into the lungs every 4 hours as needed for shortness of breath / dyspnea    albuterol (PROVENTIL) (2.5 MG/3ML) 0.083% neb solution Take 1 vial (2.5 mg) by nebulization every 4 hours as needed for shortness of breath or wheezing    fexofenadine-pseudoePHEDrine (ALLEGRA-D ALLERGY & CONGESTION) 180-240 MG 24 hr tablet TAKE 1 TABLET BY MOUTH EVERY DAY AS NEEDED    fluticasone-vilanterol (BREO ELLIPTA) 200-25 MCG/ACT inhaler Inhale 1 puff into the lungs daily (Patient not taking: Reported on 3/19/2024)    hydrOXYzine HCl (ATARAX) 25 MG tablet Take 1 tablet (25 mg) by mouth 3 times daily as needed for anxiety (Patient not taking: Reported on 3/19/2024)     "ibuprofen (ADVIL/MOTRIN) 200 MG tablet Take 600 mg by mouth every 6 hours as needed for pain (Patient not taking: Reported on 3/19/2024)    ipratropium - albuterol 0.5 mg/2.5 mg/3 mL (DUONEB) 0.5-2.5 (3) MG/3ML neb solution Take 1 vial (3 mLs) by nebulization every 4 hours as needed for shortness of breath or wheezing (Patient not taking: Reported on 3/19/2024)    ondansetron (ZOFRAN) 4 MG tablet Take 1 tablet (4 mg) by mouth every 8 hours as needed for nausea (Patient not taking: Reported on 3/19/2024)    ondansetron (ZOFRAN) 4 MG tablet Take 1 tablet (4 mg) by mouth every 8 hours as needed for nausea or vomiting (Patient not taking: Reported on 3/19/2024)    predniSONE (DELTASONE) 10 MG tablet Take 10 mg by mouth daily (Patient not taking: Reported on 2/2/2024)    predniSONE (DELTASONE) 20 MG tablet Take two tablets (= 40mg) each day for 4 (four) days (Patient not taking: Reported on 3/19/2024)    scopolamine (TRANSDERM) 1 MG/3DAYS 72 hr patch Place 1 patch onto the skin every 72 hours (Patient not taking: Reported on 3/19/2024)     No current facility-administered medications for this visit.       Review of Systems:   ROS: 10 point ROS neg other than the symptoms noted above in the HPI.    Physical Exam:  B/P: 122/75, T: Data Unavailable, P: 78, R: Data Unavailable  Estimated body mass index is 35.85 kg/m  as calculated from the following:    Height as of 11/6/23: 1.702 m (5' 7\").    Weight as of 11/6/23: 103.8 kg (228 lb 14.4 oz).  General Appearance Adult: Alert, no acute distress, oriented  Lungs: no respiratory distress, or pursed lip breathing  Neuro: Alert, oriented, speech and mentation normal  Psych: affect and mood normal    Outside records:   I spent 5 minutes reviewing outside records.    "

## 2024-03-19 NOTE — LETTER
3/19/2024       RE: Dano Kennedy  [address]  [place] [address]     Dear Colleague,    Thank you for referring your patient, Dano Kennedy, to the [facility]. Please see a copy of my visit note below.    Name: Dano Kennedy   MRN: [id number]  YOB: 1975    Assessment and Plan:  49 year old male with recent stone passage and bilateral small renal stones interested in metabolic stone evaluation.     1. Calculus of kidney, continuing issue    Discussed that we can pursue 24-hour urine study to assess his metabolic risk factors for stones and try admits to dietary medical management to reduce the frequency of stone events and stone growth in the future.  Will have him follow-up in clinic after.    Ebenezer Braden MD  March 19, 2024         Chief Complaint: Renal stones and right ureteral stone    History of Present Illness:  Dano Kennedy is a 49 year old male seen in consultation for discussion of urinary tract stones.      Patient has a history of nephrolithiasis status post left ureteroscopy in 2015 for calcium nephrolithiasis.  He has not had many issues since that time.    On February 10, 2024, patient severe left flank pain and presented to the ER.  There is some left ureterectasis however no stone seen on CT on February 10, 2024.  This with contrast and no significant other stones are seen on the right side of the left side.    Patient then represented to [facility] ER February 13 for right flank pain and was found to have a 2 mm right ureterovesical junction stone.  Pain subsided quickly within the ER and has been pain-free since.  CT report was reviewed but images not available .    Currently, they are experiencing no flank pain, no nausea, no vomiting, no hematuria, or no dysuria.      Patient is interested in metabolic stone prevention (or issues with the stone going  forward.    Pertinent stone history:  + Personal stone history  + Prior stone procedure (left ureteroscopy)  + Prior stone analysis (90% COM, 10% COD 1/14/2015)  -- Prior metabolic evaluation  -- Family stone history    Pertinent stone medical history  + Obesity   -- Diabetes  -- Neurologic disease limiting mobility   -- Osteoporosis  -- Gout  -- Gastric bypass surgery  -- Sarcoidosis  -- Inflammatory bowel disease  -- History of non-stone  surgery     Pertinent medications:  -- Antiplatelet  -- Anticoagulant  -- Topiramate   -- Thiazide  -- Potassium supplement      I reviewed internal labs, of which pertinent ones include:   Hemoglobin   Date Value Ref Range Status   02/10/2024 14.3 13.3 - 17.7 g/dL Final   12/15/2019 13.4 13.3 - 17.7 g/dL Final     Potassium   Date Value Ref Range Status   02/10/2024 3.8 3.4 - 5.3 mmol/L Final   11/09/2021 4.1 3.5 - 5.0 mmol/L Final   07/13/2020 3.9 3.5 - 5.0 mmol/L Final     Creatinine   Date Value Ref Range Status   02/10/2024 0.98 0.67 - 1.17 mg/dL Final   07/13/2020 1.00 0.70 - 1.30 mg/dL Final     pH Urine   Date Value Ref Range Status   02/10/2024 6.0 5.0 - 7.0 Final   09/16/2015 5.5 5.0 - 7.0 pH Final       Calcium   Date Value Ref Range Status   02/10/2024 9.5 8.6 - 10.0 mg/dL Final   12/15/2019 9.2 8.5 - 10.1 mg/dL Final         I reviewed internal records which in summarized above.         Past Medical History:  Past Medical History:   Diagnosis Date    Allergic rhinitis, cause unspecified     Asthma     Complication of anesthesia     did not respond well to succs    Prostate infection     Unspecified asthma(493.90)             Past Surgical History:  Past Surgical History:   Procedure Laterality Date    COLONOSCOPY      DAVINCI XI ASSISTED RESECTION RECTOSIGMOID N/A 11/26/2019    Procedure: ROBOTIC ASSISTED SIGMOID COLECTOMY. MOBILIZATION OF SPLENIC FLESURE;  Surgeon: Travis Brand MD;  Location: [facility] OR    ENDOSCOPIC POLYPECTOMY NASAL      ESOPHAGOSCOPY,  GASTROSCOPY, DUODENOSCOPY (EGD), COMBINED N/A 11/19/2021    Procedure: ESOPHAGOGASTRODUODENOSCOPY, WITH BIOPSIES using cold forceps;  Surgeon: Santhosh Harmon MD;  Location:  GI    ESOPHAGOSCOPY, GASTROSCOPY, DUODENOSCOPY (EGD), COMBINED N/A 11/08/2023    Procedure: Esophagoscopy, gastroscopy, duodenoscopy (EGD), combined;  Surgeon: Jose Spencer MD;  Location:  GI    LAPAROSCOPIC ASSISTED COLECTOMY      LASER HOLMIUM LITHOTRIPSY URETER(S), INSERT STENT, COMBINED Left 01/14/2015    Procedure: COMBINED CYSTOSCOPY, URETEROSCOPY, LASER HOLMIUM LITHOTRIPSY URETER(S), INSERT STENT;  Surgeon: Derrek Cobb MD;  Location: [facility] OR    LITHOTRIPSY      SEPTOPLASTY              Social History:  Social History     Tobacco Use    Smoking status: Never    Smokeless tobacco: Never   Vaping Use    Vaping Use: Never used   Substance Use Topics    Alcohol use: Yes     Comment: 1-2 per month    Drug use: No            Family History:  Family History   Problem Relation Age of Onset    Hyperlipidemia Mother     Sleep Apnea Mother     Glaucoma Father     Glaucoma Maternal Grandfather     Macular Degeneration No family hx of             Allergies:  Allergies   Allergen Reactions    Bee Pollen Anaphylaxis    Cats Difficulty breathing    Mold     Succinylcholine Other (See Comments)     Convulsions, muscle pain    Trees             Medications:  Current Outpatient Medications   Medication Sig    acetaminophen (TYLENOL) 500 MG tablet Take 1,000 mg by mouth every 6 hours as needed for mild pain    albuterol (PROAIR HFA/PROVENTIL HFA/VENTOLIN HFA) 108 (90 Base) MCG/ACT inhaler Inhale 2 puffs into the lungs every 4 hours as needed for shortness of breath / dyspnea    albuterol (PROVENTIL) (2.5 MG/3ML) 0.083% neb solution Take 1 vial (2.5 mg) by nebulization every 4 hours as needed for shortness of breath or wheezing    fexofenadine-pseudoePHEDrine (ALLEGRA-D ALLERGY & CONGESTION) 180-240 MG 24 hr tablet TAKE 1 TABLET BY  "MOUTH EVERY DAY AS NEEDED    fluticasone-vilanterol (BREO ELLIPTA) 200-25 MCG/ACT inhaler Inhale 1 puff into the lungs daily (Patient not taking: Reported on 3/19/2024)    hydrOXYzine HCl (ATARAX) 25 MG tablet Take 1 tablet (25 mg) by mouth 3 times daily as needed for anxiety (Patient not taking: Reported on 3/19/2024)    ibuprofen (ADVIL/MOTRIN) 200 MG tablet Take 600 mg by mouth every 6 hours as needed for pain (Patient not taking: Reported on 3/19/2024)    ipratropium - albuterol 0.5 mg/2.5 mg/3 mL (DUONEB) 0.5-2.5 (3) MG/3ML neb solution Take 1 vial (3 mLs) by nebulization every 4 hours as needed for shortness of breath or wheezing (Patient not taking: Reported on 3/19/2024)    ondansetron (ZOFRAN) 4 MG tablet Take 1 tablet (4 mg) by mouth every 8 hours as needed for nausea (Patient not taking: Reported on 3/19/2024)    ondansetron (ZOFRAN) 4 MG tablet Take 1 tablet (4 mg) by mouth every 8 hours as needed for nausea or vomiting (Patient not taking: Reported on 3/19/2024)    predniSONE (DELTASONE) 10 MG tablet Take 10 mg by mouth daily (Patient not taking: Reported on 2/2/2024)    predniSONE (DELTASONE) 20 MG tablet Take two tablets (= 40mg) each day for 4 (four) days (Patient not taking: Reported on 3/19/2024)    scopolamine (TRANSDERM) 1 MG/3DAYS 72 hr patch Place 1 patch onto the skin every 72 hours (Patient not taking: Reported on 3/19/2024)     No current facility-administered medications for this visit.       Review of Systems:   ROS: 10 point ROS neg other than the symptoms noted above in the HPI.    Physical Exam:  B/P: 122/75, T: Data Unavailable, P: 78, R: Data Unavailable  Estimated body mass index is 35.85 kg/m  as calculated from the following:    Height as of 11/6/23: 1.702 m (5' 7\").    Weight as of 11/6/23: 103.8 kg (228 lb 14.4 oz).  General Appearance Adult: Alert, no acute distress, oriented  Lungs: no respiratory distress, or pursed lip breathing  Neuro: Alert, oriented, speech and mentation " normal  Psych: affect and mood normal    Outside records:   I spent 5 minutes reviewing outside records.      Ebenezer Braden MD

## 2024-03-19 NOTE — NURSING NOTE
Pt had stones about 6 years ago and now.  Pt did not cath any stones.  Pt denies gross hem or dysuria.    Pt unable to leave a UA at this time.    Pt not on flomax  Pt had lithotripsy in the past.    TANESHA Noel CMA

## 2024-04-05 ENCOUNTER — LAB (OUTPATIENT)
Dept: LAB | Facility: CLINIC | Age: 49
End: 2024-04-05
Payer: COMMERCIAL

## 2024-04-05 DIAGNOSIS — N20.0 CALCULUS OF KIDNEY: ICD-10-CM

## 2024-04-05 DIAGNOSIS — N20.0 CALCULUS OF KIDNEY: Primary | ICD-10-CM

## 2024-04-05 PROCEDURE — 99000 SPECIMEN HANDLING OFFICE-LAB: CPT

## 2024-04-05 PROCEDURE — 84392 ASSAY OF URINE SULFATE: CPT | Mod: 90

## 2024-04-05 PROCEDURE — 82140 ASSAY OF AMMONIA: CPT | Mod: 90

## 2024-04-05 PROCEDURE — 82507 ASSAY OF CITRATE: CPT | Mod: 90

## 2024-04-05 PROCEDURE — 82570 ASSAY OF URINE CREATININE: CPT | Mod: 90

## 2024-04-05 PROCEDURE — 83945 ASSAY OF OXALATE: CPT | Mod: 90

## 2024-04-05 PROCEDURE — 82436 ASSAY OF URINE CHLORIDE: CPT | Mod: 90

## 2024-04-05 PROCEDURE — 84300 ASSAY OF URINE SODIUM: CPT | Mod: 90

## 2024-04-05 PROCEDURE — 84540 ASSAY OF URINE/UREA-N: CPT | Mod: 90

## 2024-04-05 PROCEDURE — 83986 ASSAY PH BODY FLUID NOS: CPT | Mod: 90

## 2024-04-05 PROCEDURE — 82340 ASSAY OF CALCIUM IN URINE: CPT | Mod: 90

## 2024-04-05 PROCEDURE — 84133 ASSAY OF URINE POTASSIUM: CPT | Mod: 90

## 2024-04-05 PROCEDURE — 83935 ASSAY OF URINE OSMOLALITY: CPT | Mod: 90

## 2024-04-05 PROCEDURE — 83735 ASSAY OF MAGNESIUM: CPT | Mod: 90

## 2024-04-05 PROCEDURE — 84560 ASSAY OF URINE/URIC ACID: CPT | Mod: 90

## 2024-04-05 PROCEDURE — 84105 ASSAY OF URINE PHOSPHORUS: CPT | Mod: 90

## 2024-04-08 ENCOUNTER — HOSPITAL ENCOUNTER (EMERGENCY)
Facility: CLINIC | Age: 49
Discharge: HOME | End: 2024-04-08
Attending: EMERGENCY MEDICINE | Admitting: EMERGENCY MEDICINE
Payer: COMMERCIAL

## 2024-04-08 VITALS
SYSTOLIC BLOOD PRESSURE: 155 MMHG | DIASTOLIC BLOOD PRESSURE: 87 MMHG | RESPIRATION RATE: 18 BRPM | OXYGEN SATURATION: 93 % | BODY MASS INDEX: 35.85 KG/M2 | HEIGHT: 67 IN | HEART RATE: 120 BPM | TEMPERATURE: 98.4 F

## 2024-04-08 DIAGNOSIS — J02.9 SORE THROAT: ICD-10-CM

## 2024-04-08 LAB
AMMONIA 24H UR-SRATE: 50 MMOL/24 H (ref 15–56)
BRUSHITE CRY 24H SATFR UR: 0.36 DG
BSA: ABNORMAL 1.73M(2)
CALCIUM 24H UR-MRATE: 465 MG/24 H
CAOX 24H ENGDIFF UR: 2.01 DG
CHLORIDE 24H UR-SRATE: 248 MMOL/24 H (ref 34–286)
CITRATE 24H UR-MRATE: 1846 MG/24 H (ref 356–1191)
COLLECT DURATION TIME UR: 24 H
CREAT 24H UR-MRATE: 3488 MG/24 H (ref 930–2955)
FLUAV RNA SPEC QL NAA+PROBE: NEGATIVE
FLUBV RNA RESP QL NAA+PROBE: NEGATIVE
GROUP A STREP BY PCR: NOT DETECTED
HYDROXYAPATITE 24H ENGDIFF UR: 3.05 DG
MAGNESIUM 24H UR-MRATE: 155 MG/24 H (ref 51–269)
MONOCYTES NFR BLD AUTO: NEGATIVE %
OSMOLALITY 24H UR: 990 MOSM/KG (ref 150–1150)
OXALATE 24H UR-MRATE: 24.6 MG/24 H (ref 9.7–40.5)
OXALATE 24H UR-SRATE: 0.28 MMOL/24 H (ref 0.11–0.46)
PH 24H UR: 5.5 [PH] (ref 4.5–8)
PHOSPHATE 24H UR-MRATE: 1798 MG/24 H (ref 226–1797)
POTASSIUM 24H UR-SRATE: 113 MMOL/24 H (ref 16–105)
PROTEIN CATABOLIC RATE 24H UR-MRATE: 150 G/24 H (ref 56–125)
RSV RNA SPEC NAA+PROBE: NEGATIVE
SARS-COV-2 RNA RESP QL NAA+PROBE: NEGATIVE
SODIUM 24H UR-SRATE: 276 MMOL/24 H (ref 22–328)
SPECIMEN VOL 24H UR: 1550 ML
SULFATE 24H UR-SRATE: 41 MMOL/24 H (ref 7–47)
URATE 24H UR-MRATE: 946 MG/24 H (ref 200–1000)
URATE CRY 24H SATFR UR: 4.36 DG
URINALYSIS SPECIALIST REVIEW: ABNORMAL
UUN 24H UR-MRATE: 20 G/24 H (ref 7–42)

## 2024-04-08 PROCEDURE — 250N000013 HC RX MED GY IP 250 OP 250 PS 637: Performed by: EMERGENCY MEDICINE

## 2024-04-08 PROCEDURE — 36415 COLL VENOUS BLD VENIPUNCTURE: CPT | Performed by: EMERGENCY MEDICINE

## 2024-04-08 PROCEDURE — 87637 SARSCOV2&INF A&B&RSV AMP PRB: CPT | Performed by: EMERGENCY MEDICINE

## 2024-04-08 PROCEDURE — 87651 STREP A DNA AMP PROBE: CPT | Performed by: EMERGENCY MEDICINE

## 2024-04-08 PROCEDURE — 86308 HETEROPHILE ANTIBODY SCREEN: CPT | Performed by: EMERGENCY MEDICINE

## 2024-04-08 PROCEDURE — 250N000009 HC RX 250: Performed by: EMERGENCY MEDICINE

## 2024-04-08 PROCEDURE — 99283 EMERGENCY DEPT VISIT LOW MDM: CPT

## 2024-04-08 RX ORDER — DIPHENHYDRAMINE HYDROCHLORIDE AND LIDOCAINE HYDROCHLORIDE AND ALUMINUM HYDROXIDE AND MAGNESIUM HYDRO
10 KIT ONCE
Qty: 10 ML | Refills: 0 | Status: COMPLETED | OUTPATIENT
Start: 2024-04-08 | End: 2024-04-08

## 2024-04-08 RX ORDER — DEXAMETHASONE SODIUM PHOSPHATE 10 MG/ML
12 INJECTION, SOLUTION INTRAMUSCULAR; INTRAVENOUS ONCE
Status: COMPLETED | OUTPATIENT
Start: 2024-04-08 | End: 2024-04-08

## 2024-04-08 RX ADMIN — DIPHENHYDRAMINE HYDROCHLORIDE AND LIDOCAINE HYDROCHLORIDE AND ALUMINUM HYDROXIDE AND MAGNESIUM HYDRO 10 ML: KIT at 06:24

## 2024-04-08 RX ADMIN — DEXAMETHASONE SODIUM PHOSPHATE 12 MG: 10 INJECTION INTRAMUSCULAR; INTRAVENOUS at 06:14

## 2024-04-08 ASSESSMENT — COLUMBIA-SUICIDE SEVERITY RATING SCALE - C-SSRS
1. IN THE PAST MONTH, HAVE YOU WISHED YOU WERE DEAD OR WISHED YOU COULD GO TO SLEEP AND NOT WAKE UP?: NO
2. HAVE YOU ACTUALLY HAD ANY THOUGHTS OF KILLING YOURSELF IN THE PAST MONTH?: NO
6. HAVE YOU EVER DONE ANYTHING, STARTED TO DO ANYTHING, OR PREPARED TO DO ANYTHING TO END YOUR LIFE?: NO

## 2024-04-08 ASSESSMENT — ACTIVITIES OF DAILY LIVING (ADL)
ADLS_ACUITY_SCORE: 35
ADLS_ACUITY_SCORE: 35

## 2024-04-08 NOTE — ED PROVIDER NOTES
"  History     Chief Complaint:  Pharyngitis and Fever       HPI   Dano Kennedy is a 49 year old male who presents with pharyngitis and fever. The patient started to have a sore throat on Saturday. He has felt feverish and has had chills as well. He states that he feels swelling in his throat. He has some ear pain. He has a cough. He denies runny nose. He denies mononucleosis exposure. He has been taking tylenol and ibuprofen without much success.     Independent Historian:   None - Patient Only    Medications:    albuterol   fluticasone-vilanterol   hydroxyzine HCl   ipratropium - albuterol   ondansetron  predniSONE   scopolamine     Past Medical History:    Asthma  Prostate infection  GSE  ADHD  Obesity  IBS  Legionnaire's disease    Past Surgical History:    Colonoscopy  Rectosigmoid resection  Nasal polypectomy endoscopy  EGD  Assisted colectomy  Lithotripsy  Septoplasty     Physical Exam   Patient Vitals for the past 24 hrs:   BP Temp Temp src Pulse Resp SpO2 Height   04/08/24 0500 -- -- -- -- 18 93 % --   04/08/24 0443 (!) 155/87 98.4  F (36.9  C) Oral 120 16 98 % 1.702 m (5' 7\")        Physical Exam  General: Sitting up in bed  Eyes:  The pupils are equal and round    Conjunctivae and sclerae are normal  ENT:    TM clear bilaterally. Oropharynx with mild posterior oropharynx erythema with no swelling. Voice normal. No PTA  Neck:  Normal range of motion  CV:  Tachycardic rate, regular rhythm    Skin warm and well perfused   Resp:  Non labored breathing on room air    No tachypnea    No cough heard    Lungs clear bilaterally  MS:  Normal muscular tone  Skin:  No rash or acute skin lesions noted  Neuro:   Awake, alert.      Speech is normal and fluent.    Face is symmetric.     Moves all extremities equally  Psych: Normal affect.  Appropriate interactions.    Emergency Department Course     Laboratory:  Labs Ordered and Resulted from Time of ED Arrival to Time of ED Departure   INFLUENZA A/B, RSV, & " SARS-COV2 PCR - Normal       Result Value    Influenza A PCR Negative      Influenza B PCR Negative      RSV PCR Negative      SARS CoV2 PCR Negative     GROUP A STREPTOCOCCUS PCR THROAT SWAB - Normal    Group A strep by PCR Not Detected        Emergency Department Course & Assessments:    Interventions:  Medications   dexAMETHasone (PF) (DECADRON) injectable solution used ORALLY 12 mg (12 mg Oral $Given 4/8/24 0614)   magic mouthwash suspension (diphenhydramine, lidocaine, aluminum-magnesium & simethicone) (10 mLs Swish & Swallow $Given 4/8/24 0624)      Assessments:  0546 I obtained history and examined patient.   0554 Finished examination.  0636 I rechecked the patient and explained findings.    Independent Interpretation (X-rays, CTs, rhythm strip):  None    Consultations/Discussion of Management or Tests:  None      Social Determinants of Health affecting care:   None    Disposition:  The patient was discharged.     Impression & Plan    MIPS (If applicable):  N/A    Medical Decision Making:  Dano Kennedy is a 49 year old male who presents for evaluation of a sore throat and clinical evidence of pharyngitis.  The rapid strep test is negative.  There is no clinical evidence of peritonsillar abscess, retropharyngeal abscess, Lemierre's Syndrome, epiglottis, or Robert's angina. The etiology is most likely viral.   I have recommended treatment with analgesics. Return if increasing pain, change in voice, neck pain, vomiting, or shortness of breath. Given her well appearance, I would not test further for other etiologies of serious bacterial infections. Mono negative. He feels that his asthma is stable and no wheezing on exam today.     Diagnosis:    ICD-10-CM    1. Sore throat  J02.9            Discharge Medications:  Discharge Medication List as of 4/8/2024  6:36 AM        START taking these medications    Details   magic mouthwash suspension (diphenhydrAMINE, lidocaine, aluminum-magnesium & simethicone) Swish  and swallow 10 mLs in mouth every 8 hours as needed for sore throat, Disp-80 mL, R-0, E-Prescribe            Scribe Disclosure:  IKarla Hired, am serving as a scribe at 5:14 AM on 4/8/2024 to document services personally performed by Fay Shanks MD based on my observations and the provider's statements to me.   4/8/2024   Fay Shanks MD Goertz, Maria Kristine, MD  04/08/24 0743

## 2024-04-08 NOTE — ED TRIAGE NOTES
Pt reports sore throat and fevers that began yesterday. Last Ibuprofen at 0130 and Tylenol at 2200       Triage Assessment (Adult)       Row Name 04/08/24 0438          Triage Assessment    Airway WDL WDL        Respiratory WDL    Respiratory WDL WDL        Cardiac WDL    Pulse Rate & Regularity tachycardic        Cognitive/Neuro/Behavioral WDL    Cognitive/Neuro/Behavioral WDL WDL

## 2024-04-10 ENCOUNTER — TRANSFERRED RECORDS (OUTPATIENT)
Dept: HEALTH INFORMATION MANAGEMENT | Facility: CLINIC | Age: 49
End: 2024-04-10
Payer: COMMERCIAL

## 2024-04-11 NOTE — TELEPHONE ENCOUNTER
FUTURE VISIT INFORMATION      FUTURE VISIT INFORMATION:  Date: 7/16/24  Time: 9am  [place] [facility] [place] INFORMATION:  Referring provider:    Referring providers clinic:    Reason for visit/diagnosis  Pt has sore throat, was at the ER on 4/7 and tested negative for strepp but nothing is helping with the pain. pt made appt for [facility] location     RECORDS REQUESTED FROM:       Clinic name Comments Records Status Imaging Status   [facility]  4/8/24- ED   2/8/24- ov [facility]ay [facility]anks MD  [facility]  2/2/24- OV Lynnette Garrison APRN CNP  [facility]     [facility]  11/1/21- ED   7/30/21- ED     Images:  6/20/24 - CT SINUS  12/17/23- XR CHEST   10/4/23- CT CHEST   2/27/22- CT NECK  CE Req 6/27/24     PACS   [facility] Images:    5/5/24 - XR CHEST  PACS     Records Requested     June 27, 2024 10:46 AM  [place] [address]   Facility  [facility]  Fax: [phone]     - [facility]  Fax: [phone]   Outcome Requests faxed to [facility] and  to push images to PACS.     June 27, 2024 3:26 PM - Received images from  and resolved in PACS. Still pending images from [facility]. - Denise      July 2, 2024 12:52 PM - got a fax from [facility] that those images were done outside so I Faxed a request to [facility] and [place] [facility] to push Image to [place] PACS- Chuyita   July 3, 2024 4:10 PM - Received image in pacs and attached it to patient- Chuyita

## 2024-04-16 ENCOUNTER — VIRTUAL VISIT (OUTPATIENT)
Dept: UROLOGY | Facility: CLINIC | Age: 49
End: 2024-04-16
Payer: COMMERCIAL

## 2024-04-16 ENCOUNTER — LAB (OUTPATIENT)
Dept: LAB | Facility: CLINIC | Age: 49
End: 2024-04-16
Payer: COMMERCIAL

## 2024-04-16 DIAGNOSIS — N20.0 CALCULUS OF KIDNEY: Primary | ICD-10-CM

## 2024-04-16 DIAGNOSIS — N20.0 CALCULUS OF KIDNEY: ICD-10-CM

## 2024-04-16 LAB
CALCIUM SERPL-MCNC: 9.6 MG/DL (ref 8.6–10)
PTH-INTACT SERPL-MCNC: 36 PG/ML (ref 15–65)

## 2024-04-16 PROCEDURE — 82310 ASSAY OF CALCIUM: CPT

## 2024-04-16 PROCEDURE — 82306 VITAMIN D 25 HYDROXY: CPT

## 2024-04-16 PROCEDURE — 99214 OFFICE O/P EST MOD 30 MIN: CPT | Mod: GT | Performed by: UROLOGY

## 2024-04-16 PROCEDURE — 83970 ASSAY OF PARATHORMONE: CPT

## 2024-04-16 PROCEDURE — 36415 COLL VENOUS BLD VENIPUNCTURE: CPT

## 2024-04-16 RX ORDER — FLUCONAZOLE 100 MG/1
1 TABLET ORAL
COMMUNITY
Start: 2024-04-10 | End: 2024-04-30

## 2024-04-16 ASSESSMENT — PAIN SCALES - GENERAL: PAINLEVEL: NO PAIN (0)

## 2024-04-16 NOTE — LETTER
4/16/2024       RE: Dano Kennedy  [address]  [place] [address]     Dear Colleague,    Thank you for referring your patient, Dano Kennedy, to the [facility] at [facility]. Please see a copy of my visit note below.    Virtual Visit Details    Type of service:  Video Visit     Originating Location (pt. 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     Ureteral stone, resolved  History of nephrolithiasis s/p left ureteroscopy  Hypercalciuria  High dietary sodium intake  Dietary management and counseling    Discussed possible dietary and medical options to assist with stone prevention.  Based  24 hr urine test, I counseled regarding:    Adequate fluid intake with goal of 64 to 80 fluid ounces a day for a goal urine output of 2.5 liters (80 oz) per day.  Discussed strategies to increase fluid intake, such as gradual increases of 4-8 fl oz over the period of weeks to get this goal after creating a log to see what baseline fluid intake is., Discussed the role of elevated urine sodium causes high urine calcium and risk for stone disease.  This is directly related to intake of dietary sodium.  Goal for sodium intake is <2.5g or 2500 mg daily to help treat the hypercalciuria., Adequate fruit and vegetable intake to increase dietary citrate and raise urine pH., and Decrease non-dairy animal protein intake to lower uric acid, increase dietary citrate and raise urine pH.    Although 24 hr urine suggest high animal protein intake, patient does not endorse this in diet.  Will continue to monitor this.    Will check PTH, vitamin D and calcium levels due to significant hypercalciuria.     Plan:  -Dietary Recs: increase fluid  intake, decrease sodium, and decrease non-dairy animal protein  -PTH, vit d and calcium  -dietician referral if labs normal    Ebenezer Braden MD  April 16, 2024         Chief Complaint: Stone follow-up    History of Present Illness:  Dano Kennedy is a 49 year old male with a history of nephrolithiasis status post left ureteroscopy in 2015 for calcium nephrolithiasis.  Recently passed a 2 mm right uvj stone and has few small non obstructing renal stones.    Completed 24 hr urine study to assess stone risk.    Does not have a lot of added salt or excess animal protein in diet.    Metabolic:    Component      Latest Ref Rng 4/5/2024  6:00 AM   Supersat 24 Calcium Oxalate Crystal      Reference Mean= 1.89 DG 2.01    Supersat 24 Brushite Crystal      Reference Mean= 0.46 DG 0.36    Supersat 24 Hydroxyapatite Crystal      Reference Mean= 4.19 DG 3.05    Supersat 24 Uric Acid Crystal      Reference Mean= 1.18 DG 4.36 (H)    Supersat 24 Sodium Urine      22 - 328 mmol/24 h 276    Supersat 24 Potassium Urine      16 - 105 mmol/24 h 113 (H)    Supersat 24 Calcium Urine      <250 mg/24 h 465 (H)    Supersat 24 Magnesium Urine      51 - 269 mg/24 h 155    Supersat 24 Chloride Urine      34 - 286 mmol/24 h 248    Supersat 24 Phosphorous Urine      226 - 1797 mg/24 h 1798 (H)    Supersat 24 Sulfate Urine      7 - 47 mmol/24 h 41    Supersat 24 pH Ur      4.5 - 8.0  5.5    Supersat 24 Uric Acid Ur      200 - 1000 mg/24 h 946    Supersat 24 Creatinine Ur      930 - 2955 mg/24 h 3488 (H)    Supersat 24 Osmolality Ur      150 - 1150 mOsm/kg 990    Supersat 24 Ammonium 24 Hr Ur      15 - 56 mmol/24 h 50    Supersat 24 Citrate Excretion Urine      356 - 1191 mg/24 h 1846 (H)    Supersat 24 Oxalate Urine (mmol/24 hr)      0.11 - 0.46 mmol/24 h 0.28    Supersat 24 Oxalate Urine (mg/24 hr)      9.7 - 40.5 mg/24 h 24.6    Supersat 24 Urea Nitrogen Urine      7.0 - 42.0 g/24 h 20.0    Supersat 24 Protein Catabolic Rate Urine      56 -  125 g/24 h 150 (H)    Supersat 24 Patient Surface Area      1.73m(2) SEE NOTE    Supersat 24 Collection Duration      h 24    Supersat 24 Volume      mL 1550       Legend:  (H) High         Allergies:  Allergies   Allergen Reactions    Bee Pollen Anaphylaxis    Cats Difficulty breathing    Mold     Succinylcholine Other (See Comments)     Convulsions, muscle pain    Trees             Medications:  Current Outpatient Medications   Medication Sig Dispense Refill    amoxicillin-clavulanate (AUGMENTIN) 875-125 MG tablet Take 1 tablet by mouth 2 times daily      fluconazole (DIFLUCAN) 100 MG tablet Take 1 tablet by mouth daily at 2 pm      acetaminophen (TYLENOL) 500 MG tablet Take 1,000 mg by mouth every 6 hours as needed for mild pain      albuterol (PROAIR HFA/PROVENTIL HFA/VENTOLIN HFA) 108 (90 Base) MCG/ACT inhaler Inhale 2 puffs into the lungs every 4 hours as needed for shortness of breath / dyspnea 18 g 11    albuterol (PROVENTIL) (2.5 MG/3ML) 0.083% neb solution Take 1 vial (2.5 mg) by nebulization every 4 hours as needed for shortness of breath or wheezing 120 mL 1    fexofenadine-pseudoePHEDrine (ALLEGRA-D ALLERGY & CONGESTION) 180-240 MG 24 hr tablet TAKE 1 TABLET BY MOUTH EVERY DAY AS NEEDED 90 tablet 1    fluticasone-vilanterol (BREO ELLIPTA) 200-25 MCG/ACT inhaler Inhale 1 puff into the lungs daily (Patient not taking: Reported on 3/19/2024) 60 each 11    hydrOXYzine HCl (ATARAX) 25 MG tablet Take 1 tablet (25 mg) by mouth 3 times daily as needed for anxiety (Patient not taking: Reported on 3/19/2024) 30 tablet 0    ibuprofen (ADVIL/MOTRIN) 200 MG tablet Take 600 mg by mouth every 6 hours as needed for pain (Patient not taking: Reported on 3/19/2024)      ipratropium - albuterol 0.5 mg/2.5 mg/3 mL (DUONEB) 0.5-2.5 (3) MG/3ML neb solution Take 1 vial (3 mLs) by nebulization every 4 hours as needed for shortness of breath or wheezing (Patient not taking: Reported on 3/19/2024) 270 mL 0    ondansetron  "(ZOFRAN) 4 MG tablet Take 1 tablet (4 mg) by mouth every 8 hours as needed for nausea (Patient not taking: Reported on 3/19/2024) 12 tablet 0    ondansetron (ZOFRAN) 4 MG tablet Take 1 tablet (4 mg) by mouth every 8 hours as needed for nausea or vomiting (Patient not taking: Reported on 3/19/2024) 15 tablet 0    predniSONE (DELTASONE) 10 MG tablet Take 10 mg by mouth daily (Patient not taking: Reported on 2/2/2024)      predniSONE (DELTASONE) 20 MG tablet Take two tablets (= 40mg) each day for 4 (four) days (Patient not taking: Reported on 3/19/2024) 8 tablet 0    scopolamine (TRANSDERM) 1 MG/3DAYS 72 hr patch Place 1 patch onto the skin every 72 hours (Patient not taking: Reported on 3/19/2024) 24 patch 0     No current facility-administered medications for this visit.       Physical Exam:  B/P: Data Unavailable, T: Data Unavailable, P: Data Unavailable, R: Data Unavailable  Estimated body mass index is 35.85 kg/m  as calculated from the following:    Height as of 4/8/24: 1.702 m (5' 7\").    Weight as of 11/6/23: 103.8 kg (228 lb 14.4 oz).  General: age-appropriate appearing male in NAD.  "

## 2024-04-16 NOTE — NURSING NOTE
Is the patient currently in the state of [place]? YES    Visit mode:VIDEO    If the visit is dropped, the patient can be reconnected by: VIDEO VISIT: Text to cell phone:   Telephone Information:   Mobile [phone]       Will anyone else be joining the visit? NO  (If patient encounters technical issues they should call [phone]    How would you like to obtain your AVS? MyChart    Are changes needed to the allergy or medication list? No    Are refills needed on medications prescribed by this physician? NO    Reason for visit: RECHECK (4 week follow-up )    Ginny MARTINEZ

## 2024-04-16 NOTE — PROGRESS NOTES
Virtual Visit Details    Type of service:  Video Visit     Originating Location (pt. Location): Home    Distant Location (provider location):  On-site  Platform used for Video Visit: Rylan    Name: Dano Kennedy   MRN: [id number]  YOB: 1975    Assessment and Plan:  49 year old male with hypercalciuria and hypernatriuria.     Ureteral stone, resolved  History of nephrolithiasis s/p left ureteroscopy  Hypercalciuria  High dietary sodium intake  Dietary management and counseling    Discussed possible dietary and medical options to assist with stone prevention.  Based  24 hr urine test, I counseled regarding:    Adequate fluid intake with goal of 64 to 80 fluid ounces a day for a goal urine output of 2.5 liters (80 oz) per day.  Discussed strategies to increase fluid intake, such as gradual increases of 4-8 fl oz over the period of weeks to get this goal after creating a log to see what baseline fluid intake is., Discussed the role of elevated urine sodium causes high urine calcium and risk for stone disease.  This is directly related to intake of dietary sodium.  Goal for sodium intake is <2.5g or 2500 mg daily to help treat the hypercalciuria., Adequate fruit and vegetable intake to increase dietary citrate and raise urine pH., and Decrease non-dairy animal protein intake to lower uric acid, increase dietary citrate and raise urine pH.    Although 24 hr urine suggest high animal protein intake, patient does not endorse this in diet.  Will continue to monitor this.    Will check PTH, vitamin D and calcium levels due to significant hypercalciuria.     Plan:  -Dietary Recs: increase fluid intake, decrease sodium, and decrease non-dairy animal protein  -PTH, vit d and calcium  -dietician referral if labs normal    Ebenezer Braden MD  April 16, 2024         Chief Complaint: Stone follow-up    History of Present Illness:  Dano Kennedy is a 49 year old male with a history of nephrolithiasis status post  left ureteroscopy in 2015 for calcium nephrolithiasis.  Recently passed a 2 mm right uvj stone and has few small non obstructing renal stones.    Completed 24 hr urine study to assess stone risk.    Does not have a lot of added salt or excess animal protein in diet.    Metabolic:    Component      Latest Ref Rng 4/5/2024  6:00 AM   Supersat 24 Calcium Oxalate Crystal      Reference Mean= 1.89 DG 2.01    Supersat 24 Brushite Crystal      Reference Mean= 0.46 DG 0.36    Supersat 24 Hydroxyapatite Crystal      Reference Mean= 4.19 DG 3.05    Supersat 24 Uric Acid Crystal      Reference Mean= 1.18 DG 4.36 (H)    Supersat 24 Sodium Urine      22 - 328 mmol/24 h 276    Supersat 24 Potassium Urine      16 - 105 mmol/24 h 113 (H)    Supersat 24 Calcium Urine      <250 mg/24 h 465 (H)    Supersat 24 Magnesium Urine      51 - 269 mg/24 h 155    Supersat 24 Chloride Urine      34 - 286 mmol/24 h 248    Supersat 24 Phosphorous Urine      226 - 1797 mg/24 h 1798 (H)    Supersat 24 Sulfate Urine      7 - 47 mmol/24 h 41    Supersat 24 pH Ur      4.5 - 8.0  5.5    Supersat 24 Uric Acid Ur      200 - 1000 mg/24 h 946    Supersat 24 Creatinine Ur      930 - 2955 mg/24 h 3488 (H)    Supersat 24 Osmolality Ur      150 - 1150 mOsm/kg 990    Supersat 24 Ammonium 24 Hr Ur      15 - 56 mmol/24 h 50    Supersat 24 Citrate Excretion Urine      356 - 1191 mg/24 h 1846 (H)    Supersat 24 Oxalate Urine (mmol/24 hr)      0.11 - 0.46 mmol/24 h 0.28    Supersat 24 Oxalate Urine (mg/24 hr)      9.7 - 40.5 mg/24 h 24.6    Supersat 24 Urea Nitrogen Urine      7.0 - 42.0 g/24 h 20.0    Supersat 24 Protein Catabolic Rate Urine      56 - 125 g/24 h 150 (H)    Supersat 24 Patient Surface Area      1.73m(2) SEE NOTE    Supersat 24 Collection Duration      h 24    Supersat 24 Volume      mL 1550       Legend:  (H) High         Allergies:  Allergies   Allergen Reactions    Bee Pollen Anaphylaxis    Cats Difficulty breathing    Mold     Succinylcholine  Other (See Comments)     Convulsions, muscle pain    Trees             Medications:  Current Outpatient Medications   Medication Sig Dispense Refill    amoxicillin-clavulanate (AUGMENTIN) 875-125 MG tablet Take 1 tablet by mouth 2 times daily      fluconazole (DIFLUCAN) 100 MG tablet Take 1 tablet by mouth daily at 2 pm      acetaminophen (TYLENOL) 500 MG tablet Take 1,000 mg by mouth every 6 hours as needed for mild pain      albuterol (PROAIR HFA/PROVENTIL HFA/VENTOLIN HFA) 108 (90 Base) MCG/ACT inhaler Inhale 2 puffs into the lungs every 4 hours as needed for shortness of breath / dyspnea 18 g 11    albuterol (PROVENTIL) (2.5 MG/3ML) 0.083% neb solution Take 1 vial (2.5 mg) by nebulization every 4 hours as needed for shortness of breath or wheezing 120 mL 1    fexofenadine-pseudoePHEDrine (ALLEGRA-D ALLERGY & CONGESTION) 180-240 MG 24 hr tablet TAKE 1 TABLET BY MOUTH EVERY DAY AS NEEDED 90 tablet 1    fluticasone-vilanterol (BREO ELLIPTA) 200-25 MCG/ACT inhaler Inhale 1 puff into the lungs daily (Patient not taking: Reported on 3/19/2024) 60 each 11    hydrOXYzine HCl (ATARAX) 25 MG tablet Take 1 tablet (25 mg) by mouth 3 times daily as needed for anxiety (Patient not taking: Reported on 3/19/2024) 30 tablet 0    ibuprofen (ADVIL/MOTRIN) 200 MG tablet Take 600 mg by mouth every 6 hours as needed for pain (Patient not taking: Reported on 3/19/2024)      ipratropium - albuterol 0.5 mg/2.5 mg/3 mL (DUONEB) 0.5-2.5 (3) MG/3ML neb solution Take 1 vial (3 mLs) by nebulization every 4 hours as needed for shortness of breath or wheezing (Patient not taking: Reported on 3/19/2024) 270 mL 0    ondansetron (ZOFRAN) 4 MG tablet Take 1 tablet (4 mg) by mouth every 8 hours as needed for nausea (Patient not taking: Reported on 3/19/2024) 12 tablet 0    ondansetron (ZOFRAN) 4 MG tablet Take 1 tablet (4 mg) by mouth every 8 hours as needed for nausea or vomiting (Patient not taking: Reported on 3/19/2024) 15 tablet 0     "predniSONE (DELTASONE) 10 MG tablet Take 10 mg by mouth daily (Patient not taking: Reported on 2/2/2024)      predniSONE (DELTASONE) 20 MG tablet Take two tablets (= 40mg) each day for 4 (four) days (Patient not taking: Reported on 3/19/2024) 8 tablet 0    scopolamine (TRANSDERM) 1 MG/3DAYS 72 hr patch Place 1 patch onto the skin every 72 hours (Patient not taking: Reported on 3/19/2024) 24 patch 0     No current facility-administered medications for this visit.       Physical Exam:  B/P: Data Unavailable, T: Data Unavailable, P: Data Unavailable, R: Data Unavailable  Estimated body mass index is 35.85 kg/m  as calculated from the following:    Height as of 4/8/24: 1.702 m (5' 7\").    Weight as of 11/6/23: 103.8 kg (228 lb 14.4 oz).  General: age-appropriate appearing male in NAD.  "

## 2024-04-20 LAB
DEPRECATED CALCIDIOL+CALCIFEROL SERPL-MC: <42 UG/L (ref 20–75)
VITAMIN D2 SERPL-MCNC: <5 UG/L
VITAMIN D3 SERPL-MCNC: 37 UG/L

## 2024-04-23 ENCOUNTER — HOSPITAL ENCOUNTER (EMERGENCY)
Facility: CLINIC | Age: 49
Discharge: HOME | End: 2024-04-23
Attending: EMERGENCY MEDICINE | Admitting: EMERGENCY MEDICINE
Payer: COMMERCIAL

## 2024-04-23 ENCOUNTER — APPOINTMENT (OUTPATIENT)
Dept: GENERAL RADIOLOGY | Facility: CLINIC | Age: 49
End: 2024-04-23
Attending: EMERGENCY MEDICINE
Payer: COMMERCIAL

## 2024-04-23 VITALS
OXYGEN SATURATION: 97 % | DIASTOLIC BLOOD PRESSURE: 84 MMHG | RESPIRATION RATE: 14 BRPM | HEART RATE: 87 BPM | TEMPERATURE: 98.4 F | HEIGHT: 67 IN | BODY MASS INDEX: 35.85 KG/M2 | SYSTOLIC BLOOD PRESSURE: 129 MMHG

## 2024-04-23 DIAGNOSIS — J20.9 ACUTE BRONCHITIS, UNSPECIFIED ORGANISM: ICD-10-CM

## 2024-04-23 LAB
ANION GAP SERPL CALCULATED.3IONS-SCNC: 12 MMOL/L (ref 7–15)
ATRIAL RATE - MUSE: 103 BPM
BASOPHILS # BLD AUTO: 0 10E3/UL (ref 0–0.2)
BASOPHILS NFR BLD AUTO: 0 %
BUN SERPL-MCNC: 14.4 MG/DL (ref 6–20)
CALCIUM SERPL-MCNC: 9.1 MG/DL (ref 8.6–10)
CHLORIDE SERPL-SCNC: 106 MMOL/L (ref 98–107)
CREAT SERPL-MCNC: 0.96 MG/DL (ref 0.67–1.17)
D DIMER PPP FEU-MCNC: <0.27 UG/ML FEU (ref 0–0.5)
DEPRECATED HCO3 PLAS-SCNC: 22 MMOL/L (ref 22–29)
DIASTOLIC BLOOD PRESSURE - MUSE: NORMAL MMHG
EGFRCR SERPLBLD CKD-EPI 2021: >90 ML/MIN/1.73M2
EOSINOPHIL # BLD AUTO: 0.1 10E3/UL (ref 0–0.7)
EOSINOPHIL NFR BLD AUTO: 1 %
ERYTHROCYTE [DISTWIDTH] IN BLOOD BY AUTOMATED COUNT: 12.4 % (ref 10–15)
GLUCOSE SERPL-MCNC: 114 MG/DL (ref 70–99)
HCT VFR BLD AUTO: 40.6 % (ref 40–53)
HGB BLD-MCNC: 13.2 G/DL (ref 13.3–17.7)
IMM GRANULOCYTES # BLD: 0.1 10E3/UL
IMM GRANULOCYTES NFR BLD: 1 %
INTERPRETATION ECG - MUSE: NORMAL
LYMPHOCYTES # BLD AUTO: 2.9 10E3/UL (ref 0.8–5.3)
LYMPHOCYTES NFR BLD AUTO: 26 %
MCH RBC QN AUTO: 27.7 PG (ref 26.5–33)
MCHC RBC AUTO-ENTMCNC: 32.5 G/DL (ref 31.5–36.5)
MCV RBC AUTO: 85 FL (ref 78–100)
MONOCYTES # BLD AUTO: 0.8 10E3/UL (ref 0–1.3)
MONOCYTES NFR BLD AUTO: 7 %
NEUTROPHILS # BLD AUTO: 7.4 10E3/UL (ref 1.6–8.3)
NEUTROPHILS NFR BLD AUTO: 65 %
NRBC # BLD AUTO: 0 10E3/UL
NRBC BLD AUTO-RTO: 0 /100
P AXIS - MUSE: 62 DEGREES
PLATELET # BLD AUTO: 262 10E3/UL (ref 150–450)
POTASSIUM SERPL-SCNC: 3.9 MMOL/L (ref 3.4–5.3)
PR INTERVAL - MUSE: 142 MS
QRS DURATION - MUSE: 94 MS
QT - MUSE: 348 MS
QTC - MUSE: 455 MS
R AXIS - MUSE: 75 DEGREES
RBC # BLD AUTO: 4.76 10E6/UL (ref 4.4–5.9)
SODIUM SERPL-SCNC: 140 MMOL/L (ref 135–145)
SYSTOLIC BLOOD PRESSURE - MUSE: NORMAL MMHG
T AXIS - MUSE: 3 DEGREES
TROPONIN T SERPL HS-MCNC: 8 NG/L
VENTRICULAR RATE- MUSE: 103 BPM
WBC # BLD AUTO: 11.2 10E3/UL (ref 4–11)

## 2024-04-23 PROCEDURE — 71046 X-RAY EXAM CHEST 2 VIEWS: CPT

## 2024-04-23 PROCEDURE — 96360 HYDRATION IV INFUSION INIT: CPT

## 2024-04-23 PROCEDURE — 85025 COMPLETE CBC W/AUTO DIFF WBC: CPT | Performed by: EMERGENCY MEDICINE

## 2024-04-23 PROCEDURE — 84484 ASSAY OF TROPONIN QUANT: CPT | Performed by: EMERGENCY MEDICINE

## 2024-04-23 PROCEDURE — 258N000003 HC RX IP 258 OP 636: Performed by: EMERGENCY MEDICINE

## 2024-04-23 PROCEDURE — 80048 BASIC METABOLIC PNL TOTAL CA: CPT | Performed by: EMERGENCY MEDICINE

## 2024-04-23 PROCEDURE — 36415 COLL VENOUS BLD VENIPUNCTURE: CPT | Performed by: EMERGENCY MEDICINE

## 2024-04-23 PROCEDURE — 85379 FIBRIN DEGRADATION QUANT: CPT | Performed by: EMERGENCY MEDICINE

## 2024-04-23 PROCEDURE — 93005 ELECTROCARDIOGRAM TRACING: CPT

## 2024-04-23 PROCEDURE — 99285 EMERGENCY DEPT VISIT HI MDM: CPT | Mod: 25

## 2024-04-23 RX ORDER — BENZONATATE 200 MG/1
200 CAPSULE ORAL 3 TIMES DAILY PRN
Qty: 20 CAPSULE | Refills: 0 | Status: SHIPPED | OUTPATIENT
Start: 2024-04-23 | End: 2024-04-30

## 2024-04-23 RX ORDER — PREDNISONE 20 MG/1
TABLET ORAL
Qty: 10 TABLET | Refills: 0 | Status: SHIPPED | OUTPATIENT
Start: 2024-04-23

## 2024-04-23 RX ADMIN — SODIUM CHLORIDE 1000 ML: 9 INJECTION, SOLUTION INTRAVENOUS at 05:30

## 2024-04-23 ASSESSMENT — ACTIVITIES OF DAILY LIVING (ADL): ADLS_ACUITY_SCORE: 35

## 2024-04-23 ASSESSMENT — COLUMBIA-SUICIDE SEVERITY RATING SCALE - C-SSRS
2. HAVE YOU ACTUALLY HAD ANY THOUGHTS OF KILLING YOURSELF IN THE PAST MONTH?: NO
1. IN THE PAST MONTH, HAVE YOU WISHED YOU WERE DEAD OR WISHED YOU COULD GO TO SLEEP AND NOT WAKE UP?: NO
6. HAVE YOU EVER DONE ANYTHING, STARTED TO DO ANYTHING, OR PREPARED TO DO ANYTHING TO END YOUR LIFE?: NO

## 2024-04-23 NOTE — ED PROVIDER NOTES
History     Chief Complaint:  Shortness of Breath (Seen here 4/8/24 for sore throat.  Has had fevers on and off since then.  Saw ENT and was scoped and given prednisone and antifungal.  SOb inspite of albuterol nebs.  Cough since 4/8 as well.  Noted sore throat in chart 2/8/24.), Chest Pain (R sided CP), and Cough ([facility] 1 year ago.  )       The history is provided by the patient.      Dano Kennedy is a 49 year old male with history of allergic rhinitis, obstructive sleep apnea, asthma, Legionnaire's disease, and hydronephrosis who presents to the ED alone for evaluation of shortness of breath. Patient reports ongoing shortness of breath with exertion. Two weeks ago, he had pharyngitis and onset of a fever one week ago, with highest temperature at 101F.  He was seen in the emergency department for this reports severe chest congestion and cough for about a week as well. COVID, influenza and strep were all negative.  He did follow-up with ENT and they scoped him they found a fungal infection placed him on prednisone, Diflucan, Augmentin.  All of which she is completed. . Reports oxygen sats 97% at home. Legionnarie's diagnosis was 1 year ago. Denies recent travel, vaping, or smoking. He did not ongoing symptoms with his PCP. Of note, patient had a scope done at ENT 3 months ago which showed lesions on the larynx. All tests were negative and he was prescribed Augmentin, antifungals, and prednisone at ENT. Symptoms subsided at this time.    Independent Historian:   None - Patient Only    Review of External Notes:   Patient was seen 4/8/24 for pharyngitis.        Medications:    Albuterol  Proventil  Augmentin   Flucanazole   Fluticasone-vilanterol   Hydroxyzine  Ipratroium-albuterol   Ondansetron  Prednisone   Scopolamine   Trelegy ellipta   Percocet  Tamsulosin     Past Medical History:    Allergic rhinitis  Asthma   Prostate infection   ADHD  Obesity   IBS   Legionnaire's disease   Anxiety  "  Gluten-sensitive enteropathy   Heartburn   Obstructive sleep apnea   Diverticulitis   Hydronephrosis   Nephrolithiasis   Nasal sinus tumor   Hyperlipidemia   Chronic sinusitis     Past Surgical History:    Colonoscopy   Resection rectosigmoid   Endoscopic polypectomy nasal   Esophagogastroduodenoscopy x2  Laparoscopic assisted colectomy   Laser holmium lithotripsy ureter(s), insert stent, combined   Lithotripsy   Septoplasty     Physical Exam   Patient Vitals for the past 24 hrs:   BP Temp Temp src Pulse Resp SpO2 Height   04/23/24 0645 129/84 -- -- 87 14 97 % --   04/23/24 0610 -- -- -- 79 14 97 % --   04/23/24 0600 -- -- -- 90 18 96 % --   04/23/24 0530 -- -- -- 93 19 96 % --   04/23/24 0515 -- -- -- 98 12 97 % --   04/23/24 0503 139/80 98.4  F (36.9  C) Oral 108 20 97 % 1.702 m (5' 7\")        Physical Exam      Physical Exam   Constitutional:  Patient is oriented to person, place, and time. They appear well-developed and well-nourished. Mild distress secondary to shortness of breath    HENT:   Mouth/Throat:   Oropharynx is clear and moist.   Eyes:    Conjunctivae normal and EOM are normal. Pupils are equal, round, and reactive to light.   Neck:    Normal range of motion.   Cardiovascular: Normal rate, regular rhythm and normal heart sounds.  Exam reveals no gallop and no friction rub.  No murmur heard.  Pulmonary/Chest:  Effort normal and breath sounds normal. Patient has no wheezes. Patient has no rales. Tight cough present.   Abdominal:   Soft. Bowel sounds are normal. Patient exhibits no mass. There is no tenderness. There is no rebound and no guarding.   Musculoskeletal:  Normal range of motion. Patient exhibits no edema.   Neurological:   Patient is alert and oriented to person, place, and time. Patient has normal strength. No cranial nerve deficit or sensory deficit. GCS 15  Skin:   Skin is warm and dry. No rash noted. No erythema.   Psychiatric:   Patient has a normal mood and affect. Patient's behavior " is normal. Judgment and thought content normal.      Emergency Department Course   ECG    ECG results from 04/23/24   EKG 12 lead     Value    Systolic Blood Pressure     Diastolic Blood Pressure     Ventricular Rate 103    Atrial Rate 103    MD Interval 142    QRS Duration 94        QTc 455    P Axis 62    R AXIS 75    T Axis 3    Interpretation ECG      Sinus tachycardia  Otherwise normal ECG  When compared with ECG of 07-FEB-2024 23:39,  No significant change was found  Taken at 0500. Read at 0510 by Gem Marte MD         Imaging:  XR Chest 2 Views   Final Result   IMPRESSION: Negative chest.             Laboratory:  Labs Ordered and Resulted from Time of ED Arrival to Time of ED Departure   BASIC METABOLIC PANEL - Abnormal       Result Value    Sodium 140      Potassium 3.9      Chloride 106      Carbon Dioxide (CO2) 22      Anion Gap 12      Urea Nitrogen 14.4      Creatinine 0.96      GFR Estimate >90      Calcium 9.1      Glucose 114 (*)    CBC WITH PLATELETS AND DIFFERENTIAL - Abnormal    WBC Count 11.2 (*)     RBC Count 4.76      Hemoglobin 13.2 (*)     Hematocrit 40.6      MCV 85      MCH 27.7      MCHC 32.5      RDW 12.4      Platelet Count 262      % Neutrophils 65      % Lymphocytes 26      % Monocytes 7      % Eosinophils 1      % Basophils 0      % Immature Granulocytes 1      NRBCs per 100 WBC 0      Absolute Neutrophils 7.4      Absolute Lymphocytes 2.9      Absolute Monocytes 0.8      Absolute Eosinophils 0.1      Absolute Basophils 0.0      Absolute Immature Granulocytes 0.1      Absolute NRBCs 0.0     D DIMER QUANTITATIVE - Normal    D-Dimer Quantitative <0.27     TROPONIN T, HIGH SENSITIVITY - Normal    Troponin T, High Sensitivity 8          Procedures   None     Emergency Department Course & Assessments:    Interventions:  Medications   sodium chloride 0.9% BOLUS 1,000 mL (0 mLs Intravenous Stopped 4/23/24 0657)        Assessments:  0518 I obtained patient history and  performed a physical exam.     Independent Interpretation (X-rays, CTs, rhythm strip):  None    Consultations/Discussion of Management or Tests:  None   ED Course as of 04/23/24 0734   Tue Apr 23, 2024   0592 I obtained patient history and performed a physical exam.        Social Determinants of Health affecting care:   None    Disposition:  The patient was discharged.     Impression & Plan    CMS Diagnoses: None         Medical Decision Making:    Dano Kennedy is a 49 year old male who presents for evaluation of shortness of breath, cough, chest discomfort.  EKG was performed that does not show any ischemia or injury.  His white blood cell count is normal.  He has no acute metabolic derangement.  A chest x-ray does not show any acute infiltrate.  D-dimer was noted to be normal.  His symptoms are consistent with bronchitis.  I did speak with him about doing another course of steroids.  He is not acutely wheezy here.  He did do a nebulizer just prior to arrival which like likely made his heart rate a little bit higher initially but this did normalize.  At this time I will write him for prednisone Tessalon and he will follow-up closely with his primary care doctor.         Diagnosis:    ICD-10-CM    1. Acute bronchitis, unspecified organism  J20.9            Discharge Medications:  Discharge Medication List as of 4/23/2024  6:57 AM        START taking these medications    Details   benzonatate (TESSALON) 200 MG capsule Take 1 capsule (200 mg) by mouth 3 times daily as needed for cough, Disp-20 capsule, R-0, E-Prescribe                Scribe Disclosure:  I, Jennifer Bazan, am serving as a scribe at 6:21 AM on 4/23/2024 to document services personally performed by Gem Marte MD based on my observations and the provider's statements to me.   4/23/2024   Gem Marte MD Bochert, Michelle Ann, MD  04/23/24 0760

## 2024-04-23 NOTE — ED TRIAGE NOTES
Asa 1 year ago.  Seen here 4/8/24 for sore throat.  Has had fevers on and off since then.  Saw ENT and was scoped and given prednisone and antifungal.  SOb inspite of albuterol nebs.  Cough since 4/8 as well.  Noted sore throat in chart 2/8/24.     Triage Assessment (Adult)       Row Name 04/23/24 0504          Triage Assessment    Airway WDL WDL        Respiratory WDL    Respiratory WDL X;cough     Cough Frequency frequent     Cough Type dry        Skin Circulation/Temperature WDL    Skin Circulation/Temperature WDL WDL        Cardiac WDL    Cardiac WDL X;all     Cardiac Rhythm ST  ALBUTEROL NEB 1 HOUR AGO        Chest Pain Assessment    Chest Pain Location anterior chest, right        Peripheral/Neurovascular WDL    Peripheral Neurovascular WDL WDL        Cognitive/Neuro/Behavioral WDL    Cognitive/Neuro/Behavioral WDL WDL

## 2024-04-25 ENCOUNTER — TELEPHONE (OUTPATIENT)
Dept: UROLOGY | Facility: CLINIC | Age: 49
End: 2024-04-25
Payer: COMMERCIAL

## 2024-04-28 ENCOUNTER — E-VISIT (OUTPATIENT)
Dept: FAMILY MEDICINE | Facility: CLINIC | Age: 49
End: 2024-04-28
Payer: COMMERCIAL

## 2024-04-28 DIAGNOSIS — R06.2 WHEEZE: ICD-10-CM

## 2024-04-28 DIAGNOSIS — J45.50 SEVERE PERSISTENT ASTHMA WITHOUT COMPLICATION (H): ICD-10-CM

## 2024-04-28 PROCEDURE — 99207 PR NON-BILLABLE SERV PER CHARTING: CPT | Performed by: FAMILY MEDICINE

## 2024-04-29 RX ORDER — IPRATROPIUM BROMIDE AND ALBUTEROL SULFATE 2.5; .5 MG/3ML; MG/3ML
1 SOLUTION RESPIRATORY (INHALATION) EVERY 4 HOURS PRN
Qty: 270 ML | Refills: 0 | Status: SHIPPED | OUTPATIENT
Start: 2024-04-29 | End: 2024-09-09

## 2024-04-29 RX ORDER — ALBUTEROL SULFATE 0.83 MG/ML
2.5 SOLUTION RESPIRATORY (INHALATION) EVERY 4 HOURS PRN
Qty: 120 ML | Refills: 1 | Status: SHIPPED | OUTPATIENT
Start: 2024-04-29 | End: 2024-06-17

## 2024-04-29 NOTE — PATIENT INSTRUCTIONS
Thank you for choosing us for your care. I have placed an order for a prescription so that you can start treatment. View your full visit summary for details by clicking on the link below. Your pharmacist will able to address any questions you may have about the medication.     If you're not feeling better within 5-7 days, please schedule an appointment.  You can schedule an appointment right here in [facility], or call [phone]  If the visit is for the same symptoms as your eVisit, we'll refund the cost of your eVisit if seen within seven days.

## 2024-04-30 ENCOUNTER — OFFICE VISIT (OUTPATIENT)
Dept: FAMILY MEDICINE | Facility: CLINIC | Age: 49
End: 2024-04-30
Payer: COMMERCIAL

## 2024-04-30 ENCOUNTER — ANCILLARY PROCEDURE (OUTPATIENT)
Dept: GENERAL RADIOLOGY | Facility: CLINIC | Age: 49
End: 2024-04-30
Attending: FAMILY MEDICINE
Payer: COMMERCIAL

## 2024-04-30 VITALS
HEIGHT: 67 IN | BODY MASS INDEX: 35.63 KG/M2 | TEMPERATURE: 98.1 F | RESPIRATION RATE: 20 BRPM | DIASTOLIC BLOOD PRESSURE: 98 MMHG | WEIGHT: 227 LBS | OXYGEN SATURATION: 96 % | SYSTOLIC BLOOD PRESSURE: 134 MMHG | HEART RATE: 104 BPM

## 2024-04-30 DIAGNOSIS — J45.51 SEVERE PERSISTENT ASTHMA WITH ACUTE EXACERBATION (H): Primary | ICD-10-CM

## 2024-04-30 DIAGNOSIS — J45.50 SEVERE PERSISTENT ASTHMA WITHOUT COMPLICATION (H): ICD-10-CM

## 2024-04-30 PROCEDURE — 71046 X-RAY EXAM CHEST 2 VIEWS: CPT | Mod: TC | Performed by: RADIOLOGY

## 2024-04-30 PROCEDURE — 99214 OFFICE O/P EST MOD 30 MIN: CPT | Performed by: FAMILY MEDICINE

## 2024-04-30 RX ORDER — FLUTICASONE FUROATE, UMECLIDINIUM BROMIDE AND VILANTEROL TRIFENATATE 200; 62.5; 25 UG/1; UG/1; UG/1
POWDER RESPIRATORY (INHALATION)
COMMUNITY
Start: 2024-04-29

## 2024-04-30 RX ORDER — CODEINE PHOSPHATE AND GUAIFENESIN 10; 100 MG/5ML; MG/5ML
1-2 SOLUTION ORAL EVERY 4 HOURS PRN
Qty: 180 ML | Refills: 0 | Status: SHIPPED | OUTPATIENT
Start: 2024-04-30

## 2024-04-30 ASSESSMENT — ASTHMA QUESTIONNAIRES
QUESTION_3 LAST FOUR WEEKS HOW OFTEN DID YOUR ASTHMA SYMPTOMS (WHEEZING, COUGHING, SHORTNESS OF BREATH, CHEST TIGHTNESS OR PAIN) WAKE YOU UP AT NIGHT OR EARLIER THAN USUAL IN THE MORNING: FOUR OR MORE NIGHTS A WEEK
QUESTION_2 LAST FOUR WEEKS HOW OFTEN HAVE YOU HAD SHORTNESS OF BREATH: MORE THAN ONCE A DAY
QUESTION_4 LAST FOUR WEEKS HOW OFTEN HAVE YOU USED YOUR RESCUE INHALER OR NEBULIZER MEDICATION (SUCH AS ALBUTEROL): ONE OR TWO TIMES PER DAY
QUESTION_1 LAST FOUR WEEKS HOW MUCH OF THE TIME DID YOUR ASTHMA KEEP YOU FROM GETTING AS MUCH DONE AT WORK, SCHOOL OR AT HOME: SOME OF THE TIME
EMERGENCY_ROOM_LAST_YEAR_TOTAL: TWO
ACT_TOTALSCORE: 9
QUESTION_5 LAST FOUR WEEKS HOW WOULD YOU RATE YOUR ASTHMA CONTROL: POORLY CONTROLLED
ACT_TOTALSCORE: 9

## 2024-04-30 NOTE — PROGRESS NOTES
"  Assessment & Plan     Severe persistent asthma with acute exacerbation (H28)  ER records reviewed patient was seen a week ago at [facility], repeat x-ray today without evidence of new infiltrates.  Formal read pending.  Looks like he is maxed out on bronchodilator therapy he does have follow-up with pulmonology, had discussion he will likely need to be treated with a biologic due to the refractory nature.  Did inform him we can use a short course of Robitussin AC for the severe cough.  Otherwise if his is requiring his nebulizers more than every 4 hours his oxygen is decreased to 90% or less, he needs to go to the ER.  - guaiFENesin-codeine (ROBITUSSIN AC) 100-10 MG/5ML solution  Dispense: 180 mL; Refill: 0              BMI  Estimated body mass index is 35.55 kg/m  as calculated from the following:    Height as of this encounter: 1.702 m (5' 7\").    Weight as of this encounter: 103 kg (227 lb).             Jada Matson is a 49 year old, presenting for the following health issues:    Asthma (Cough, SOB)        4/30/2024     8:01 AM   Additional Questions   Roomed by Alyssa MARTIN     History of Present Illness     Asthma:  He presents for follow up of asthma.  He has some cough, some wheezing, and some shortness of breath.  He is using a relief medication daily. He typically misses taking his controller medication 1 time(s) per week. Patient is aware of the following triggers: same as previous visit. The patient has had a visit to the Emergency Room, Urgent Care or Hospital due to asthma since the last clinic visit.     Reason for visit:  Lung issues    He eats 0-1 servings of fruits and vegetables daily.He consumes 0 sweetened beverage(s) daily.He exercises with enough effort to increase his heart rate 10 to 19 minutes per day.    He is taking medications regularly.     Is a 49-year-old male with severe persistent asthma presents for ongoing cough and shortness of breath.  Was seen for same issue a week ago at " [facility], continues to use his duo nebulizers 3 times a day continues to feel short of breath, home oximetry has still been in the 90s.  He does have follow-up with pulmonology.  Recently completed a prednisone course that was prescribed in the ER.    Continues his Trelegy daily.      Objective    BP (!) 134/98 (Cuff Size: Adult Large)   Pulse 104   Temp 98.1  F (36.7  C) (Oral)   Resp 20   Ht 1.702 m (5' 7\")   Wt 103 kg (227 lb)   SpO2 96%   BMI 35.55 kg/m    Body mass index is 35.55 kg/m .  Physical Exam  Vitals reviewed.   Constitutional:       Appearance: He is not ill-appearing.   Cardiovascular:      Rate and Rhythm: Normal rate.   Pulmonary:      Effort: Pulmonary effort is normal. No respiratory distress.      Breath sounds: No stridor. Wheezing present. No rhonchi or rales.   Chest:      Chest wall: No tenderness.          2 view chest x-ray, independently reviewed by me, no focal infiltrates.          Signed Electronically by: Alex Nichols MD    "

## 2024-05-06 ENCOUNTER — MYC MEDICAL ADVICE (OUTPATIENT)
Dept: FAMILY MEDICINE | Facility: CLINIC | Age: 49
End: 2024-05-06
Payer: COMMERCIAL

## 2024-05-06 ENCOUNTER — TRANSFERRED RECORDS (OUTPATIENT)
Dept: HEALTH INFORMATION MANAGEMENT | Facility: CLINIC | Age: 49
End: 2024-05-06
Payer: COMMERCIAL

## 2024-05-06 ENCOUNTER — DOCUMENTATION ONLY (OUTPATIENT)
Dept: ALLERGY | Facility: CLINIC | Age: 49
End: 2024-05-06
Payer: COMMERCIAL

## 2024-05-06 DIAGNOSIS — T78.40XS ALLERGY, SEQUELA: Primary | ICD-10-CM

## 2024-05-09 NOTE — TELEPHONE ENCOUNTER
Patient is checking the status of this Referral, his appointment is on 5/23/24. Once referral is sign please fax it to Dr. Nii Dash office.    Eli Savage   [facility]  Central Scheduler

## 2024-05-15 ENCOUNTER — TELEPHONE (OUTPATIENT)
Dept: FAMILY MEDICINE | Facility: CLINIC | Age: 49
End: 2024-05-15
Payer: COMMERCIAL

## 2024-05-15 DIAGNOSIS — T78.40XA ALLERGIC REACTION, INITIAL ENCOUNTER: Primary | ICD-10-CM

## 2024-05-15 NOTE — TELEPHONE ENCOUNTER
FYI - Status Update    Who is Calling: patient    Update: Pt called in and said referral was sent in wrong. Referral should stated out-patient and NOT in-patient. Please resubmit this referral correctly according to [employer]. Pt was this to be rushed and pt also wants a call back when this referral has been submitted correctly back to [employer].     Does caller want a call/response back: Yes     Could we send this information to you in [employer] or would you prefer to receive a phone call?:   No preference   Okay to leave a detailed message?: Yes at Cell number on file:    Telephone Information:   Mobile [phone]

## 2024-06-02 ENCOUNTER — HEALTH MAINTENANCE LETTER (OUTPATIENT)
Age: 49
End: 2024-06-02

## 2024-06-14 DIAGNOSIS — J45.50 SEVERE PERSISTENT ASTHMA WITHOUT COMPLICATION (H): ICD-10-CM

## 2024-06-14 DIAGNOSIS — J30.81 CHRONIC ALLERGIC RHINITIS DUE TO ANIMAL HAIR AND DANDER: ICD-10-CM

## 2024-06-14 DIAGNOSIS — J30.1 CHRONIC SEASONAL ALLERGIC RHINITIS DUE TO POLLEN: ICD-10-CM

## 2024-06-14 DIAGNOSIS — J30.89 ALLERGIC RHINITIS DUE TO MOLD: ICD-10-CM

## 2024-06-14 RX ORDER — FEXOFENADINE HCL AND PSEUDOEPHEDRINE HCL 180; 240 MG/1; MG/1
TABLET, EXTENDED RELEASE ORAL
Qty: 90 TABLET | Refills: 1 | Status: SHIPPED | OUTPATIENT
Start: 2024-06-14

## 2024-06-15 ENCOUNTER — E-VISIT (OUTPATIENT)
Dept: FAMILY MEDICINE | Facility: CLINIC | Age: 49
End: 2024-06-15
Payer: COMMERCIAL

## 2024-06-15 DIAGNOSIS — R06.2 WHEEZE: ICD-10-CM

## 2024-06-15 DIAGNOSIS — J30.89 SEASONAL ALLERGIC RHINITIS DUE TO OTHER ALLERGIC TRIGGER: Primary | ICD-10-CM

## 2024-06-15 DIAGNOSIS — J45.50 SEVERE PERSISTENT ASTHMA WITHOUT COMPLICATION (H): ICD-10-CM

## 2024-06-15 PROCEDURE — 99421 OL DIG E/M SVC 5-10 MIN: CPT | Performed by: FAMILY MEDICINE

## 2024-06-17 RX ORDER — ALBUTEROL SULFATE 0.83 MG/ML
2.5 SOLUTION RESPIRATORY (INHALATION) EVERY 4 HOURS PRN
Qty: 120 ML | Refills: 1 | Status: SHIPPED | OUTPATIENT
Start: 2024-06-17 | End: 2024-10-04

## 2024-06-17 RX ORDER — FEXOFENADINE HCL AND PSEUDOEPHEDRINE HCL 180; 240 MG/1; MG/1
1 TABLET, EXTENDED RELEASE ORAL DAILY
Qty: 30 TABLET | Refills: 3 | Status: SHIPPED | OUTPATIENT
Start: 2024-06-17 | End: 2024-10-04

## 2024-07-16 ENCOUNTER — PRE VISIT (OUTPATIENT)
Dept: OTOLARYNGOLOGY | Facility: CLINIC | Age: 49
End: 2024-07-16

## 2024-09-06 DIAGNOSIS — R06.2 WHEEZE: ICD-10-CM

## 2024-09-06 DIAGNOSIS — J45.50 SEVERE PERSISTENT ASTHMA WITHOUT COMPLICATION (H): ICD-10-CM

## 2024-09-09 RX ORDER — IPRATROPIUM BROMIDE AND ALBUTEROL SULFATE 2.5; .5 MG/3ML; MG/3ML
SOLUTION RESPIRATORY (INHALATION)
Qty: 270 ML | Refills: 3 | Status: SHIPPED | OUTPATIENT
Start: 2024-09-09 | End: 2024-10-04

## 2024-09-16 ASSESSMENT — ASTHMA QUESTIONNAIRES
ACT_TOTALSCORE: 13
QUESTION_3 LAST FOUR WEEKS HOW OFTEN DID YOUR ASTHMA SYMPTOMS (WHEEZING, COUGHING, SHORTNESS OF BREATH, CHEST TIGHTNESS OR PAIN) WAKE YOU UP AT NIGHT OR EARLIER THAN USUAL IN THE MORNING: TWO OR THREE NIGHTS A WEEK
QUESTION_1 LAST FOUR WEEKS HOW MUCH OF THE TIME DID YOUR ASTHMA KEEP YOU FROM GETTING AS MUCH DONE AT WORK, SCHOOL OR AT HOME: SOME OF THE TIME
QUESTION_2 LAST FOUR WEEKS HOW OFTEN HAVE YOU HAD SHORTNESS OF BREATH: THREE TO SIX TIMES A WEEK
EMERGENCY_ROOM_LAST_YEAR_TOTAL: ONE
QUESTION_5 LAST FOUR WEEKS HOW WOULD YOU RATE YOUR ASTHMA CONTROL: SOMEWHAT CONTROLLED
QUESTION_4 LAST FOUR WEEKS HOW OFTEN HAVE YOU USED YOUR RESCUE INHALER OR NEBULIZER MEDICATION (SUCH AS ALBUTEROL): ONE OR TWO TIMES PER DAY
ACT_TOTALSCORE: 13

## 2024-09-17 ENCOUNTER — OFFICE VISIT (OUTPATIENT)
Dept: ALLERGY | Facility: CLINIC | Age: 49
End: 2024-09-17
Payer: COMMERCIAL

## 2024-09-17 VITALS
HEART RATE: 83 BPM | SYSTOLIC BLOOD PRESSURE: 149 MMHG | RESPIRATION RATE: 16 BRPM | OXYGEN SATURATION: 95 % | BODY MASS INDEX: 39.03 KG/M2 | WEIGHT: 249.2 LBS | DIASTOLIC BLOOD PRESSURE: 93 MMHG

## 2024-09-17 DIAGNOSIS — J45.50 SEVERE PERSISTENT ASTHMA WITHOUT COMPLICATION (H): Primary | ICD-10-CM

## 2024-09-17 DIAGNOSIS — J30.1 SEASONAL ALLERGIC RHINITIS DUE TO POLLEN: ICD-10-CM

## 2024-09-17 PROCEDURE — 99204 OFFICE O/P NEW MOD 45 MIN: CPT | Performed by: INTERNAL MEDICINE

## 2024-09-17 RX ORDER — BUDESONIDE AND FORMOTEROL FUMARATE DIHYDRATE 160; 4.5 UG/1; UG/1
2 AEROSOL RESPIRATORY (INHALATION) 2 TIMES DAILY
Qty: 10.2 G | Refills: 5 | Status: SHIPPED | OUTPATIENT
Start: 2024-09-17

## 2024-09-17 RX ORDER — AZITHROMYCIN 250 MG/1
TABLET, FILM COATED ORAL
Qty: 6 TABLET | Refills: 0 | Status: SHIPPED | OUTPATIENT
Start: 2024-09-17 | End: 2024-09-22

## 2024-09-17 NOTE — PATIENT INSTRUCTIONS
Symbicort 2 puffs twice daily  Breathing tests in 1 week  Labs  Will consider allergy shots or biologics  Azithro x 5 days  Follow up in 1 month      Allergy Staff Appt Hours Shot Hours Location       Physician   Francis Kay MD      Support Staff   FERNY Byrd RN, MA Emily J., MA      Mondays Tuesdays Thursdays and Fridays:      Meryl 7-5      Wednesdays         Close                Mondays, Tuesdays and Fridays:  7:20 - 3:40              [facility]  [address]  [place] [address]  Allergy appointment  line: [phone]    Pulmonary Function Scheduling:  [place]: [phone]           Questions about cost of your care  For questions about your cost of your visit, procedure, lab or imaging contact: [employer] Price Line [phone] or visit:  [email or web address]    Prescription Assistance  If you need assistance with your prescriptions (cost, coverage, etc) please contact: [employer] Prescription Assistance Program [phone]    Important Scheduling Information  All visits for food challenges, medication/drug allergy testing, and drug challenges MUST be scheduled through the allergy clinic nurse. Please contact them via [employer] or by calling the clinic at [phone] and asking to speak with an allergy nurse. They will provide additional information and instructions for the appointment. Discontinue oral antihistamines 7 days prior to the appointment. Discontinue nasal and ocular antihistamines 1 day prior to the appointment.    Appointments for skin testing: Appointment will last approximately 45 minutes.  Please call the appointment line for your clinic to schedule.  Discontinue oral antihistamines 7 days prior to the appointment.  Discontinue nasal and ocular antihistamines 1 days prior to appointment.    Thank you for trusting us with your care. Please feel free to contact us with any questions or  concerns you may have.

## 2024-09-17 NOTE — PROGRESS NOTES
"Dano Kennedy was seen in the Allergy Clinic at [facility].    Dano Kennedy is a 49 year old male being seen today at the request of Alex Nichols MD, [facility] in consultation for asthma and allergic rhinitis.    He has symptoms of itchy eyes, rhinorrhea, wheezing and cough as well as throat clearing and sinus congestion.  He has been followed by a pulmonologist at [facility].  He has a history of sleep apnea.  He was hospitalized for Legionella pneumonia.  He contracted that in a hot tub and then subsequently was diagnosed on a cruise and was in [place].  He was hospitalized.    He has seen Dr. Dash an allergist and did have testing is positive to cat and dog and mold and ragweed.    Does take omeprazole 20 mg in the morning for reflux which is currently well-controlled.  He was vomiting at night due to reflux.    He is not currently using Astelin or Flonase.  He does use Allegra-D as needed.  He has been prescribed Breo which was switched to Trelegy and then stopped Trelegy about 10 days ago.  He actually feels better off Trelegy.  He was using a nebulizer once to twice a day while on Trelegy.    He has been treated with prednisone most of his life intermittently.  He does get jittery and cannot sleep and is irritable when on prednisone.    He has had a sinus CT scan in June which was relatively normal.  He has had sinus surgery in the past in 2 different occasions.    A CT scan of his chest in October 2, 2023 showed a mosaic pattern in his lower lobes.  It has not been repeated since then.    His pulmonologist wanted him to be considered for Biologics.  Per the patient the pulmonologist that he saw does not order Biologics.    The allergist that he saw (Dr. Dash) wanted him to eat [facility] daily at [facility] and take supplements.    Past Medical History:   Diagnosis Date    Allergic rhinitis, cause unspecified     Asthma     Complication of " anesthesia     did not respond well to succs    Prostate infection     Unspecified asthma(493.90)      Family History   Problem Relation Age of Onset    Hyperlipidemia Mother     Sleep Apnea Mother     Glaucoma Father     Glaucoma Maternal Grandfather     Macular Degeneration No family hx of      Past Surgical History:   Procedure Laterality Date    COLONOSCOPY      DAVINCI XI ASSISTED RESECTION RECTOSIGMOID N/A 11/26/2019    Procedure: ROBOTIC ASSISTED SIGMOID COLECTOMY. MOBILIZATION OF SPLENIC FLESURE;  Surgeon: Travis Brand MD;  Location:  OR    ENDOSCOPIC POLYPECTOMY NASAL      ESOPHAGOSCOPY, GASTROSCOPY, DUODENOSCOPY (EGD), COMBINED N/A 11/19/2021    Procedure: ESOPHAGOGASTRODUODENOSCOPY, WITH BIOPSIES using cold forceps;  Surgeon: Santhosh Harmon MD;  Location:  GI    ESOPHAGOSCOPY, GASTROSCOPY, DUODENOSCOPY (EGD), COMBINED N/A 11/08/2023    Procedure: Esophagoscopy, gastroscopy, duodenoscopy (EGD), combined;  Surgeon: Jose Spencer MD;  Location:  GI    LAPAROSCOPIC ASSISTED COLECTOMY      LASER HOLMIUM LITHOTRIPSY URETER(S), INSERT STENT, COMBINED Left 01/14/2015    Procedure: COMBINED CYSTOSCOPY, URETEROSCOPY, LASER HOLMIUM LITHOTRIPSY URETER(S), INSERT STENT;  Surgeon: Derrek Cobb MD;  Location:  OR    LITHOTRIPSY      SEPTOPLASTY         ENVIRONMENTAL HISTORY:   Pets inside the house include None.  Do you smoke cigarettes or other recreational drugs? No There is/are 0 smokers living in the house. The house does not have a damp basement.     SOCIAL HISTORY:   Dano is employed as  . He lives with self.      Review of Systems      Current Outpatient Medications:     acetaminophen (TYLENOL) 500 MG tablet, Take 1,000 mg by mouth every 6 hours as needed for mild pain, Disp: , Rfl:     albuterol (PROAIR HFA/PROVENTIL HFA/VENTOLIN HFA) 108 (90 Base) MCG/ACT inhaler, Inhale 2 puffs into the lungs every 4 hours as needed for shortness of breath / dyspnea, Disp: 18  g, Rfl: 11    albuterol (PROVENTIL) (2.5 MG/3ML) 0.083% neb solution, Take 1 vial (2.5 mg) by nebulization every 4 hours as needed for shortness of breath or wheezing, Disp: 120 mL, Rfl: 1    azithromycin (ZITHROMAX) 250 MG tablet, Take 2 tablets (500 mg) by mouth daily for 1 day, THEN 1 tablet (250 mg) daily for 4 days., Disp: 6 tablet, Rfl: 0    budesonide-formoterol (SYMBICORT) 160-4.5 MCG/ACT Inhaler, Inhale 2 puffs into the lungs 2 times daily., Disp: 10.2 g, Rfl: 5    fexofenadine-pseudoePHEDrine (ALLEGRA-D 24) 180-240 MG 24 hr tablet, Take 1 tablet by mouth daily (Patient taking differently: Take 1 tablet by mouth as needed.), Disp: 30 tablet, Rfl: 3    fexofenadine-pseudoePHEDrine (ALLEGRA-D ALLERGY & CONGESTION) 180-240 MG 24 hr tablet, TAKE 1 TABLET BY MOUTH EVERY DAY AS NEEDED, Disp: 90 tablet, Rfl: 1    guaiFENesin-codeine (ROBITUSSIN AC) 100-10 MG/5ML solution, Take 5-10 mLs by mouth every 4 hours as needed for cough, Disp: 180 mL, Rfl: 0    ibuprofen (ADVIL/MOTRIN) 200 MG tablet, Take 600 mg by mouth every 6 hours as needed for pain, Disp: , Rfl:     ipratropium - albuterol 0.5 mg/2.5 mg/3 mL (DUONEB) 0.5-2.5 (3) MG/3ML neb solution, NEBULIZER 1 VIAL EVERY 4 HOURS AS NEEDED FOR SHORTNESS OF BREATH OR WHEEZING, Disp: 270 mL, Rfl: 3    fluticasone-vilanterol (BREO ELLIPTA) 200-25 MCG/ACT inhaler, Inhale 1 puff into the lungs daily (Patient not taking: Reported on 3/19/2024), Disp: 60 each, Rfl: 11    ondansetron (ZOFRAN) 4 MG tablet, Take 1 tablet (4 mg) by mouth every 8 hours as needed for nausea (Patient not taking: Reported on 3/19/2024), Disp: 12 tablet, Rfl: 0    ondansetron (ZOFRAN) 4 MG tablet, Take 1 tablet (4 mg) by mouth every 8 hours as needed for nausea or vomiting (Patient not taking: Reported on 3/19/2024), Disp: 15 tablet, Rfl: 0    predniSONE (DELTASONE) 20 MG tablet, Take two tablets (= 40mg) each day for 5 (five) days (Patient not taking: Reported on 4/30/2024), Disp: 10 tablet, Rfl:  0    scopolamine (TRANSDERM) 1 MG/3DAYS 72 hr patch, Place 1 patch onto the skin every 72 hours (Patient not taking: Reported on 3/19/2024), Disp: 24 patch, Rfl: 0    TRELEGY ELLIPTA 200-62.5-25 MCG/ACT oral inhaler, , Disp: , Rfl:   Allergies   Allergen Reactions    Bee Pollen Anaphylaxis    Cats Difficulty breathing    Mold     Succinylcholine Other (See Comments)     Convulsions, muscle pain    Trees          EXAM:   BP (!) 149/93 (BP Location: Left arm, Patient Position: Sitting, Cuff Size: Adult Large)   Pulse 83   Resp 16   Wt 113 kg (249 lb 3.2 oz)   SpO2 95%   BMI 39.03 kg/m      Physical Exam    Constitutional:       General: He is not in acute distress.     Appearance: Normal appearance. He is not ill-appearing.   HENT:      Head: Normocephalic and atraumatic.      Nose: Mild turbinate hypertrophy bilaterally.     Mouth/Throat:      Mouth: Mucous membranes are moist.      Pharynx: Oropharynx is clear. No posterior oropharyngeal erythema.   Eyes:      General:         Right eye: No discharge.         Left eye: No discharge.   Cardiovascular:      Rate and Rhythm: Normal rate and regular rhythm.      Heart sounds: Normal heart sounds.   Pulmonary:      Effort: Pulmonary effort is normal.      Breath sounds: Normal breath sounds. No wheezing or rhonchi.   Skin:     General: Skin is warm.      Findings: No erythema or rash.   Neurological:      General: No focal deficit present.      Mental Status: He is alert. Mental status is at baseline.   Psychiatric:         Mood and Affect: Mood normal.         Behavior: Behavior normal.      ASSESSMENT/PLAN:  Dano Kennedy is a 49 year old male seen today for evaluation of allergic rhinitis as well as asthma.  One of his requests is being considered for biologic therapy.  He also has a history of sleep apnea.  His FEV1 improved from 69% predicted to 80% predicted based on the [place] lung notes.  He has had immunoglobulins that were within normal limits.  His  pneumococcal titers were low initially and he did receive the pneumococcal vaccine but did not have those repeated.  Currently not on any maintenance medications for asthma.  Will prescribe Symbicort.  He did not feel Trelegy was helpful.  Will also prescribe azithromycin due to persistent cough and throat clearing.  May refer to the [facility] clinic    Symbicort 2 puffs twice daily  Breathing tests in 1 week  Labs  Will consider allergy shots or biologics.  Will check eosinophil count and IgE level.  If considering allergy shots will check skin testing.  Azithro x 5 days  Follow up in 1 month  May consider referral to the [facility] clinic.    Follow-up in 1 month      Thank you for allowing me to participate in the care of Dano Gavinland.      I spent 45 minutes on the date of the encounter doing chart review, history and exam, documentation and further coordination as noted above exclusive of separately reported interpretations    Francis Kay MD  Allergy/Immunology  [facility]

## 2024-09-17 NOTE — LETTER
"9/17/2024      Dano Kennedy  [address]  [place] [address]      Dear Colleague,    Thank you for referring your patient, Dano Kennedy, to the [facility] SPECIALTY [facility]. Please see a copy of my visit note below.    Dano Kennedy was seen in the Allergy Clinic at [facility].    Dano Kennedy is a 49 year old male being seen today at the request of Alex Nichols MD, [facility] in consultation for asthma and allergic rhinitis.    He has symptoms of itchy eyes, rhinorrhea, wheezing and cough as well as throat clearing and sinus congestion.  He has been followed by a pulmonologist at [facility].  He has a history of sleep apnea.  He was hospitalized for Legionella pneumonia.  He contracted that in a hot tub and then subsequently was diagnosed on a cruise and was in [place].  He was hospitalized.    He has seen Dr. Dash an allergist and did have testing is positive to cat and dog and mold and ragweed.    Does take omeprazole 20 mg in the morning for reflux which is currently well-controlled.  He was vomiting at night due to reflux.    He is not currently using Astelin or Flonase.  He does use Allegra-D as needed.  He has been prescribed Breo which was switched to Trelegy and then stopped Trelegy about 10 days ago.  He actually feels better off Trelegy.  He was using a nebulizer once to twice a day while on Trelegy.    He has been treated with prednisone most of his life intermittently.  He does get jittery and cannot sleep and is irritable when on prednisone.    He has had a sinus CT scan in June which was relatively normal.  He has had sinus surgery in the past in 2 different occasions.    A CT scan of his chest in October 2, 2023 showed a mosaic pattern in his lower lobes.  It has not been repeated since then.    His pulmonologist wanted him to be considered for Biologics.  Per the patient the pulmonologist that he saw " does not order Biologics.    The allergist that he saw (Dr. Dash) wanted him to eat vegetables daily at [facility] and take supplements.    Past Medical History:   Diagnosis Date     Allergic rhinitis, cause unspecified      Asthma      Complication of anesthesia     did not respond well to succs     Prostate infection      Unspecified asthma(493.90)      Family History   Problem Relation Age of Onset     Hyperlipidemia Mother      Sleep Apnea Mother      Glaucoma Father      Glaucoma Maternal Grandfather      Macular Degeneration No family hx of      Past Surgical History:   Procedure Laterality Date     COLONOSCOPY       DAVINCI XI ASSISTED RESECTION RECTOSIGMOID N/A 11/26/2019    Procedure: ROBOTIC ASSISTED SIGMOID COLECTOMY. MOBILIZATION OF SPLENIC FLESURE;  Surgeon: Travis Brand MD;  Location:  OR     ENDOSCOPIC POLYPECTOMY NASAL       ESOPHAGOSCOPY, GASTROSCOPY, DUODENOSCOPY (EGD), COMBINED N/A 11/19/2021    Procedure: ESOPHAGOGASTRODUODENOSCOPY, WITH BIOPSIES using cold forceps;  Surgeon: Santhosh Harmon MD;  Location:  GI     ESOPHAGOSCOPY, GASTROSCOPY, DUODENOSCOPY (EGD), COMBINED N/A 11/08/2023    Procedure: Esophagoscopy, gastroscopy, duodenoscopy (EGD), combined;  Surgeon: Jose Spencer MD;  Location: [facility]     LAPAROSCOPIC ASSISTED COLECTOMY       LASER HOLMIUM LITHOTRIPSY URETER(S), INSERT STENT, COMBINED Left 01/14/2015    Procedure: COMBINED CYSTOSCOPY, URETEROSCOPY, LASER HOLMIUM LITHOTRIPSY URETER(S), INSERT STENT;  Surgeon: Derrek Cobb MD;  Location:  OR     LITHOTRIPSY       SEPTOPLASTY         ENVIRONMENTAL HISTORY:   Pets inside the house include None.  Do you smoke cigarettes or other recreational drugs? No There is/are 0 smokers living in the house. The house does not have a damp basement.     SOCIAL HISTORY:   Dano is employed as  . He lives with self.      Review of Systems      Current Outpatient Medications:      acetaminophen (TYLENOL) 500  MG tablet, Take 1,000 mg by mouth every 6 hours as needed for mild pain, Disp: , Rfl:      albuterol (PROAIR HFA/PROVENTIL HFA/VENTOLIN HFA) 108 (90 Base) MCG/ACT inhaler, Inhale 2 puffs into the lungs every 4 hours as needed for shortness of breath / dyspnea, Disp: 18 g, Rfl: 11     albuterol (PROVENTIL) (2.5 MG/3ML) 0.083% neb solution, Take 1 vial (2.5 mg) by nebulization every 4 hours as needed for shortness of breath or wheezing, Disp: 120 mL, Rfl: 1     azithromycin (ZITHROMAX) 250 MG tablet, Take 2 tablets (500 mg) by mouth daily for 1 day, THEN 1 tablet (250 mg) daily for 4 days., Disp: 6 tablet, Rfl: 0     budesonide-formoterol (SYMBICORT) 160-4.5 MCG/ACT Inhaler, Inhale 2 puffs into the lungs 2 times daily., Disp: 10.2 g, Rfl: 5     fexofenadine-pseudoePHEDrine (ALLEGRA-D 24) 180-240 MG 24 hr tablet, Take 1 tablet by mouth daily (Patient taking differently: Take 1 tablet by mouth as needed.), Disp: 30 tablet, Rfl: 3     fexofenadine-pseudoePHEDrine (ALLEGRA-D ALLERGY & CONGESTION) 180-240 MG 24 hr tablet, TAKE 1 TABLET BY MOUTH EVERY DAY AS NEEDED, Disp: 90 tablet, Rfl: 1     guaiFENesin-codeine (ROBITUSSIN AC) 100-10 MG/5ML solution, Take 5-10 mLs by mouth every 4 hours as needed for cough, Disp: 180 mL, Rfl: 0     ibuprofen (ADVIL/MOTRIN) 200 MG tablet, Take 600 mg by mouth every 6 hours as needed for pain, Disp: , Rfl:      ipratropium - albuterol 0.5 mg/2.5 mg/3 mL (DUONEB) 0.5-2.5 (3) MG/3ML neb solution, NEBULIZER 1 VIAL EVERY 4 HOURS AS NEEDED FOR SHORTNESS OF BREATH OR WHEEZING, Disp: 270 mL, Rfl: 3     fluticasone-vilanterol (BREO ELLIPTA) 200-25 MCG/ACT inhaler, Inhale 1 puff into the lungs daily (Patient not taking: Reported on 3/19/2024), Disp: 60 each, Rfl: 11     ondansetron (ZOFRAN) 4 MG tablet, Take 1 tablet (4 mg) by mouth every 8 hours as needed for nausea (Patient not taking: Reported on 3/19/2024), Disp: 12 tablet, Rfl: 0     ondansetron (ZOFRAN) 4 MG tablet, Take 1 tablet (4 mg) by  mouth every 8 hours as needed for nausea or vomiting (Patient not taking: Reported on 3/19/2024), Disp: 15 tablet, Rfl: 0     predniSONE (DELTASONE) 20 MG tablet, Take two tablets (= 40mg) each day for 5 (five) days (Patient not taking: Reported on 4/30/2024), Disp: 10 tablet, Rfl: 0     scopolamine (TRANSDERM) 1 MG/3DAYS 72 hr patch, Place 1 patch onto the skin every 72 hours (Patient not taking: Reported on 3/19/2024), Disp: 24 patch, Rfl: 0     TRELEGY ELLIPTA 200-62.5-25 MCG/ACT oral inhaler, , Disp: , Rfl:   Allergies   Allergen Reactions     Bee Pollen Anaphylaxis     Cats Difficulty breathing     Mold      Succinylcholine Other (See Comments)     Convulsions, muscle pain     Trees          EXAM:   BP (!) 149/93 (BP Location: Left arm, Patient Position: Sitting, Cuff Size: Adult Large)   Pulse 83   Resp 16   Wt 113 kg (249 lb 3.2 oz)   SpO2 95%   BMI 39.03 kg/m      Physical Exam    Constitutional:       General: He is not in acute distress.     Appearance: Normal appearance. He is not ill-appearing.   HENT:      Head: Normocephalic and atraumatic.      Nose: Mild turbinate hypertrophy bilaterally.     Mouth/Throat:      Mouth: Mucous membranes are moist.      Pharynx: Oropharynx is clear. No posterior oropharyngeal erythema.   Eyes:      General:         Right eye: No discharge.         Left eye: No discharge.   Cardiovascular:      Rate and Rhythm: Normal rate and regular rhythm.      Heart sounds: Normal heart sounds.   Pulmonary:      Effort: Pulmonary effort is normal.      Breath sounds: Normal breath sounds. No wheezing or rhonchi.   Skin:     General: Skin is warm.      Findings: No erythema or rash.   Neurological:      General: No focal deficit present.      Mental Status: He is alert. Mental status is at baseline.   Psychiatric:         Mood and Affect: Mood normal.         Behavior: Behavior normal.      ASSESSMENT/PLAN:  Dano Kennedy is a 49 year old male seen today for evaluation of  allergic rhinitis as well as asthma.  One of his requests is being considered for biologic therapy.  He also has a history of sleep apnea.  His FEV1 improved from 69% predicted to 80% predicted based on the [place] lung notes.  He has had immunoglobulins that were within normal limits.  His pneumococcal titers were low initially and he did receive the pneumococcal vaccine but did not have those repeated.  Currently not on any maintenance medications for asthma.  Will prescribe Symbicort.  He did not feel Trelegy was helpful.  Will also prescribe azithromycin due to persistent cough and throat clearing.  May refer to the [facility]    Symbicort 2 puffs twice daily  Breathing tests in 1 week  Labs  Will consider allergy shots or biologics.  Will check eosinophil count and IgE level.  If considering allergy shots will check skin testing.  Azithro x 5 days  Follow up in 1 month  May consider referral to the [facility].    Follow-up in 1 month      Thank you for allowing me to participate in the care of Dano Kennedy.      I spent 45 minutes on the date of the encounter doing chart review, history and exam, documentation and further coordination as noted above exclusive of separately reported interpretations    Francis Kay MD  Allergy/Immunology  [facility]      Again, thank you for allowing me to participate in the care of your patient.        Sincerely,        Francis Kay MD

## 2024-09-20 ENCOUNTER — LAB (OUTPATIENT)
Dept: LAB | Facility: CLINIC | Age: 49
End: 2024-09-20
Payer: COMMERCIAL

## 2024-09-20 DIAGNOSIS — Z13.220 SCREENING FOR HYPERLIPIDEMIA: Primary | ICD-10-CM

## 2024-09-20 DIAGNOSIS — J45.50 SEVERE PERSISTENT ASTHMA WITHOUT COMPLICATION (H): ICD-10-CM

## 2024-09-20 LAB
BASOPHILS # BLD AUTO: 0 10E3/UL (ref 0–0.2)
BASOPHILS NFR BLD AUTO: 0 %
CHOLEST SERPL-MCNC: 244 MG/DL
CRP SERPL-MCNC: <3 MG/L
EOSINOPHIL # BLD AUTO: 0.2 10E3/UL (ref 0–0.7)
EOSINOPHIL NFR BLD AUTO: 3 %
ERYTHROCYTE [DISTWIDTH] IN BLOOD BY AUTOMATED COUNT: 12.6 % (ref 10–15)
ERYTHROCYTE [SEDIMENTATION RATE] IN BLOOD BY WESTERGREN METHOD: 7 MM/HR (ref 0–15)
FASTING STATUS PATIENT QL REPORTED: YES
HCT VFR BLD AUTO: 45.8 % (ref 40–53)
HDLC SERPL-MCNC: 38 MG/DL
HGB BLD-MCNC: 15.2 G/DL (ref 13.3–17.7)
IMM GRANULOCYTES # BLD: 0 10E3/UL
IMM GRANULOCYTES NFR BLD: 0 %
LDLC SERPL CALC-MCNC: 158 MG/DL
LYMPHOCYTES # BLD AUTO: 1.5 10E3/UL (ref 0.8–5.3)
LYMPHOCYTES NFR BLD AUTO: 19 %
MCH RBC QN AUTO: 27.5 PG (ref 26.5–33)
MCHC RBC AUTO-ENTMCNC: 33.2 G/DL (ref 31.5–36.5)
MCV RBC AUTO: 83 FL (ref 78–100)
MONOCYTES # BLD AUTO: 0.6 10E3/UL (ref 0–1.3)
MONOCYTES NFR BLD AUTO: 8 %
NEUTROPHILS # BLD AUTO: 5.5 10E3/UL (ref 1.6–8.3)
NEUTROPHILS NFR BLD AUTO: 70 %
NONHDLC SERPL-MCNC: 206 MG/DL
PLATELET # BLD AUTO: 222 10E3/UL (ref 150–450)
RBC # BLD AUTO: 5.52 10E6/UL (ref 4.4–5.9)
TRIGL SERPL-MCNC: 238 MG/DL
WBC # BLD AUTO: 7.8 10E3/UL (ref 4–11)

## 2024-09-20 PROCEDURE — 80061 LIPID PANEL: CPT

## 2024-09-20 PROCEDURE — 36415 COLL VENOUS BLD VENIPUNCTURE: CPT

## 2024-09-20 PROCEDURE — 85025 COMPLETE CBC W/AUTO DIFF WBC: CPT

## 2024-09-20 PROCEDURE — 86003 ALLG SPEC IGE CRUDE XTRC EA: CPT

## 2024-09-20 PROCEDURE — 86140 C-REACTIVE PROTEIN: CPT

## 2024-09-20 PROCEDURE — 85652 RBC SED RATE AUTOMATED: CPT

## 2024-09-23 ENCOUNTER — OFFICE VISIT (OUTPATIENT)
Dept: PULMONOLOGY | Facility: CLINIC | Age: 49
End: 2024-09-23
Payer: COMMERCIAL

## 2024-09-23 DIAGNOSIS — J45.50 SEVERE PERSISTENT ASTHMA WITHOUT COMPLICATION (H): ICD-10-CM

## 2024-09-23 LAB — PULMONARY FUNCTION TEST-FENO: 22.5 PPB (ref 0–40)

## 2024-09-23 PROCEDURE — 94375 RESPIRATORY FLOW VOLUME LOOP: CPT | Performed by: INTERNAL MEDICINE

## 2024-09-23 PROCEDURE — 94729 DIFFUSING CAPACITY: CPT | Performed by: INTERNAL MEDICINE

## 2024-09-23 PROCEDURE — 95012 NITRIC OXIDE EXP GAS DETER: CPT | Performed by: INTERNAL MEDICINE

## 2024-09-23 PROCEDURE — 94150 VITAL CAPACITY TEST: CPT | Performed by: INTERNAL MEDICINE

## 2024-09-23 PROCEDURE — 94726 PLETHYSMOGRAPHY LUNG VOLUMES: CPT | Performed by: INTERNAL MEDICINE

## 2024-09-23 NOTE — PROGRESS NOTES
Dano Kennedy comes into clinic today at the request of Dr. Kay, Ordering Provider for PFT and FENO      This service provided today was under the supervising provider of the day Dr. Kay, who was available if needed.    Nathaniel Sanchez, RT

## 2024-09-24 LAB
A ALTERNATA IGE QN: 0.42 KU(A)/L
C HERBARUM IGE QN: 0.16 KU(A)/L
COMMON RAGWEED IGE QN: 0.53 KU(A)/L
D FARINAE IGE QN: <0.1 KU(A)/L
D PTERONYSS IGE QN: <0.1 KU(A)/L
DLCOCOR-%PRED-PRE: 109 %
DLCOCOR-PRE: 29.4 ML/MIN/MMHG
DLCOUNC-%PRED-PRE: 110 %
DLCOUNC-PRE: 29.89 ML/MIN/MMHG
DLCOUNC-PRED: 26.95 ML/MIN/MMHG
DOG DANDER+EPITH IGE QN: 0.65 KU(A)/L
ERV-%PRED-PRE: 40 %
ERV-PRE: 0.61 L
ERV-PRED: 1.51 L
EXPTIME-PRE: 4.83 SEC
FEF2575-%PRED-PRE: 138 %
FEF2575-PRE: 4.38 L/SEC
FEF2575-PRED: 3.18 L/SEC
FEFMAX-%PRED-PRE: 104 %
FEFMAX-PRE: 9.61 L/SEC
FEFMAX-PRED: 9.19 L/SEC
FEV1-%PRED-PRE: 113 %
FEV1-PRE: 3.78 L
FEV1FEV6-PRE: 85 %
FEV1FEV6-PRED: 80 %
FEV1FVC-PRE: 85 %
FEV1FVC-PRED: 81 %
FEV1SVC-PRE: 89 %
FEV1SVC-PRED: 75 %
FIFMAX-PRE: 4.75 L/SEC
FRCPLETH-%PRED-PRE: 83 %
FRCPLETH-PRE: 2.77 L
FRCPLETH-PRED: 3.34 L
FVC-%PRED-PRE: 107 %
FVC-PRE: 4.44 L
FVC-PRED: 4.14 L
GIANT RAGWEED IGE QN: 0.32 KU(A)/L
IC-%PRED-PRE: 120 %
IC-PRE: 3.64 L
IC-PRED: 3.02 L
RVPLETH-%PRED-PRE: 103 %
RVPLETH-PRE: 2.16 L
RVPLETH-PRED: 2.09 L
SILVER BIRCH IGE QN: <0.1 KU(A)/L
TIMOTHY IGE QN: <0.1 KU(A)/L
TLCPLETH-%PRED-PRE: 98 %
TLCPLETH-PRE: 6.41 L
TLCPLETH-PRED: 6.52 L
VA-%PRED-PRE: 98 %
VA-PRE: 5.85 L
VC-%PRED-PRE: 95 %
VC-PRE: 4.25 L
VC-PRED: 4.45 L

## 2024-09-25 ENCOUNTER — HOSPITAL ENCOUNTER (OUTPATIENT)
Dept: CT IMAGING | Facility: CLINIC | Age: 49
Discharge: HOME | End: 2024-09-25
Attending: INTERNAL MEDICINE | Admitting: INTERNAL MEDICINE
Payer: COMMERCIAL

## 2024-09-25 DIAGNOSIS — J45.50 SEVERE PERSISTENT ASTHMA WITHOUT COMPLICATION (H): ICD-10-CM

## 2024-09-25 PROCEDURE — 71250 CT THORAX DX C-: CPT

## 2024-09-29 ENCOUNTER — E-VISIT (OUTPATIENT)
Dept: FAMILY MEDICINE | Facility: CLINIC | Age: 49
End: 2024-09-29
Payer: COMMERCIAL

## 2024-09-29 DIAGNOSIS — E78.2 MIXED HYPERLIPIDEMIA: Primary | ICD-10-CM

## 2024-09-29 PROCEDURE — 99207 PR NON-BILLABLE SERV PER CHARTING: CPT | Performed by: FAMILY MEDICINE

## 2024-09-30 NOTE — TELEPHONE ENCOUNTER
Provider E-Visit time total (minutes): 0          Please double book a video virtual visit to further discuss his current concern.  Please let him know I no charge this visit    [place]

## 2024-10-01 ENCOUNTER — VIRTUAL VISIT (OUTPATIENT)
Dept: FAMILY MEDICINE | Facility: CLINIC | Age: 49
End: 2024-10-01
Payer: COMMERCIAL

## 2024-10-01 DIAGNOSIS — E78.2 MIXED HYPERLIPIDEMIA: ICD-10-CM

## 2024-10-01 DIAGNOSIS — K76.0 FATTY LIVER DISEASE, NONALCOHOLIC: Primary | ICD-10-CM

## 2024-10-01 PROCEDURE — 99214 OFFICE O/P EST MOD 30 MIN: CPT | Mod: 95 | Performed by: FAMILY MEDICINE

## 2024-10-01 NOTE — PROGRESS NOTES
Dano is a 49 year old who is being evaluated via a billable video visit.    How would you like to obtain your AVS? MyChart  If the video visit is dropped, the invitation should be resent by: Text to cell phone: [phone]  Will anyone else be joining your video visit? No      Assessment & Plan     Fatty liver disease, nonalcoholic  Nonalcoholic steatohepatitis which I informed patient is very common for obese middle-aged man.  To characterize, recommend ultrasound elastography, counseling performed, closely work with nutritionist for the next 6 months and we will reassess his lipid panel and assess him for statin therapy.  Would also consider GLP-1's in future.  Avoid alcohol use.  - US Elastography with Abdomen Limited  - Adult Nutrition  Referral  - Lipid panel reflex to direct LDL Fasting    Mixed hyperlipidemia        Subjective   Dano is a 49 year old, presenting for the following health issues:    Hyperlipidemia and Results (Go over recent test results)        10/1/2024    10:57 AM   Additional Questions   Roomed by Alyssa MARTIN     History of Present Illness       Hyperlipidemia:  He presents for follow up of hyperlipidemia.   He is not taking medication to lower cholesterol. He is not having myalgia or other side effects to statin medications.    Reason for visit:  Fatty liver issue from CT    He eats 0-1 servings of fruits and vegetables daily.He consumes 0 sweetened beverage(s) daily.He exercises with enough effort to increase his heart rate 9 or less minutes per day.  He exercises with enough effort to increase his heart rate 3 or less days per week.   He is taking medications regularly.       Patient has questions concerning his cholesterol levels and previous CT scan that showed fatty infiltration of the liver.          Objective           Vitals:  No vitals were obtained today due to virtual visit.    Physical Exam   GENERAL: alert and no distress  EYES: Eyes grossly normal to inspection.  No  discharge or erythema, or obvious scleral/conjunctival abnormalities.  RESP: No audible wheeze, cough, or visible cyanosis.    SKIN: Visible skin clear. No significant rash, abnormal pigmentation or lesions.  NEURO: Cranial nerves grossly intact.  Mentation and speech appropriate for age.  PSYCH: Appropriate affect, tone, and pace of words  The 10-year ASCVD risk score (Valarie BLACKBURN, et al., 2019) is: 7.3%    Values used to calculate the score:      Age: 49 years      Sex: Male      Is Non- : No      Diabetic: No      Tobacco smoker: No      Systolic Blood Pressure: 149 mmHg      Is BP treated: No      HDL Cholesterol: 38 mg/dL      Total Cholesterol: 244 mg/dL              Video-Visit Details    Type of service:  Video Visit   Originating Location (pt. Location): Other work    Distant Location (provider location):  On-site  Platform used for Video Visit: Rylan  Signed Electronically by: Alex Nichols MD

## 2024-10-04 ENCOUNTER — E-VISIT (OUTPATIENT)
Dept: FAMILY MEDICINE | Facility: CLINIC | Age: 49
End: 2024-10-04
Payer: COMMERCIAL

## 2024-10-04 DIAGNOSIS — J45.50 SEVERE PERSISTENT ASTHMA WITHOUT COMPLICATION (H): ICD-10-CM

## 2024-10-04 DIAGNOSIS — J30.89 SEASONAL ALLERGIC RHINITIS DUE TO OTHER ALLERGIC TRIGGER: ICD-10-CM

## 2024-10-04 DIAGNOSIS — R06.2 WHEEZE: ICD-10-CM

## 2024-10-04 PROCEDURE — 99207 PR NON-BILLABLE SERV PER CHARTING: CPT | Performed by: FAMILY MEDICINE

## 2024-10-04 RX ORDER — IPRATROPIUM BROMIDE AND ALBUTEROL SULFATE 2.5; .5 MG/3ML; MG/3ML
1 SOLUTION RESPIRATORY (INHALATION) EVERY 4 HOURS PRN
Qty: 270 ML | Refills: 3 | Status: SHIPPED | OUTPATIENT
Start: 2024-10-04

## 2024-10-04 RX ORDER — FEXOFENADINE HCL AND PSEUDOEPHEDRINE HCL 180; 240 MG/1; MG/1
1 TABLET, EXTENDED RELEASE ORAL PRN
Qty: 30 TABLET | Refills: 11 | Status: SHIPPED | OUTPATIENT
Start: 2024-10-04 | End: 2024-10-05

## 2024-10-04 RX ORDER — ALBUTEROL SULFATE 0.83 MG/ML
2.5 SOLUTION RESPIRATORY (INHALATION) EVERY 4 HOURS PRN
Qty: 120 ML | Refills: 1 | Status: SHIPPED | OUTPATIENT
Start: 2024-10-04

## 2024-10-04 RX ORDER — ALBUTEROL SULFATE 90 UG/1
2 INHALANT RESPIRATORY (INHALATION) EVERY 4 HOURS PRN
Qty: 18 G | Refills: 11 | Status: SHIPPED | OUTPATIENT
Start: 2024-10-04

## 2024-10-05 RX ORDER — FEXOFENADINE HCL AND PSEUDOEPHEDRINE HCL 180; 240 MG/1; MG/1
1 TABLET, EXTENDED RELEASE ORAL PRN
Qty: 30 TABLET | Refills: 11 | Status: SHIPPED | OUTPATIENT
Start: 2024-10-05

## 2024-10-10 ENCOUNTER — ANCILLARY PROCEDURE (OUTPATIENT)
Dept: ULTRASOUND IMAGING | Facility: CLINIC | Age: 49
End: 2024-10-10
Attending: FAMILY MEDICINE
Payer: COMMERCIAL

## 2024-10-10 DIAGNOSIS — K76.0 FATTY LIVER DISEASE, NONALCOHOLIC: ICD-10-CM

## 2024-10-10 PROCEDURE — 76981 USE PARENCHYMA: CPT | Performed by: RADIOLOGY

## 2024-10-31 ENCOUNTER — TELEPHONE (OUTPATIENT)
Dept: FAMILY MEDICINE | Facility: CLINIC | Age: 49
End: 2024-10-31

## 2024-10-31 NOTE — TELEPHONE ENCOUNTER
Patient Quality Outreach    Patient is due for the following:   Physical Annual Wellness Visit    Next Steps:   Schedule a Adult Preventative    Type of outreach:    Sent Embedded Chat message.      Questions for provider review:    None           Bridgett Stanley [facility]

## 2024-12-10 ENCOUNTER — OFFICE VISIT (OUTPATIENT)
Dept: ALLERGY | Facility: CLINIC | Age: 49
End: 2024-12-10
Attending: INTERNAL MEDICINE
Payer: COMMERCIAL

## 2024-12-10 VITALS
DIASTOLIC BLOOD PRESSURE: 101 MMHG | OXYGEN SATURATION: 95 % | SYSTOLIC BLOOD PRESSURE: 164 MMHG | BODY MASS INDEX: 40.14 KG/M2 | WEIGHT: 256.3 LBS | HEART RATE: 92 BPM

## 2024-12-10 DIAGNOSIS — J30.1 SEASONAL ALLERGIC RHINITIS DUE TO POLLEN: ICD-10-CM

## 2024-12-10 DIAGNOSIS — R06.02 SHORTNESS OF BREATH: Primary | ICD-10-CM

## 2024-12-10 DIAGNOSIS — J45.50 SEVERE PERSISTENT ASTHMA WITHOUT COMPLICATION (H): ICD-10-CM

## 2024-12-10 PROCEDURE — 99214 OFFICE O/P EST MOD 30 MIN: CPT | Performed by: INTERNAL MEDICINE

## 2024-12-10 RX ORDER — PREDNISONE 10 MG/1
TABLET ORAL
Qty: 21 TABLET | Refills: 0 | Status: SHIPPED | OUTPATIENT
Start: 2024-12-10

## 2024-12-10 RX ORDER — AZITHROMYCIN 250 MG/1
TABLET, FILM COATED ORAL
Qty: 6 TABLET | Refills: 0 | Status: SHIPPED | OUTPATIENT
Start: 2024-12-10 | End: 2024-12-15

## 2024-12-10 ASSESSMENT — ASTHMA QUESTIONNAIRES
EMERGENCY_ROOM_LAST_YEAR_TOTAL: ONE
QUESTION_5 LAST FOUR WEEKS HOW WOULD YOU RATE YOUR ASTHMA CONTROL: POORLY CONTROLLED
QUESTION_2 LAST FOUR WEEKS HOW OFTEN HAVE YOU HAD SHORTNESS OF BREATH: ONCE OR TWICE A WEEK
QUESTION_3 LAST FOUR WEEKS HOW OFTEN DID YOUR ASTHMA SYMPTOMS (WHEEZING, COUGHING, SHORTNESS OF BREATH, CHEST TIGHTNESS OR PAIN) WAKE YOU UP AT NIGHT OR EARLIER THAN USUAL IN THE MORNING: TWO OR THREE NIGHTS A WEEK
QUESTION_4 LAST FOUR WEEKS HOW OFTEN HAVE YOU USED YOUR RESCUE INHALER OR NEBULIZER MEDICATION (SUCH AS ALBUTEROL): ONE OR TWO TIMES PER DAY
ACT_TOTALSCORE: 15
ACT_TOTALSCORE: 15
HOSPITALIZATION_OVERNIGHT_LAST_YEAR_TOTAL: ONE
QUESTION_1 LAST FOUR WEEKS HOW MUCH OF THE TIME DID YOUR ASTHMA KEEP YOU FROM GETTING AS MUCH DONE AT WORK, SCHOOL OR AT HOME: NONE OF THE TIME

## 2024-12-10 NOTE — LETTER
12/10/2024      Dano Kennedy  [address]  [place] [address]      Dear Colleague,    Thank you for referring your patient, Dano Kennedy, to the [facility] SPECIALTY [facility]. Please see a copy of my visit note below.    Dano Kennedy was seen in the Allergy Clinic at [facility].    Dano Kennedy is a 49 year old male being seen today for ongoing evaluation of   Severe persistent asthma without complication and allergic rhinitis.  Since the last visit the patient has been doing a little better.    He has significant asthma and despite therapies continue to have daily symptoms of chest tightness and shortness of breath.  At the last appointment he had stopped Trelegy for a few weeks and actually felt better.  We started Symbicort 2 puffs twice daily.  On review of his pulmonary function test his FEV1 was at 67% of predicted with at the pulmonology office but up to 80% predicted when repeated and was repeated at [place] and improved significantly.  Despite that he still has these chest tightness and shortness of breath symptoms and he points to his mid chest.  He does not have any cough or wheezing.  Albuterol and the albuterol nebs both help these symptoms, which he is using multiple times per week.    He was treated with azithromycin at the last appointment with a mild improvement.  He does continue with the omeprazole for reflux which is well-controlled.      Blood test showed a normal eosinophil count of 200.  Previously had an IgE level outside our system which is 52 and normal.  Blood tests were minimally positive to mold and dog and ragweed.    PAST ALLERGY HISTORY:    He has symptoms of itchy eyes, rhinorrhea, wheezing and cough as well as throat clearing and sinus congestion.  He has been followed by a pulmonologist at [facility].  He has a history of sleep apnea.  He was hospitalized for Legionella pneumonia.      He has seen Dr. Ekta soto  allergist and did have testing is positive to cat and dog and mold and ragweed.    He takes omeprazole 20 mg in the morning for reflux which is currently well-controlled.  He was vomiting at night due to reflux.    He has been treated with prednisone most of his life intermittently.  He does get jittery and cannot sleep and is irritable when on prednisone.    He has had a sinus CT scan in [place] which was relatively normal.  He has had sinus surgery in the past in 2 different occasions.    A CT scan of his chest in October 2, 2023 showed a mosaic pattern in his lower lobes.      The allergist that he saw (Dr. Dash) wanted him to eat vegetables daily at [facility] and take supplements.    Past Medical History:   Diagnosis Date     Allergic rhinitis, cause unspecified      Asthma      Complication of anesthesia     did not respond well to succs     Prostate infection      Unspecified asthma(493.90)      Family History   Problem Relation Age of Onset     Hyperlipidemia Mother      Sleep Apnea Mother      Glaucoma Father      Glaucoma Maternal Grandfather      Macular Degeneration No family hx of      Past Surgical History:   Procedure Laterality Date     COLONOSCOPY       DAVINCI XI ASSISTED RESECTION RECTOSIGMOID N/A 11/26/2019    Procedure: ROBOTIC ASSISTED SIGMOID COLECTOMY. MOBILIZATION OF SPLENIC FLESURE;  Surgeon: Travis Brand MD;  Location:  OR     ENDOSCOPIC POLYPECTOMY NASAL       ESOPHAGOSCOPY, GASTROSCOPY, DUODENOSCOPY (EGD), COMBINED N/A 11/19/2021    Procedure: ESOPHAGOGASTRODUODENOSCOPY, WITH BIOPSIES using cold forceps;  Surgeon: Santhosh Harmon MD;  Location:  GI     ESOPHAGOSCOPY, GASTROSCOPY, DUODENOSCOPY (EGD), COMBINED N/A 11/08/2023    Procedure: Esophagoscopy, gastroscopy, duodenoscopy (EGD), combined;  Surgeon: Jose Spencer MD;  Location: [facility]     LAPAROSCOPIC ASSISTED COLECTOMY       LASER HOLMIUM LITHOTRIPSY URETER(S), INSERT STENT, COMBINED Left 01/14/2015    Procedure:  COMBINED CYSTOSCOPY, URETEROSCOPY, LASER HOLMIUM LITHOTRIPSY URETER(S), INSERT STENT;  Surgeon: Derrek Cobb MD;  Location: [facility] OR     LITHOTRIPSY       SEPTOPLASTY           Current Outpatient Medications:      acetaminophen (TYLENOL) 500 MG tablet, Take 1,000 mg by mouth every 6 hours as needed for mild pain, Disp: , Rfl:      albuterol (PROAIR HFA/PROVENTIL HFA/VENTOLIN HFA) 108 (90 Base) MCG/ACT inhaler, Inhale 2 puffs into the lungs every 4 hours as needed for shortness of breath., Disp: 18 g, Rfl: 11     albuterol (PROVENTIL) (2.5 MG/3ML) 0.083% neb solution, Take 1 vial (2.5 mg) by nebulization every 4 hours as needed for shortness of breath or wheezing., Disp: 120 mL, Rfl: 1     azithromycin (ZITHROMAX) 250 MG tablet, Take 2 tablets (500 mg) by mouth daily for 1 day, THEN 1 tablet (250 mg) daily for 4 days., Disp: 6 tablet, Rfl: 0     budesonide-formoterol (SYMBICORT) 160-4.5 MCG/ACT Inhaler, Inhale 2 puffs into the lungs 2 times daily., Disp: 10.2 g, Rfl: 5     fexofenadine-pseudoePHEDrine (ALLEGRA-D 24) 180-240 MG 24 hr tablet, Take 1 tablet by mouth as needed for allergies., Disp: 30 tablet, Rfl: 11     ibuprofen (ADVIL/MOTRIN) 200 MG tablet, Take 600 mg by mouth every 6 hours as needed for pain, Disp: , Rfl:      ipratropium - albuterol 0.5 mg/2.5 mg/3 mL (DUONEB) 0.5-2.5 (3) MG/3ML neb solution, Take 1 vial (3 mLs) by nebulization every 4 hours as needed for shortness of breath, wheezing or cough., Disp: 270 mL, Rfl: 3     predniSONE (DELTASONE) 10 MG tablet, Take 2 tabs daily x 7 days, then 1 tab daily x 7 days, Disp: 21 tablet, Rfl: 0     scopolamine (TRANSDERM) 1 MG/3DAYS 72 hr patch, Place 1 patch onto the skin every 72 hours (Patient taking differently: Place 1 patch onto the skin every 72 hours. PRN), Disp: 24 patch, Rfl: 0     fexofenadine-pseudoePHEDrine (ALLEGRA-D ALLERGY & CONGESTION) 180-240 MG 24 hr tablet, TAKE 1 TABLET BY MOUTH EVERY DAY AS NEEDED, Disp: 90 tablet, Rfl: 1      fluticasone-vilanterol (BREO ELLIPTA) 200-25 MCG/ACT inhaler, Inhale 1 puff into the lungs daily (Patient not taking: Reported on 12/10/2024), Disp: 60 each, Rfl: 11     TRELEGY ELLIPTA 200-62.5-25 MCG/ACT oral inhaler, , Disp: , Rfl:   Allergies   Allergen Reactions     Bee Pollen Anaphylaxis     Cats Difficulty breathing     Mold      Succinylcholine Other (See Comments)     Convulsions, muscle pain     Trees          EXAM:   BP (!) 164/101   Pulse 92   Wt 116.3 kg (256 lb 4.8 oz)   SpO2 95%   BMI 40.14 kg/m      Constitutional:       General: He is not in acute distress.     Appearance: Normal appearance. He is not ill-appearing.   HENT:      Head: Normocephalic and atraumatic.      Nose: Nose normal. No congestion or rhinorrhea.      Mouth/Throat:      Mouth: Mucous membranes are moist.      Pharynx: Oropharynx is clear. No posterior oropharyngeal erythema.   Eyes:      General:         Right eye: No discharge.         Left eye: No discharge.   Cardiovascular:      Rate and Rhythm: Normal rate and regular rhythm.      Heart sounds: Normal heart sounds.   Pulmonary:      Effort: Pulmonary effort is normal.      Breath sounds: Normal breath sounds. No wheezing or rhonchi.   Skin:     General: Skin is warm.      Findings: No erythema or rash.   Neurological:      General: No focal deficit present.      Mental Status: He is alert. Mental status is at baseline.   Psychiatric:         Mood and Affect: Mood normal.         Behavior: Behavior normal.      WORKUP: Has a normal chest CT scan which is performed since last seen.  He has a relatively normal sinus CT scan and a normal spirometry and exhaled nitric oxide recently.  Eosinophils are also normal.    ASSESSMENT/PLAN:  Dano Kennedy is a 49 year old male seen today for severe persistent asthma with continued mid chest tightening and shortness of breath.  Interestingly his breathing test have significantly improved yet he continues to have symptoms.  He does  respond to albuterol.  I am concerned that there may be alternative diagnoses contributing to his symptoms.  Will refer to the [facility] voice clinic for possible vocal cord dysfunction or laryngeal spasm contributing to his symptoms.      Will also message his primary to see if any cardiac evaluation needs to take place.  Will consider biologic therapies after a trial of prednisone with azithromycin at today's visit.  If he symptoms significantly improved that would be helpful to confirm asthma is contributing to his symptoms and justify biologic therapy.    Follow-up in 1 month      Thank you for allowing me to participate in the care of Dano Kennedy.      I spent 40 minutes on the date of the encounter doing chart review, history and exam, documentation and further coordination as noted above exclusive of separately reported interpretations    Francis Kay MD  Allergy/Immunology  [facility]      Again, thank you for allowing me to participate in the care of your patient.        Sincerely,        Francis Kay MD

## 2024-12-10 NOTE — PATIENT INSTRUCTIONS
Symbicort 2 puffs twice daily  Consider allergy shots or biologics  Azithro x 5 days with prednisone x 2 weeks  The [facility] voice clinic referral was placed  Omeprazole to continue

## 2024-12-10 NOTE — PROGRESS NOTES
Dano Kennedy was seen in the Allergy Clinic at [facility].    Dano Kennedy is a 49 year old male being seen today for ongoing evaluation of   Severe persistent asthma without complication and allergic rhinitis.  Since the last visit the patient has been doing a little better.    He has significant asthma and despite therapies continue to have daily symptoms of chest tightness and shortness of breath.  At the last appointment he had stopped Trelegy for a few weeks and actually felt better.  We started Symbicort 2 puffs twice daily.  On review of his pulmonary function test his FEV1 was at 67% of predicted with at the pulmonology office but up to 80% predicted when repeated and was repeated at [place] and improved significantly.  Despite that he still has these chest tightness and shortness of breath symptoms and he points to his mid chest.  He does not have any cough or wheezing.  Albuterol and the albuterol nebs both help these symptoms, which he is using multiple times per week.    He was treated with azithromycin at the last appointment with a mild improvement.  He does continue with the omeprazole for reflux which is well-controlled.      Blood test showed a normal eosinophil count of 200.  Previously had an IgE level outside our system which is 52 and normal.  Blood tests were minimally positive to mold and dog and ragweed.    PAST ALLERGY HISTORY:    He has symptoms of itchy eyes, rhinorrhea, wheezing and cough as well as throat clearing and sinus congestion.  He has been followed by a pulmonologist at [facility].  He has a history of sleep apnea.  He was hospitalized for Legionella pneumonia.      He has seen Dr. Dash an allergist and did have testing is positive to cat and dog and mold and ragweed.    He takes omeprazole 20 mg in the morning for reflux which is currently well-controlled.  He was vomiting at night due to reflux.    He has been treated with prednisone most of  his life intermittently.  He does get jittery and cannot sleep and is irritable when on prednisone.    He has had a sinus CT scan in June which was relatively normal.  He has had sinus surgery in the past in 2 different occasions.    A CT scan of his chest in October 2, 2023 showed a mosaic pattern in his lower lobes.      The allergist that he saw (Dr. Dash) wanted him to eat vegetables daily at [employer] and take supplements.    Past Medical History:   Diagnosis Date    Allergic rhinitis, cause unspecified     Asthma     Complication of anesthesia     did not respond well to succs    Prostate infection     Unspecified asthma(493.90)      Family History   Problem Relation Age of Onset    Hyperlipidemia Mother     Sleep Apnea Mother     Glaucoma Father     Glaucoma Maternal Grandfather     Macular Degeneration No family hx of      Past Surgical History:   Procedure Laterality Date    COLONOSCOPY      DAVINCI XI ASSISTED RESECTION RECTOSIGMOID N/A 11/26/2019    Procedure: ROBOTIC ASSISTED SIGMOID COLECTOMY. MOBILIZATION OF SPLENIC FLESURE;  Surgeon: Travis Brand MD;  Location:  OR    ENDOSCOPIC POLYPECTOMY NASAL      ESOPHAGOSCOPY, GASTROSCOPY, DUODENOSCOPY (EGD), COMBINED N/A 11/19/2021    Procedure: ESOPHAGOGASTRODUODENOSCOPY, WITH BIOPSIES using cold forceps;  Surgeon: Santhosh Harmon MD;  Location:  GI    ESOPHAGOSCOPY, GASTROSCOPY, DUODENOSCOPY (EGD), COMBINED N/A 11/08/2023    Procedure: Esophagoscopy, gastroscopy, duodenoscopy (EGD), combined;  Surgeon: Jose Spencer MD;  Location: [facility]    LAPAROSCOPIC ASSISTED COLECTOMY      LASER HOLMIUM LITHOTRIPSY URETER(S), INSERT STENT, COMBINED Left 01/14/2015    Procedure: COMBINED CYSTOSCOPY, URETEROSCOPY, LASER HOLMIUM LITHOTRIPSY URETER(S), INSERT STENT;  Surgeon: Derrek Cobb MD;  Location: [facility] OR    LITHOTRIPSY      SEPTOPLASTY           Current Outpatient Medications:     acetaminophen (TYLENOL) 500 MG tablet, Take 1,000 mg by mouth  every 6 hours as needed for mild pain, Disp: , Rfl:     albuterol (PROAIR HFA/PROVENTIL HFA/VENTOLIN HFA) 108 (90 Base) MCG/ACT inhaler, Inhale 2 puffs into the lungs every 4 hours as needed for shortness of breath., Disp: 18 g, Rfl: 11    albuterol (PROVENTIL) (2.5 MG/3ML) 0.083% neb solution, Take 1 vial (2.5 mg) by nebulization every 4 hours as needed for shortness of breath or wheezing., Disp: 120 mL, Rfl: 1    azithromycin (ZITHROMAX) 250 MG tablet, Take 2 tablets (500 mg) by mouth daily for 1 day, THEN 1 tablet (250 mg) daily for 4 days., Disp: 6 tablet, Rfl: 0    budesonide-formoterol (SYMBICORT) 160-4.5 MCG/ACT Inhaler, Inhale 2 puffs into the lungs 2 times daily., Disp: 10.2 g, Rfl: 5    fexofenadine-pseudoePHEDrine (ALLEGRA-D 24) 180-240 MG 24 hr tablet, Take 1 tablet by mouth as needed for allergies., Disp: 30 tablet, Rfl: 11    ibuprofen (ADVIL/MOTRIN) 200 MG tablet, Take 600 mg by mouth every 6 hours as needed for pain, Disp: , Rfl:     ipratropium - albuterol 0.5 mg/2.5 mg/3 mL (DUONEB) 0.5-2.5 (3) MG/3ML neb solution, Take 1 vial (3 mLs) by nebulization every 4 hours as needed for shortness of breath, wheezing or cough., Disp: 270 mL, Rfl: 3    predniSONE (DELTASONE) 10 MG tablet, Take 2 tabs daily x 7 days, then 1 tab daily x 7 days, Disp: 21 tablet, Rfl: 0    scopolamine (TRANSDERM) 1 MG/3DAYS 72 hr patch, Place 1 patch onto the skin every 72 hours (Patient taking differently: Place 1 patch onto the skin every 72 hours. PRN), Disp: 24 patch, Rfl: 0    fexofenadine-pseudoePHEDrine (ALLEGRA-D ALLERGY & CONGESTION) 180-240 MG 24 hr tablet, TAKE 1 TABLET BY MOUTH EVERY DAY AS NEEDED, Disp: 90 tablet, Rfl: 1    fluticasone-vilanterol (BREO ELLIPTA) 200-25 MCG/ACT inhaler, Inhale 1 puff into the lungs daily (Patient not taking: Reported on 12/10/2024), Disp: 60 each, Rfl: 11    TRELEGY ELLIPTA 200-62.5-25 MCG/ACT oral inhaler, , Disp: , Rfl:   Allergies   Allergen Reactions    Bee Pollen Anaphylaxis     Cats Difficulty breathing    Mold     Succinylcholine Other (See Comments)     Convulsions, muscle pain    Trees          EXAM:   BP (!) 164/101   Pulse 92   Wt 116.3 kg (256 lb 4.8 oz)   SpO2 95%   BMI 40.14 kg/m      Constitutional:       General: He is not in acute distress.     Appearance: Normal appearance. He is not ill-appearing.   HENT:      Head: Normocephalic and atraumatic.      Nose: Nose normal. No congestion or rhinorrhea.      Mouth/Throat:      Mouth: Mucous membranes are moist.      Pharynx: Oropharynx is clear. No posterior oropharyngeal erythema.   Eyes:      General:         Right eye: No discharge.         Left eye: No discharge.   Cardiovascular:      Rate and Rhythm: Normal rate and regular rhythm.      Heart sounds: Normal heart sounds.   Pulmonary:      Effort: Pulmonary effort is normal.      Breath sounds: Normal breath sounds. No wheezing or rhonchi.   Skin:     General: Skin is warm.      Findings: No erythema or rash.   Neurological:      General: No focal deficit present.      Mental Status: He is alert. Mental status is at baseline.   Psychiatric:         Mood and Affect: Mood normal.         Behavior: Behavior normal.      WORKUP: Has a normal chest CT scan which is performed since last seen.  He has a relatively normal sinus CT scan and a normal spirometry and exhaled nitric oxide recently.  Eosinophils are also normal.    ASSESSMENT/PLAN:  Dano Kennedy is a 49 year old male seen today for severe persistent asthma with continued mid chest tightening and shortness of breath.  Interestingly his breathing test have significantly improved yet he continues to have symptoms.  He does respond to albuterol.  I am concerned that there may be alternative diagnoses contributing to his symptoms.  Will refer to the [facility] voice clinic for possible vocal cord dysfunction or laryngeal spasm contributing to his symptoms.      Will also message his primary to see if any cardiac evaluation  needs to take place.  Will consider biologic therapies after a trial of prednisone with azithromycin at today's visit.  If he symptoms significantly improved that would be helpful to confirm asthma is contributing to his symptoms and justify biologic therapy.    Follow-up in 1 month      Thank you for allowing me to participate in the care of Dano Kennedy.      I spent 40 minutes on the date of the encounter doing chart review, history and exam, documentation and further coordination as noted above exclusive of separately reported interpretations    Francis Kay MD  Allergy/Immunology  [facility]

## 2025-02-15 ENCOUNTER — OFFICE VISIT (OUTPATIENT)
Dept: URGENT CARE | Facility: URGENT CARE | Age: 50
End: 2025-02-15
Payer: COMMERCIAL

## 2025-02-15 ENCOUNTER — TELEPHONE (OUTPATIENT)
Dept: FAMILY MEDICINE | Facility: CLINIC | Age: 50
End: 2025-02-15

## 2025-02-15 ENCOUNTER — E-VISIT (OUTPATIENT)
Dept: FAMILY MEDICINE | Facility: CLINIC | Age: 50
End: 2025-02-15
Payer: COMMERCIAL

## 2025-02-15 ENCOUNTER — ANCILLARY PROCEDURE (OUTPATIENT)
Dept: GENERAL RADIOLOGY | Facility: CLINIC | Age: 50
End: 2025-02-15
Attending: PHYSICIAN ASSISTANT
Payer: COMMERCIAL

## 2025-02-15 VITALS
BODY MASS INDEX: 38.53 KG/M2 | SYSTOLIC BLOOD PRESSURE: 149 MMHG | WEIGHT: 246 LBS | TEMPERATURE: 98.6 F | RESPIRATION RATE: 16 BRPM | DIASTOLIC BLOOD PRESSURE: 81 MMHG | HEART RATE: 110 BPM | OXYGEN SATURATION: 95 %

## 2025-02-15 DIAGNOSIS — J20.8 ACUTE BACTERIAL BRONCHITIS: ICD-10-CM

## 2025-02-15 DIAGNOSIS — B96.89 ACUTE BACTERIAL BRONCHITIS: ICD-10-CM

## 2025-02-15 DIAGNOSIS — R05.1 ACUTE COUGH: Primary | ICD-10-CM

## 2025-02-15 DIAGNOSIS — J45.901 MILD ASTHMA WITH EXACERBATION, UNSPECIFIED WHETHER PERSISTENT: ICD-10-CM

## 2025-02-15 DIAGNOSIS — J06.9 UPPER RESPIRATORY TRACT INFECTION, UNSPECIFIED TYPE: Primary | ICD-10-CM

## 2025-02-15 DIAGNOSIS — R05.1 ACUTE COUGH: ICD-10-CM

## 2025-02-15 LAB
FLUAV AG SPEC QL IA: NEGATIVE
FLUBV AG SPEC QL IA: NEGATIVE

## 2025-02-15 PROCEDURE — 87804 INFLUENZA ASSAY W/OPTIC: CPT | Performed by: PHYSICIAN ASSISTANT

## 2025-02-15 PROCEDURE — 71046 X-RAY EXAM CHEST 2 VIEWS: CPT | Mod: TC | Performed by: RADIOLOGY

## 2025-02-15 PROCEDURE — 99207 PR NON-BILLABLE SERV PER CHARTING: CPT | Performed by: FAMILY MEDICINE

## 2025-02-15 PROCEDURE — 99214 OFFICE O/P EST MOD 30 MIN: CPT | Performed by: PHYSICIAN ASSISTANT

## 2025-02-15 RX ORDER — ALBUTEROL SULFATE 90 UG/1
2 INHALANT RESPIRATORY (INHALATION) EVERY 6 HOURS
Qty: 8.5 G | Refills: 0 | Status: SHIPPED | OUTPATIENT
Start: 2025-02-15

## 2025-02-15 RX ORDER — BENZONATATE 200 MG/1
200 CAPSULE ORAL 3 TIMES DAILY PRN
Qty: 21 CAPSULE | Refills: 0 | Status: SHIPPED | OUTPATIENT
Start: 2025-02-15 | End: 2025-02-22

## 2025-02-15 RX ORDER — DOXYCYCLINE 100 MG/1
100 CAPSULE ORAL 2 TIMES DAILY
Qty: 20 CAPSULE | Refills: 0 | Status: SHIPPED | OUTPATIENT
Start: 2025-02-15

## 2025-02-15 RX ORDER — PREDNISONE 20 MG/1
20 TABLET ORAL 2 TIMES DAILY
Qty: 10 TABLET | Refills: 0 | Status: SHIPPED | OUTPATIENT
Start: 2025-02-15

## 2025-02-15 NOTE — TELEPHONE ENCOUNTER
FYI - Status Update    Who is Calling: patient    Update: Pt is at [facility] & outside automatic doors will not open. States about 5+ other families have been trying to get the clinics attention through the window for the past 15 min but no one is noticing.     Unable to connect with anyone through backdoor number. No answer.     Does caller want a call/response back: No

## 2025-02-15 NOTE — PROGRESS NOTES
Assessment & Plan     Acute cough    Influenza neg  Chest xray Negative for acute findings, read by Conrado Randle PA-C [facility] at time of visit.    Tessalon for coughing  - Influenza A & B Antigen - Clinic Collect  - XR Chest 2 Views  - benzonatate (TESSALON) 200 MG capsule  Dispense: 21 capsule; Refill: 0    Mild asthma with exacerbation, unspecified whether persistent      Asthma makes it hard for you to breathe. During an asthma attack, the airways swell and narrow. Severe asthma attacks can be dangerous, but you can usually prevent them. Controlling asthma and treating symptoms before they get bad can help you avoid bad attacks.  Take medications as directed.  Follow up with your family doctor if not improving or go to the ED if symptoms worsen.     - XR Chest 2 Views  - predniSONE (DELTASONE) 20 MG tablet  Dispense: 10 tablet; Refill: 0  - albuterol (PROVENTIL HFA) 108 (90 Base) MCG/ACT inhaler  Dispense: 8.5 g; Refill: 0    Acute bacterial bronchitis    Bronchitis is inflammation of the bronchial tubes, which carry air to the lungs. The tubes swell and produce mucus, or phlegm. The mucus and inflamed bronchial tubes make you cough. You may have trouble breathing.  Most cases of bronchitis are caused by viruses but in your case we are more concerned about a bacterial infection. . Antibiotics usually are helpful in this situation to help you clear this bacterial infection.  Bronchitis usually develops rapidly and lasts about 2 to 3 weeks in otherwise healthy people.    - doxycycline monohydrate (MONODOX) 100 MG capsule  Dispense: 20 capsule; Refill: 0         At today's visit with Dano Kennedy , we discussed results, diagnosis, medications and formulated a plan.  We also discussed red flags for immediate return to clinic/ER, as well as indications for follow up with PCP if not improved in 3 days. Patient understood and agreed to plan. Dano Kennedy was discharged with stable vitals and has no further questions.        No follow-ups on file.    Conrado Randle, PAULO, PAMODE  M [facility] FLET[facility]ER Matson is a 49 year old male who presents to clinic today for the following health issues:  Chief Complaint   Patient presents with    Cough     Cough, wheezing, fever and SOB for the last four days. Patient is asthmatic        HPI  Review of Systems  Constitutional, HEENT, cardiovascular, pulmonary, GI, , musculoskeletal, neuro, skin, endocrine and psych systems are negative, except as otherwise noted.      Objective    BP (!) 149/81 (BP Location: Right arm, Patient Position: Sitting, Cuff Size: Adult Large)   Pulse 110   Temp 98.6  F (37  C) (Oral)   Resp 16   Wt 111.6 kg (246 lb)   SpO2 95%   BMI 38.53 kg/m    Physical Exam   GENERAL: alert and no distress  EYES: Eyes grossly normal to inspection, PERRL and conjunctivae and sclerae normal  HENT: ear canals and TM's normal, nose and mouth without ulcers or lesions  NECK: no adenopathy, no asymmetry, masses, or scars  RESP: expiratory wheezes bilateral  CV: regular rate and rhythm, normal S1 S2, no S3 or S4, no murmur, click or rub, no peripheral edema  MS: no gross musculoskeletal defects noted, no edema  SKIN: no suspicious lesions or rashes  NEURO: Normal strength and tone, mentation intact and speech normal  PSYCH: mentation appears normal, affect normal/bright    Chest xray Negative for acute findings, read by Conrado BATES at time of visit.

## 2025-02-15 NOTE — TELEPHONE ENCOUNTER
We had power outage. Doors should be open now.     Sandrine Traore, [facility]

## 2025-02-16 DIAGNOSIS — R06.2 WHEEZE: ICD-10-CM

## 2025-02-16 DIAGNOSIS — J45.50 SEVERE PERSISTENT ASTHMA WITHOUT COMPLICATION (H): ICD-10-CM

## 2025-02-17 RX ORDER — ALBUTEROL SULFATE 0.83 MG/ML
SOLUTION RESPIRATORY (INHALATION)
Qty: 90 ML | Refills: 11 | Status: SHIPPED | OUTPATIENT
Start: 2025-02-17

## 2025-02-17 NOTE — TELEPHONE ENCOUNTER
Provider E-Visit time total (minutes): Referred to in person/virtual visit.  Less than 5 minutes.

## 2025-02-18 NOTE — PROGRESS NOTES
"PULMONOLOGY PROGRESS NOTE  [facility]      SUBJECTIVE  / INTERVAL EVENTS     Slept much better last night. Feeling better this morning. Has WENDY and sees [facility]. Brought ResMed CPAP from home. Optimal compliance limited by poor mask fit.     PHYSICAL EXAMINATION   Vital signs  Temp: 98.1  F (36.7  C) Temp src: Oral BP: 129/79 Pulse: 90   Resp: 20 SpO2: 91 % O2 Device: None (Room air) Oxygen Delivery: 2 LPM Height: 170.2 cm (5' 7\") Weight: 100.5 kg (221 lb 9.6 oz)  Estimated body mass index is 34.71 kg/m  as calculated from the following:    Height as of this encounter: 1.702 m (5' 7\").    Weight as of this encounter: 100.5 kg (221 lb 9.6 oz).  I/O last 3 completed shifts:  In: 1665 [P.O.:860; I.V.:805]  Out: 2500 [Urine:2500]    CONSTITUTIONAL/GENERAL: Alert male. No apparent distress.  EARS,NOSE,THROAT,MOUTH: Trachea midline.   RESPIRATORY: Faint Right basilar crackles. Deeper inspiration than yesterday.   CARDIOVASCULAR: RRR, S1, S2.   PSYCHIATRIC: Appropriate mood and affect.     LABORATORY ASSESSMENT    Arterial Blood GasNo lab results found in last 7 days.  CBC  Recent Labs   Lab 08/29/23  0844 08/28/23  0721 08/27/23  0128   WBC 21.9* 16.2* 25.0*   RBC 4.71 4.34* 4.59   HGB 13.0* 12.0* 12.8*   HCT 40.3 36.3* 39.2*   MCV 86 84 85   MCH 27.6 27.6 27.9   MCHC 32.3 33.1 32.7   RDW 13.0 12.9 13.2    230 284     BMP  Recent Labs   Lab 08/28/23  0721 08/27/23  0128    139   POTASSIUM 4.0 3.7   CHLORIDE 101 101   TAY 8.8 9.2   CO2 24 23   BUN 12.2 20.7*   CR 0.82 0.92   * 113*     INRNo lab results found in last 7 days.   BNPNo lab results found in last 7 days.  VENOUS BLOOD GASESNo lab results found in last 7 days.    IMAGING, ECHO, PFT, SLEEP STUDY, RHC, OTHER TESTING         ASSESSMENT / PLAN      Pulmonary diagnoses (alphabetical):  Abnormal Chest Imaging R91.8  Asthma w/ Exacerbation J45.901  Cough R05  Hypoxemia R09.02  Shortness of Breath R06.02  Legionella PNA.   " Pt with R 6th rib fracture   Pain control with Tylenol ATC and lidocaine patch. Oxycodone PRN with bowel regimen.  Patient unlikely to cooperate with incentive spirometer   PT Eval: [facility]     ASSESSMENT:  48-year-old male never smoker with asthma presenting with Legionella PNA.     Used hot tub 8/17 with 5 other people.  On 8/21 started to feel ill with fever to 103, body aches, and dry cough while on a cruise ship.  Used prednisone and inhaler without improvement.  Cruise ship physician checked CXR which reportedly showed LLL opacity and urine Legionella which was reportedly positive.  Patient received azithromycin and Levaquin.  Seen in hospital in [place] 8/23; completed 4 doses of azithromycin and Levaquin, and received 4-5 doses of steroids.  Fever resolved on day of discharge.  Returned to [place] 8/26.       Seen in ER 8/27 with WBC 25k and elevated LFTs.  Urine Legionella negative here.  Sputum culture 4+ GPC. CT chest showed bibasilar patchy consolidations with GGO in the mid and upper lobes. Currently receiving Levaquin.  Reported chest tightness which improves with DuoNeb.  Not producing mucus.  On 8/28 switched to budesonide neb, continued DuoNeb yesterday, and started systemic steroids.     Slept much better last night. Feeling better this morning. Has WENDY and sees [facility]. Brought ResMed CPAP from home. Optimal compliance limited by poor mask fit.         PLAN:   Continue budesonide neb 0.5 mg twice daily and DuoNeb 4 times daily.   Aerobika after nebs.  Continue Solumedrol 40mg daily. Will eventually switch to Prednisone taper prior to discharge.   Follow up sputum culture.   Antibiotics per ID.  Incentive spirometry 10 times/hour while awake.  Goal SpO2 >90%. Wean as able.   HOB >30 .  Encourage OOB as appropriate.  CPAP when sleeping. Work with RT to ensure proper mask fit.   Will need outpatient Pulmonary follow up for PFTs and repeat imaging. Provided the patient with our contact information to schedule an appt with [facility]. Info also placed in Discharge tab.         Maycol Tee MD    [facility] / [facility]  Office:  [phone]  Pager: [phone]           Patient intermittently retains urine requiring straight cath, possibly 2/2 bph  - removed traore earlier in the hospitalization  - check bladder scans q8  - straight cath as needed  - continue doxazosin and finasteride Patient intermittently retains urine requiring straight cath, possibly 2/2 bph  - removed traore earlier in the hospitalization  - check bladder scans q8  - straight cath as needed  - continue doxazosin and finasteride Pt with R 6th rib fracture   Pain control with Tylenol ATC and lidocaine patch. Oxycodone PRN with bowel regimen.  Patient unlikely to cooperate with incentive spirometer   PT Eval: [facility] vs home Patient intermittently retains urine requiring straight cath, possibly 2/2 bph  - removed traore earlier in the hospitalization  - check bladder scans q8  - straight cath as needed  - continue doxazosin and finasteride Pt with GGO on imaging   Pt with leukocytosis and hypoxia sating 92% on 2L NC -> now on room air   s/p 5 days of ceftriaxone for pna  Blood Clx NGTD Patient intermittently retains urine requiring straight cath, possibly 2/2 bph  - removed traore earlier in the hospitalization  - check bladder scans q8  - straight cath as needed  - continue doxazosin and finasteride Pt with R 6th rib fracture   Pain control with Tylenol ATC and lidocaine patch. Oxycodone PRN with bowel regimen.  Patient unlikely to cooperate with incentive spirometer   PT Eval: [facility]LE - Pt's 2 sons Je Evans and Jamil Evans acting as healthcare surrogates  - Code status DNR/I   - GOC: transition to full comfort measures with transfer to PCU. Stopping daily labs, fingersticks, NGT/TFs and non-essential oral meds to optimize comfort C/w TLSO brace  Pain control with tylenol and lidocaine patch   Sx follow up if MRI T/L spine is indicated as pt will not be able to tolerate Patient intermittently retains urine requiring straight cath, possibly 2/2 bph  - removed traore earlier in the hospitalization  - check bladder scans q8  - straight cath as needed  - continue doxazosin and finasteride Pt with R 6th rib fracture   Pain control with Tylenol ATC and lidocaine patch. Oxycodone PRN with bowel regimen.  Patient unlikely to cooperate with incentive spirometer   PT Eval: [facility] - Pt's 2 sons Je Evans and Jamil Evans acting as healthcare surrogates  - Code status DNR/I  - GOC: continue current course with close monitoring of hemodynamics, family would want to resume trial of NGT if pt is deemed safe and stable, for nutrition optimization. Further discuss GOC pending course.

## 2025-03-17 ENCOUNTER — MYC MEDICAL ADVICE (OUTPATIENT)
Dept: FAMILY MEDICINE | Facility: CLINIC | Age: 50
End: 2025-03-17
Payer: COMMERCIAL

## 2025-03-18 ENCOUNTER — E-VISIT (OUTPATIENT)
Dept: FAMILY MEDICINE | Facility: CLINIC | Age: 50
End: 2025-03-18
Payer: COMMERCIAL

## 2025-03-18 DIAGNOSIS — Z87.898 HISTORY OF MOTION SICKNESS: Primary | ICD-10-CM

## 2025-03-18 RX ORDER — ONDANSETRON 4 MG/1
4 TABLET, ORALLY DISINTEGRATING ORAL EVERY 8 HOURS PRN
Qty: 30 TABLET | Refills: 0 | Status: SHIPPED | OUTPATIENT
Start: 2025-03-18

## 2025-04-19 DIAGNOSIS — J30.89 SEASONAL ALLERGIC RHINITIS DUE TO OTHER ALLERGIC TRIGGER: ICD-10-CM

## 2025-04-21 RX ORDER — FEXOFENADINE HYDROCHLORIDE AND PSEUDOEPHEDRINE HYDROCHLORIDE 180; 240 MG/1; MG/1
TABLET, FILM COATED, EXTENDED RELEASE ORAL
Qty: 30 TABLET | Refills: 11 | Status: SHIPPED | OUTPATIENT
Start: 2025-04-21

## 2025-06-14 ENCOUNTER — HEALTH MAINTENANCE LETTER (OUTPATIENT)
Age: 50
End: 2025-06-14

## 2025-07-11 ENCOUNTER — TRANSFERRED RECORDS (OUTPATIENT)
Dept: HEALTH INFORMATION MANAGEMENT | Facility: CLINIC | Age: 50
End: 2025-07-11
Payer: COMMERCIAL

## (undated) DEVICE — DRAPE BREAST/CHEST 29420

## (undated) DEVICE — ESU PENCIL W/HOLSTER E2350H

## (undated) DEVICE — ENDO FORCEP ENDOJAW BIOPSY 2.8MMX160CM FB-220K

## (undated) DEVICE — DAVINCI SEAL CANNULA AND STPL 12MM 470380

## (undated) DEVICE — ESU HOLDER LAP INST DISP PURPLE LONG 330MM H-PRO-330

## (undated) DEVICE — SU VICRYL 3-0 TIE 12X18" J904T

## (undated) DEVICE — DAVINCI XI DRAPE ARM 470015

## (undated) DEVICE — SPONGE LAP 18X18" X8435

## (undated) DEVICE — ENDO BITE BLOCK ADULT OLYMPUS LATEX FREE MAJ-1632

## (undated) DEVICE — STPL CIRCULAR DST 28MM W/TILT TIP EEA28

## (undated) DEVICE — SUCTION IRR STRYKERFLOW II W/TIP 250-070-520

## (undated) DEVICE — SPONGE RAY-TEC 4X8" 7318

## (undated) DEVICE — SOL NACL 0.9% IRRIG 1000ML BOTTLE 2F7124

## (undated) DEVICE — PACK DAVINCI UROLOGY SBA15UDFSG

## (undated) DEVICE — GLOVE PROTEXIS W/NEU-THERA 7.5  2D73TE75

## (undated) DEVICE — GLOVE PROTEXIS BLUE W/NEU-THERA 7.5  2D73EB75

## (undated) DEVICE — Device

## (undated) DEVICE — DAVINCI XI SEAL UNIVERSAL 5-8MM 470361

## (undated) DEVICE — BLADE KNIFE SURG 10 371110

## (undated) DEVICE — KIT ENDO TURNOVER/PROCEDURE W/CLEAN A SCOPE LINERS 103888

## (undated) DEVICE — ENDO TROCAR CONMED AIRSEAL BLADELESS 08X120MM IAS8-120LP

## (undated) DEVICE — ESU GROUND PAD UNIVERSAL W/O CORD

## (undated) DEVICE — GLOVE PROTEXIS BLUE W/NEU-THERA 6.5  2D73EB65

## (undated) DEVICE — SU VICRYL 0 TIE 12X18" J906G

## (undated) DEVICE — DEVICE SUTURE GRASPER TROCAR CLOSURE 14GA PMITCSG

## (undated) DEVICE — KIT PATIENT POSITIONING PIGAZZI LATEX FREE 40580

## (undated) DEVICE — DAVINCI XI MONOPOLAR SCISSORS HOT SHEARS 8MM 470179

## (undated) DEVICE — DRAPE POUCH IRR 1016

## (undated) DEVICE — DAVINCI XI DRAPE COLUMN 470341

## (undated) DEVICE — SU PDS II 0 CTX 60" Z990G

## (undated) DEVICE — GLOVE PROTEXIS MICRO 6.5  2D73PM65

## (undated) DEVICE — DAVINCI XI NDL DRIVER LARGE 470006

## (undated) DEVICE — TUBING CONMED AIRSEAL SMOKE EVAC INSUFFLATION ASM-EVAC

## (undated) DEVICE — DAVINCI XI OBTURATOR BLADELESS 8MM 470359

## (undated) DEVICE — GOWN IMPERVIOUS SPECIALTY XL/XLONG 39049

## (undated) DEVICE — SUCTION CANISTER MEDIVAC LINER 3000ML W/LID 65651-530

## (undated) DEVICE — LINEN TOWEL PACK X5 5464

## (undated) DEVICE — TAPE CLOTH ADHESIVE 3" LATEX FREE

## (undated) DEVICE — DAVINCI XI REDUCER 8-12MM 470381

## (undated) DEVICE — SU PROLENE 2-0 SHDA 48" 8533H

## (undated) DEVICE — SOL WATER IRRIG 1000ML BOTTLE 2F7114

## (undated) DEVICE — SOL NACL 0.9% INJ 1000ML BAG 2B1324X

## (undated) DEVICE — NDL INSUFFLATION 13GA 120MM C2201

## (undated) DEVICE — TUBING SUCTION 12"X1/4" N612

## (undated) DEVICE — STPL CIRCULAR ENDOPATH 29MM CVD ECS29A

## (undated) DEVICE — SU VICRYL 2-0 SH 27" J317H

## (undated) DEVICE — DAVINCI XI GRASPER FENESTRATED TIP UP 8MM 470347

## (undated) DEVICE — SYR BULB IRRIG 50ML LATEX FREE 0035280

## (undated) DEVICE — DAVINCI HOT SHEARS TIP COVER  400180

## (undated) DEVICE — SU MONOCRYL 4-0 PS-2 18" UND Y496G

## (undated) RX ORDER — FENTANYL CITRATE 50 UG/ML
INJECTION, SOLUTION INTRAMUSCULAR; INTRAVENOUS
Status: DISPENSED
Start: 2019-11-26

## (undated) RX ORDER — HEPARIN SODIUM 5000 [USP'U]/.5ML
INJECTION, SOLUTION INTRAVENOUS; SUBCUTANEOUS
Status: DISPENSED
Start: 2019-11-26

## (undated) RX ORDER — ONDANSETRON 2 MG/ML
INJECTION INTRAMUSCULAR; INTRAVENOUS
Status: DISPENSED
Start: 2019-11-26

## (undated) RX ORDER — BUPIVACAINE HYDROCHLORIDE 5 MG/ML
INJECTION, SOLUTION EPIDURAL; INTRACAUDAL
Status: DISPENSED
Start: 2019-11-26

## (undated) RX ORDER — ALBUTEROL SULFATE 90 UG/1
AEROSOL, METERED RESPIRATORY (INHALATION)
Status: DISPENSED
Start: 2019-11-26

## (undated) RX ORDER — HYDROMORPHONE HYDROCHLORIDE 1 MG/ML
INJECTION, SOLUTION INTRAMUSCULAR; INTRAVENOUS; SUBCUTANEOUS
Status: DISPENSED
Start: 2019-11-26

## (undated) RX ORDER — DEXAMETHASONE SODIUM PHOSPHATE 4 MG/ML
INJECTION, SOLUTION INTRA-ARTICULAR; INTRALESIONAL; INTRAMUSCULAR; INTRAVENOUS; SOFT TISSUE
Status: DISPENSED
Start: 2019-11-26

## (undated) RX ORDER — LIDOCAINE HYDROCHLORIDE 20 MG/ML
INJECTION, SOLUTION EPIDURAL; INFILTRATION; INTRACAUDAL; PERINEURAL
Status: DISPENSED
Start: 2019-11-26

## (undated) RX ORDER — PROPOFOL 10 MG/ML
INJECTION, EMULSION INTRAVENOUS
Status: DISPENSED
Start: 2019-11-26